# Patient Record
Sex: MALE | Race: WHITE | Employment: OTHER
[De-identification: names, ages, dates, MRNs, and addresses within clinical notes are randomized per-mention and may not be internally consistent; named-entity substitution may affect disease eponyms.]

---

## 2017-01-09 ENCOUNTER — CARE COORDINATION (OUTPATIENT)
Dept: CARE COORDINATION | Facility: CLINIC | Age: 52
End: 2017-01-09

## 2017-02-21 ENCOUNTER — CARE COORDINATION (OUTPATIENT)
Dept: CARE COORDINATION | Facility: CLINIC | Age: 52
End: 2017-02-21

## 2017-02-28 DIAGNOSIS — R94.31 ABNORMAL EKG: Primary | ICD-10-CM

## 2017-03-01 ENCOUNTER — CARE COORDINATION (OUTPATIENT)
Dept: CARE COORDINATION | Facility: CLINIC | Age: 52
End: 2017-03-01

## 2017-03-02 RX ORDER — SEVELAMER CARBONATE 800 MG/1
2 TABLET, FILM COATED ORAL
Status: ON HOLD | COMMUNITY
End: 2020-07-13

## 2017-03-02 RX ORDER — OMEPRAZOLE 20 MG/1
20 CAPSULE, DELAYED RELEASE ORAL DAILY
COMMUNITY
End: 2017-06-22 | Stop reason: ALTCHOICE

## 2017-03-02 RX ORDER — NIFEDIPINE 90 MG/1
90 TABLET, EXTENDED RELEASE ORAL DAILY
COMMUNITY
End: 2017-06-22 | Stop reason: ALTCHOICE

## 2017-03-02 RX ORDER — DOCUSATE SODIUM 100 MG/1
100 CAPSULE, LIQUID FILLED ORAL DAILY
COMMUNITY
End: 2017-06-22 | Stop reason: ALTCHOICE

## 2017-03-02 RX ORDER — MINOXIDIL 2.5 MG/1
5 TABLET ORAL DAILY
COMMUNITY
End: 2017-10-30 | Stop reason: DRUGHIGH

## 2017-03-13 RX ORDER — METOPROLOL TARTRATE 100 MG/1
TABLET ORAL
Qty: 60 TABLET | Refills: 5 | Status: SHIPPED | OUTPATIENT
Start: 2017-03-13 | End: 2017-12-05 | Stop reason: SDUPTHER

## 2017-03-16 ENCOUNTER — TELEPHONE (OUTPATIENT)
Dept: FAMILY MEDICINE CLINIC | Age: 52
End: 2017-03-16

## 2017-03-16 ENCOUNTER — CARE COORDINATION (OUTPATIENT)
Dept: CARE COORDINATION | Age: 52
End: 2017-03-16

## 2017-04-25 ENCOUNTER — TELEPHONE (OUTPATIENT)
Dept: OTHER | Age: 52
End: 2017-04-25

## 2017-04-26 ENCOUNTER — CARE COORDINATION (OUTPATIENT)
Dept: CARE COORDINATION | Age: 52
End: 2017-04-26

## 2017-04-27 ENCOUNTER — CARE COORDINATION (OUTPATIENT)
Dept: CARE COORDINATION | Age: 52
End: 2017-04-27

## 2017-05-12 ENCOUNTER — CARE COORDINATION (OUTPATIENT)
Dept: CARE COORDINATION | Age: 52
End: 2017-05-12

## 2017-06-07 ENCOUNTER — ANESTHESIA (OUTPATIENT)
Dept: OPERATING ROOM | Age: 52
End: 2017-06-07
Payer: MEDICARE

## 2017-06-07 ENCOUNTER — HOSPITAL ENCOUNTER (OUTPATIENT)
Age: 52
Setting detail: OUTPATIENT SURGERY
Discharge: HOME | End: 2017-06-07
Attending: SURGERY | Admitting: SURGERY
Payer: MEDICARE

## 2017-06-07 ENCOUNTER — ANESTHESIA EVENT (OUTPATIENT)
Dept: OPERATING ROOM | Age: 52
End: 2017-06-07
Payer: MEDICARE

## 2017-06-07 VITALS — DIASTOLIC BLOOD PRESSURE: 82 MMHG | TEMPERATURE: 96.8 F | SYSTOLIC BLOOD PRESSURE: 138 MMHG | OXYGEN SATURATION: 100 %

## 2017-06-07 VITALS
HEART RATE: 72 BPM | SYSTOLIC BLOOD PRESSURE: 173 MMHG | OXYGEN SATURATION: 97 % | BODY MASS INDEX: 26.48 KG/M2 | WEIGHT: 185 LBS | HEIGHT: 70 IN | TEMPERATURE: 97.5 F | DIASTOLIC BLOOD PRESSURE: 96 MMHG | RESPIRATION RATE: 16 BRPM

## 2017-06-07 LAB
ABSOLUTE EOS #: 0.1 K/UL (ref 0–0.4)
ABSOLUTE LYMPH #: 0.5 K/UL (ref 1–4.8)
ABSOLUTE MONO #: 0.5 K/UL (ref 0.1–1.3)
ANION GAP SERPL CALCULATED.3IONS-SCNC: 23 MMOL/L (ref 9–17)
BASOPHILS # BLD: 1 %
BASOPHILS ABSOLUTE: 0 K/UL (ref 0–0.2)
BUN BLDV-MCNC: 84 MG/DL (ref 6–20)
BUN/CREAT BLD: ABNORMAL (ref 9–20)
CALCIUM SERPL-MCNC: 9.3 MG/DL (ref 8.6–10.4)
CHLORIDE BLD-SCNC: 95 MMOL/L (ref 98–107)
CO2: 18 MMOL/L (ref 20–31)
CREAT SERPL-MCNC: 10.72 MG/DL (ref 0.7–1.2)
DIFFERENTIAL TYPE: ABNORMAL
EOSINOPHILS RELATIVE PERCENT: 1 %
GFR AFRICAN AMERICAN: 6 ML/MIN
GFR NON-AFRICAN AMERICAN: 5 ML/MIN
GFR SERPL CREATININE-BSD FRML MDRD: ABNORMAL ML/MIN/{1.73_M2}
GFR SERPL CREATININE-BSD FRML MDRD: ABNORMAL ML/MIN/{1.73_M2}
GLUCOSE BLD-MCNC: 191 MG/DL (ref 75–110)
GLUCOSE BLD-MCNC: 232 MG/DL (ref 75–110)
GLUCOSE BLD-MCNC: 242 MG/DL (ref 70–99)
HCT VFR BLD CALC: 27.9 % (ref 41–53)
HEMOGLOBIN: 9.2 G/DL (ref 13.5–17.5)
LYMPHOCYTES # BLD: 8 %
MCH RBC QN AUTO: 29.5 PG (ref 26–34)
MCHC RBC AUTO-ENTMCNC: 32.9 G/DL (ref 31–37)
MCV RBC AUTO: 89.5 FL (ref 80–100)
MONOCYTES # BLD: 7 %
PDW BLD-RTO: 14.4 % (ref 11.5–14.9)
PLATELET # BLD: 119 K/UL (ref 150–450)
PLATELET ESTIMATE: ABNORMAL
PMV BLD AUTO: 6.9 FL (ref 6–12)
POTASSIUM SERPL-SCNC: 4.9 MMOL/L (ref 3.7–5.3)
RBC # BLD: 3.12 M/UL (ref 4.5–5.9)
RBC # BLD: ABNORMAL 10*6/UL
SEG NEUTROPHILS: 83 %
SEGMENTED NEUTROPHILS ABSOLUTE COUNT: 5.4 K/UL (ref 1.3–9.1)
SODIUM BLD-SCNC: 136 MMOL/L (ref 135–144)
WBC # BLD: 6.5 K/UL (ref 3.5–11)
WBC # BLD: ABNORMAL 10*3/UL

## 2017-06-07 PROCEDURE — 2500000003 HC RX 250 WO HCPCS: Performed by: NURSE ANESTHETIST, CERTIFIED REGISTERED

## 2017-06-07 PROCEDURE — 7100000001 HC PACU RECOVERY - ADDTL 15 MIN: Performed by: SURGERY

## 2017-06-07 PROCEDURE — 36415 COLL VENOUS BLD VENIPUNCTURE: CPT

## 2017-06-07 PROCEDURE — 3700000001 HC ADD 15 MINUTES (ANESTHESIA): Performed by: SURGERY

## 2017-06-07 PROCEDURE — 82947 ASSAY GLUCOSE BLOOD QUANT: CPT

## 2017-06-07 PROCEDURE — 6360000002 HC RX W HCPCS: Performed by: NURSE ANESTHETIST, CERTIFIED REGISTERED

## 2017-06-07 PROCEDURE — 2580000003 HC RX 258: Performed by: SURGERY

## 2017-06-07 PROCEDURE — 6370000000 HC RX 637 (ALT 250 FOR IP): Performed by: ANESTHESIOLOGY

## 2017-06-07 PROCEDURE — 2500000003 HC RX 250 WO HCPCS: Performed by: SURGERY

## 2017-06-07 PROCEDURE — 7100000000 HC PACU RECOVERY - FIRST 15 MIN: Performed by: SURGERY

## 2017-06-07 PROCEDURE — 7100000031 HC ASPR PHASE II RECOVERY - ADDTL 15 MIN: Performed by: SURGERY

## 2017-06-07 PROCEDURE — 3700000000 HC ANESTHESIA ATTENDED CARE: Performed by: SURGERY

## 2017-06-07 PROCEDURE — 3600000002 HC SURGERY LEVEL 2 BASE: Performed by: SURGERY

## 2017-06-07 PROCEDURE — 85025 COMPLETE CBC W/AUTO DIFF WBC: CPT

## 2017-06-07 PROCEDURE — 80048 BASIC METABOLIC PNL TOTAL CA: CPT

## 2017-06-07 PROCEDURE — 3600000012 HC SURGERY LEVEL 2 ADDTL 15MIN: Performed by: SURGERY

## 2017-06-07 PROCEDURE — 7100000030 HC ASPR PHASE II RECOVERY - FIRST 15 MIN: Performed by: SURGERY

## 2017-06-07 RX ORDER — LABETALOL HYDROCHLORIDE 5 MG/ML
5 INJECTION, SOLUTION INTRAVENOUS EVERY 10 MIN PRN
Status: DISCONTINUED | OUTPATIENT
Start: 2017-06-07 | End: 2017-06-07 | Stop reason: HOSPADM

## 2017-06-07 RX ORDER — FENTANYL CITRATE 50 UG/ML
INJECTION, SOLUTION INTRAMUSCULAR; INTRAVENOUS PRN
Status: DISCONTINUED | OUTPATIENT
Start: 2017-06-07 | End: 2017-06-07 | Stop reason: SDUPTHER

## 2017-06-07 RX ORDER — ONDANSETRON 2 MG/ML
INJECTION INTRAMUSCULAR; INTRAVENOUS PRN
Status: DISCONTINUED | OUTPATIENT
Start: 2017-06-07 | End: 2017-06-07 | Stop reason: SDUPTHER

## 2017-06-07 RX ORDER — DEXTROSE MONOHYDRATE 25 G/50ML
12.5 INJECTION, SOLUTION INTRAVENOUS PRN
Status: DISCONTINUED | OUTPATIENT
Start: 2017-06-07 | End: 2017-06-07 | Stop reason: HOSPADM

## 2017-06-07 RX ORDER — LIDOCAINE HYDROCHLORIDE 10 MG/ML
INJECTION, SOLUTION EPIDURAL; INFILTRATION; INTRACAUDAL; PERINEURAL PRN
Status: DISCONTINUED | OUTPATIENT
Start: 2017-06-07 | End: 2017-06-07 | Stop reason: SDUPTHER

## 2017-06-07 RX ORDER — DIPHENHYDRAMINE HYDROCHLORIDE 50 MG/ML
12.5 INJECTION INTRAMUSCULAR; INTRAVENOUS
Status: DISCONTINUED | OUTPATIENT
Start: 2017-06-07 | End: 2017-06-07 | Stop reason: HOSPADM

## 2017-06-07 RX ORDER — MORPHINE SULFATE 2 MG/ML
2 INJECTION, SOLUTION INTRAMUSCULAR; INTRAVENOUS EVERY 5 MIN PRN
Status: DISCONTINUED | OUTPATIENT
Start: 2017-06-07 | End: 2017-06-07 | Stop reason: HOSPADM

## 2017-06-07 RX ORDER — DEXTROSE MONOHYDRATE 50 MG/ML
100 INJECTION, SOLUTION INTRAVENOUS PRN
Status: DISCONTINUED | OUTPATIENT
Start: 2017-06-07 | End: 2017-06-07 | Stop reason: HOSPADM

## 2017-06-07 RX ORDER — SODIUM CHLORIDE 9 MG/ML
INJECTION, SOLUTION INTRAVENOUS CONTINUOUS
Status: DISCONTINUED | OUTPATIENT
Start: 2017-06-07 | End: 2017-06-07 | Stop reason: HOSPADM

## 2017-06-07 RX ORDER — PROPOFOL 10 MG/ML
INJECTION, EMULSION INTRAVENOUS PRN
Status: DISCONTINUED | OUTPATIENT
Start: 2017-06-07 | End: 2017-06-07 | Stop reason: SDUPTHER

## 2017-06-07 RX ORDER — MIDAZOLAM HYDROCHLORIDE 1 MG/ML
INJECTION INTRAMUSCULAR; INTRAVENOUS PRN
Status: DISCONTINUED | OUTPATIENT
Start: 2017-06-07 | End: 2017-06-07 | Stop reason: SDUPTHER

## 2017-06-07 RX ORDER — ONDANSETRON 2 MG/ML
4 INJECTION INTRAMUSCULAR; INTRAVENOUS
Status: DISCONTINUED | OUTPATIENT
Start: 2017-06-07 | End: 2017-06-07 | Stop reason: HOSPADM

## 2017-06-07 RX ORDER — CEFAZOLIN SODIUM 1 G/3ML
INJECTION, POWDER, FOR SOLUTION INTRAMUSCULAR; INTRAVENOUS PRN
Status: DISCONTINUED | OUTPATIENT
Start: 2017-06-07 | End: 2017-06-07 | Stop reason: SDUPTHER

## 2017-06-07 RX ORDER — NICOTINE POLACRILEX 4 MG
15 LOZENGE BUCCAL PRN
Status: DISCONTINUED | OUTPATIENT
Start: 2017-06-07 | End: 2017-06-07 | Stop reason: HOSPADM

## 2017-06-07 RX ORDER — BUPIVACAINE HYDROCHLORIDE 5 MG/ML
INJECTION, SOLUTION EPIDURAL; INTRACAUDAL PRN
Status: DISCONTINUED | OUTPATIENT
Start: 2017-06-07 | End: 2017-06-07 | Stop reason: HOSPADM

## 2017-06-07 RX ORDER — DEXAMETHASONE SODIUM PHOSPHATE 4 MG/ML
INJECTION, SOLUTION INTRA-ARTICULAR; INTRALESIONAL; INTRAMUSCULAR; INTRAVENOUS; SOFT TISSUE PRN
Status: DISCONTINUED | OUTPATIENT
Start: 2017-06-07 | End: 2017-06-07 | Stop reason: SDUPTHER

## 2017-06-07 RX ADMIN — LIDOCAINE HYDROCHLORIDE 40 MG: 10 INJECTION, SOLUTION EPIDURAL; INFILTRATION; INTRACAUDAL; PERINEURAL at 13:01

## 2017-06-07 RX ADMIN — SODIUM CHLORIDE: 9 INJECTION, SOLUTION INTRAVENOUS at 12:56

## 2017-06-07 RX ADMIN — ONDANSETRON 4 MG: 2 INJECTION INTRAMUSCULAR; INTRAVENOUS at 13:05

## 2017-06-07 RX ADMIN — FENTANYL CITRATE 50 MCG: 50 INJECTION, SOLUTION INTRAMUSCULAR; INTRAVENOUS at 13:08

## 2017-06-07 RX ADMIN — MIDAZOLAM 2 MG: 1 INJECTION INTRAMUSCULAR; INTRAVENOUS at 12:55

## 2017-06-07 RX ADMIN — FENTANYL CITRATE 50 MCG: 50 INJECTION, SOLUTION INTRAMUSCULAR; INTRAVENOUS at 13:01

## 2017-06-07 RX ADMIN — CEFAZOLIN 2000 MG: 330 INJECTION, POWDER, FOR SOLUTION INTRAMUSCULAR; INTRAVENOUS at 12:56

## 2017-06-07 RX ADMIN — DEXAMETHASONE SODIUM PHOSPHATE 4 MG: 4 INJECTION, SOLUTION INTRAMUSCULAR; INTRAVENOUS at 13:05

## 2017-06-07 RX ADMIN — INSULIN HUMAN 5 UNITS: 100 INJECTION, SOLUTION PARENTERAL at 12:25

## 2017-06-07 RX ADMIN — PROPOFOL 30 MG: 10 INJECTION, EMULSION INTRAVENOUS at 13:01

## 2017-06-07 RX ADMIN — PROPOFOL 30 MG: 10 INJECTION, EMULSION INTRAVENOUS at 13:12

## 2017-06-07 ASSESSMENT — PAIN SCALES - GENERAL
PAINLEVEL_OUTOF10: 0

## 2017-06-07 ASSESSMENT — PAIN - FUNCTIONAL ASSESSMENT: PAIN_FUNCTIONAL_ASSESSMENT: 0-10

## 2017-06-12 ENCOUNTER — CARE COORDINATION (OUTPATIENT)
Dept: CARE COORDINATION | Age: 52
End: 2017-06-12

## 2017-06-19 ENCOUNTER — CARE COORDINATION (OUTPATIENT)
Dept: CARE COORDINATION | Age: 52
End: 2017-06-19

## 2017-06-20 ENCOUNTER — CARE COORDINATION (OUTPATIENT)
Dept: CARE COORDINATION | Age: 52
End: 2017-06-20

## 2017-06-22 ENCOUNTER — CARE COORDINATOR VISIT (OUTPATIENT)
Dept: CARE COORDINATION | Age: 52
End: 2017-06-22

## 2017-06-22 ENCOUNTER — OFFICE VISIT (OUTPATIENT)
Dept: FAMILY MEDICINE CLINIC | Age: 52
End: 2017-06-22
Payer: MEDICARE

## 2017-06-22 VITALS
BODY MASS INDEX: 28.12 KG/M2 | RESPIRATION RATE: 18 BRPM | OXYGEN SATURATION: 96 % | DIASTOLIC BLOOD PRESSURE: 82 MMHG | TEMPERATURE: 97.9 F | HEIGHT: 70 IN | HEART RATE: 65 BPM | SYSTOLIC BLOOD PRESSURE: 132 MMHG | WEIGHT: 196.4 LBS

## 2017-06-22 DIAGNOSIS — I13.2 MALIGNANT HYPERTENSION WITH HEART FAILURE AND END-STAGE RENAL DIS (HCC): ICD-10-CM

## 2017-06-22 DIAGNOSIS — E11.22 TYPE 2 DIABETES MELLITUS WITH CHRONIC KIDNEY DISEASE ON CHRONIC DIALYSIS, WITHOUT LONG-TERM CURRENT USE OF INSULIN (HCC): ICD-10-CM

## 2017-06-22 DIAGNOSIS — N18.6 MALIGNANT HYPERTENSION WITH HEART FAILURE AND END-STAGE RENAL DIS (HCC): ICD-10-CM

## 2017-06-22 DIAGNOSIS — R42 DIZZINESS: ICD-10-CM

## 2017-06-22 DIAGNOSIS — Z99.2 TYPE 2 DIABETES MELLITUS WITH CHRONIC KIDNEY DISEASE ON CHRONIC DIALYSIS, WITHOUT LONG-TERM CURRENT USE OF INSULIN (HCC): ICD-10-CM

## 2017-06-22 DIAGNOSIS — K86.0 ALCOHOL-INDUCED CHRONIC PANCREATITIS (HCC): ICD-10-CM

## 2017-06-22 DIAGNOSIS — Z99.2 ESRD ON DIALYSIS (HCC): ICD-10-CM
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
## 2017-06-22 DIAGNOSIS — N18.6 TYPE 2 DIABETES MELLITUS WITH CHRONIC KIDNEY DISEASE ON CHRONIC DIALYSIS, WITHOUT LONG-TERM CURRENT USE OF INSULIN (HCC): ICD-10-CM

## 2017-06-22 DIAGNOSIS — K40.90 LEFT INGUINAL HERNIA: Primary | ICD-10-CM

## 2017-06-22 DIAGNOSIS — N18.6 ESRD ON DIALYSIS (HCC): ICD-10-CM

## 2017-06-22 PROCEDURE — G8427 DOCREV CUR MEDS BY ELIG CLIN: HCPCS | Performed by: NURSE PRACTITIONER

## 2017-06-22 PROCEDURE — 99215 OFFICE O/P EST HI 40 MIN: CPT | Performed by: NURSE PRACTITIONER

## 2017-06-22 PROCEDURE — 3046F HEMOGLOBIN A1C LEVEL >9.0%: CPT | Performed by: NURSE PRACTITIONER

## 2017-06-22 PROCEDURE — 1036F TOBACCO NON-USER: CPT | Performed by: NURSE PRACTITIONER

## 2017-06-22 PROCEDURE — G8419 CALC BMI OUT NRM PARAM NOF/U: HCPCS | Performed by: NURSE PRACTITIONER

## 2017-06-23 RX ORDER — HYDROCODONE BITARTRATE AND ACETAMINOPHEN 5; 325 MG/1; MG/1
1 TABLET ORAL EVERY 8 HOURS PRN
Qty: 9 TABLET | Refills: 0 | Status: SHIPPED | OUTPATIENT
Start: 2017-06-23 | End: 2017-06-30

## 2017-07-06 ASSESSMENT — ENCOUNTER SYMPTOMS
VISUAL CHANGE: 0
CHEST TIGHTNESS: 0
ABDOMINAL DISTENTION: 0
COUGH: 0
ABDOMINAL PAIN: 1
EYE ITCHING: 0
BOWEL INCONTINENCE: 0
EYE PAIN: 0
CONSTIPATION: 0
COLOR CHANGE: 0
DIARRHEA: 0
SORE THROAT: 0
WHEEZING: 0
SINUS PRESSURE: 0
BLOOD IN STOOL: 0
NAUSEA: 1
SHORTNESS OF BREATH: 0

## 2017-07-06 ASSESSMENT — CROHNS DISEASE ACTIVITY INDEX (CDAI): CDAI SCORE: 0

## 2017-07-11 ENCOUNTER — CARE COORDINATION (OUTPATIENT)
Dept: CARE COORDINATION | Age: 52
End: 2017-07-11

## 2017-07-12 ENCOUNTER — CARE COORDINATION (OUTPATIENT)
Dept: CARE COORDINATION | Age: 52
End: 2017-07-12

## 2017-07-25 ENCOUNTER — CARE COORDINATION (OUTPATIENT)
Dept: CARE COORDINATION | Age: 52
End: 2017-07-25

## 2017-07-28 ENCOUNTER — CARE COORDINATION (OUTPATIENT)
Dept: CARE COORDINATION | Age: 52
End: 2017-07-28

## 2017-07-31 ENCOUNTER — CARE COORDINATION (OUTPATIENT)
Dept: CARE COORDINATION | Age: 52
End: 2017-07-31

## 2017-07-31 DIAGNOSIS — R93.0 ABNORMAL CT SCAN OF HEAD: Primary | ICD-10-CM

## 2017-08-07 ENCOUNTER — CARE COORDINATOR VISIT (OUTPATIENT)
Dept: CARE COORDINATION | Age: 52
End: 2017-08-07

## 2017-08-07 ENCOUNTER — OFFICE VISIT (OUTPATIENT)
Dept: FAMILY MEDICINE CLINIC | Age: 52
End: 2017-08-07
Payer: MEDICARE

## 2017-08-07 VITALS
HEART RATE: 56 BPM | OXYGEN SATURATION: 99 % | BODY MASS INDEX: 27.69 KG/M2 | WEIGHT: 193.4 LBS | DIASTOLIC BLOOD PRESSURE: 68 MMHG | SYSTOLIC BLOOD PRESSURE: 142 MMHG | HEIGHT: 70 IN

## 2017-08-07 DIAGNOSIS — I10 ESSENTIAL HYPERTENSION: Primary | ICD-10-CM

## 2017-08-07 DIAGNOSIS — Z12.11 ENCOUNTER FOR FIT (FECAL IMMUNOCHEMICAL TEST) SCREENING: ICD-10-CM

## 2017-08-07 DIAGNOSIS — E11.22 TYPE 2 DIABETES MELLITUS WITH CHRONIC KIDNEY DISEASE ON CHRONIC DIALYSIS, WITHOUT LONG-TERM CURRENT USE OF INSULIN (HCC): ICD-10-CM

## 2017-08-07 DIAGNOSIS — N18.6 ESRD (END STAGE RENAL DISEASE) ON DIALYSIS (HCC): ICD-10-CM

## 2017-08-07 DIAGNOSIS — Z99.2 TYPE 2 DIABETES MELLITUS WITH CHRONIC KIDNEY DISEASE ON CHRONIC DIALYSIS, WITHOUT LONG-TERM CURRENT USE OF INSULIN (HCC): ICD-10-CM

## 2017-08-07 DIAGNOSIS — Z99.2 ESRD (END STAGE RENAL DISEASE) ON DIALYSIS (HCC): ICD-10-CM

## 2017-08-07 DIAGNOSIS — N18.6 TYPE 2 DIABETES MELLITUS WITH CHRONIC KIDNEY DISEASE ON CHRONIC DIALYSIS, WITHOUT LONG-TERM CURRENT USE OF INSULIN (HCC): ICD-10-CM

## 2017-08-07 LAB — HBA1C MFR BLD: 7.5 %

## 2017-08-07 PROCEDURE — 83036 HEMOGLOBIN GLYCOSYLATED A1C: CPT | Performed by: NURSE PRACTITIONER

## 2017-08-07 PROCEDURE — 1036F TOBACCO NON-USER: CPT | Performed by: NURSE PRACTITIONER

## 2017-08-07 PROCEDURE — G8427 DOCREV CUR MEDS BY ELIG CLIN: HCPCS | Performed by: NURSE PRACTITIONER

## 2017-08-07 PROCEDURE — 3046F HEMOGLOBIN A1C LEVEL >9.0%: CPT | Performed by: NURSE PRACTITIONER

## 2017-08-07 PROCEDURE — 3017F COLORECTAL CA SCREEN DOC REV: CPT | Performed by: NURSE PRACTITIONER

## 2017-08-07 PROCEDURE — 99214 OFFICE O/P EST MOD 30 MIN: CPT | Performed by: NURSE PRACTITIONER

## 2017-08-07 PROCEDURE — G8419 CALC BMI OUT NRM PARAM NOF/U: HCPCS | Performed by: NURSE PRACTITIONER

## 2017-08-07 RX ORDER — NIFEDIPINE 30 MG/1
TABLET, EXTENDED RELEASE ORAL
COMMUNITY
Start: 2017-08-02 | End: 2017-09-21 | Stop reason: ALTCHOICE

## 2017-08-07 RX ORDER — BLOOD-GLUCOSE METER
1 KIT MISCELLANEOUS DAILY
Qty: 1 KIT | Refills: 0 | Status: SHIPPED | OUTPATIENT
Start: 2017-08-07

## 2017-08-07 ASSESSMENT — ENCOUNTER SYMPTOMS
NAUSEA: 0
SORE THROAT: 0
BLOOD IN STOOL: 0
DIARRHEA: 0
CONSTIPATION: 0
COUGH: 0
WHEEZING: 0
CHEST TIGHTNESS: 0
COLOR CHANGE: 0
SINUS PRESSURE: 0
ABDOMINAL PAIN: 0
SHORTNESS OF BREATH: 0
ABDOMINAL DISTENTION: 0
EYE ITCHING: 0
EYE PAIN: 0

## 2017-08-07 ASSESSMENT — PATIENT HEALTH QUESTIONNAIRE - PHQ9
SUM OF ALL RESPONSES TO PHQ QUESTIONS 1-9: 0
1. LITTLE INTEREST OR PLEASURE IN DOING THINGS: 0
SUM OF ALL RESPONSES TO PHQ9 QUESTIONS 1 & 2: 0
2. FEELING DOWN, DEPRESSED OR HOPELESS: 0

## 2017-08-16 ENCOUNTER — HOSPITAL ENCOUNTER (OUTPATIENT)
Age: 52
Setting detail: SPECIMEN
Discharge: HOME | End: 2017-08-16
Payer: MEDICARE

## 2017-08-16 ENCOUNTER — TELEPHONE (OUTPATIENT)
Dept: FAMILY MEDICINE CLINIC | Age: 52
End: 2017-08-16

## 2017-08-16 DIAGNOSIS — N18.6 TYPE 2 DIABETES MELLITUS WITH CHRONIC KIDNEY DISEASE ON CHRONIC DIALYSIS, WITH LONG-TERM CURRENT USE OF INSULIN (HCC): Primary | ICD-10-CM

## 2017-08-16 DIAGNOSIS — E11.22 TYPE 2 DIABETES MELLITUS WITH CHRONIC KIDNEY DISEASE ON CHRONIC DIALYSIS, WITH LONG-TERM CURRENT USE OF INSULIN (HCC): Primary | ICD-10-CM

## 2017-08-16 DIAGNOSIS — Z99.2 TYPE 2 DIABETES MELLITUS WITH CHRONIC KIDNEY DISEASE ON CHRONIC DIALYSIS, WITHOUT LONG-TERM CURRENT USE OF INSULIN (HCC): Primary | ICD-10-CM

## 2017-08-16 DIAGNOSIS — Z99.2 TYPE 2 DIABETES MELLITUS WITH CHRONIC KIDNEY DISEASE ON CHRONIC DIALYSIS, WITH LONG-TERM CURRENT USE OF INSULIN (HCC): ICD-10-CM

## 2017-08-16 DIAGNOSIS — Z79.4 TYPE 2 DIABETES MELLITUS WITH CHRONIC KIDNEY DISEASE ON CHRONIC DIALYSIS, WITH LONG-TERM CURRENT USE OF INSULIN (HCC): Primary | ICD-10-CM

## 2017-08-16 DIAGNOSIS — Z99.2 TYPE 2 DIABETES MELLITUS WITH CHRONIC KIDNEY DISEASE ON CHRONIC DIALYSIS, WITH LONG-TERM CURRENT USE OF INSULIN (HCC): Primary | ICD-10-CM

## 2017-08-16 DIAGNOSIS — E11.22 TYPE 2 DIABETES MELLITUS WITH CHRONIC KIDNEY DISEASE ON CHRONIC DIALYSIS, WITH LONG-TERM CURRENT USE OF INSULIN (HCC): ICD-10-CM

## 2017-08-16 DIAGNOSIS — N18.6 TYPE 2 DIABETES MELLITUS WITH CHRONIC KIDNEY DISEASE ON CHRONIC DIALYSIS, WITH LONG-TERM CURRENT USE OF INSULIN (HCC): ICD-10-CM

## 2017-08-16 DIAGNOSIS — Z79.4 TYPE 2 DIABETES MELLITUS WITH CHRONIC KIDNEY DISEASE ON CHRONIC DIALYSIS, WITH LONG-TERM CURRENT USE OF INSULIN (HCC): ICD-10-CM

## 2017-08-16 DIAGNOSIS — N18.6 TYPE 2 DIABETES MELLITUS WITH CHRONIC KIDNEY DISEASE ON CHRONIC DIALYSIS, WITHOUT LONG-TERM CURRENT USE OF INSULIN (HCC): Primary | ICD-10-CM

## 2017-08-16 DIAGNOSIS — E11.22 TYPE 2 DIABETES MELLITUS WITH CHRONIC KIDNEY DISEASE ON CHRONIC DIALYSIS, WITHOUT LONG-TERM CURRENT USE OF INSULIN (HCC): Primary | ICD-10-CM

## 2017-08-16 LAB
CREATININE URINE: 53 MG/DL (ref 39–259)
MICROALBUMIN/CREAT 24H UR: 1394 MG/L
MICROALBUMIN/CREAT UR-RTO: 2630 MCG/MG CREAT

## 2017-08-21 ENCOUNTER — CARE COORDINATION (OUTPATIENT)
Dept: CARE COORDINATION | Age: 52
End: 2017-08-21

## 2017-08-22 ENCOUNTER — TELEPHONE (OUTPATIENT)
Dept: FAMILY MEDICINE CLINIC | Age: 52
End: 2017-08-22

## 2017-08-24 ENCOUNTER — CARE COORDINATION (OUTPATIENT)
Dept: CARE COORDINATION | Age: 52
End: 2017-08-24

## 2017-08-29 ENCOUNTER — CARE COORDINATION (OUTPATIENT)
Dept: CARE COORDINATION | Age: 52
End: 2017-08-29

## 2017-09-12 ENCOUNTER — CARE COORDINATION (OUTPATIENT)
Dept: FAMILY MEDICINE CLINIC | Age: 52
End: 2017-09-12

## 2017-09-20 ENCOUNTER — CARE COORDINATION (OUTPATIENT)
Dept: CARE COORDINATION | Age: 52
End: 2017-09-20

## 2017-09-20 ENCOUNTER — TELEPHONE (OUTPATIENT)
Dept: FAMILY MEDICINE CLINIC | Age: 52
End: 2017-09-20

## 2017-09-20 NOTE — TELEPHONE ENCOUNTER
Smith Newman was discharged from [facility] 09/16/17 and has a f/u appt with Brisa Mcallister on 09/21/17, please request records for this appt.   Thanks

## 2017-09-21 ENCOUNTER — OFFICE VISIT (OUTPATIENT)
Dept: FAMILY MEDICINE CLINIC | Age: 52
End: 2017-09-21
Payer: MEDICARE

## 2017-09-21 ENCOUNTER — CARE COORDINATOR VISIT (OUTPATIENT)
Dept: CARE COORDINATION | Age: 52
End: 2017-09-21

## 2017-09-21 VITALS
BODY MASS INDEX: 26.05 KG/M2 | TEMPERATURE: 98.4 F | WEIGHT: 182 LBS | SYSTOLIC BLOOD PRESSURE: 134 MMHG | HEART RATE: 63 BPM | RESPIRATION RATE: 20 BRPM | OXYGEN SATURATION: 97 % | DIASTOLIC BLOOD PRESSURE: 82 MMHG | HEIGHT: 70 IN

## 2017-09-21 DIAGNOSIS — A41.01 SEPSIS DUE TO METHICILLIN SUSCEPTIBLE STAPHYLOCOCCUS AUREUS (HCC): ICD-10-CM

## 2017-09-21 DIAGNOSIS — T82.7XXS: ICD-10-CM

## 2017-09-21 DIAGNOSIS — N18.6 TYPE 2 DIABETES MELLITUS WITH CHRONIC KIDNEY DISEASE ON CHRONIC DIALYSIS, WITH LONG-TERM CURRENT USE OF INSULIN (HCC): ICD-10-CM

## 2017-09-21 DIAGNOSIS — Z99.2 TYPE 2 DIABETES MELLITUS WITH CHRONIC KIDNEY DISEASE ON CHRONIC DIALYSIS, WITH LONG-TERM CURRENT USE OF INSULIN (HCC): ICD-10-CM

## 2017-09-21 DIAGNOSIS — Z79.4 TYPE 2 DIABETES MELLITUS WITH CHRONIC KIDNEY DISEASE ON CHRONIC DIALYSIS, WITH LONG-TERM CURRENT USE OF INSULIN (HCC): ICD-10-CM

## 2017-09-21 DIAGNOSIS — E11.22 TYPE 2 DIABETES MELLITUS WITH CHRONIC KIDNEY DISEASE ON CHRONIC DIALYSIS, WITH LONG-TERM CURRENT USE OF INSULIN (HCC): ICD-10-CM

## 2017-09-21 DIAGNOSIS — R10.84 GENERALIZED ABDOMINAL PAIN: Primary | ICD-10-CM

## 2017-09-21 PROCEDURE — 3046F HEMOGLOBIN A1C LEVEL >9.0%: CPT | Performed by: NURSE PRACTITIONER

## 2017-09-21 PROCEDURE — 1036F TOBACCO NON-USER: CPT | Performed by: NURSE PRACTITIONER

## 2017-09-21 PROCEDURE — 3017F COLORECTAL CA SCREEN DOC REV: CPT | Performed by: NURSE PRACTITIONER

## 2017-09-21 PROCEDURE — G8417 CALC BMI ABV UP PARAM F/U: HCPCS | Performed by: NURSE PRACTITIONER

## 2017-09-21 PROCEDURE — G8427 DOCREV CUR MEDS BY ELIG CLIN: HCPCS | Performed by: NURSE PRACTITIONER

## 2017-09-21 PROCEDURE — 99214 OFFICE O/P EST MOD 30 MIN: CPT | Performed by: NURSE PRACTITIONER

## 2017-09-21 RX ORDER — DOXYCYCLINE 100 MG/1
100 TABLET ORAL 2 TIMES DAILY
COMMUNITY
Start: 2017-09-18 | End: 2017-09-27

## 2017-09-21 RX ORDER — LINEZOLID 600 MG/1
600 TABLET, FILM COATED ORAL 2 TIMES DAILY
COMMUNITY
Start: 2017-09-18 | End: 2017-09-27

## 2017-09-21 ASSESSMENT — PATIENT HEALTH QUESTIONNAIRE - PHQ9
2. FEELING DOWN, DEPRESSED OR HOPELESS: 1
5. POOR APPETITE OR OVEREATING: 0
1. LITTLE INTEREST OR PLEASURE IN DOING THINGS: 0
SUM OF ALL RESPONSES TO PHQ QUESTIONS 1-9: 2
10. IF YOU CHECKED OFF ANY PROBLEMS, HOW DIFFICULT HAVE THESE PROBLEMS MADE IT FOR YOU TO DO YOUR WORK, TAKE CARE OF THINGS AT HOME, OR GET ALONG WITH OTHER PEOPLE: 0
SUM OF ALL RESPONSES TO PHQ9 QUESTIONS 1 & 2: 1
6. FEELING BAD ABOUT YOURSELF - OR THAT YOU ARE A FAILURE OR HAVE LET YOURSELF OR YOUR FAMILY DOWN: 0
3. TROUBLE FALLING OR STAYING ASLEEP: 0
4. FEELING TIRED OR HAVING LITTLE ENERGY: 1
7. TROUBLE CONCENTRATING ON THINGS, SUCH AS READING THE NEWSPAPER OR WATCHING TELEVISION: 0
8. MOVING OR SPEAKING SO SLOWLY THAT OTHER PEOPLE COULD HAVE NOTICED. OR THE OPPOSITE, BEING SO FIGETY OR RESTLESS THAT YOU HAVE BEEN MOVING AROUND A LOT MORE THAN USUAL: 0
9. THOUGHTS THAT YOU WOULD BE BETTER OFF DEAD, OR OF HURTING YOURSELF: 0

## 2017-09-21 NOTE — MR AVS SNAPSHOT
After Visit Summary             Teagan Thomas   2017 10:00 AM   Office Visit    Description:  Male : 1965   Provider: Maria Esther Michael CNP   Department:  [facility] and Future Appointments         Below is a list of your follow-up and future appointments. This may not be a complete list as you may have made appointments directly with providers that we are not aware of or your providers may have made some for you. Please call your providers to confirm appointments. It is important to keep your appointments. Please bring your current insurance card, photo ID, co-pay, and all medication bottles to your appointment. If self-pay, payment is expected at the time of service. Your To-Do List     Future Appointments Provider Department Dept Phone    2017 10:00 AM Maria Esther Michael, [facility] [phone]    Please arrive 15 minutes prior to appointment time, bring insurance card and photo ID. Follow-Up    Return in about 3 months (around 2017) for renal failure, fistula in abd. .         Information from Your Visit        Department     Name Address Phone Fax    [address] [phone]      Vital Signs     Blood Pressure Pulse Temperature Respirations Height Weight    134/82 (Site: Right Arm, Position: Sitting, Cuff Size: Medium Adult) 63 98.4 °F (36.9 °C) (Temporal) 20 5' 10\" (1.778 m) 182 lb (82.6 kg)    Oxygen Saturation Body Mass Index Smoking Status             97% 26.11 kg/m2 Never Smoker         Additional Information about your Body Mass Index (BMI)           Your BMI as listed above is considered overweight (25.0-29.9). BMI is an estimate of body fat, calculated from your height and weight.   The higher your BMI, the greater your risk of heart disease, high blood pressure, Eye Exam By An Eye Doctor 3/22/2018 (Originally 6/24/1975)    Cholesterol Screening 3/22/2018 (Originally [birth year]/24/1977)    Pneumococcal Vaccines (two) for adults aged 19-64  years who are immunocompromised or whose spleen is missing or not working  (1 of 3 - PCV13) 3/22/2018 (Originally 6/24/1986)    HIV screening is recommended for all people regardless of risk factors  aged 15-65 years at least once (lifetime) who have never been HIV tested. 4/25/2018 (Originally 6/24/1982)    Hemoglobin A1C (Test For Long-Term Glucose Control) 8/7/2018            MyChart Signup           Our records indicate that you have declined MyChart signup.

## 2017-10-01 ASSESSMENT — ENCOUNTER SYMPTOMS
BLOOD IN STOOL: 0
EYE PAIN: 0
SHORTNESS OF BREATH: 0
CONSTIPATION: 0
EYE ITCHING: 0
ABDOMINAL DISTENTION: 0
SINUS PRESSURE: 0
VISUAL CHANGE: 0
COLOR CHANGE: 0
NAUSEA: 1
CHEST TIGHTNESS: 0
ABDOMINAL PAIN: 1

## 2017-10-01 NOTE — PROGRESS NOTES
[facility], [address] [address]  Dept: [phone]  Dept Fax: [phone]    Brannon Martinez is a 50 y.o. male who presents today for his medical conditions/complaints as noted below. Brannon Martinez is c/o of Follow-Up from Hospital (Kirk Garcia  steven 09/16/2017 blood infection ); Abdominal Pain; and Fever    Benton Escamilla received counseling on the following healthy behaviors: nutrition and medication adherence  Reviewed prior labs and health maintenance. Continue current medications, diet and exercise. Discussed use, benefit, and side effects of prescribed medications. Barriers to medication compliance addressed. Patient given educational materials - see patient instructions. All patient questions answered. Patient voiced understanding. HPI:     Abdominal Pain   The current episode started 1 to 4 weeks ago. The onset quality is gradual. The problem occurs constantly. The problem has been gradually improving. The pain is located in the RUQ. The pain is at a severity of 6/10. The pain is moderate. The quality of the pain is a sensation of fullness and dull. The abdominal pain does not radiate. Associated symptoms include a fever and nausea. Pertinent negatives include no arthralgias, constipation, frequency or myalgias. Nothing aggravates the pain. The pain is relieved by nothing. Treatments tried: Went to hospital. The treatment provided moderate relief. His past medical history is significant for abdominal surgery and pancreatitis. Diabetes   He presents for his follow-up diabetic visit. He has type 2 diabetes mellitus. His disease course has been improving. There are no hypoglycemic associated symptoms. Pertinent negatives for hypoglycemia include no nervousness/anxiousness or speech difficulty. Associated symptoms include fatigue.  Pertinent negatives for diabetes include no foot ulcerations, no polydipsia, no polyphagia, no polyuria, no visual change and no weakness. There are no hypoglycemic complications. Symptoms are stable. Diabetic complications include nephropathy. Risk factors for coronary artery disease include diabetes mellitus, male sex, sedentary lifestyle and hypertension. Current diabetic treatment includes insulin injections. He is compliant with treatment some of the time. His weight is stable. He is following a generally healthy diet. Meal planning includes avoidance of concentrated sweets. He has not had a previous visit with a dietitian. He rarely participates in exercise. His home blood glucose trend is decreasing steadily. An ACE inhibitor/angiotensin II receptor blocker is contraindicated. He does not see a podiatrist.Eye exam is not current. Sepsis in AV access right jugular  This is a new problem that started approximately 2 weeks ago. Went to hospital due to abdominal pain, nausea, fever and dizziness. Was found to have MRSA in AV access. Was treated with antibiotics and right jugular was removed. A new left AV jugular access was inserted without difficulty. Is currently taking antibiotics. 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 Developed a draining sinus wound that drains green drainage all the time. Needs to be seen at [facility] to have this repaired but has had numerous complications delaying the surgery. Had abdominal pain with nausea, fever and dizziness so went to hospital where cultures were done and he was found to have MRSA. Believe that the infection is from this draining sinus in abdomen. Is currently taking antibiotics and doing dressing changes as well as applying mupirocin 3 times a day. Dr. Laure Haque to get with [facility] to get surgery set up. Hemoglobin A1C (%)   Date Value   08/07/2017 7.5   01/16/2015 11.2 (H)             ( goal A1C is < 7)   Microalb/Crt.  Ratio (mcg/mg creat)   Date Value   08/16/2017 2630 (H)     No results  mupirocin (BACTROBAN) 2 % ointment Apply 1 each topically 2 times daily Indications: to abd fistula Apply topically 3 times daily. No current facility-administered medications for this visit. No Known Allergies    Health Maintenance   Topic Date Due    Colon cancer screen colonoscopy  06/24/2017    Flu vaccine (1) 11/22/2017 (Originally 9/1/2017)    Diabetic foot exam  03/15/2018 (Originally [birth year]/24/1977)    DTaP/Tdap/Td vaccine (1 - Tdap) 03/21/2018 (Originally 6/24/1986)    Diabetic retinal exam  03/22/2018 (Originally 6/24/1977)    Lipid screen  03/22/2018 (Originally 6/24/1977)    Pneumococcal highest risk (1 of 3 - PCV13) 03/22/2018 (Originally 6/24/1986)    HIV screen  04/25/2018 (Originally 6/24/1982)    Diabetic hemoglobin A1C test  08/07/2018       Subjective:      Review of Systems   Constitutional: Positive for fatigue and fever. Negative for activity change and appetite change. HENT: Negative for hearing loss, sinus pressure and tinnitus. Eyes: Negative for pain, itching and visual disturbance. Respiratory: Negative for chest tightness and shortness of breath. Cardiovascular: Negative for palpitations and leg swelling. Has AV fistula in left upper arm that is used for dialysis. Not always used as some techs cannot access it. Gastrointestinal: Positive for abdominal pain and nausea. Negative for abdominal distention, blood in stool and constipation. Endocrine: Negative for cold intolerance, heat intolerance, polydipsia, polyphagia and polyuria. Genitourinary: Negative for flank pain, frequency and urgency. Musculoskeletal: Negative for arthralgias, gait problem and myalgias. Skin: Positive for wound (abdominal fistula. ). Negative for color change. Allergic/Immunologic: Negative for environmental allergies and food allergies. Neurological: Negative for speech difficulty, weakness and light-headedness. Hematological: Does not bruise/bleed easily.

## 2017-10-09 ENCOUNTER — CARE COORDINATION (OUTPATIENT)
Dept: CARE COORDINATION | Age: 52
End: 2017-10-09

## 2017-10-09 NOTE — CARE COORDINATION
Ambulatory Care Coordination Note  10/9/2017  CM Risk Score: 11  Tracy Mortality Risk Score:      ACC: Marcelino Walker, RN    Summary Note: Patient had hospital admission at [facility] last week for abdominal pain, also diagnosed with accelerated HTN. He was prescribed Cardizem 120 mg twice daily at discharge but he didn't fill prescription because he stated he won't take any new medications without getting ok from family doctor first. He was discharged 10/5/17 but had not called to schedule appt yet. I scheduled a follow up appt for tomorrow to discuss the medications with PCP. He stated his blood pressure is always high at [facility] because they switch him from oral clonidine to the patch which does not control his blood pressure. Abdominal pain is a little better right now. He has not heard any more from Dr. Andre Marcos office regarding a [facility] appt with surgeon. I called and spoke with Ami Thacker at that office, there are no notes about Dr. Gigi Luong speaking with a surgeon at [facility] regarding Aric Fernando. She will send a referral order today to Dr. Koby Fernandes there. Diabetes Assessment    Medic Alert ID:  No  Meal Planning:  None   How often do you test your blood sugar?:  Daily   Do you have barriers with adherence to non-pharmacologic self-management interventions?  (Nutrition/Exercise/Self-Monitoring):  No   Have you ever had to go to the ED for symptoms of low blood sugar?:  No       No patient-reported symptoms       and   Congestive Heart Failure Assessment    Are you currently restricting fluids?:  Other (Comment: 1500ml/day)  Do you understand a low sodium diet?:  Yes  Do you understand how to read food labels?:  Yes  How many restaurant meals do you eat per week?:  0  Do you salt your food before tasting it?:  No     No patient-reported symptoms      Symptoms:   None:  Yes      Symptom course:  stable  Weight trend:  stable               Care Coordination Interventions    Program Enrollment:  Complex Care  Referral from

## 2017-10-20 ENCOUNTER — CARE COORDINATION (OUTPATIENT)
Dept: CARE COORDINATION | Age: 52
End: 2017-10-20

## 2017-10-30 ENCOUNTER — CARE COORDINATION (OUTPATIENT)
Dept: CARE COORDINATION | Age: 52
End: 2017-10-30

## 2017-10-30 RX ORDER — MINOXIDIL 2.5 MG/1
5 TABLET ORAL
COMMUNITY
Start: 2017-10-20 | End: 2018-01-04

## 2017-10-30 NOTE — CARE COORDINATION
Spoke with Elaine Gannon. He had another hospital admission at [facility] 10/14-10/19 for abdominal pain, infection. He was on antibiotics. He now has an appt with a pancreatic surgeon on 11/13/17 and was told by Dr. Kelley Tijerina that Dr. Kelley Tijerina will also be at appt. He needs to have abdominal fistula repaired and infected mesh removed and then will need his right inguinal hernia repaired. Blood pressure medications were again changed, medications reviewed. Blood sugars are stable, his dialysis AVF is functioning right now without problems. Will follow. Case discussed with PCP, will not make appt right now.

## 2017-11-15 ENCOUNTER — CARE COORDINATION (OUTPATIENT)
Dept: CARE COORDINATION | Age: 52
End: 2017-11-15
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
## 2017-12-08 ENCOUNTER — CARE COORDINATION (OUTPATIENT)
Dept: CARE COORDINATION | Age: 52
End: 2017-12-08

## 2017-12-08 RX ORDER — FOLIC ACID/VIT B COMPLEX AND C 0.8 MG
TABLET ORAL
Status: ON HOLD | COMMUNITY
Start: 2017-11-30 | End: 2020-07-13

## 2017-12-08 RX ORDER — NIFEDIPINE 30 MG/1
TABLET, EXTENDED RELEASE ORAL
Refills: 1 | COMMUNITY
Start: 2017-11-06 | End: 2018-07-31 | Stop reason: ALTCHOICE

## 2017-12-08 RX ORDER — HYDROCODONE BITARTRATE AND ACETAMINOPHEN 5; 325 MG/1; MG/1
TABLET ORAL
Refills: 0 | COMMUNITY
Start: 2017-11-06 | End: 2017-12-08 | Stop reason: ALTCHOICE

## 2017-12-08 NOTE — CARE COORDINATION
Ambulatory Care Coordination Note  12/8/2017  CM Risk Score: 14  Tracy Mortality Risk Score:      ACC: Zayra Escalante RN    Summary Note: He had a fistulogram last week for problems with his left arm fistula, had a balloon angioplasty done and fistula has been functioning good since then. No other issues with dialysis, his BP has been stable, he hasn't missed any treatments. Feeling good overall, blood sugars good, did not give me any numbers but his a1c last week at [facility] was 6.2. No leg swelling or shortness of breath. His appt at [facility] for his abdominal fistula is 12/18/17 also has a PCP appt that week I reminded him of. Diabetes Assessment    Medic Alert ID:  No  Meal Planning:  None   How often do you test your blood sugar?:  Daily (Comment: twice daily before breakfast and supper)   Do you have barriers with adherence to non-pharmacologic self-management interventions?  (Nutrition/Exercise/Self-Monitoring):  No   Have you ever had to go to the ED for symptoms of low blood sugar?:  No       No patient-reported symptoms   Do you have hyperglycemia symptoms?:  No   Do you have hypoglycemia symptoms?:  No   Blood Sugar Trends:  No Change      ,   Congestive Heart Failure Assessment    Are you currently restricting fluids?:  Other (Comment: 1500ml/day)  Do you understand a low sodium diet?:  Yes  Do you understand how to read food labels?:  Yes  How many restaurant meals do you eat per week?:  0  Do you salt your food before tasting it?:  No     No patient-reported symptoms      Symptoms:   None:  Yes      Symptom course:  stable  Weight trend:  stable      and   General Assessment    Do you have any symptoms that are causing concern?:  No               Care Coordination Interventions    Program Enrollment:  Complex Care  Referral from Primary Care Provider:  Yes  Suggested Interventions and Community Resources  No Identified Needs  Pharmacist:  Declined         Goals Addressed     None          Prior to Admission medications    Medication Sig Start Date End Date Taking? Authorizing Provider   minoxidil (LONITEN) 2.5 MG tablet Take 5 mg by mouth 10/20/17 12/8/17 Yes Historical Provider, MD YODER Complex-C-Folic Acid (NEPHRO-CLAUDIA) 0.8 MG TABS  11/30/17   Historical Provider, MD   NIFEdipine (PROCARDIA XL) 30 MG extended release tablet TAKE 1 TABLET (30 MG TOTAL) BY MOUTH DAILY FOR30 DAYS. 11/6/17   Historical Provider, MD   metoprolol (LOPRESSOR) 100 MG tablet TAKE 1 TABLET BY MOUTH 2 TIMES DAILY 12/5/17   Mary Lou Cochran CNP   glucose monitoring kit (FREESTYLE) monitoring kit 1 kit by Does not apply route daily 8/7/17   Mary Lou Cochran CNP   Misc. Devices MISC Check blood sugar one time a day 8/7/17   Mary Lou Cochran CNP   insulin detemir (LEVEMIR FLEXTOUCH) 100 UNIT/ML injection pen Inject 16 Units into the skin nightly 6/23/17   Mary Lou Cochran CNP   Insulin Pen Needle 32G X 6 MM MISC 1 Package by Does not apply route daily 6/23/17   Mary Lou Cochran CNP   sevelamer (RENVELA) 800 MG tablet Take 2 tablets by mouth 3 times daily (with meals) Indications: and 1 tablet with snacks    Historical Provider, MD   mupirocin (BACTROBAN) 2 % ointment Apply 1 each topically 2 times daily Indications: to abd fistula Apply topically 3 times daily.     Historical Provider, MD       Future Appointments  Date Time Provider Britney Quigley   12/21/2017 10:00 AM FRED Delgado [facility]

## 2018-01-03 ENCOUNTER — TELEPHONE (OUTPATIENT)
Dept: FAMILY MEDICINE CLINIC | Age: 53
End: 2018-01-03

## 2018-01-04 ENCOUNTER — CARE COORDINATION (OUTPATIENT)
Dept: CARE COORDINATION | Age: 53
End: 2018-01-04

## 2018-01-04 NOTE — CARE COORDINATION
Needs  Pharmacist:  Declined         Goals Addressed             Most Recent     Care Coordination Self Management   On track (1/4/2018)             CC Self Management Goal  Patient Goal (What steps will patient take to achieve goal?): Monitor blood sugars, be adherent with diet and medications, dialysis treatments  Patient is able to discuss self-management of condition(s): DM, CRF  Pt demonstrates adherence to medications  Pt demonstrates understanding of self-monitoring  Patient is able to identify Red Flags:  Alert to potential adverse drug reactions(s) or side effects and actions to take should they arise  Discuss target symptoms and actions to take should they arise  Identify problems that require immediate PCP or specialist visit  Patient demonstrates understanding of access to PCP/Specialist:  Understands about scheduling routine Follow Up appointments   Understands about sick day appointment options for worsening of symptoms/progression (Same Day, E Visits)       Conditions and Symptoms   Improving (1/4/2018)             I will schedule office visits, as directed by my provider. I will keep my appointment or reschedule if I have to cancel. I will notify my provider of any barriers to my plan of care. I will follow my Zone Management tool to seek urgent or emergent care. I will notify my provider of any symptoms that indicate a worsening of my condition. Barriers: overwhelmed by complexity of regimen  Plan for overcoming my barriers: education, schedule appts, call nurse at PCP or dialysis offices  Confidence: 7/10  Anticipated Goal Completion Date: 3 months              Prior to Admission medications    Medication Sig Start Date End Date Taking? Authorizing Provider   B Complex-C-Folic Acid (NEPHRO-CLAUDIA) 0.8 MG TABS  11/30/17  Yes Historical Provider, MD   NIFEdipine (PROCARDIA XL) 30 MG extended release tablet TAKE 1 TABLET (30 MG TOTAL) BY MOUTH DAILY FOR30 DAYS.  11/6/17  Yes Historical Provider,

## 2018-02-05 ENCOUNTER — CARE COORDINATION (OUTPATIENT)
Dept: CARE COORDINATION | Age: 53
End: 2018-02-05

## 2018-02-05 NOTE — CARE COORDINATION
He had a hospital admission at [facility] 1/23/18-1/26/18 for pancreatitis, N/V. I called and left a voicemail message on his phone asking for a return call to me at [phone] for hospital follow up/care coordination call. No future appointments.

## 2018-02-16 ENCOUNTER — CARE COORDINATION (OUTPATIENT)
Dept: CARE COORDINATION | Age: 53
End: 2018-02-16

## 2018-02-27 ENCOUNTER — TELEPHONE (OUTPATIENT)
Dept: FAMILY MEDICINE CLINIC | Age: 53
End: 2018-02-27

## 2018-03-02 ENCOUNTER — CARE COORDINATION (OUTPATIENT)
Dept: CARE COORDINATION | Age: 53
End: 2018-03-02

## 2018-03-02 NOTE — CARE COORDINATION
Called and left a voicemail message asking Karel Forrest to call me back at [phone] for care coordination call. Reminded him to call office to schedule PCP appt. He was at [facility] 2/19-2/21 for fluid overload, had ultrafiltration treatment. He continues dialysis at [facility]. No future appointments.

## 2018-03-19 ENCOUNTER — CARE COORDINATION (OUTPATIENT)
Dept: CARE COORDINATION | Age: 53
End: 2018-03-19

## 2018-05-09 ENCOUNTER — TELEPHONE (OUTPATIENT)
Dept: FAMILY MEDICINE CLINIC | Age: 53
End: 2018-05-09

## 2018-05-14 ENCOUNTER — CARE COORDINATION (OUTPATIENT)
Dept: CARE COORDINATION | Age: 53
End: 2018-05-14

## 2018-06-08 ENCOUNTER — CARE COORDINATION (OUTPATIENT)
Dept: CARE COORDINATION | Age: 53
End: 2018-06-08

## 2018-06-13 ENCOUNTER — CARE COORDINATION (OUTPATIENT)
Dept: CARE COORDINATION | Age: 53
End: 2018-06-13

## 2018-06-15 ENCOUNTER — TELEPHONE (OUTPATIENT)
Dept: FAMILY MEDICINE CLINIC | Age: 53
End: 2018-06-15

## 2018-06-18 ENCOUNTER — OFFICE VISIT (OUTPATIENT)
Dept: FAMILY MEDICINE CLINIC | Age: 53
End: 2018-06-18
Payer: MEDICARE

## 2018-06-18 VITALS
RESPIRATION RATE: 18 BRPM | HEART RATE: 79 BPM | TEMPERATURE: 98.2 F | OXYGEN SATURATION: 96 % | BODY MASS INDEX: 27.89 KG/M2 | WEIGHT: 194.8 LBS | SYSTOLIC BLOOD PRESSURE: 192 MMHG | HEIGHT: 70 IN | DIASTOLIC BLOOD PRESSURE: 78 MMHG

## 2018-06-18 DIAGNOSIS — E11.22 TYPE 2 DIABETES MELLITUS WITH CHRONIC KIDNEY DISEASE ON CHRONIC DIALYSIS, WITH LONG-TERM CURRENT USE OF INSULIN (HCC): Primary | ICD-10-CM

## 2018-06-18 DIAGNOSIS — N18.6: ICD-10-CM

## 2018-06-18 DIAGNOSIS — Z79.4 TYPE 2 DIABETES MELLITUS WITH CHRONIC KIDNEY DISEASE ON CHRONIC DIALYSIS, WITH LONG-TERM CURRENT USE OF INSULIN (HCC): Primary | ICD-10-CM

## 2018-06-18 DIAGNOSIS — Z99.2 ESRD (END STAGE RENAL DISEASE) ON DIALYSIS (HCC): ICD-10-CM

## 2018-06-18 DIAGNOSIS — Z99.2 TYPE 2 DIABETES MELLITUS WITH CHRONIC KIDNEY DISEASE ON CHRONIC DIALYSIS, WITH LONG-TERM CURRENT USE OF INSULIN (HCC): Primary | ICD-10-CM

## 2018-06-18 DIAGNOSIS — I13.2: ICD-10-CM

## 2018-06-18 DIAGNOSIS — K86.0 ALCOHOL-INDUCED CHRONIC PANCREATITIS (HCC): ICD-10-CM

## 2018-06-18 DIAGNOSIS — R12 HEARTBURN: ICD-10-CM

## 2018-06-18 DIAGNOSIS — N18.6 ESRD (END STAGE RENAL DISEASE) ON DIALYSIS (HCC): ICD-10-CM

## 2018-06-18 DIAGNOSIS — N18.6 TYPE 2 DIABETES MELLITUS WITH CHRONIC KIDNEY DISEASE ON CHRONIC DIALYSIS, WITH LONG-TERM CURRENT USE OF INSULIN (HCC): Primary | ICD-10-CM

## 2018-06-18 LAB — HBA1C MFR BLD: 5.9 %

## 2018-06-18 PROCEDURE — 2022F DILAT RTA XM EVC RTNOPTHY: CPT | Performed by: NURSE PRACTITIONER

## 2018-06-18 PROCEDURE — 3044F HG A1C LEVEL LT 7.0%: CPT | Performed by: NURSE PRACTITIONER

## 2018-06-18 PROCEDURE — 83036 HEMOGLOBIN GLYCOSYLATED A1C: CPT | Performed by: NURSE PRACTITIONER

## 2018-06-18 PROCEDURE — G8427 DOCREV CUR MEDS BY ELIG CLIN: HCPCS | Performed by: NURSE PRACTITIONER

## 2018-06-18 PROCEDURE — 1036F TOBACCO NON-USER: CPT | Performed by: NURSE PRACTITIONER

## 2018-06-18 PROCEDURE — G8417 CALC BMI ABV UP PARAM F/U: HCPCS | Performed by: NURSE PRACTITIONER

## 2018-06-18 PROCEDURE — 3017F COLORECTAL CA SCREEN DOC REV: CPT | Performed by: NURSE PRACTITIONER

## 2018-06-18 PROCEDURE — 99215 OFFICE O/P EST HI 40 MIN: CPT | Performed by: NURSE PRACTITIONER

## 2018-06-18 RX ORDER — FAMOTIDINE 20 MG/1
20 TABLET, FILM COATED ORAL 2 TIMES DAILY
Qty: 60 TABLET | Refills: 3
Start: 2018-06-18 | End: 2019-04-22 | Stop reason: ALTCHOICE

## 2018-06-18 RX ORDER — PANCRELIPASE 30000; 6000; 19000 [USP'U]/1; [USP'U]/1; [USP'U]/1
CAPSULE, DELAYED RELEASE PELLETS ORAL
Refills: 0 | COMMUNITY
Start: 2018-06-05 | End: 2022-02-15 | Stop reason: SDUPTHER

## 2018-06-18 ASSESSMENT — PATIENT HEALTH QUESTIONNAIRE - PHQ9
SUM OF ALL RESPONSES TO PHQ9 QUESTIONS 1 & 2: 0
1. LITTLE INTEREST OR PLEASURE IN DOING THINGS: 0
SUM OF ALL RESPONSES TO PHQ QUESTIONS 1-9: 0
2. FEELING DOWN, DEPRESSED OR HOPELESS: 0

## 2018-06-26 ASSESSMENT — ENCOUNTER SYMPTOMS
COLOR CHANGE: 0
HEARTBURN: 1
SORE THROAT: 0
ABDOMINAL DISTENTION: 0
EYE PAIN: 0
CONSTIPATION: 0
EYE ITCHING: 0
BLOOD IN STOOL: 0
WHEEZING: 0
SINUS PRESSURE: 0
SHORTNESS OF BREATH: 0
DIARRHEA: 0
CHEST TIGHTNESS: 0
COUGH: 0
NAUSEA: 0

## 2018-06-27 ENCOUNTER — TELEPHONE (OUTPATIENT)
Dept: FAMILY MEDICINE CLINIC | Age: 53
End: 2018-06-27

## 2018-06-27 DIAGNOSIS — N52.9 ERECTILE DYSFUNCTION, UNSPECIFIED ERECTILE DYSFUNCTION TYPE: Primary | ICD-10-CM

## 2018-06-27 RX ORDER — TADALAFIL 5 MG/1
5 TABLET ORAL PRN
Qty: 30 TABLET | Refills: 3 | Status: SHIPPED | OUTPATIENT
Start: 2018-06-27 | End: 2018-07-31

## 2018-06-28 ASSESSMENT — ENCOUNTER SYMPTOMS: VISUAL CHANGE: 0

## 2018-07-31 RX ORDER — SILDENAFIL 100 MG/1
100 TABLET, FILM COATED ORAL PRN
Qty: 30 TABLET | Refills: 3 | Status: SHIPPED | OUTPATIENT
Start: 2018-07-31 | End: 2019-04-22 | Stop reason: ALTCHOICE

## 2018-10-19 ENCOUNTER — TELEPHONE (OUTPATIENT)
Dept: FAMILY MEDICINE CLINIC | Age: 53
End: 2018-10-19

## 2018-10-22 ENCOUNTER — OFFICE VISIT (OUTPATIENT)
Dept: FAMILY MEDICINE CLINIC | Age: 53
End: 2018-10-22
Payer: MEDICARE

## 2018-10-22 VITALS
OXYGEN SATURATION: 94 % | TEMPERATURE: 98.5 F | WEIGHT: 205.8 LBS | SYSTOLIC BLOOD PRESSURE: 134 MMHG | HEART RATE: 64 BPM | DIASTOLIC BLOOD PRESSURE: 82 MMHG | BODY MASS INDEX: 29.46 KG/M2 | RESPIRATION RATE: 20 BRPM | HEIGHT: 70 IN

## 2018-10-22 DIAGNOSIS — E11.42 DIABETIC POLYNEUROPATHY ASSOCIATED WITH TYPE 2 DIABETES MELLITUS (HCC): ICD-10-CM

## 2018-10-22 DIAGNOSIS — Z99.2 ESRD (END STAGE RENAL DISEASE) ON DIALYSIS (HCC): ICD-10-CM

## 2018-10-22 DIAGNOSIS — N18.6 ESRD (END STAGE RENAL DISEASE) ON DIALYSIS (HCC): ICD-10-CM

## 2018-10-22 DIAGNOSIS — I10 ESSENTIAL HYPERTENSION: ICD-10-CM

## 2018-10-22 DIAGNOSIS — L03.115 CELLULITIS OF FOOT, RIGHT: Primary | ICD-10-CM

## 2018-10-22 PROCEDURE — 1036F TOBACCO NON-USER: CPT | Performed by: NURSE PRACTITIONER

## 2018-10-22 PROCEDURE — 1111F DSCHRG MED/CURRENT MED MERGE: CPT | Performed by: NURSE PRACTITIONER

## 2018-10-22 PROCEDURE — G8427 DOCREV CUR MEDS BY ELIG CLIN: HCPCS | Performed by: NURSE PRACTITIONER

## 2018-10-22 PROCEDURE — G8417 CALC BMI ABV UP PARAM F/U: HCPCS | Performed by: NURSE PRACTITIONER

## 2018-10-22 PROCEDURE — 3017F COLORECTAL CA SCREEN DOC REV: CPT | Performed by: NURSE PRACTITIONER

## 2018-10-22 PROCEDURE — 2022F DILAT RTA XM EVC RTNOPTHY: CPT | Performed by: NURSE PRACTITIONER

## 2018-10-22 PROCEDURE — 99214 OFFICE O/P EST MOD 30 MIN: CPT | Performed by: NURSE PRACTITIONER

## 2018-10-22 PROCEDURE — 3044F HG A1C LEVEL LT 7.0%: CPT | Performed by: NURSE PRACTITIONER

## 2018-10-22 PROCEDURE — G8484 FLU IMMUNIZE NO ADMIN: HCPCS | Performed by: NURSE PRACTITIONER

## 2018-10-22 RX ORDER — CEPHALEXIN 500 MG/1
500 CAPSULE ORAL 4 TIMES DAILY
Qty: 40 CAPSULE | Refills: 0 | Status: SHIPPED | OUTPATIENT
Start: 2018-10-22 | End: 2018-11-01

## 2018-10-22 RX ORDER — PREGABALIN 75 MG/1
75 CAPSULE ORAL 2 TIMES DAILY
Qty: 60 CAPSULE | Refills: 3 | Status: SHIPPED | OUTPATIENT
Start: 2018-10-22 | End: 2019-04-22 | Stop reason: DRUGHIGH

## 2018-10-22 ASSESSMENT — PATIENT HEALTH QUESTIONNAIRE - PHQ9
SUM OF ALL RESPONSES TO PHQ9 QUESTIONS 1 & 2: 0
SUM OF ALL RESPONSES TO PHQ QUESTIONS 1-9: 0
1. LITTLE INTEREST OR PLEASURE IN DOING THINGS: 0
SUM OF ALL RESPONSES TO PHQ QUESTIONS 1-9: 0
2. FEELING DOWN, DEPRESSED OR HOPELESS: 0

## 2018-10-22 NOTE — PROGRESS NOTES
tablet 3    CREON 6000 units delayed release capsule TAKE ONE CAPSULE BY MOUTH 3 TIMES A DAY WITH MEALS  0    famotidine (PEPCID) 20 MG tablet Take 1 tablet by mouth 2 times daily 60 tablet 3    B Complex-C-Folic Acid (NEPHRO-CLAUDIA) 0.8 MG TABS       metoprolol (LOPRESSOR) 100 MG tablet TAKE 1 TABLET BY MOUTH 2 TIMES DAILY 60 tablet 3    glucose monitoring kit (FREESTYLE) monitoring kit 1 kit by Does not apply route daily 1 kit 0    Misc. Devices MISC Check blood sugar one time a day 50 each 5    insulin detemir (LEVEMIR FLEXTOUCH) 100 UNIT/ML injection pen Inject 16 Units into the skin nightly 5 Pen 5    Insulin Pen Needle 32G X 6 MM MISC 1 Package by Does not apply route daily 100 each 3    sevelamer (RENVELA) 800 MG tablet Take 2 tablets by mouth 3 times daily (with meals) Indications: and 1 tablet with snacks      mupirocin (BACTROBAN) 2 % ointment Apply 1 each topically 2 times daily Indications: to abd fistula Apply topically 3 times daily. Medications patient taking as of now reconciled against medications ordered at time of hospital discharge: Yes    Chief Complaint   Patient presents with    Swelling     right leg foot swelling pain comes and goes     Fever    Follow-Up from Hospital     for high fever [facility] [place] x 4 days        Fever    This is a new problem. The current episode started 1 to 4 weeks ago. The problem occurs 2 to 4 times per day. The problem has been resolved. The maximum temperature noted was 102 to 102.9 F. The temperature was taken using an oral thermometer. Pertinent negatives include no abdominal pain, chest pain, congestion, coughing, diarrhea, ear pain, headaches, nausea, rash, sore throat, urinary pain or wheezing. He has tried acetaminophen for the symptoms. The treatment provided mild relief. Diabetes   He presents for his follow-up diabetic visit. He has type 2 diabetes mellitus. No MedicAlert identification noted. His disease course has been stable. foot  This is a new problem that started a couple of days before admission to hospital on 10/15. States foot was very swollen, red and painful. Has no idea how it started. Does walk around house barefooted and cut end of right great toe. Said doctors at hospital thought that may be the cause of it. Is continuing on antibiotics. Chronic kidney disease  This is a chronic problem that he has had for over a year. Goes to dialysis 3 times a week. Is going to [facility] to have testing done for getting on transplant list. Is very hopeful that this will happen soon. Inpatient course: Discharge summary reviewed- see chart. Interval history/Current status:     Vitals:    10/22/18 1309   BP: 134/82   Site: Right Upper Arm   Position: Sitting   Cuff Size: Large Adult   Pulse: 64   Resp: 20   Temp: 98.5 °F (36.9 °C)   TempSrc: Temporal   SpO2: 94%   Weight: 205 lb 12.8 oz (93.4 kg)   Height: 5' 10\" (1.778 m)     Body mass index is 29.53 kg/m². Wt Readings from Last 3 Encounters:   10/22/18 205 lb 12.8 oz (93.4 kg)   06/18/18 194 lb 12.8 oz (88.4 kg)   09/21/17 182 lb (82.6 kg)     BP Readings from Last 3 Encounters:   10/22/18 134/82   06/18/18 (!) 192/78   09/21/17 134/82       Physical Exam   Constitutional: He is oriented to person, place, and time. He appears well-developed and well-nourished. HENT:   Head: Normocephalic. Right Ear: External ear normal.   Left Ear: External ear normal.   Nose: Nose normal.   Mouth/Throat: Oropharynx is clear and moist.   Eyes: Conjunctivae and EOM are normal.   Neck: Normal range of motion. Neck supple. No thyromegaly present. Cardiovascular: Normal rate, regular rhythm and normal heart sounds. Exam reveals no gallop and no friction rub. No murmur heard. Pulmonary/Chest: Effort normal and breath sounds normal. No respiratory distress. He has no wheezes. He has no rales. He exhibits no tenderness. Abdominal: Soft. Bowel sounds are normal. He exhibits no distension.

## 2018-10-28 ASSESSMENT — ENCOUNTER SYMPTOMS
CHEST TIGHTNESS: 0
EYE ITCHING: 0
SINUS PRESSURE: 0
BLOOD IN STOOL: 0
NAUSEA: 0
SORE THROAT: 0
COLOR CHANGE: 0
SHORTNESS OF BREATH: 0
WHEEZING: 0
CONSTIPATION: 0
ABDOMINAL DISTENTION: 0
EYE PAIN: 0
COUGH: 0
ABDOMINAL PAIN: 0
DIARRHEA: 0

## 2018-11-01 ENCOUNTER — TELEPHONE (OUTPATIENT)
Dept: FAMILY MEDICINE CLINIC | Age: 53
End: 2018-11-01

## 2018-11-19 ENCOUNTER — OFFICE VISIT (OUTPATIENT)
Dept: FAMILY MEDICINE CLINIC | Age: 53
End: 2018-11-19
Payer: MEDICARE

## 2018-11-19 DIAGNOSIS — N18.6 TYPE 2 DIABETES MELLITUS WITH CHRONIC KIDNEY DISEASE ON CHRONIC DIALYSIS, WITH LONG-TERM CURRENT USE OF INSULIN (HCC): Primary | ICD-10-CM

## 2018-11-19 DIAGNOSIS — Z79.4 TYPE 2 DIABETES MELLITUS WITH CHRONIC KIDNEY DISEASE ON CHRONIC DIALYSIS, WITH LONG-TERM CURRENT USE OF INSULIN (HCC): Primary | ICD-10-CM

## 2018-11-19 DIAGNOSIS — I10 ESSENTIAL HYPERTENSION: ICD-10-CM

## 2018-11-19 DIAGNOSIS — Z99.2 TYPE 2 DIABETES MELLITUS WITH CHRONIC KIDNEY DISEASE ON CHRONIC DIALYSIS, WITH LONG-TERM CURRENT USE OF INSULIN (HCC): Primary | ICD-10-CM

## 2018-11-19 DIAGNOSIS — E11.22 TYPE 2 DIABETES MELLITUS WITH CHRONIC KIDNEY DISEASE ON CHRONIC DIALYSIS, WITH LONG-TERM CURRENT USE OF INSULIN (HCC): Primary | ICD-10-CM

## 2018-11-19 PROCEDURE — G8427 DOCREV CUR MEDS BY ELIG CLIN: HCPCS | Performed by: NURSE PRACTITIONER

## 2018-11-19 PROCEDURE — 2022F DILAT RTA XM EVC RTNOPTHY: CPT | Performed by: NURSE PRACTITIONER

## 2018-11-19 PROCEDURE — G8484 FLU IMMUNIZE NO ADMIN: HCPCS | Performed by: NURSE PRACTITIONER

## 2018-11-19 PROCEDURE — G8417 CALC BMI ABV UP PARAM F/U: HCPCS | Performed by: NURSE PRACTITIONER

## 2018-11-19 PROCEDURE — 3044F HG A1C LEVEL LT 7.0%: CPT | Performed by: NURSE PRACTITIONER

## 2018-11-19 PROCEDURE — 3017F COLORECTAL CA SCREEN DOC REV: CPT | Performed by: NURSE PRACTITIONER

## 2018-11-19 PROCEDURE — 1036F TOBACCO NON-USER: CPT | Performed by: NURSE PRACTITIONER

## 2018-11-19 PROCEDURE — 99213 OFFICE O/P EST LOW 20 MIN: CPT | Performed by: NURSE PRACTITIONER

## 2018-11-19 RX ORDER — METOPROLOL TARTRATE 100 MG/1
TABLET ORAL
Qty: 60 TABLET | Refills: 5 | Status: SHIPPED | OUTPATIENT
Start: 2018-11-19 | End: 2019-09-16 | Stop reason: SDUPTHER

## 2018-11-19 RX ORDER — NIFEDIPINE 30 MG/1
TABLET, EXTENDED RELEASE ORAL
Qty: 30 TABLET | Refills: 5 | Status: SHIPPED | OUTPATIENT
Start: 2018-11-19 | End: 2019-09-16

## 2018-11-19 ASSESSMENT — PATIENT HEALTH QUESTIONNAIRE - PHQ9
SUM OF ALL RESPONSES TO PHQ QUESTIONS 1-9: 0
1. LITTLE INTEREST OR PLEASURE IN DOING THINGS: 0
SUM OF ALL RESPONSES TO PHQ9 QUESTIONS 1 & 2: 0
SUM OF ALL RESPONSES TO PHQ QUESTIONS 1-9: 0
2. FEELING DOWN, DEPRESSED OR HOPELESS: 0

## 2018-11-25 VITALS
WEIGHT: 210.2 LBS | BODY MASS INDEX: 30.09 KG/M2 | DIASTOLIC BLOOD PRESSURE: 82 MMHG | RESPIRATION RATE: 18 BRPM | HEIGHT: 70 IN | HEART RATE: 58 BPM | SYSTOLIC BLOOD PRESSURE: 136 MMHG | OXYGEN SATURATION: 96 %

## 2018-11-25 ASSESSMENT — ENCOUNTER SYMPTOMS
ABDOMINAL DISTENTION: 0
ABDOMINAL PAIN: 0
CHEST TIGHTNESS: 0
WHEEZING: 0
COLOR CHANGE: 0
EYE ITCHING: 0
COUGH: 0
DIARRHEA: 0
NAUSEA: 0
CONSTIPATION: 0
SHORTNESS OF BREATH: 0
BLOOD IN STOOL: 0
SINUS PRESSURE: 0
EYE PAIN: 0
SORE THROAT: 0

## 2019-02-26 ENCOUNTER — APPOINTMENT (OUTPATIENT)
Dept: CT IMAGING | Age: 54
DRG: 077 | End: 2019-02-26
Payer: MEDICARE

## 2019-02-26 ENCOUNTER — HOSPITAL ENCOUNTER (INPATIENT)
Age: 54
LOS: 7 days | Discharge: NURSING, REHAB OR LONG-TERM CARE | DRG: 077 | End: 2019-03-05
Attending: EMERGENCY MEDICINE
Payer: MEDICARE

## 2019-02-26 ENCOUNTER — APPOINTMENT (OUTPATIENT)
Dept: GENERAL RADIOLOGY | Age: 54
DRG: 077 | End: 2019-02-26
Payer: MEDICARE

## 2019-02-26 DIAGNOSIS — I67.4 HYPERTENSIVE ENCEPHALOPATHY: Primary | ICD-10-CM

## 2019-02-26 DIAGNOSIS — M79.605 PAIN IN BOTH LOWER EXTREMITIES: ICD-10-CM

## 2019-02-26 DIAGNOSIS — M79.604 PAIN IN BOTH LOWER EXTREMITIES: ICD-10-CM

## 2019-02-26 LAB
-: NORMAL
ABO/RH: NORMAL
ACETAMINOPHEN LEVEL: <5 UG/ML (ref 10–30)
ACT TEG: 113 SEC (ref 86–118)
ALBUMIN SERPL-MCNC: 3.6 G/DL (ref 3.5–5.2)
ALBUMIN/GLOBULIN RATIO: 1.6 (ref 1–2.5)
ALLEN TEST: ABNORMAL
ALP BLD-CCNC: 77 U/L (ref 40–129)
ALT SERPL-CCNC: 20 U/L (ref 5–41)
AMMONIA: 40 UMOL/L (ref 16–60)
AMORPHOUS: NORMAL
AMPHETAMINE SCREEN URINE: NEGATIVE
ANGLE, RAPID TEG: 74.9 DEG (ref 64–80)
ANION GAP SERPL CALCULATED.3IONS-SCNC: 25 MMOL/L (ref 9–17)
ANION GAP: 9 MMOL/L (ref 7–16)
ANTIBODY SCREEN: NEGATIVE
ARM BAND NUMBER: NORMAL
AST SERPL-CCNC: 15 U/L
BACTERIA: NORMAL
BARBITURATE SCREEN URINE: NEGATIVE
BENZODIAZEPINE SCREEN, URINE: NEGATIVE
BILIRUB SERPL-MCNC: 0.55 MG/DL (ref 0.3–1.2)
BILIRUBIN DIRECT: 0.23 MG/DL
BILIRUBIN URINE: NEGATIVE
BILIRUBIN, INDIRECT: 0.32 MG/DL (ref 0–1)
BLOOD BANK SPECIMEN: ABNORMAL
BUN BLDV-MCNC: 61 MG/DL (ref 6–20)
BUPRENORPHINE URINE: NORMAL
CANNABINOID SCREEN URINE: NEGATIVE
CARBOXYHEMOGLOBIN: 1.7 % (ref 0–5)
CASTS UA: NORMAL /LPF (ref 0–2)
CHLORIDE BLD-SCNC: 94 MMOL/L (ref 98–107)
CO2: 16 MMOL/L (ref 20–31)
COCAINE METABOLITE, URINE: NEGATIVE
COLOR: YELLOW
COMMENT UA: ABNORMAL
CREAT SERPL-MCNC: 12.59 MG/DL (ref 0.7–1.2)
CRYSTALS, UA: NORMAL /HPF
EPITHELIAL CELLS UA: NORMAL /HPF (ref 0–5)
EPL-TEG: 1.7 % (ref 0–15)
ETHANOL PERCENT: <0.01 %
ETHANOL: <10 MG/DL
EXPIRATION DATE: NORMAL
FIO2: 30
FIO2: ABNORMAL
FIO2: ABNORMAL
GFR AFRICAN AMERICAN: 5 ML/MIN
GFR NON-AFRICAN AMERICAN: 4 ML/MIN
GFR NON-AFRICAN AMERICAN: 5 ML/MIN
GFR SERPL CREATININE-BSD FRML MDRD: 5 ML/MIN
GFR SERPL CREATININE-BSD FRML MDRD: ABNORMAL ML/MIN/{1.73_M2}
GLOBULIN: ABNORMAL G/DL (ref 1.5–3.8)
GLUCOSE BLD-MCNC: 171 MG/DL (ref 75–110)
GLUCOSE BLD-MCNC: 235 MG/DL (ref 74–100)
GLUCOSE BLD-MCNC: 257 MG/DL (ref 70–99)
GLUCOSE BLD-MCNC: 286 MG/DL (ref 74–100)
GLUCOSE URINE: ABNORMAL
HCG QUALITATIVE: NEGATIVE
HCO3 VENOUS: 17.8 MMOL/L (ref 24–30)
HCT VFR BLD CALC: 31.7 % (ref 40.7–50.3)
HEMOGLOBIN: 10.4 G/DL (ref 13–17)
HEPARIN THERAPY: NORMAL
INR BLD: 1.4
KETONES, URINE: NEGATIVE
KINETICS RAPID TEG: 1.8 MIN (ref 1–2)
LEUKOCYTE ESTERASE, URINE: NEGATIVE
LY30 (LYSIS) TEG: 1.7 % (ref 0–8)
MA(MAX CLOT) RAPID TEG: 57.1 MM (ref 52–71)
MAGNESIUM: 2.6 MG/DL (ref 1.6–2.6)
MCH RBC QN AUTO: 31.7 PG (ref 25.2–33.5)
MCHC RBC AUTO-ENTMCNC: 32.8 G/DL (ref 28.4–34.8)
MCV RBC AUTO: 96.6 FL (ref 82.6–102.9)
MDMA URINE: NORMAL
METHADONE SCREEN, URINE: NEGATIVE
METHAMPHETAMINE, URINE: NORMAL
METHEMOGLOBIN: ABNORMAL % (ref 0–1.5)
MODE: ABNORMAL
MUCUS: NORMAL
NEGATIVE BASE EXCESS, ART: 4 (ref 0–2)
NEGATIVE BASE EXCESS, ART: ABNORMAL (ref 0–2)
NEGATIVE BASE EXCESS, VEN: 10 MMOL/L (ref 0–2)
NITRITE, URINE: NEGATIVE
NOTIFICATION TIME: ABNORMAL
NOTIFICATION: ABNORMAL
NRBC AUTOMATED: 0 PER 100 WBC
O2 DEVICE/FLOW/%: ABNORMAL
O2 SAT, VEN: 86.1 % (ref 60–85)
OPIATES, URINE: NEGATIVE
OTHER OBSERVATIONS UA: NORMAL
OXYCODONE SCREEN URINE: NEGATIVE
OXYHEMOGLOBIN: ABNORMAL % (ref 95–98)
PARTIAL THROMBOPLASTIN TIME: 23.4 SEC (ref 20.5–30.5)
PATIENT TEMP: 35.3
PATIENT TEMP: 37
PATIENT TEMP: ABNORMAL
PCO2, VEN, TEMP ADJ: ABNORMAL MMHG (ref 39–55)
PCO2, VEN: 49.4 (ref 39–55)
PDW BLD-RTO: 14.2 % (ref 11.8–14.4)
PEEP/CPAP: ABNORMAL
PH UA: 8 (ref 5–8)
PH VENOUS: 7.18 (ref 7.32–7.42)
PH, VEN, TEMP ADJ: ABNORMAL (ref 7.32–7.42)
PHENCYCLIDINE, URINE: NEGATIVE
PHOSPHORUS: 6.5 MG/DL (ref 2.5–4.5)
PLATELET # BLD: 122 K/UL (ref 138–453)
PMV BLD AUTO: 11.5 FL (ref 8.1–13.5)
PO2, VEN, TEMP ADJ: ABNORMAL MMHG (ref 30–50)
PO2, VEN: 69.6 (ref 30–50)
POC CHLORIDE: 106 MMOL/L (ref 98–107)
POC CREATININE: 11.9 MG/DL (ref 0.51–1.19)
POC HCO3: 21.2 MMOL/L (ref 21–28)
POC HCO3: 26.5 MMOL/L (ref 21–28)
POC HEMATOCRIT: 28 % (ref 41–53)
POC HEMOGLOBIN: 9.6 G/DL (ref 13.5–17.5)
POC IONIZED CALCIUM: 0.98 MMOL/L (ref 1.15–1.33)
POC LACTIC ACID: 2.79 MMOL/L (ref 0.56–1.39)
POC O2 SATURATION: 96 % (ref 94–98)
POC O2 SATURATION: 96 % (ref 94–98)
POC PCO2 TEMP: 40 MM HG
POC PCO2 TEMP: ABNORMAL MM HG
POC PCO2: 37.3 MM HG (ref 35–48)
POC PCO2: 42.8 MM HG (ref 35–48)
POC PH TEMP: 7.42
POC PH TEMP: ABNORMAL
POC PH: 7.36 (ref 7.35–7.45)
POC PH: 7.4 (ref 7.35–7.45)
POC PO2 TEMP: 72 MM HG
POC PO2 TEMP: ABNORMAL MM HG
POC PO2: 80.7 MM HG (ref 83–108)
POC PO2: 81 MM HG (ref 83–108)
POC POTASSIUM: 4.6 MMOL/L (ref 3.5–4.5)
POC SODIUM: 136 MMOL/L (ref 138–146)
POSITIVE BASE EXCESS, ART: 2 (ref 0–3)
POSITIVE BASE EXCESS, ART: ABNORMAL (ref 0–3)
POSITIVE BASE EXCESS, VEN: ABNORMAL MMOL/L (ref 0–2)
POTASSIUM SERPL-SCNC: 6.2 MMOL/L (ref 3.7–5.3)
PROPOXYPHENE, URINE: NORMAL
PROTEIN UA: ABNORMAL
PROTHROMBIN TIME: 14.4 SEC (ref 9–12)
PSV: ABNORMAL
PT. POSITION: ABNORMAL
RBC # BLD: 3.28 M/UL (ref 4.21–5.77)
RBC UA: NORMAL /HPF (ref 0–2)
REACTION TIME RAPID TEG: 0.7 MIN (ref 0–1)
RENAL EPITHELIAL, UA: NORMAL /HPF
RESPIRATORY RATE: ABNORMAL
SALICYLATE LEVEL: <1 MG/DL (ref 3–10)
SAMPLE SITE: ABNORMAL
SET RATE: ABNORMAL
SODIUM BLD-SCNC: 135 MMOL/L (ref 135–144)
SPECIFIC GRAVITY UA: 1.02 (ref 1–1.03)
TCO2 (CALC), ART: 22 MMOL/L (ref 22–29)
TCO2 (CALC), ART: 28 MMOL/L (ref 22–29)
TEG COMMENT: NORMAL
TEST INFORMATION: NORMAL
TEXT FOR RESPIRATORY: ABNORMAL
TOTAL HB: ABNORMAL G/DL (ref 12–16)
TOTAL PROTEIN: 5.9 G/DL (ref 6.4–8.3)
TOTAL RATE: ABNORMAL
TOXIC TRICYCLIC SC,BLOOD: NEGATIVE
TRICHOMONAS: NORMAL
TRICYCLIC ANTIDEPRESSANTS, UR: NORMAL
TURBIDITY: CLEAR
URINE HGB: ABNORMAL
UROBILINOGEN, URINE: NORMAL
VT: ABNORMAL
WBC # BLD: 11.1 K/UL (ref 3.5–11.3)
WBC UA: NORMAL /HPF (ref 0–5)
YEAST: NORMAL

## 2019-02-26 PROCEDURE — 85390 FIBRINOLYSINS SCREEN I&R: CPT

## 2019-02-26 PROCEDURE — 5A1945Z RESPIRATORY VENTILATION, 24-96 CONSECUTIVE HOURS: ICD-10-PCS | Performed by: INTERNAL MEDICINE

## 2019-02-26 PROCEDURE — 82550 ASSAY OF CK (CPK): CPT

## 2019-02-26 PROCEDURE — 82803 BLOOD GASES ANY COMBINATION: CPT

## 2019-02-26 PROCEDURE — 85027 COMPLETE CBC AUTOMATED: CPT

## 2019-02-26 PROCEDURE — 86900 BLOOD TYPING SEROLOGIC ABO: CPT

## 2019-02-26 PROCEDURE — 82435 ASSAY OF BLOOD CHLORIDE: CPT

## 2019-02-26 PROCEDURE — 84443 ASSAY THYROID STIM HORMONE: CPT

## 2019-02-26 PROCEDURE — 83735 ASSAY OF MAGNESIUM: CPT

## 2019-02-26 PROCEDURE — 82565 ASSAY OF CREATININE: CPT

## 2019-02-26 PROCEDURE — 82805 BLOOD GASES W/O2 SATURATION: CPT

## 2019-02-26 PROCEDURE — 85610 PROTHROMBIN TIME: CPT

## 2019-02-26 PROCEDURE — 5A1D70Z PERFORMANCE OF URINARY FILTRATION, INTERMITTENT, LESS THAN 6 HOURS PER DAY: ICD-10-PCS | Performed by: INTERNAL MEDICINE

## 2019-02-26 PROCEDURE — 84132 ASSAY OF SERUM POTASSIUM: CPT

## 2019-02-26 PROCEDURE — 99285 EMERGENCY DEPT VISIT HI MDM: CPT

## 2019-02-26 PROCEDURE — 80051 ELECTROLYTE PANEL: CPT

## 2019-02-26 PROCEDURE — 94770 HC ETCO2 MONITOR DAILY: CPT

## 2019-02-26 PROCEDURE — 83605 ASSAY OF LACTIC ACID: CPT

## 2019-02-26 PROCEDURE — 85014 HEMATOCRIT: CPT

## 2019-02-26 PROCEDURE — 0BH18EZ INSERTION OF ENDOTRACHEAL AIRWAY INTO TRACHEA, VIA NATURAL OR ARTIFICIAL OPENING ENDOSCOPIC: ICD-10-PCS | Performed by: EMERGENCY MEDICINE

## 2019-02-26 PROCEDURE — 72125 CT NECK SPINE W/O DYE: CPT

## 2019-02-26 PROCEDURE — 82140 ASSAY OF AMMONIA: CPT

## 2019-02-26 PROCEDURE — 90937 HEMODIALYSIS REPEATED EVAL: CPT

## 2019-02-26 PROCEDURE — 84295 ASSAY OF SERUM SODIUM: CPT

## 2019-02-26 PROCEDURE — 80076 HEPATIC FUNCTION PANEL: CPT

## 2019-02-26 PROCEDURE — 93005 ELECTROCARDIOGRAM TRACING: CPT

## 2019-02-26 PROCEDURE — G0480 DRUG TEST DEF 1-7 CLASSES: HCPCS

## 2019-02-26 PROCEDURE — 94002 VENT MGMT INPAT INIT DAY: CPT

## 2019-02-26 PROCEDURE — 86850 RBC ANTIBODY SCREEN: CPT

## 2019-02-26 PROCEDURE — 85210 CLOT FACTOR II PROTHROM SPEC: CPT

## 2019-02-26 PROCEDURE — 80307 DRUG TEST PRSMV CHEM ANLYZR: CPT

## 2019-02-26 PROCEDURE — 70450 CT HEAD/BRAIN W/O DYE: CPT

## 2019-02-26 PROCEDURE — 94762 N-INVAS EAR/PLS OXIMTRY CONT: CPT

## 2019-02-26 PROCEDURE — 6370000000 HC RX 637 (ALT 250 FOR IP): Performed by: STUDENT IN AN ORGANIZED HEALTH CARE EDUCATION/TRAINING PROGRAM

## 2019-02-26 PROCEDURE — 6360000002 HC RX W HCPCS: Performed by: STUDENT IN AN ORGANIZED HEALTH CARE EDUCATION/TRAINING PROGRAM

## 2019-02-26 PROCEDURE — 84100 ASSAY OF PHOSPHORUS: CPT

## 2019-02-26 PROCEDURE — 82947 ASSAY GLUCOSE BLOOD QUANT: CPT

## 2019-02-26 PROCEDURE — 84520 ASSAY OF UREA NITROGEN: CPT

## 2019-02-26 PROCEDURE — 6360000002 HC RX W HCPCS: Performed by: EMERGENCY MEDICINE

## 2019-02-26 PROCEDURE — 31500 INSERT EMERGENCY AIRWAY: CPT

## 2019-02-26 PROCEDURE — 84146 ASSAY OF PROLACTIN: CPT

## 2019-02-26 PROCEDURE — 6360000002 HC RX W HCPCS

## 2019-02-26 PROCEDURE — 90935 HEMODIALYSIS ONE EVALUATION: CPT

## 2019-02-26 PROCEDURE — 71045 X-RAY EXAM CHEST 1 VIEW: CPT

## 2019-02-26 PROCEDURE — 81001 URINALYSIS AUTO W/SCOPE: CPT

## 2019-02-26 PROCEDURE — 72128 CT CHEST SPINE W/O DYE: CPT

## 2019-02-26 PROCEDURE — 84484 ASSAY OF TROPONIN QUANT: CPT

## 2019-02-26 PROCEDURE — 74177 CT ABD & PELVIS W/CONTRAST: CPT

## 2019-02-26 PROCEDURE — 2000000000 HC ICU R&B

## 2019-02-26 PROCEDURE — 6810039001 HC L1 TRAUMA PRIORITY

## 2019-02-26 PROCEDURE — 72131 CT LUMBAR SPINE W/O DYE: CPT

## 2019-02-26 PROCEDURE — 2700000000 HC OXYGEN THERAPY PER DAY

## 2019-02-26 PROCEDURE — 85730 THROMBOPLASTIN TIME PARTIAL: CPT

## 2019-02-26 PROCEDURE — 84703 CHORIONIC GONADOTROPIN ASSAY: CPT

## 2019-02-26 PROCEDURE — 2500000003 HC RX 250 WO HCPCS

## 2019-02-26 PROCEDURE — 6360000004 HC RX CONTRAST MEDICATION: Performed by: EMERGENCY MEDICINE

## 2019-02-26 PROCEDURE — 82330 ASSAY OF CALCIUM: CPT

## 2019-02-26 PROCEDURE — 87641 MR-STAPH DNA AMP PROBE: CPT

## 2019-02-26 PROCEDURE — 86901 BLOOD TYPING SEROLOGIC RH(D): CPT

## 2019-02-26 RX ORDER — FOLIC ACID 5 MG/ML
1 INJECTION, SOLUTION INTRAMUSCULAR; INTRAVENOUS; SUBCUTANEOUS DAILY
Status: DISCONTINUED | OUTPATIENT
Start: 2019-02-27 | End: 2019-02-26

## 2019-02-26 RX ORDER — MULTIVITAMIN WITH FOLIC ACID 400 MCG
1 TABLET ORAL DAILY
Status: DISCONTINUED | OUTPATIENT
Start: 2019-02-27 | End: 2019-03-05 | Stop reason: HOSPADM

## 2019-02-26 RX ORDER — ROCURONIUM BROMIDE 10 MG/ML
90 INJECTION, SOLUTION INTRAVENOUS ONCE
Status: DISCONTINUED | OUTPATIENT
Start: 2019-02-26 | End: 2019-02-26

## 2019-02-26 RX ORDER — NICOTINE POLACRILEX 4 MG
15 LOZENGE BUCCAL PRN
Status: DISCONTINUED | OUTPATIENT
Start: 2019-02-26 | End: 2019-03-05 | Stop reason: HOSPADM

## 2019-02-26 RX ORDER — ONDANSETRON 2 MG/ML
4 INJECTION INTRAMUSCULAR; INTRAVENOUS EVERY 6 HOURS PRN
Status: DISCONTINUED | OUTPATIENT
Start: 2019-02-26 | End: 2019-03-05 | Stop reason: HOSPADM

## 2019-02-26 RX ORDER — INSULIN GLARGINE 100 [IU]/ML
10 INJECTION, SOLUTION SUBCUTANEOUS NIGHTLY
Status: DISCONTINUED | OUTPATIENT
Start: 2019-02-26 | End: 2019-02-28

## 2019-02-26 RX ORDER — HEPARIN SODIUM 5000 [USP'U]/ML
5000 INJECTION, SOLUTION INTRAVENOUS; SUBCUTANEOUS EVERY 8 HOURS SCHEDULED
Status: DISCONTINUED | OUTPATIENT
Start: 2019-02-26 | End: 2019-03-05 | Stop reason: HOSPADM

## 2019-02-26 RX ORDER — PROPOFOL 10 MG/ML
INJECTION, EMULSION INTRAVENOUS
Status: COMPLETED
Start: 2019-02-26 | End: 2019-02-26

## 2019-02-26 RX ORDER — PROPOFOL 10 MG/ML
20 INJECTION, EMULSION INTRAVENOUS
Status: DISCONTINUED | OUTPATIENT
Start: 2019-02-26 | End: 2019-02-26

## 2019-02-26 RX ORDER — SODIUM CHLORIDE 0.9 % (FLUSH) 0.9 %
10 SYRINGE (ML) INJECTION PRN
Status: DISCONTINUED | OUTPATIENT
Start: 2019-02-26 | End: 2019-03-05 | Stop reason: HOSPADM

## 2019-02-26 RX ORDER — PREGABALIN 75 MG/1
75 CAPSULE ORAL 2 TIMES DAILY
COMMUNITY
End: 2019-04-22

## 2019-02-26 RX ORDER — MINOXIDIL 2.5 MG/1
5 TABLET ORAL 2 TIMES DAILY
Status: ON HOLD | COMMUNITY
End: 2019-03-04 | Stop reason: HOSPADM

## 2019-02-26 RX ORDER — PREGABALIN 75 MG/1
75 CAPSULE ORAL 2 TIMES DAILY
Status: DISCONTINUED | OUTPATIENT
Start: 2019-02-26 | End: 2019-03-05 | Stop reason: HOSPADM

## 2019-02-26 RX ORDER — METOPROLOL TARTRATE 100 MG/1
100 TABLET ORAL 2 TIMES DAILY
Status: ON HOLD | COMMUNITY
End: 2019-03-04 | Stop reason: HOSPADM

## 2019-02-26 RX ORDER — SODIUM CHLORIDE 0.9 % (FLUSH) 0.9 %
10 SYRINGE (ML) INJECTION EVERY 12 HOURS SCHEDULED
Status: DISCONTINUED | OUTPATIENT
Start: 2019-02-26 | End: 2019-03-05 | Stop reason: HOSPADM

## 2019-02-26 RX ORDER — METOPROLOL TARTRATE 50 MG/1
100 TABLET, FILM COATED ORAL 2 TIMES DAILY
Status: DISCONTINUED | OUTPATIENT
Start: 2019-02-26 | End: 2019-02-28

## 2019-02-26 RX ORDER — MAGNESIUM SULFATE 1 G/100ML
1 INJECTION INTRAVENOUS
Status: DISPENSED | OUTPATIENT
Start: 2019-02-26 | End: 2019-02-26

## 2019-02-26 RX ORDER — DEXTROSE MONOHYDRATE 25 G/50ML
12.5 INJECTION, SOLUTION INTRAVENOUS PRN
Status: DISCONTINUED | OUTPATIENT
Start: 2019-02-26 | End: 2019-03-05 | Stop reason: HOSPADM

## 2019-02-26 RX ORDER — DEXTROSE MONOHYDRATE 50 MG/ML
100 INJECTION, SOLUTION INTRAVENOUS PRN
Status: DISCONTINUED | OUTPATIENT
Start: 2019-02-26 | End: 2019-03-05 | Stop reason: HOSPADM

## 2019-02-26 RX ORDER — NIFEDIPINE 30 MG/1
30 TABLET, EXTENDED RELEASE ORAL DAILY
Status: DISCONTINUED | OUTPATIENT
Start: 2019-02-27 | End: 2019-03-02

## 2019-02-26 RX ORDER — NIFEDIPINE 30 MG/1
30 TABLET, FILM COATED, EXTENDED RELEASE ORAL DAILY
Status: ON HOLD | COMMUNITY
End: 2019-03-04 | Stop reason: HOSPADM

## 2019-02-26 RX ORDER — THIAMINE HYDROCHLORIDE 100 MG/ML
100 INJECTION, SOLUTION INTRAMUSCULAR; INTRAVENOUS DAILY
Status: DISCONTINUED | OUTPATIENT
Start: 2019-02-27 | End: 2019-02-26

## 2019-02-26 RX ORDER — ONDANSETRON 2 MG/ML
4 INJECTION INTRAMUSCULAR; INTRAVENOUS EVERY 6 HOURS PRN
Status: DISCONTINUED | OUTPATIENT
Start: 2019-02-26 | End: 2019-03-01

## 2019-02-26 RX ORDER — CHLORHEXIDINE GLUCONATE 0.12 MG/ML
15 RINSE ORAL 2 TIMES DAILY
Status: DISCONTINUED | OUTPATIENT
Start: 2019-02-26 | End: 2019-02-28

## 2019-02-26 RX ORDER — ETOMIDATE 2 MG/ML
20 INJECTION INTRAVENOUS ONCE
Status: DISCONTINUED | OUTPATIENT
Start: 2019-02-26 | End: 2019-02-26

## 2019-02-26 RX ORDER — PROPOFOL 10 MG/ML
20 INJECTION, EMULSION INTRAVENOUS
Status: DISCONTINUED | OUTPATIENT
Start: 2019-02-26 | End: 2019-02-28

## 2019-02-26 RX ADMIN — INSULIN GLARGINE 10 UNITS: 100 INJECTION, SOLUTION SUBCUTANEOUS at 23:30

## 2019-02-26 RX ADMIN — PROPOFOL 50 MCG/KG/MIN: 10 INJECTION, EMULSION INTRAVENOUS at 21:15

## 2019-02-26 RX ADMIN — HEPARIN SODIUM 5000 UNITS: 5000 INJECTION INTRAVENOUS; SUBCUTANEOUS at 23:29

## 2019-02-26 RX ADMIN — Medication 7.5 MG/HR: at 21:15

## 2019-02-26 RX ADMIN — IOVERSOL 130 ML: 741 INJECTION INTRA-ARTERIAL; INTRAVENOUS at 16:51

## 2019-02-26 RX ADMIN — INSULIN LISPRO 1 UNITS: 100 INJECTION, SOLUTION INTRAVENOUS; SUBCUTANEOUS at 23:30

## 2019-02-26 RX ADMIN — NICARDIPINE HYDROCHLORIDE 5 MG: 0.1 INJECTION, SOLUTION INTRAVENOUS at 21:03

## 2019-02-26 ASSESSMENT — PULMONARY FUNCTION TESTS
PIF_VALUE: 25
PIF_VALUE: 24
PIF_VALUE: 24
PIF_VALUE: 16

## 2019-02-27 ENCOUNTER — APPOINTMENT (OUTPATIENT)
Dept: DIALYSIS | Age: 54
DRG: 077 | End: 2019-02-27
Payer: MEDICARE

## 2019-02-27 LAB
ALBUMIN SERPL-MCNC: 4.1 G/DL (ref 3.5–5.2)
ALBUMIN/GLOBULIN RATIO: 1.5 (ref 1–2.5)
ALP BLD-CCNC: 90 U/L (ref 40–129)
ALT SERPL-CCNC: 24 U/L (ref 5–41)
AMPHETAMINE SCREEN URINE: NEGATIVE
ANION GAP SERPL CALCULATED.3IONS-SCNC: 16 MMOL/L (ref 9–17)
AST SERPL-CCNC: 18 U/L
BARBITURATE SCREEN URINE: NEGATIVE
BENZODIAZEPINE SCREEN, URINE: POSITIVE
BILIRUB SERPL-MCNC: 0.84 MG/DL (ref 0.3–1.2)
BUN BLDV-MCNC: 30 MG/DL (ref 6–20)
BUN/CREAT BLD: ABNORMAL (ref 9–20)
BUPRENORPHINE URINE: ABNORMAL
CALCIUM SERPL-MCNC: 8.1 MG/DL (ref 8.6–10.4)
CANNABINOID SCREEN URINE: NEGATIVE
CHLORIDE BLD-SCNC: 97 MMOL/L (ref 98–107)
CO2: 26 MMOL/L (ref 20–31)
COCAINE METABOLITE, URINE: NEGATIVE
CREAT SERPL-MCNC: 7.5 MG/DL (ref 0.7–1.2)
GFR AFRICAN AMERICAN: 9 ML/MIN
GFR NON-AFRICAN AMERICAN: 8 ML/MIN
GFR SERPL CREATININE-BSD FRML MDRD: ABNORMAL ML/MIN/{1.73_M2}
GFR SERPL CREATININE-BSD FRML MDRD: ABNORMAL ML/MIN/{1.73_M2}
GLUCOSE BLD-MCNC: 101 MG/DL (ref 75–110)
GLUCOSE BLD-MCNC: 116 MG/DL (ref 75–110)
GLUCOSE BLD-MCNC: 134 MG/DL (ref 70–99)
GLUCOSE BLD-MCNC: 87 MG/DL (ref 75–110)
GLUCOSE BLD-MCNC: 96 MG/DL (ref 75–110)
MDMA URINE: ABNORMAL
METHADONE SCREEN, URINE: NEGATIVE
METHAMPHETAMINE, URINE: ABNORMAL
MRSA, DNA, NASAL: ABNORMAL
OPIATES, URINE: NEGATIVE
OXYCODONE SCREEN URINE: NEGATIVE
PHENCYCLIDINE, URINE: NEGATIVE
POTASSIUM SERPL-SCNC: 4.2 MMOL/L (ref 3.7–5.3)
PROLACTIN: 33.46 UG/L (ref 4.04–15.2)
PROPOXYPHENE, URINE: ABNORMAL
SODIUM BLD-SCNC: 139 MMOL/L (ref 135–144)
SPECIMEN DESCRIPTION: ABNORMAL
TEST INFORMATION: ABNORMAL
TOTAL CK: 236 U/L (ref 39–308)
TOTAL PROTEIN: 6.8 G/DL (ref 6.4–8.3)
TRICYCLIC ANTIDEPRESSANTS, UR: ABNORMAL
TROPONIN INTERP: ABNORMAL
TROPONIN INTERP: ABNORMAL
TROPONIN T: ABNORMAL NG/ML
TROPONIN T: ABNORMAL NG/ML
TROPONIN, HIGH SENSITIVITY: 371 NG/L (ref 0–22)
TROPONIN, HIGH SENSITIVITY: 390 NG/L (ref 0–22)
TSH SERPL DL<=0.05 MIU/L-ACNC: 2.82 MIU/L (ref 0.3–5)

## 2019-02-27 PROCEDURE — 94770 HC ETCO2 MONITOR DAILY: CPT

## 2019-02-27 PROCEDURE — 36415 COLL VENOUS BLD VENIPUNCTURE: CPT

## 2019-02-27 PROCEDURE — 2580000003 HC RX 258: Performed by: STUDENT IN AN ORGANIZED HEALTH CARE EDUCATION/TRAINING PROGRAM

## 2019-02-27 PROCEDURE — 80053 COMPREHEN METABOLIC PANEL: CPT

## 2019-02-27 PROCEDURE — 99291 CRITICAL CARE FIRST HOUR: CPT | Performed by: INTERNAL MEDICINE

## 2019-02-27 PROCEDURE — 94762 N-INVAS EAR/PLS OXIMTRY CONT: CPT

## 2019-02-27 PROCEDURE — 94003 VENT MGMT INPAT SUBQ DAY: CPT

## 2019-02-27 PROCEDURE — 90935 HEMODIALYSIS ONE EVALUATION: CPT

## 2019-02-27 PROCEDURE — 6360000002 HC RX W HCPCS: Performed by: EMERGENCY MEDICINE

## 2019-02-27 PROCEDURE — 82803 BLOOD GASES ANY COMBINATION: CPT

## 2019-02-27 PROCEDURE — 6370000000 HC RX 637 (ALT 250 FOR IP): Performed by: STUDENT IN AN ORGANIZED HEALTH CARE EDUCATION/TRAINING PROGRAM

## 2019-02-27 PROCEDURE — 2700000000 HC OXYGEN THERAPY PER DAY

## 2019-02-27 PROCEDURE — 84484 ASSAY OF TROPONIN QUANT: CPT

## 2019-02-27 PROCEDURE — 2580000003 HC RX 258: Performed by: INTERNAL MEDICINE

## 2019-02-27 PROCEDURE — 90937 HEMODIALYSIS REPEATED EVAL: CPT

## 2019-02-27 PROCEDURE — 2500000003 HC RX 250 WO HCPCS: Performed by: STUDENT IN AN ORGANIZED HEALTH CARE EDUCATION/TRAINING PROGRAM

## 2019-02-27 PROCEDURE — 2000000000 HC ICU R&B

## 2019-02-27 PROCEDURE — 6360000002 HC RX W HCPCS: Performed by: STUDENT IN AN ORGANIZED HEALTH CARE EDUCATION/TRAINING PROGRAM

## 2019-02-27 PROCEDURE — 82947 ASSAY GLUCOSE BLOOD QUANT: CPT

## 2019-02-27 RX ORDER — FAMOTIDINE 20 MG/1
20 TABLET, FILM COATED ORAL DAILY
Status: DISCONTINUED | OUTPATIENT
Start: 2019-02-28 | End: 2019-02-28

## 2019-02-27 RX ORDER — FENTANYL CITRATE 50 UG/ML
50 INJECTION, SOLUTION INTRAMUSCULAR; INTRAVENOUS
Status: DISCONTINUED | OUTPATIENT
Start: 2019-02-27 | End: 2019-03-01

## 2019-02-27 RX ORDER — MAGNESIUM SULFATE 1 G/100ML
1 INJECTION INTRAVENOUS
Status: DISPENSED | OUTPATIENT
Start: 2019-02-27 | End: 2019-02-27

## 2019-02-27 RX ADMIN — HEPARIN SODIUM 5000 UNITS: 5000 INJECTION INTRAVENOUS; SUBCUTANEOUS at 14:50

## 2019-02-27 RX ADMIN — FENTANYL CITRATE 50 MCG: 50 INJECTION, SOLUTION INTRAMUSCULAR; INTRAVENOUS at 21:55

## 2019-02-27 RX ADMIN — FAMOTIDINE 20 MG: 10 INJECTION, SOLUTION INTRAVENOUS at 01:12

## 2019-02-27 RX ADMIN — NICARDIPINE HYDROCHLORIDE 5 MG/HR: 0.1 INJECTION, SOLUTION INTRAVENOUS at 01:25

## 2019-02-27 RX ADMIN — NICARDIPINE HYDROCHLORIDE 4 MG/HR: 0.1 INJECTION, SOLUTION INTRAVENOUS at 21:00

## 2019-02-27 RX ADMIN — FENTANYL CITRATE 50 MCG: 50 INJECTION, SOLUTION INTRAMUSCULAR; INTRAVENOUS at 05:53

## 2019-02-27 RX ADMIN — CHLORHEXIDINE GLUCONATE 0.12% ORAL RINSE 15 ML: 1.2 LIQUID ORAL at 21:03

## 2019-02-27 RX ADMIN — FAMOTIDINE 20 MG: 10 INJECTION, SOLUTION INTRAVENOUS at 08:20

## 2019-02-27 RX ADMIN — Medication 10 ML: at 08:21

## 2019-02-27 RX ADMIN — Medication 10 ML: at 20:10

## 2019-02-27 RX ADMIN — Medication 10 ML: at 08:23

## 2019-02-27 RX ADMIN — PREGABALIN 75 MG: 75 CAPSULE ORAL at 08:20

## 2019-02-27 RX ADMIN — PREGABALIN 75 MG: 75 CAPSULE ORAL at 01:13

## 2019-02-27 RX ADMIN — INSULIN GLARGINE 10 UNITS: 100 INJECTION, SOLUTION SUBCUTANEOUS at 21:01

## 2019-02-27 RX ADMIN — HEPARIN SODIUM 5000 UNITS: 5000 INJECTION INTRAVENOUS; SUBCUTANEOUS at 21:00

## 2019-02-27 RX ADMIN — PREGABALIN 75 MG: 75 CAPSULE ORAL at 20:10

## 2019-02-27 RX ADMIN — FENTANYL CITRATE 50 MCG: 50 INJECTION, SOLUTION INTRAMUSCULAR; INTRAVENOUS at 17:46

## 2019-02-27 RX ADMIN — FENTANYL CITRATE 50 MCG: 50 INJECTION, SOLUTION INTRAMUSCULAR; INTRAVENOUS at 18:48

## 2019-02-27 RX ADMIN — THERA TABS 1 TABLET: TAB at 08:21

## 2019-02-27 RX ADMIN — NICARDIPINE HYDROCHLORIDE 5 MG/HR: 0.1 INJECTION, SOLUTION INTRAVENOUS at 02:32

## 2019-02-27 RX ADMIN — PROPOFOL 30 MCG/KG/MIN: 10 INJECTION, EMULSION INTRAVENOUS at 03:20

## 2019-02-27 RX ADMIN — FENTANYL CITRATE 50 MCG: 50 INJECTION, SOLUTION INTRAMUSCULAR; INTRAVENOUS at 08:45

## 2019-02-27 RX ADMIN — FENTANYL CITRATE 50 MCG: 50 INJECTION, SOLUTION INTRAMUSCULAR; INTRAVENOUS at 14:54

## 2019-02-27 RX ADMIN — FENTANYL CITRATE 50 MCG: 50 INJECTION, SOLUTION INTRAMUSCULAR; INTRAVENOUS at 10:57

## 2019-02-27 RX ADMIN — HEPARIN SODIUM 5000 UNITS: 5000 INJECTION INTRAVENOUS; SUBCUTANEOUS at 05:46

## 2019-02-27 RX ADMIN — THIAMINE HYDROCHLORIDE: 100 INJECTION, SOLUTION INTRAMUSCULAR; INTRAVENOUS at 09:38

## 2019-02-27 RX ADMIN — MAGNESIUM SULFATE HEPTAHYDRATE 1 G: 1 INJECTION, SOLUTION INTRAVENOUS at 08:03

## 2019-02-27 RX ADMIN — NICARDIPINE HYDROCHLORIDE 3 MG/HR: 0.1 INJECTION, SOLUTION INTRAVENOUS at 08:22

## 2019-02-27 ASSESSMENT — PULMONARY FUNCTION TESTS
PIF_VALUE: 18
PIF_VALUE: 17
PIF_VALUE: 18
PIF_VALUE: 13
PIF_VALUE: 13
PIF_VALUE: 18
PIF_VALUE: 17
PIF_VALUE: 18
PIF_VALUE: 13
PIF_VALUE: 13
PIF_VALUE: 18
PIF_VALUE: 13
PIF_VALUE: 18
PIF_VALUE: 17
PIF_VALUE: 14
PIF_VALUE: 18
PIF_VALUE: 13
PIF_VALUE: 18
PIF_VALUE: 17
PIF_VALUE: 18
PIF_VALUE: 18
PIF_VALUE: 19
PIF_VALUE: 18
PIF_VALUE: 13
PIF_VALUE: 13
PIF_VALUE: 17
PIF_VALUE: 17
PIF_VALUE: 13
PIF_VALUE: 13
PIF_VALUE: 17
PIF_VALUE: 18
PIF_VALUE: 18
PIF_VALUE: 13
PIF_VALUE: 18

## 2019-02-28 LAB
ABSOLUTE EOS #: 0.1 K/UL (ref 0–0.4)
ABSOLUTE IMMATURE GRANULOCYTE: 0 K/UL (ref 0–0.3)
ABSOLUTE LYMPH #: 0.4 K/UL (ref 1–4.8)
ABSOLUTE MONO #: 1.21 K/UL (ref 0.1–0.8)
ALLEN TEST: ABNORMAL
ANION GAP SERPL CALCULATED.3IONS-SCNC: 11 MMOL/L (ref 9–17)
BASOPHILS # BLD: 0 % (ref 0–2)
BASOPHILS ABSOLUTE: 0 K/UL (ref 0–0.2)
BUN BLDV-MCNC: 13 MG/DL (ref 6–20)
BUN/CREAT BLD: ABNORMAL (ref 9–20)
CALCIUM SERPL-MCNC: 8.9 MG/DL (ref 8.6–10.4)
CHLORIDE BLD-SCNC: 99 MMOL/L (ref 98–107)
CO2: 26 MMOL/L (ref 20–31)
CREAT SERPL-MCNC: 5.26 MG/DL (ref 0.7–1.2)
DIFFERENTIAL TYPE: ABNORMAL
EKG ATRIAL RATE: 71 BPM
EKG P AXIS: 63 DEGREES
EKG P-R INTERVAL: 254 MS
EKG Q-T INTERVAL: 510 MS
EKG QRS DURATION: 114 MS
EKG QTC CALCULATION (BAZETT): 554 MS
EKG R AXIS: -19 DEGREES
EKG T AXIS: 39 DEGREES
EKG VENTRICULAR RATE: 71 BPM
EOSINOPHILS RELATIVE PERCENT: 1 % (ref 1–4)
FIO2: 30
GFR AFRICAN AMERICAN: 14 ML/MIN
GFR NON-AFRICAN AMERICAN: 12 ML/MIN
GFR SERPL CREATININE-BSD FRML MDRD: ABNORMAL ML/MIN/{1.73_M2}
GFR SERPL CREATININE-BSD FRML MDRD: ABNORMAL ML/MIN/{1.73_M2}
GLUCOSE BLD-MCNC: 127 MG/DL (ref 75–110)
GLUCOSE BLD-MCNC: 235 MG/DL (ref 75–110)
GLUCOSE BLD-MCNC: 69 MG/DL (ref 75–110)
GLUCOSE BLD-MCNC: 70 MG/DL (ref 75–110)
GLUCOSE BLD-MCNC: 73 MG/DL (ref 70–99)
GLUCOSE BLD-MCNC: 92 MG/DL (ref 75–110)
GLUCOSE BLD-MCNC: 93 MG/DL (ref 75–110)
HCT VFR BLD CALC: 31.6 % (ref 40.7–50.3)
HEMOGLOBIN: 10.4 G/DL (ref 13–17)
IMMATURE GRANULOCYTES: 0 %
LYMPHOCYTES # BLD: 4 % (ref 24–44)
MCH RBC QN AUTO: 30.7 PG (ref 25.2–33.5)
MCHC RBC AUTO-ENTMCNC: 32.9 G/DL (ref 28.4–34.8)
MCV RBC AUTO: 93.2 FL (ref 82.6–102.9)
MODE: ABNORMAL
MONOCYTES # BLD: 12 % (ref 1–7)
MORPHOLOGY: NORMAL
NEGATIVE BASE EXCESS, ART: ABNORMAL (ref 0–2)
NRBC AUTOMATED: 0 PER 100 WBC
O2 DEVICE/FLOW/%: ABNORMAL
PATIENT TEMP: ABNORMAL
PDW BLD-RTO: 13.9 % (ref 11.8–14.4)
PLATELET # BLD: ABNORMAL K/UL (ref 138–453)
PLATELET ESTIMATE: ABNORMAL
PLATELET, FLUORESCENCE: 110 K/UL (ref 138–453)
PLATELET, IMMATURE FRACTION: 3.2 % (ref 1.1–10.3)
PMV BLD AUTO: ABNORMAL FL (ref 8.1–13.5)
POC HCO3: 29.1 MMOL/L (ref 21–28)
POC O2 SATURATION: 99 % (ref 94–98)
POC PCO2 TEMP: ABNORMAL MM HG
POC PCO2: 42.9 MM HG (ref 35–48)
POC PH TEMP: ABNORMAL
POC PH: 7.44 (ref 7.35–7.45)
POC PO2 TEMP: ABNORMAL MM HG
POC PO2: 116.9 MM HG (ref 83–108)
POSITIVE BASE EXCESS, ART: 4 (ref 0–3)
POTASSIUM SERPL-SCNC: 4 MMOL/L (ref 3.7–5.3)
RBC # BLD: 3.39 M/UL (ref 4.21–5.77)
RBC # BLD: ABNORMAL 10*6/UL
SAMPLE SITE: ABNORMAL
SEG NEUTROPHILS: 83 % (ref 36–66)
SEGMENTED NEUTROPHILS ABSOLUTE COUNT: 8.39 K/UL (ref 1.8–7.7)
SODIUM BLD-SCNC: 136 MMOL/L (ref 135–144)
TCO2 (CALC), ART: 30 MMOL/L (ref 22–29)
WBC # BLD: 10.1 K/UL (ref 3.5–11.3)
WBC # BLD: ABNORMAL 10*3/UL

## 2019-02-28 PROCEDURE — 2580000003 HC RX 258: Performed by: STUDENT IN AN ORGANIZED HEALTH CARE EDUCATION/TRAINING PROGRAM

## 2019-02-28 PROCEDURE — 6360000002 HC RX W HCPCS: Performed by: STUDENT IN AN ORGANIZED HEALTH CARE EDUCATION/TRAINING PROGRAM

## 2019-02-28 PROCEDURE — 85055 RETICULATED PLATELET ASSAY: CPT

## 2019-02-28 PROCEDURE — 82947 ASSAY GLUCOSE BLOOD QUANT: CPT

## 2019-02-28 PROCEDURE — 85025 COMPLETE CBC W/AUTO DIFF WBC: CPT

## 2019-02-28 PROCEDURE — 2700000000 HC OXYGEN THERAPY PER DAY

## 2019-02-28 PROCEDURE — 80048 BASIC METABOLIC PNL TOTAL CA: CPT

## 2019-02-28 PROCEDURE — 90935 HEMODIALYSIS ONE EVALUATION: CPT

## 2019-02-28 PROCEDURE — 2060000000 HC ICU INTERMEDIATE R&B

## 2019-02-28 PROCEDURE — 2580000003 HC RX 258: Performed by: INTERNAL MEDICINE

## 2019-02-28 PROCEDURE — 6360000002 HC RX W HCPCS: Performed by: INTERNAL MEDICINE

## 2019-02-28 PROCEDURE — 6370000000 HC RX 637 (ALT 250 FOR IP): Performed by: STUDENT IN AN ORGANIZED HEALTH CARE EDUCATION/TRAINING PROGRAM

## 2019-02-28 PROCEDURE — 90937 HEMODIALYSIS REPEATED EVAL: CPT

## 2019-02-28 PROCEDURE — 94762 N-INVAS EAR/PLS OXIMTRY CONT: CPT

## 2019-02-28 PROCEDURE — 2500000003 HC RX 250 WO HCPCS: Performed by: STUDENT IN AN ORGANIZED HEALTH CARE EDUCATION/TRAINING PROGRAM

## 2019-02-28 PROCEDURE — 76937 US GUIDE VASCULAR ACCESS: CPT

## 2019-02-28 PROCEDURE — 94660 CPAP INITIATION&MGMT: CPT

## 2019-02-28 PROCEDURE — 99233 SBSQ HOSP IP/OBS HIGH 50: CPT | Performed by: INTERNAL MEDICINE

## 2019-02-28 PROCEDURE — 82803 BLOOD GASES ANY COMBINATION: CPT

## 2019-02-28 RX ORDER — 0.9 % SODIUM CHLORIDE 0.9 %
150 INTRAVENOUS SOLUTION INTRAVENOUS PRN
Status: DISCONTINUED | OUTPATIENT
Start: 2019-02-28 | End: 2019-03-05 | Stop reason: HOSPADM

## 2019-02-28 RX ORDER — HYDRALAZINE HYDROCHLORIDE 20 MG/ML
10 INJECTION INTRAMUSCULAR; INTRAVENOUS EVERY 6 HOURS PRN
Status: DISCONTINUED | OUTPATIENT
Start: 2019-02-28 | End: 2019-03-05 | Stop reason: HOSPADM

## 2019-02-28 RX ORDER — METOPROLOL TARTRATE 50 MG/1
50 TABLET, FILM COATED ORAL 2 TIMES DAILY
Status: DISCONTINUED | OUTPATIENT
Start: 2019-02-28 | End: 2019-03-02

## 2019-02-28 RX ORDER — METOPROLOL TARTRATE 50 MG/1
50 TABLET, FILM COATED ORAL ONCE
Status: COMPLETED | OUTPATIENT
Start: 2019-02-28 | End: 2019-02-28

## 2019-02-28 RX ORDER — MINOXIDIL 2.5 MG/1
5 TABLET ORAL 2 TIMES DAILY
Status: DISCONTINUED | OUTPATIENT
Start: 2019-02-28 | End: 2019-03-02

## 2019-02-28 RX ORDER — 0.9 % SODIUM CHLORIDE 0.9 %
250 INTRAVENOUS SOLUTION INTRAVENOUS PRN
Status: DISCONTINUED | OUTPATIENT
Start: 2019-02-28 | End: 2019-03-05 | Stop reason: HOSPADM

## 2019-02-28 RX ORDER — TRAMADOL HYDROCHLORIDE 50 MG/1
50 TABLET ORAL EVERY 6 HOURS PRN
Status: COMPLETED | OUTPATIENT
Start: 2019-02-28 | End: 2019-03-01

## 2019-02-28 RX ORDER — INSULIN GLARGINE 100 [IU]/ML
5 INJECTION, SOLUTION SUBCUTANEOUS NIGHTLY
Status: DISCONTINUED | OUTPATIENT
Start: 2019-02-28 | End: 2019-03-04

## 2019-02-28 RX ADMIN — PANCRELIPASE 1 CAPSULE: 30000; 6000; 19000 CAPSULE, DELAYED RELEASE PELLETS ORAL at 12:42

## 2019-02-28 RX ADMIN — Medication 10 ML: at 23:06

## 2019-02-28 RX ADMIN — PANCRELIPASE 1 CAPSULE: 30000; 6000; 19000 CAPSULE, DELAYED RELEASE PELLETS ORAL at 18:43

## 2019-02-28 RX ADMIN — HEPARIN SODIUM 5000 UNITS: 5000 INJECTION INTRAVENOUS; SUBCUTANEOUS at 14:31

## 2019-02-28 RX ADMIN — TRAMADOL HYDROCHLORIDE 50 MG: 50 TABLET, FILM COATED ORAL at 21:47

## 2019-02-28 RX ADMIN — MINOXIDIL 5 MG: 2.5 TABLET ORAL at 23:05

## 2019-02-28 RX ADMIN — Medication 10 ML: at 23:07

## 2019-02-28 RX ADMIN — PANCRELIPASE 1 CAPSULE: 30000; 6000; 19000 CAPSULE, DELAYED RELEASE PELLETS ORAL at 08:39

## 2019-02-28 RX ADMIN — Medication 10 ML: at 08:40

## 2019-02-28 RX ADMIN — PREGABALIN 75 MG: 75 CAPSULE ORAL at 08:39

## 2019-02-28 RX ADMIN — NIFEDIPINE 30 MG: 30 TABLET, FILM COATED, EXTENDED RELEASE ORAL at 08:39

## 2019-02-28 RX ADMIN — DOXERCALCIFEROL 2.5 MCG: 2 INJECTION, SOLUTION INTRAVENOUS at 15:37

## 2019-02-28 RX ADMIN — FAMOTIDINE 20 MG: 20 TABLET, FILM COATED ORAL at 08:39

## 2019-02-28 RX ADMIN — HEPARIN SODIUM 5000 UNITS: 5000 INJECTION INTRAVENOUS; SUBCUTANEOUS at 06:06

## 2019-02-28 RX ADMIN — MINOXIDIL 5 MG: 2.5 TABLET ORAL at 12:42

## 2019-02-28 RX ADMIN — HYDRALAZINE HYDROCHLORIDE 10 MG: 20 INJECTION INTRAMUSCULAR; INTRAVENOUS at 15:40

## 2019-02-28 RX ADMIN — NICARDIPINE HYDROCHLORIDE 8 MG/HR: 0.1 INJECTION, SOLUTION INTRAVENOUS at 00:34

## 2019-02-28 RX ADMIN — THERA TABS 1 TABLET: TAB at 08:39

## 2019-02-28 RX ADMIN — Medication 10 ML: at 08:41

## 2019-02-28 RX ADMIN — METOPROLOL TARTRATE 50 MG: 50 TABLET, FILM COATED ORAL at 22:07

## 2019-02-28 RX ADMIN — INSULIN LISPRO 1 UNITS: 100 INJECTION, SOLUTION INTRAVENOUS; SUBCUTANEOUS at 21:50

## 2019-02-28 RX ADMIN — METOPROLOL TARTRATE 50 MG: 50 TABLET, FILM COATED ORAL at 11:26

## 2019-02-28 RX ADMIN — INSULIN GLARGINE 5 UNITS: 100 INJECTION, SOLUTION SUBCUTANEOUS at 21:51

## 2019-02-28 RX ADMIN — PREGABALIN 75 MG: 75 CAPSULE ORAL at 22:07

## 2019-02-28 RX ADMIN — METOPROLOL TARTRATE 25 MG: 50 TABLET, FILM COATED ORAL at 08:40

## 2019-02-28 ASSESSMENT — PAIN SCALES - GENERAL
PAINLEVEL_OUTOF10: 0
PAINLEVEL_OUTOF10: 6

## 2019-02-28 ASSESSMENT — PULMONARY FUNCTION TESTS: PIF_VALUE: 12

## 2019-03-01 LAB
ABSOLUTE EOS #: 0.1 K/UL (ref 0–0.44)
ABSOLUTE IMMATURE GRANULOCYTE: 0.05 K/UL (ref 0–0.3)
ABSOLUTE LYMPH #: 0.94 K/UL (ref 1.1–3.7)
ABSOLUTE MONO #: 1.43 K/UL (ref 0.1–1.2)
ANION GAP SERPL CALCULATED.3IONS-SCNC: 18 MMOL/L (ref 9–17)
BASOPHILS # BLD: 1 % (ref 0–2)
BASOPHILS ABSOLUTE: 0.12 K/UL (ref 0–0.2)
BUN BLDV-MCNC: 16 MG/DL (ref 6–20)
BUN/CREAT BLD: ABNORMAL (ref 9–20)
CALCIUM SERPL-MCNC: 9 MG/DL (ref 8.6–10.4)
CHLORIDE BLD-SCNC: 95 MMOL/L (ref 98–107)
CO2: 18 MMOL/L (ref 20–31)
CREAT SERPL-MCNC: 5.29 MG/DL (ref 0.7–1.2)
DIFFERENTIAL TYPE: ABNORMAL
EOSINOPHILS RELATIVE PERCENT: 1 % (ref 1–4)
GFR AFRICAN AMERICAN: 14 ML/MIN
GFR NON-AFRICAN AMERICAN: 12 ML/MIN
GFR SERPL CREATININE-BSD FRML MDRD: ABNORMAL ML/MIN/{1.73_M2}
GFR SERPL CREATININE-BSD FRML MDRD: ABNORMAL ML/MIN/{1.73_M2}
GLUCOSE BLD-MCNC: 131 MG/DL (ref 75–110)
GLUCOSE BLD-MCNC: 139 MG/DL (ref 70–99)
GLUCOSE BLD-MCNC: 158 MG/DL (ref 75–110)
GLUCOSE BLD-MCNC: 161 MG/DL (ref 75–110)
GLUCOSE BLD-MCNC: 191 MG/DL (ref 75–110)
HCT VFR BLD CALC: 39.5 % (ref 40.7–50.3)
HEMOGLOBIN: 12.7 G/DL (ref 13–17)
IMMATURE GRANULOCYTES: 1 %
LYMPHOCYTES # BLD: 9 % (ref 24–43)
MCH RBC QN AUTO: 31.4 PG (ref 25.2–33.5)
MCHC RBC AUTO-ENTMCNC: 32.2 G/DL (ref 28.4–34.8)
MCV RBC AUTO: 97.8 FL (ref 82.6–102.9)
MONOCYTES # BLD: 13 % (ref 3–12)
NRBC AUTOMATED: 0 PER 100 WBC
PDW BLD-RTO: 14 % (ref 11.8–14.4)
PLATELET # BLD: 143 K/UL (ref 138–453)
PLATELET ESTIMATE: ABNORMAL
PMV BLD AUTO: 10.6 FL (ref 8.1–13.5)
POTASSIUM SERPL-SCNC: 5.1 MMOL/L (ref 3.7–5.3)
RBC # BLD: 4.04 M/UL (ref 4.21–5.77)
RBC # BLD: ABNORMAL 10*6/UL
SEG NEUTROPHILS: 76 % (ref 36–65)
SEGMENTED NEUTROPHILS ABSOLUTE COUNT: 8.43 K/UL (ref 1.5–8.1)
SODIUM BLD-SCNC: 131 MMOL/L (ref 135–144)
WBC # BLD: 11.1 K/UL (ref 3.5–11.3)
WBC # BLD: ABNORMAL 10*3/UL

## 2019-03-01 PROCEDURE — 97530 THERAPEUTIC ACTIVITIES: CPT

## 2019-03-01 PROCEDURE — 2580000003 HC RX 258: Performed by: STUDENT IN AN ORGANIZED HEALTH CARE EDUCATION/TRAINING PROGRAM

## 2019-03-01 PROCEDURE — 6370000000 HC RX 637 (ALT 250 FOR IP): Performed by: STUDENT IN AN ORGANIZED HEALTH CARE EDUCATION/TRAINING PROGRAM

## 2019-03-01 PROCEDURE — 6360000002 HC RX W HCPCS: Performed by: STUDENT IN AN ORGANIZED HEALTH CARE EDUCATION/TRAINING PROGRAM

## 2019-03-01 PROCEDURE — 94760 N-INVAS EAR/PLS OXIMETRY 1: CPT

## 2019-03-01 PROCEDURE — 85025 COMPLETE CBC W/AUTO DIFF WBC: CPT

## 2019-03-01 PROCEDURE — 97535 SELF CARE MNGMENT TRAINING: CPT

## 2019-03-01 PROCEDURE — 94762 N-INVAS EAR/PLS OXIMTRY CONT: CPT

## 2019-03-01 PROCEDURE — 99233 SBSQ HOSP IP/OBS HIGH 50: CPT | Performed by: INTERNAL MEDICINE

## 2019-03-01 PROCEDURE — 2060000000 HC ICU INTERMEDIATE R&B

## 2019-03-01 PROCEDURE — 82947 ASSAY GLUCOSE BLOOD QUANT: CPT

## 2019-03-01 PROCEDURE — 97162 PT EVAL MOD COMPLEX 30 MIN: CPT

## 2019-03-01 PROCEDURE — 36415 COLL VENOUS BLD VENIPUNCTURE: CPT

## 2019-03-01 PROCEDURE — 80048 BASIC METABOLIC PNL TOTAL CA: CPT

## 2019-03-01 PROCEDURE — 97166 OT EVAL MOD COMPLEX 45 MIN: CPT

## 2019-03-01 RX ORDER — 0.9 % SODIUM CHLORIDE 0.9 %
250 INTRAVENOUS SOLUTION INTRAVENOUS ONCE
Status: COMPLETED | OUTPATIENT
Start: 2019-03-01 | End: 2019-03-01

## 2019-03-01 RX ORDER — MIDODRINE HYDROCHLORIDE 5 MG/1
10 TABLET ORAL ONCE
Status: COMPLETED | OUTPATIENT
Start: 2019-03-01 | End: 2019-03-01

## 2019-03-01 RX ORDER — THIAMINE MONONITRATE (VIT B1) 100 MG
100 TABLET ORAL DAILY
Status: DISCONTINUED | OUTPATIENT
Start: 2019-03-01 | End: 2019-03-05 | Stop reason: HOSPADM

## 2019-03-01 RX ORDER — FOLIC ACID 1 MG/1
1 TABLET ORAL DAILY
Status: DISCONTINUED | OUTPATIENT
Start: 2019-03-01 | End: 2019-03-05 | Stop reason: HOSPADM

## 2019-03-01 RX ADMIN — THERA TABS 1 TABLET: TAB at 08:25

## 2019-03-01 RX ADMIN — PANCRELIPASE 1 CAPSULE: 30000; 6000; 19000 CAPSULE, DELAYED RELEASE PELLETS ORAL at 14:37

## 2019-03-01 RX ADMIN — PANCRELIPASE 1 CAPSULE: 30000; 6000; 19000 CAPSULE, DELAYED RELEASE PELLETS ORAL at 17:22

## 2019-03-01 RX ADMIN — HEPARIN SODIUM 5000 UNITS: 5000 INJECTION INTRAVENOUS; SUBCUTANEOUS at 23:00

## 2019-03-01 RX ADMIN — INSULIN LISPRO 1 UNITS: 100 INJECTION, SOLUTION INTRAVENOUS; SUBCUTANEOUS at 21:40

## 2019-03-01 RX ADMIN — INSULIN LISPRO 1 UNITS: 100 INJECTION, SOLUTION INTRAVENOUS; SUBCUTANEOUS at 14:37

## 2019-03-01 RX ADMIN — METOPROLOL TARTRATE 50 MG: 50 TABLET, FILM COATED ORAL at 08:25

## 2019-03-01 RX ADMIN — TRAMADOL HYDROCHLORIDE 50 MG: 50 TABLET, FILM COATED ORAL at 09:43

## 2019-03-01 RX ADMIN — MINOXIDIL 5 MG: 2.5 TABLET ORAL at 08:25

## 2019-03-01 RX ADMIN — INSULIN GLARGINE 5 UNITS: 100 INJECTION, SOLUTION SUBCUTANEOUS at 21:40

## 2019-03-01 RX ADMIN — PREGABALIN 75 MG: 75 CAPSULE ORAL at 08:25

## 2019-03-01 RX ADMIN — Medication 100 MG: at 08:25

## 2019-03-01 RX ADMIN — NIFEDIPINE 30 MG: 30 TABLET, FILM COATED, EXTENDED RELEASE ORAL at 08:24

## 2019-03-01 RX ADMIN — MIDODRINE HYDROCHLORIDE 10 MG: 5 TABLET ORAL at 22:37

## 2019-03-01 RX ADMIN — FOLIC ACID 1 MG: 1 TABLET ORAL at 08:25

## 2019-03-01 RX ADMIN — SODIUM CHLORIDE 250 ML: 9 INJECTION, SOLUTION INTRAVENOUS at 20:45

## 2019-03-01 RX ADMIN — CALCIUM GLUCONATE 1 G: 98 INJECTION, SOLUTION INTRAVENOUS at 22:38

## 2019-03-01 RX ADMIN — PANCRELIPASE 1 CAPSULE: 30000; 6000; 19000 CAPSULE, DELAYED RELEASE PELLETS ORAL at 08:24

## 2019-03-01 RX ADMIN — Medication 10 ML: at 20:45

## 2019-03-01 RX ADMIN — Medication 10 ML: at 08:26

## 2019-03-01 RX ADMIN — HEPARIN SODIUM 5000 UNITS: 5000 INJECTION INTRAVENOUS; SUBCUTANEOUS at 14:36

## 2019-03-01 ASSESSMENT — PAIN SCALES - GENERAL
PAINLEVEL_OUTOF10: 8
PAINLEVEL_OUTOF10: 7
PAINLEVEL_OUTOF10: 7
PAINLEVEL_OUTOF10: 6

## 2019-03-01 ASSESSMENT — PAIN DESCRIPTION - ORIENTATION
ORIENTATION: RIGHT
ORIENTATION: RIGHT
ORIENTATION: RIGHT;LEFT

## 2019-03-01 ASSESSMENT — PAIN DESCRIPTION - LOCATION
LOCATION: SHOULDER
LOCATION: SHOULDER;LEG
LOCATION: SHOULDER

## 2019-03-02 ENCOUNTER — APPOINTMENT (OUTPATIENT)
Dept: DIALYSIS | Age: 54
DRG: 077 | End: 2019-03-02
Payer: MEDICARE

## 2019-03-02 LAB
ABSOLUTE EOS #: 0.18 K/UL (ref 0–0.44)
ABSOLUTE IMMATURE GRANULOCYTE: 0.06 K/UL (ref 0–0.3)
ABSOLUTE LYMPH #: 1.16 K/UL (ref 1.1–3.7)
ABSOLUTE MONO #: 1.46 K/UL (ref 0.1–1.2)
ANION GAP SERPL CALCULATED.3IONS-SCNC: 15 MMOL/L (ref 9–17)
BASOPHILS # BLD: 1 % (ref 0–2)
BASOPHILS ABSOLUTE: 0.14 K/UL (ref 0–0.2)
BUN BLDV-MCNC: 38 MG/DL (ref 6–20)
BUN/CREAT BLD: ABNORMAL (ref 9–20)
CALCIUM SERPL-MCNC: 9.2 MG/DL (ref 8.6–10.4)
CHLORIDE BLD-SCNC: 96 MMOL/L (ref 98–107)
CO2: 19 MMOL/L (ref 20–31)
CREAT SERPL-MCNC: 8.12 MG/DL (ref 0.7–1.2)
DIFFERENTIAL TYPE: ABNORMAL
EOSINOPHILS RELATIVE PERCENT: 1 % (ref 1–4)
GFR AFRICAN AMERICAN: 9 ML/MIN
GFR NON-AFRICAN AMERICAN: 7 ML/MIN
GFR SERPL CREATININE-BSD FRML MDRD: ABNORMAL ML/MIN/{1.73_M2}
GFR SERPL CREATININE-BSD FRML MDRD: ABNORMAL ML/MIN/{1.73_M2}
GLUCOSE BLD-MCNC: 154 MG/DL (ref 75–110)
GLUCOSE BLD-MCNC: 171 MG/DL (ref 70–99)
GLUCOSE BLD-MCNC: 174 MG/DL (ref 75–110)
GLUCOSE BLD-MCNC: 213 MG/DL (ref 75–110)
HCT VFR BLD CALC: 36 % (ref 40.7–50.3)
HEMOGLOBIN: 11.5 G/DL (ref 13–17)
IMMATURE GRANULOCYTES: 1 %
LYMPHOCYTES # BLD: 9 % (ref 24–43)
MCH RBC QN AUTO: 31 PG (ref 25.2–33.5)
MCHC RBC AUTO-ENTMCNC: 31.9 G/DL (ref 28.4–34.8)
MCV RBC AUTO: 97 FL (ref 82.6–102.9)
MONOCYTES # BLD: 11 % (ref 3–12)
NRBC AUTOMATED: 0 PER 100 WBC
PDW BLD-RTO: 13.9 % (ref 11.8–14.4)
PLATELET # BLD: 160 K/UL (ref 138–453)
PLATELET ESTIMATE: ABNORMAL
PMV BLD AUTO: 10.2 FL (ref 8.1–13.5)
POTASSIUM SERPL-SCNC: 5.3 MMOL/L (ref 3.7–5.3)
RBC # BLD: 3.71 M/UL (ref 4.21–5.77)
RBC # BLD: ABNORMAL 10*6/UL
SEG NEUTROPHILS: 77 % (ref 36–65)
SEGMENTED NEUTROPHILS ABSOLUTE COUNT: 10.23 K/UL (ref 1.5–8.1)
SODIUM BLD-SCNC: 130 MMOL/L (ref 135–144)
WBC # BLD: 13.2 K/UL (ref 3.5–11.3)
WBC # BLD: ABNORMAL 10*3/UL

## 2019-03-02 PROCEDURE — 6370000000 HC RX 637 (ALT 250 FOR IP): Performed by: STUDENT IN AN ORGANIZED HEALTH CARE EDUCATION/TRAINING PROGRAM

## 2019-03-02 PROCEDURE — 6360000002 HC RX W HCPCS: Performed by: STUDENT IN AN ORGANIZED HEALTH CARE EDUCATION/TRAINING PROGRAM

## 2019-03-02 PROCEDURE — 36415 COLL VENOUS BLD VENIPUNCTURE: CPT

## 2019-03-02 PROCEDURE — 83036 HEMOGLOBIN GLYCOSYLATED A1C: CPT

## 2019-03-02 PROCEDURE — 6370000000 HC RX 637 (ALT 250 FOR IP): Performed by: INTERNAL MEDICINE

## 2019-03-02 PROCEDURE — 90937 HEMODIALYSIS REPEATED EVAL: CPT

## 2019-03-02 PROCEDURE — 6360000002 HC RX W HCPCS: Performed by: INTERNAL MEDICINE

## 2019-03-02 PROCEDURE — 2580000003 HC RX 258: Performed by: STUDENT IN AN ORGANIZED HEALTH CARE EDUCATION/TRAINING PROGRAM

## 2019-03-02 PROCEDURE — 90935 HEMODIALYSIS ONE EVALUATION: CPT

## 2019-03-02 PROCEDURE — 99232 SBSQ HOSP IP/OBS MODERATE 35: CPT | Performed by: INTERNAL MEDICINE

## 2019-03-02 PROCEDURE — 85025 COMPLETE CBC W/AUTO DIFF WBC: CPT

## 2019-03-02 PROCEDURE — 80048 BASIC METABOLIC PNL TOTAL CA: CPT

## 2019-03-02 PROCEDURE — 82947 ASSAY GLUCOSE BLOOD QUANT: CPT

## 2019-03-02 PROCEDURE — 2060000000 HC ICU INTERMEDIATE R&B

## 2019-03-02 RX ORDER — MIDODRINE HYDROCHLORIDE 5 MG/1
5 TABLET ORAL ONCE
Status: COMPLETED | OUTPATIENT
Start: 2019-03-02 | End: 2019-03-02

## 2019-03-02 RX ORDER — MORPHINE SULFATE 4 MG/ML
1 INJECTION, SOLUTION INTRAMUSCULAR; INTRAVENOUS EVERY 4 HOURS PRN
Status: DISCONTINUED | OUTPATIENT
Start: 2019-03-02 | End: 2019-03-05

## 2019-03-02 RX ORDER — MIDODRINE HYDROCHLORIDE 5 MG/1
10 TABLET ORAL PRN
Status: DISPENSED | OUTPATIENT
Start: 2019-03-02 | End: 2019-03-02

## 2019-03-02 RX ORDER — MIDODRINE HYDROCHLORIDE 5 MG/1
5 TABLET ORAL
Status: DISCONTINUED | OUTPATIENT
Start: 2019-03-02 | End: 2019-03-05 | Stop reason: HOSPADM

## 2019-03-02 RX ADMIN — HEPARIN SODIUM 5000 UNITS: 5000 INJECTION INTRAVENOUS; SUBCUTANEOUS at 21:02

## 2019-03-02 RX ADMIN — INSULIN LISPRO 1 UNITS: 100 INJECTION, SOLUTION INTRAVENOUS; SUBCUTANEOUS at 16:48

## 2019-03-02 RX ADMIN — FOLIC ACID 1 MG: 1 TABLET ORAL at 08:22

## 2019-03-02 RX ADMIN — PREGABALIN 75 MG: 75 CAPSULE ORAL at 21:55

## 2019-03-02 RX ADMIN — MIDODRINE HYDROCHLORIDE 5 MG: 5 TABLET ORAL at 21:03

## 2019-03-02 RX ADMIN — INSULIN GLARGINE 5 UNITS: 100 INJECTION, SOLUTION SUBCUTANEOUS at 21:02

## 2019-03-02 RX ADMIN — Medication 100 MG: at 08:22

## 2019-03-02 RX ADMIN — Medication 10 ML: at 09:18

## 2019-03-02 RX ADMIN — MIDODRINE HYDROCHLORIDE 5 MG: 5 TABLET ORAL at 16:48

## 2019-03-02 RX ADMIN — MIDODRINE HYDROCHLORIDE 10 MG: 5 TABLET ORAL at 11:37

## 2019-03-02 RX ADMIN — INSULIN LISPRO 1 UNITS: 100 INJECTION, SOLUTION INTRAVENOUS; SUBCUTANEOUS at 08:22

## 2019-03-02 RX ADMIN — PREGABALIN 75 MG: 75 CAPSULE ORAL at 08:22

## 2019-03-02 RX ADMIN — PANCRELIPASE 1 CAPSULE: 30000; 6000; 19000 CAPSULE, DELAYED RELEASE PELLETS ORAL at 16:48

## 2019-03-02 RX ADMIN — PANCRELIPASE 1 CAPSULE: 30000; 6000; 19000 CAPSULE, DELAYED RELEASE PELLETS ORAL at 08:22

## 2019-03-02 RX ADMIN — MIDODRINE HYDROCHLORIDE 10 MG: 5 TABLET ORAL at 14:13

## 2019-03-02 RX ADMIN — HEPARIN SODIUM 5000 UNITS: 5000 INJECTION INTRAVENOUS; SUBCUTANEOUS at 06:52

## 2019-03-02 RX ADMIN — INSULIN LISPRO 1 UNITS: 100 INJECTION, SOLUTION INTRAVENOUS; SUBCUTANEOUS at 21:02

## 2019-03-02 RX ADMIN — Medication 10 ML: at 21:07

## 2019-03-02 RX ADMIN — MIDODRINE HYDROCHLORIDE 5 MG: 5 TABLET ORAL at 08:22

## 2019-03-02 RX ADMIN — DOXERCALCIFEROL 2.5 MCG: 2 INJECTION, SOLUTION INTRAVENOUS at 12:19

## 2019-03-02 RX ADMIN — MIDODRINE HYDROCHLORIDE 5 MG: 5 TABLET ORAL at 09:18

## 2019-03-02 RX ADMIN — THERA TABS 1 TABLET: TAB at 08:22

## 2019-03-02 RX ADMIN — MORPHINE SULFATE 1 MG: 4 INJECTION INTRAVENOUS at 16:15

## 2019-03-02 ASSESSMENT — PAIN DESCRIPTION - ONSET: ONSET: ON-GOING

## 2019-03-02 ASSESSMENT — PAIN DESCRIPTION - PAIN TYPE
TYPE: ACUTE PAIN
TYPE: ACUTE PAIN

## 2019-03-02 ASSESSMENT — PAIN DESCRIPTION - LOCATION
LOCATION: NECK
LOCATION: BACK;SHOULDER

## 2019-03-02 ASSESSMENT — PAIN SCALES - GENERAL
PAINLEVEL_OUTOF10: 8
PAINLEVEL_OUTOF10: 0
PAINLEVEL_OUTOF10: 8
PAINLEVEL_OUTOF10: 8

## 2019-03-02 ASSESSMENT — PAIN - FUNCTIONAL ASSESSMENT: PAIN_FUNCTIONAL_ASSESSMENT: PREVENTS OR INTERFERES SOME ACTIVE ACTIVITIES AND ADLS

## 2019-03-02 ASSESSMENT — PAIN DESCRIPTION - FREQUENCY: FREQUENCY: CONTINUOUS

## 2019-03-02 ASSESSMENT — PAIN DESCRIPTION - DESCRIPTORS: DESCRIPTORS: ACHING

## 2019-03-02 ASSESSMENT — PAIN DESCRIPTION - ORIENTATION: ORIENTATION: UPPER;LEFT

## 2019-03-02 ASSESSMENT — PAIN DESCRIPTION - PROGRESSION: CLINICAL_PROGRESSION: NOT CHANGED

## 2019-03-03 LAB
ANION GAP SERPL CALCULATED.3IONS-SCNC: 14 MMOL/L (ref 9–17)
BUN BLDV-MCNC: 28 MG/DL (ref 6–20)
BUN/CREAT BLD: ABNORMAL (ref 9–20)
CALCIUM SERPL-MCNC: 9.3 MG/DL (ref 8.6–10.4)
CHLORIDE BLD-SCNC: 91 MMOL/L (ref 98–107)
CO2: 26 MMOL/L (ref 20–31)
CREAT SERPL-MCNC: 6.52 MG/DL (ref 0.7–1.2)
ESTIMATED AVERAGE GLUCOSE: 140 MG/DL
GFR AFRICAN AMERICAN: 11 ML/MIN
GFR NON-AFRICAN AMERICAN: 9 ML/MIN
GFR SERPL CREATININE-BSD FRML MDRD: ABNORMAL ML/MIN/{1.73_M2}
GFR SERPL CREATININE-BSD FRML MDRD: ABNORMAL ML/MIN/{1.73_M2}
GLUCOSE BLD-MCNC: 145 MG/DL (ref 75–110)
GLUCOSE BLD-MCNC: 156 MG/DL (ref 75–110)
GLUCOSE BLD-MCNC: 212 MG/DL (ref 70–99)
GLUCOSE BLD-MCNC: 214 MG/DL (ref 75–110)
GLUCOSE BLD-MCNC: 223 MG/DL (ref 75–110)
HBA1C MFR BLD: 6.5 % (ref 4–6)
POTASSIUM SERPL-SCNC: 5.1 MMOL/L (ref 3.7–5.3)
SODIUM BLD-SCNC: 131 MMOL/L (ref 135–144)

## 2019-03-03 PROCEDURE — 97116 GAIT TRAINING THERAPY: CPT

## 2019-03-03 PROCEDURE — 6370000000 HC RX 637 (ALT 250 FOR IP): Performed by: STUDENT IN AN ORGANIZED HEALTH CARE EDUCATION/TRAINING PROGRAM

## 2019-03-03 PROCEDURE — 2580000003 HC RX 258: Performed by: STUDENT IN AN ORGANIZED HEALTH CARE EDUCATION/TRAINING PROGRAM

## 2019-03-03 PROCEDURE — 80048 BASIC METABOLIC PNL TOTAL CA: CPT

## 2019-03-03 PROCEDURE — 94762 N-INVAS EAR/PLS OXIMTRY CONT: CPT

## 2019-03-03 PROCEDURE — 82947 ASSAY GLUCOSE BLOOD QUANT: CPT

## 2019-03-03 PROCEDURE — 6360000002 HC RX W HCPCS: Performed by: STUDENT IN AN ORGANIZED HEALTH CARE EDUCATION/TRAINING PROGRAM

## 2019-03-03 PROCEDURE — 36415 COLL VENOUS BLD VENIPUNCTURE: CPT

## 2019-03-03 PROCEDURE — 99232 SBSQ HOSP IP/OBS MODERATE 35: CPT | Performed by: INTERNAL MEDICINE

## 2019-03-03 PROCEDURE — 2060000000 HC ICU INTERMEDIATE R&B

## 2019-03-03 PROCEDURE — 97110 THERAPEUTIC EXERCISES: CPT

## 2019-03-03 PROCEDURE — 2700000000 HC OXYGEN THERAPY PER DAY

## 2019-03-03 RX ORDER — DOCUSATE SODIUM 100 MG/1
100 CAPSULE, LIQUID FILLED ORAL DAILY
Status: DISCONTINUED | OUTPATIENT
Start: 2019-03-03 | End: 2019-03-05 | Stop reason: HOSPADM

## 2019-03-03 RX ADMIN — DOCUSATE SODIUM 100 MG: 100 CAPSULE, LIQUID FILLED ORAL at 09:13

## 2019-03-03 RX ADMIN — Medication 10 ML: at 20:54

## 2019-03-03 RX ADMIN — MIDODRINE HYDROCHLORIDE 5 MG: 5 TABLET ORAL at 11:43

## 2019-03-03 RX ADMIN — INSULIN GLARGINE 5 UNITS: 100 INJECTION, SOLUTION SUBCUTANEOUS at 20:54

## 2019-03-03 RX ADMIN — HEPARIN SODIUM 5000 UNITS: 5000 INJECTION INTRAVENOUS; SUBCUTANEOUS at 06:23

## 2019-03-03 RX ADMIN — INSULIN LISPRO 1 UNITS: 100 INJECTION, SOLUTION INTRAVENOUS; SUBCUTANEOUS at 20:54

## 2019-03-03 RX ADMIN — PANCRELIPASE 1 CAPSULE: 30000; 6000; 19000 CAPSULE, DELAYED RELEASE PELLETS ORAL at 11:43

## 2019-03-03 RX ADMIN — MORPHINE SULFATE 1 MG: 4 INJECTION INTRAVENOUS at 06:24

## 2019-03-03 RX ADMIN — PREGABALIN 75 MG: 75 CAPSULE ORAL at 09:13

## 2019-03-03 RX ADMIN — Medication 100 MG: at 09:13

## 2019-03-03 RX ADMIN — MORPHINE SULFATE 1 MG: 4 INJECTION INTRAVENOUS at 16:50

## 2019-03-03 RX ADMIN — HEPARIN SODIUM 5000 UNITS: 5000 INJECTION INTRAVENOUS; SUBCUTANEOUS at 21:05

## 2019-03-03 RX ADMIN — THERA TABS 1 TABLET: TAB at 09:13

## 2019-03-03 RX ADMIN — MORPHINE SULFATE 1 MG: 4 INJECTION INTRAVENOUS at 13:00

## 2019-03-03 RX ADMIN — PREGABALIN 75 MG: 75 CAPSULE ORAL at 20:53

## 2019-03-03 RX ADMIN — MIDODRINE HYDROCHLORIDE 5 MG: 5 TABLET ORAL at 18:11

## 2019-03-03 RX ADMIN — INSULIN LISPRO 1 UNITS: 100 INJECTION, SOLUTION INTRAVENOUS; SUBCUTANEOUS at 11:43

## 2019-03-03 RX ADMIN — INSULIN LISPRO 1 UNITS: 100 INJECTION, SOLUTION INTRAVENOUS; SUBCUTANEOUS at 09:13

## 2019-03-03 RX ADMIN — MORPHINE SULFATE 1 MG: 4 INJECTION INTRAVENOUS at 20:54

## 2019-03-03 RX ADMIN — FOLIC ACID 1 MG: 1 TABLET ORAL at 09:13

## 2019-03-03 RX ADMIN — Medication 10 ML: at 09:14

## 2019-03-03 RX ADMIN — PANCRELIPASE 1 CAPSULE: 30000; 6000; 19000 CAPSULE, DELAYED RELEASE PELLETS ORAL at 16:50

## 2019-03-03 RX ADMIN — INSULIN LISPRO 2 UNITS: 100 INJECTION, SOLUTION INTRAVENOUS; SUBCUTANEOUS at 16:50

## 2019-03-03 RX ADMIN — PANCRELIPASE 1 CAPSULE: 30000; 6000; 19000 CAPSULE, DELAYED RELEASE PELLETS ORAL at 09:13

## 2019-03-03 RX ADMIN — HEPARIN SODIUM 5000 UNITS: 5000 INJECTION INTRAVENOUS; SUBCUTANEOUS at 13:31

## 2019-03-03 ASSESSMENT — PAIN DESCRIPTION - PAIN TYPE
TYPE: ACUTE PAIN
TYPE: ACUTE PAIN

## 2019-03-03 ASSESSMENT — PAIN SCALES - GENERAL
PAINLEVEL_OUTOF10: 4
PAINLEVEL_OUTOF10: 7
PAINLEVEL_OUTOF10: 3
PAINLEVEL_OUTOF10: 7

## 2019-03-03 ASSESSMENT — PAIN DESCRIPTION - LOCATION
LOCATION: BACK
LOCATION: BACK;SHOULDER

## 2019-03-03 ASSESSMENT — ENCOUNTER SYMPTOMS
SORE THROAT: 1
COUGH: 0
SHORTNESS OF BREATH: 0
NAUSEA: 0
TROUBLE SWALLOWING: 0
WHEEZING: 0
ABDOMINAL PAIN: 0
VOMITING: 0

## 2019-03-03 ASSESSMENT — PAIN DESCRIPTION - ORIENTATION: ORIENTATION: LEFT

## 2019-03-04 LAB
ANION GAP SERPL CALCULATED.3IONS-SCNC: 17 MMOL/L (ref 9–17)
BUN BLDV-MCNC: 47 MG/DL (ref 6–20)
BUN/CREAT BLD: ABNORMAL (ref 9–20)
CALCIUM SERPL-MCNC: 9.5 MG/DL (ref 8.6–10.4)
CHLORIDE BLD-SCNC: 91 MMOL/L (ref 98–107)
CO2: 24 MMOL/L (ref 20–31)
CREAT SERPL-MCNC: 8.72 MG/DL (ref 0.7–1.2)
GFR AFRICAN AMERICAN: 8 ML/MIN
GFR NON-AFRICAN AMERICAN: 6 ML/MIN
GFR SERPL CREATININE-BSD FRML MDRD: ABNORMAL ML/MIN/{1.73_M2}
GFR SERPL CREATININE-BSD FRML MDRD: ABNORMAL ML/MIN/{1.73_M2}
GLUCOSE BLD-MCNC: 158 MG/DL (ref 75–110)
GLUCOSE BLD-MCNC: 170 MG/DL (ref 70–99)
GLUCOSE BLD-MCNC: 180 MG/DL (ref 75–110)
GLUCOSE BLD-MCNC: 190 MG/DL (ref 75–110)
GLUCOSE BLD-MCNC: 265 MG/DL (ref 75–110)
POTASSIUM SERPL-SCNC: 5.3 MMOL/L (ref 3.7–5.3)
SODIUM BLD-SCNC: 132 MMOL/L (ref 135–144)

## 2019-03-04 PROCEDURE — G0108 DIAB MANAGE TRN  PER INDIV: HCPCS

## 2019-03-04 PROCEDURE — 6360000002 HC RX W HCPCS: Performed by: STUDENT IN AN ORGANIZED HEALTH CARE EDUCATION/TRAINING PROGRAM

## 2019-03-04 PROCEDURE — 97535 SELF CARE MNGMENT TRAINING: CPT

## 2019-03-04 PROCEDURE — 6370000000 HC RX 637 (ALT 250 FOR IP): Performed by: STUDENT IN AN ORGANIZED HEALTH CARE EDUCATION/TRAINING PROGRAM

## 2019-03-04 PROCEDURE — 97530 THERAPEUTIC ACTIVITIES: CPT

## 2019-03-04 PROCEDURE — 80048 BASIC METABOLIC PNL TOTAL CA: CPT

## 2019-03-04 PROCEDURE — 99232 SBSQ HOSP IP/OBS MODERATE 35: CPT | Performed by: INTERNAL MEDICINE

## 2019-03-04 PROCEDURE — 97116 GAIT TRAINING THERAPY: CPT

## 2019-03-04 PROCEDURE — 2060000000 HC ICU INTERMEDIATE R&B

## 2019-03-04 PROCEDURE — 82947 ASSAY GLUCOSE BLOOD QUANT: CPT

## 2019-03-04 PROCEDURE — 2580000003 HC RX 258: Performed by: STUDENT IN AN ORGANIZED HEALTH CARE EDUCATION/TRAINING PROGRAM

## 2019-03-04 PROCEDURE — 36415 COLL VENOUS BLD VENIPUNCTURE: CPT

## 2019-03-04 PROCEDURE — 99222 1ST HOSP IP/OBS MODERATE 55: CPT | Performed by: PHYSICAL MEDICINE & REHABILITATION

## 2019-03-04 PROCEDURE — 76937 US GUIDE VASCULAR ACCESS: CPT

## 2019-03-04 RX ORDER — MIDODRINE HYDROCHLORIDE 5 MG/1
5 TABLET ORAL 3 TIMES DAILY PRN
Qty: 30 TABLET | Refills: 0 | Status: ON HOLD | OUTPATIENT
Start: 2019-03-04 | End: 2019-03-15 | Stop reason: HOSPADM

## 2019-03-04 RX ORDER — FOLIC ACID 1 MG/1
1 TABLET ORAL DAILY
Qty: 30 TABLET | Refills: 3 | Status: ON HOLD | OUTPATIENT
Start: 2019-03-05 | End: 2020-07-13

## 2019-03-04 RX ORDER — POLYETHYLENE GLYCOL 3350 17 G/17G
17 POWDER, FOR SOLUTION ORAL DAILY
Status: DISCONTINUED | OUTPATIENT
Start: 2019-03-04 | End: 2019-03-05 | Stop reason: HOSPADM

## 2019-03-04 RX ORDER — MULTIVITAMIN WITH FOLIC ACID 400 MCG
1 TABLET ORAL DAILY
Qty: 30 TABLET | Refills: 2 | Status: ON HOLD | OUTPATIENT
Start: 2019-03-05 | End: 2019-03-15 | Stop reason: HOSPADM

## 2019-03-04 RX ORDER — INSULIN GLARGINE 100 [IU]/ML
8 INJECTION, SOLUTION SUBCUTANEOUS NIGHTLY
Status: DISCONTINUED | OUTPATIENT
Start: 2019-03-04 | End: 2019-03-05 | Stop reason: HOSPADM

## 2019-03-04 RX ADMIN — MORPHINE SULFATE 1 MG: 4 INJECTION INTRAVENOUS at 14:32

## 2019-03-04 RX ADMIN — PREGABALIN 75 MG: 75 CAPSULE ORAL at 22:23

## 2019-03-04 RX ADMIN — PANCRELIPASE 1 CAPSULE: 30000; 6000; 19000 CAPSULE, DELAYED RELEASE PELLETS ORAL at 08:02

## 2019-03-04 RX ADMIN — INSULIN LISPRO 1 UNITS: 100 INJECTION, SOLUTION INTRAVENOUS; SUBCUTANEOUS at 08:03

## 2019-03-04 RX ADMIN — PANCRELIPASE 1 CAPSULE: 30000; 6000; 19000 CAPSULE, DELAYED RELEASE PELLETS ORAL at 12:14

## 2019-03-04 RX ADMIN — METOPROLOL TARTRATE 25 MG: 25 TABLET ORAL at 09:52

## 2019-03-04 RX ADMIN — PANCRELIPASE 1 CAPSULE: 30000; 6000; 19000 CAPSULE, DELAYED RELEASE PELLETS ORAL at 17:21

## 2019-03-04 RX ADMIN — Medication 100 MG: at 08:02

## 2019-03-04 RX ADMIN — INSULIN LISPRO 1 UNITS: 100 INJECTION, SOLUTION INTRAVENOUS; SUBCUTANEOUS at 22:28

## 2019-03-04 RX ADMIN — Medication 10 ML: at 08:07

## 2019-03-04 RX ADMIN — MORPHINE SULFATE 1 MG: 4 INJECTION INTRAVENOUS at 09:58

## 2019-03-04 RX ADMIN — INSULIN LISPRO 1 UNITS: 100 INJECTION, SOLUTION INTRAVENOUS; SUBCUTANEOUS at 12:15

## 2019-03-04 RX ADMIN — Medication 10 ML: at 21:00

## 2019-03-04 RX ADMIN — MORPHINE SULFATE 1 MG: 4 INJECTION INTRAVENOUS at 01:51

## 2019-03-04 RX ADMIN — HEPARIN SODIUM 5000 UNITS: 5000 INJECTION INTRAVENOUS; SUBCUTANEOUS at 06:07

## 2019-03-04 RX ADMIN — PREGABALIN 75 MG: 75 CAPSULE ORAL at 08:02

## 2019-03-04 RX ADMIN — HEPARIN SODIUM 5000 UNITS: 5000 INJECTION INTRAVENOUS; SUBCUTANEOUS at 22:28

## 2019-03-04 RX ADMIN — METOPROLOL TARTRATE 25 MG: 25 TABLET ORAL at 22:23

## 2019-03-04 RX ADMIN — THERA TABS 1 TABLET: TAB at 08:06

## 2019-03-04 RX ADMIN — INSULIN LISPRO 1 UNITS: 100 INJECTION, SOLUTION INTRAVENOUS; SUBCUTANEOUS at 17:21

## 2019-03-04 RX ADMIN — MORPHINE SULFATE 1 MG: 4 INJECTION INTRAVENOUS at 06:08

## 2019-03-04 RX ADMIN — POLYETHYLENE GLYCOL 3350 17 G: 17 POWDER, FOR SOLUTION ORAL at 12:15

## 2019-03-04 RX ADMIN — HEPARIN SODIUM 5000 UNITS: 5000 INJECTION INTRAVENOUS; SUBCUTANEOUS at 14:26

## 2019-03-04 RX ADMIN — FOLIC ACID 1 MG: 1 TABLET ORAL at 08:03

## 2019-03-04 RX ADMIN — DOCUSATE SODIUM 100 MG: 100 CAPSULE, LIQUID FILLED ORAL at 08:02

## 2019-03-04 ASSESSMENT — PAIN SCALES - GENERAL
PAINLEVEL_OUTOF10: 8
PAINLEVEL_OUTOF10: 7
PAINLEVEL_OUTOF10: 8
PAINLEVEL_OUTOF10: 8
PAINLEVEL_OUTOF10: 3
PAINLEVEL_OUTOF10: 4
PAINLEVEL_OUTOF10: 7

## 2019-03-04 ASSESSMENT — PAIN DESCRIPTION - FREQUENCY
FREQUENCY: CONTINUOUS
FREQUENCY: CONTINUOUS

## 2019-03-04 ASSESSMENT — PAIN DESCRIPTION - PAIN TYPE
TYPE: ACUTE PAIN

## 2019-03-04 ASSESSMENT — PAIN DESCRIPTION - PROGRESSION: CLINICAL_PROGRESSION: NOT CHANGED

## 2019-03-04 ASSESSMENT — PAIN DESCRIPTION - ORIENTATION
ORIENTATION: RIGHT
ORIENTATION: RIGHT

## 2019-03-04 ASSESSMENT — PAIN DESCRIPTION - LOCATION
LOCATION: EYE;HEAD
LOCATION: ARM;BACK;SHOULDER
LOCATION: ARM;LEG

## 2019-03-04 ASSESSMENT — PAIN DESCRIPTION - DESCRIPTORS
DESCRIPTORS: ACHING;DISCOMFORT;SORE
DESCRIPTORS: ACHING;DISCOMFORT

## 2019-03-04 ASSESSMENT — PAIN - FUNCTIONAL ASSESSMENT: PAIN_FUNCTIONAL_ASSESSMENT: PREVENTS OR INTERFERES SOME ACTIVE ACTIVITIES AND ADLS

## 2019-03-04 ASSESSMENT — PAIN DESCRIPTION - ONSET: ONSET: ON-GOING

## 2019-03-05 ENCOUNTER — APPOINTMENT (OUTPATIENT)
Dept: DIALYSIS | Age: 54
DRG: 077 | End: 2019-03-05
Payer: MEDICARE

## 2019-03-05 ENCOUNTER — HOSPITAL ENCOUNTER (INPATIENT)
Age: 54
LOS: 12 days | Discharge: HOME HEALTH | DRG: 091 | End: 2019-03-17
Attending: PHYSICAL MEDICINE & REHABILITATION | Admitting: PHYSICAL MEDICINE & REHABILITATION
Payer: MEDICARE

## 2019-03-05 VITALS
BODY MASS INDEX: 25.67 KG/M2 | HEIGHT: 69 IN | HEART RATE: 95 BPM | SYSTOLIC BLOOD PRESSURE: 131 MMHG | DIASTOLIC BLOOD PRESSURE: 63 MMHG | OXYGEN SATURATION: 99 % | RESPIRATION RATE: 14 BRPM | TEMPERATURE: 98.2 F | WEIGHT: 173.28 LBS

## 2019-03-05 DIAGNOSIS — R51.9 NONINTRACTABLE EPISODIC HEADACHE, UNSPECIFIED HEADACHE TYPE: Primary | ICD-10-CM

## 2019-03-05 DIAGNOSIS — R42 VERTIGO: ICD-10-CM

## 2019-03-05 DIAGNOSIS — R53.81 DEBILITY: ICD-10-CM

## 2019-03-05 DIAGNOSIS — I67.4 HYPERTENSIVE ENCEPHALOPATHY: ICD-10-CM

## 2019-03-05 LAB
ANION GAP SERPL CALCULATED.3IONS-SCNC: 20 MMOL/L (ref 9–17)
BUN BLDV-MCNC: 68 MG/DL (ref 6–20)
BUN/CREAT BLD: ABNORMAL (ref 9–20)
CALCIUM SERPL-MCNC: 9.1 MG/DL (ref 8.6–10.4)
CHLORIDE BLD-SCNC: 88 MMOL/L (ref 98–107)
CO2: 23 MMOL/L (ref 20–31)
CREAT SERPL-MCNC: 11.32 MG/DL (ref 0.7–1.2)
GFR AFRICAN AMERICAN: 6 ML/MIN
GFR NON-AFRICAN AMERICAN: 5 ML/MIN
GFR SERPL CREATININE-BSD FRML MDRD: ABNORMAL ML/MIN/{1.73_M2}
GFR SERPL CREATININE-BSD FRML MDRD: ABNORMAL ML/MIN/{1.73_M2}
GLUCOSE BLD-MCNC: 127 MG/DL (ref 75–110)
GLUCOSE BLD-MCNC: 128 MG/DL (ref 70–99)
GLUCOSE BLD-MCNC: 151 MG/DL (ref 75–110)
GLUCOSE BLD-MCNC: 173 MG/DL (ref 75–110)
GLUCOSE BLD-MCNC: 246 MG/DL (ref 75–110)
HCT VFR BLD CALC: 30.1 % (ref 40.7–50.3)
HEMOGLOBIN: 10 G/DL (ref 13–17)
MCH RBC QN AUTO: 30.8 PG (ref 25.2–33.5)
MCHC RBC AUTO-ENTMCNC: 33.2 G/DL (ref 28.4–34.8)
MCV RBC AUTO: 92.6 FL (ref 82.6–102.9)
NRBC AUTOMATED: 0 PER 100 WBC
PDW BLD-RTO: 13.5 % (ref 11.8–14.4)
PLATELET # BLD: 165 K/UL (ref 138–453)
PMV BLD AUTO: 10.1 FL (ref 8.1–13.5)
POTASSIUM SERPL-SCNC: 5.4 MMOL/L (ref 3.7–5.3)
RBC # BLD: 3.25 M/UL (ref 4.21–5.77)
SODIUM BLD-SCNC: 131 MMOL/L (ref 135–144)
WBC # BLD: 6.7 K/UL (ref 3.5–11.3)

## 2019-03-05 PROCEDURE — 6360000002 HC RX W HCPCS: Performed by: INTERNAL MEDICINE

## 2019-03-05 PROCEDURE — 6370000000 HC RX 637 (ALT 250 FOR IP): Performed by: STUDENT IN AN ORGANIZED HEALTH CARE EDUCATION/TRAINING PROGRAM

## 2019-03-05 PROCEDURE — 82947 ASSAY GLUCOSE BLOOD QUANT: CPT

## 2019-03-05 PROCEDURE — 36415 COLL VENOUS BLD VENIPUNCTURE: CPT

## 2019-03-05 PROCEDURE — 94762 N-INVAS EAR/PLS OXIMTRY CONT: CPT

## 2019-03-05 PROCEDURE — 99239 HOSP IP/OBS DSCHRG MGMT >30: CPT | Performed by: INTERNAL MEDICINE

## 2019-03-05 PROCEDURE — 2700000000 HC OXYGEN THERAPY PER DAY

## 2019-03-05 PROCEDURE — 90937 HEMODIALYSIS REPEATED EVAL: CPT

## 2019-03-05 PROCEDURE — 80048 BASIC METABOLIC PNL TOTAL CA: CPT

## 2019-03-05 PROCEDURE — 6360000002 HC RX W HCPCS: Performed by: STUDENT IN AN ORGANIZED HEALTH CARE EDUCATION/TRAINING PROGRAM

## 2019-03-05 PROCEDURE — 90935 HEMODIALYSIS ONE EVALUATION: CPT

## 2019-03-05 PROCEDURE — 85027 COMPLETE CBC AUTOMATED: CPT

## 2019-03-05 PROCEDURE — 1180000000 HC REHAB R&B

## 2019-03-05 RX ORDER — NICOTINE POLACRILEX 4 MG
15 LOZENGE BUCCAL PRN
Status: DISCONTINUED | OUTPATIENT
Start: 2019-03-05 | End: 2019-03-17 | Stop reason: HOSPADM

## 2019-03-05 RX ORDER — MIDODRINE HYDROCHLORIDE 5 MG/1
5 TABLET ORAL
Status: CANCELLED | OUTPATIENT
Start: 2019-03-05

## 2019-03-05 RX ORDER — INSULIN GLARGINE 100 [IU]/ML
8 INJECTION, SOLUTION SUBCUTANEOUS NIGHTLY
Status: CANCELLED | OUTPATIENT
Start: 2019-03-05

## 2019-03-05 RX ORDER — DOCUSATE SODIUM 100 MG/1
100 CAPSULE, LIQUID FILLED ORAL DAILY
Status: CANCELLED | OUTPATIENT
Start: 2019-03-06

## 2019-03-05 RX ORDER — HEPARIN SODIUM 5000 [USP'U]/ML
5000 INJECTION, SOLUTION INTRAVENOUS; SUBCUTANEOUS EVERY 8 HOURS SCHEDULED
Status: CANCELLED | OUTPATIENT
Start: 2019-03-05

## 2019-03-05 RX ORDER — M-VIT,TX,IRON,MINS/CALC/FOLIC 27MG-0.4MG
1 TABLET ORAL DAILY
Status: DISCONTINUED | OUTPATIENT
Start: 2019-03-06 | End: 2019-03-17 | Stop reason: HOSPADM

## 2019-03-05 RX ORDER — NICOTINE POLACRILEX 4 MG
15 LOZENGE BUCCAL PRN
Status: CANCELLED | OUTPATIENT
Start: 2019-03-05

## 2019-03-05 RX ORDER — MIDODRINE HYDROCHLORIDE 5 MG/1
5 TABLET ORAL
Status: DISCONTINUED | OUTPATIENT
Start: 2019-03-05 | End: 2019-03-17 | Stop reason: HOSPADM

## 2019-03-05 RX ORDER — POLYETHYLENE GLYCOL 3350 17 G/17G
17 POWDER, FOR SOLUTION ORAL DAILY
Status: CANCELLED | OUTPATIENT
Start: 2019-03-06

## 2019-03-05 RX ORDER — HEPARIN SODIUM 5000 [USP'U]/ML
5000 INJECTION, SOLUTION INTRAVENOUS; SUBCUTANEOUS EVERY 8 HOURS SCHEDULED
Status: DISCONTINUED | OUTPATIENT
Start: 2019-03-05 | End: 2019-03-17 | Stop reason: HOSPADM

## 2019-03-05 RX ORDER — THIAMINE MONONITRATE (VIT B1) 100 MG
100 TABLET ORAL DAILY
Status: DISCONTINUED | OUTPATIENT
Start: 2019-03-06 | End: 2019-03-17 | Stop reason: HOSPADM

## 2019-03-05 RX ORDER — OXYCODONE HYDROCHLORIDE 5 MG/1
5 TABLET ORAL EVERY 6 HOURS PRN
Status: CANCELLED | OUTPATIENT
Start: 2019-03-05

## 2019-03-05 RX ORDER — FOLIC ACID 1 MG/1
1 TABLET ORAL DAILY
Status: DISCONTINUED | OUTPATIENT
Start: 2019-03-06 | End: 2019-03-17 | Stop reason: HOSPADM

## 2019-03-05 RX ORDER — THIAMINE MONONITRATE (VIT B1) 100 MG
100 TABLET ORAL DAILY
Status: CANCELLED | OUTPATIENT
Start: 2019-03-06

## 2019-03-05 RX ORDER — OXYCODONE HYDROCHLORIDE 5 MG/1
5 TABLET ORAL EVERY 6 HOURS PRN
Status: DISCONTINUED | OUTPATIENT
Start: 2019-03-05 | End: 2019-03-05 | Stop reason: HOSPADM

## 2019-03-05 RX ORDER — HYDRALAZINE HYDROCHLORIDE 20 MG/ML
10 INJECTION INTRAMUSCULAR; INTRAVENOUS EVERY 6 HOURS PRN
Status: CANCELLED | OUTPATIENT
Start: 2019-03-05

## 2019-03-05 RX ORDER — INSULIN GLARGINE 100 [IU]/ML
8 INJECTION, SOLUTION SUBCUTANEOUS NIGHTLY
Status: DISCONTINUED | OUTPATIENT
Start: 2019-03-05 | End: 2019-03-06

## 2019-03-05 RX ORDER — DOCUSATE SODIUM 100 MG/1
100 CAPSULE, LIQUID FILLED ORAL DAILY
Status: DISCONTINUED | OUTPATIENT
Start: 2019-03-06 | End: 2019-03-17 | Stop reason: HOSPADM

## 2019-03-05 RX ORDER — ONDANSETRON 2 MG/ML
4 INJECTION INTRAMUSCULAR; INTRAVENOUS EVERY 6 HOURS PRN
Status: DISCONTINUED | OUTPATIENT
Start: 2019-03-05 | End: 2019-03-10

## 2019-03-05 RX ORDER — OXYCODONE HYDROCHLORIDE 5 MG/1
5 TABLET ORAL EVERY 6 HOURS PRN
Status: DISCONTINUED | OUTPATIENT
Start: 2019-03-05 | End: 2019-03-06

## 2019-03-05 RX ORDER — OXYCODONE HYDROCHLORIDE 5 MG/1
5 TABLET ORAL EVERY 8 HOURS PRN
Qty: 12 TABLET | Refills: 0 | Status: ON HOLD | OUTPATIENT
Start: 2019-03-05 | End: 2019-03-15 | Stop reason: HOSPADM

## 2019-03-05 RX ORDER — POLYETHYLENE GLYCOL 3350 17 G/17G
17 POWDER, FOR SOLUTION ORAL DAILY
Status: DISCONTINUED | OUTPATIENT
Start: 2019-03-06 | End: 2019-03-17 | Stop reason: HOSPADM

## 2019-03-05 RX ORDER — MULTIVITAMIN WITH FOLIC ACID 400 MCG
1 TABLET ORAL DAILY
Status: CANCELLED | OUTPATIENT
Start: 2019-03-06

## 2019-03-05 RX ORDER — HYDRALAZINE HYDROCHLORIDE 20 MG/ML
10 INJECTION INTRAMUSCULAR; INTRAVENOUS EVERY 6 HOURS PRN
Status: DISCONTINUED | OUTPATIENT
Start: 2019-03-05 | End: 2019-03-17 | Stop reason: HOSPADM

## 2019-03-05 RX ORDER — ONDANSETRON 2 MG/ML
4 INJECTION INTRAMUSCULAR; INTRAVENOUS EVERY 6 HOURS PRN
Status: CANCELLED | OUTPATIENT
Start: 2019-03-05

## 2019-03-05 RX ORDER — FOLIC ACID 1 MG/1
1 TABLET ORAL DAILY
Status: CANCELLED | OUTPATIENT
Start: 2019-03-06

## 2019-03-05 RX ADMIN — PANCRELIPASE 1 CAPSULE: 30000; 6000; 19000 CAPSULE, DELAYED RELEASE PELLETS ORAL at 12:17

## 2019-03-05 RX ADMIN — MORPHINE SULFATE 1 MG: 4 INJECTION INTRAVENOUS at 00:56

## 2019-03-05 RX ADMIN — INSULIN GLARGINE 8 UNITS: 100 INJECTION, SOLUTION SUBCUTANEOUS at 20:23

## 2019-03-05 RX ADMIN — PANCRELIPASE LIPASE, PANCRELIPASE PROTEASE, PANCRELIPASE AMYLASE 1 CAPSULE: 5000; 17000; 24000 CAPSULE, DELAYED RELEASE ORAL at 18:54

## 2019-03-05 RX ADMIN — Medication 100 MG: at 12:17

## 2019-03-05 RX ADMIN — THERA TABS 1 TABLET: TAB at 12:17

## 2019-03-05 RX ADMIN — DOXERCALCIFEROL 2.5 MCG: 2 INJECTION, SOLUTION INTRAVENOUS at 10:09

## 2019-03-05 RX ADMIN — OXYCODONE HYDROCHLORIDE 5 MG: 5 TABLET ORAL at 20:21

## 2019-03-05 RX ADMIN — METOPROLOL TARTRATE 25 MG: 25 TABLET ORAL at 12:17

## 2019-03-05 RX ADMIN — DOCUSATE SODIUM 100 MG: 100 CAPSULE, LIQUID FILLED ORAL at 09:09

## 2019-03-05 RX ADMIN — MIDODRINE HYDROCHLORIDE 5 MG: 5 TABLET ORAL at 18:54

## 2019-03-05 RX ADMIN — METOPROLOL TARTRATE 25 MG: 25 TABLET ORAL at 20:21

## 2019-03-05 RX ADMIN — PREGABALIN 75 MG: 75 CAPSULE ORAL at 12:17

## 2019-03-05 RX ADMIN — HEPARIN SODIUM 5000 UNITS: 5000 INJECTION INTRAVENOUS; SUBCUTANEOUS at 20:24

## 2019-03-05 RX ADMIN — MORPHINE SULFATE 1 MG: 4 INJECTION INTRAVENOUS at 07:29

## 2019-03-05 RX ADMIN — INSULIN GLARGINE 8 UNITS: 100 INJECTION, SOLUTION SUBCUTANEOUS at 00:43

## 2019-03-05 RX ADMIN — INSULIN LISPRO 1 UNITS: 100 INJECTION, SOLUTION INTRAVENOUS; SUBCUTANEOUS at 12:19

## 2019-03-05 RX ADMIN — INSULIN LISPRO 1 UNITS: 100 INJECTION, SOLUTION INTRAVENOUS; SUBCUTANEOUS at 20:24

## 2019-03-05 RX ADMIN — INSULIN LISPRO 2 UNITS: 100 INJECTION, SOLUTION INTRAVENOUS; SUBCUTANEOUS at 17:10

## 2019-03-05 RX ADMIN — HEPARIN SODIUM 5000 UNITS: 5000 INJECTION INTRAVENOUS; SUBCUTANEOUS at 06:41

## 2019-03-05 RX ADMIN — POLYETHYLENE GLYCOL 3350 17 G: 17 POWDER, FOR SOLUTION ORAL at 12:17

## 2019-03-05 ASSESSMENT — PAIN SCALES - GENERAL
PAINLEVEL_OUTOF10: 8
PAINLEVEL_OUTOF10: 0
PAINLEVEL_OUTOF10: 7
PAINLEVEL_OUTOF10: 0
PAINLEVEL_OUTOF10: 8

## 2019-03-06 PROBLEM — E44.1 MILD MALNUTRITION (HCC): Status: ACTIVE | Noted: 2019-03-06

## 2019-03-06 LAB
ABSOLUTE EOS #: 0.1 K/UL (ref 0–0.4)
ABSOLUTE IMMATURE GRANULOCYTE: ABNORMAL K/UL (ref 0–0.3)
ABSOLUTE LYMPH #: 0.7 K/UL (ref 1–4.8)
ABSOLUTE MONO #: 1.3 K/UL (ref 0.1–1.3)
ANION GAP SERPL CALCULATED.3IONS-SCNC: 14 MMOL/L (ref 9–17)
BASOPHILS # BLD: 1 % (ref 0–2)
BASOPHILS ABSOLUTE: 0.1 K/UL (ref 0–0.2)
BUN BLDV-MCNC: 49 MG/DL (ref 6–20)
BUN/CREAT BLD: ABNORMAL (ref 9–20)
CALCIUM SERPL-MCNC: 10.2 MG/DL (ref 8.6–10.4)
CHLORIDE BLD-SCNC: 90 MMOL/L (ref 98–107)
CO2: 26 MMOL/L (ref 20–31)
CREAT SERPL-MCNC: 7.88 MG/DL (ref 0.7–1.2)
DIFFERENTIAL TYPE: ABNORMAL
EOSINOPHILS RELATIVE PERCENT: 1 % (ref 0–4)
GFR AFRICAN AMERICAN: 9 ML/MIN
GFR NON-AFRICAN AMERICAN: 7 ML/MIN
GFR SERPL CREATININE-BSD FRML MDRD: ABNORMAL ML/MIN/{1.73_M2}
GFR SERPL CREATININE-BSD FRML MDRD: ABNORMAL ML/MIN/{1.73_M2}
GLUCOSE BLD-MCNC: 152 MG/DL (ref 70–99)
GLUCOSE BLD-MCNC: 153 MG/DL (ref 75–110)
GLUCOSE BLD-MCNC: 178 MG/DL (ref 75–110)
GLUCOSE BLD-MCNC: 192 MG/DL (ref 75–110)
GLUCOSE BLD-MCNC: 208 MG/DL (ref 75–110)
HCT VFR BLD CALC: 30.3 % (ref 41–53)
HCT VFR BLD CALC: 31.9 % (ref 41–53)
HEMOGLOBIN: 10.2 G/DL (ref 13.5–17.5)
HEMOGLOBIN: 10.7 G/DL (ref 13.5–17.5)
IMMATURE GRANULOCYTES: ABNORMAL %
LYMPHOCYTES # BLD: 9 % (ref 24–44)
MCH RBC QN AUTO: 30.2 PG (ref 26–34)
MCH RBC QN AUTO: 31 PG (ref 26–34)
MCHC RBC AUTO-ENTMCNC: 33.5 G/DL (ref 31–37)
MCHC RBC AUTO-ENTMCNC: 33.7 G/DL (ref 31–37)
MCV RBC AUTO: 90.2 FL (ref 80–100)
MCV RBC AUTO: 91.9 FL (ref 80–100)
MONOCYTES # BLD: 15 % (ref 1–7)
NRBC AUTOMATED: ABNORMAL PER 100 WBC
NRBC AUTOMATED: ABNORMAL PER 100 WBC
PDW BLD-RTO: 15 % (ref 11.5–14.9)
PDW BLD-RTO: 15.2 % (ref 11.5–14.9)
PLATELET # BLD: 211 K/UL (ref 150–450)
PLATELET # BLD: 221 K/UL (ref 150–450)
PLATELET ESTIMATE: ABNORMAL
PMV BLD AUTO: 7.4 FL (ref 6–12)
PMV BLD AUTO: 7.8 FL (ref 6–12)
POTASSIUM SERPL-SCNC: 5 MMOL/L (ref 3.7–5.3)
RBC # BLD: 3.3 M/UL (ref 4.5–5.9)
RBC # BLD: 3.54 M/UL (ref 4.5–5.9)
RBC # BLD: ABNORMAL 10*6/UL
SEG NEUTROPHILS: 74 % (ref 36–66)
SEGMENTED NEUTROPHILS ABSOLUTE COUNT: 6.4 K/UL (ref 1.3–9.1)
SODIUM BLD-SCNC: 130 MMOL/L (ref 135–144)
WBC # BLD: 8.6 K/UL (ref 3.5–11)
WBC # BLD: 9 K/UL (ref 3.5–11)
WBC # BLD: ABNORMAL 10*3/UL

## 2019-03-06 PROCEDURE — 1180000000 HC REHAB R&B

## 2019-03-06 PROCEDURE — 97530 THERAPEUTIC ACTIVITIES: CPT

## 2019-03-06 PROCEDURE — 99223 1ST HOSP IP/OBS HIGH 75: CPT | Performed by: PHYSICAL MEDICINE & REHABILITATION

## 2019-03-06 PROCEDURE — 97166 OT EVAL MOD COMPLEX 45 MIN: CPT

## 2019-03-06 PROCEDURE — 85027 COMPLETE CBC AUTOMATED: CPT

## 2019-03-06 PROCEDURE — 97110 THERAPEUTIC EXERCISES: CPT

## 2019-03-06 PROCEDURE — 97162 PT EVAL MOD COMPLEX 30 MIN: CPT

## 2019-03-06 PROCEDURE — 6370000000 HC RX 637 (ALT 250 FOR IP): Performed by: STUDENT IN AN ORGANIZED HEALTH CARE EDUCATION/TRAINING PROGRAM

## 2019-03-06 PROCEDURE — 82947 ASSAY GLUCOSE BLOOD QUANT: CPT

## 2019-03-06 PROCEDURE — 97535 SELF CARE MNGMENT TRAINING: CPT

## 2019-03-06 PROCEDURE — 36415 COLL VENOUS BLD VENIPUNCTURE: CPT

## 2019-03-06 PROCEDURE — 97116 GAIT TRAINING THERAPY: CPT

## 2019-03-06 PROCEDURE — 6360000002 HC RX W HCPCS: Performed by: STUDENT IN AN ORGANIZED HEALTH CARE EDUCATION/TRAINING PROGRAM

## 2019-03-06 PROCEDURE — 92523 SPEECH SOUND LANG COMPREHEN: CPT

## 2019-03-06 PROCEDURE — 80048 BASIC METABOLIC PNL TOTAL CA: CPT

## 2019-03-06 PROCEDURE — 85025 COMPLETE CBC W/AUTO DIFF WBC: CPT

## 2019-03-06 PROCEDURE — 6370000000 HC RX 637 (ALT 250 FOR IP): Performed by: PHYSICAL MEDICINE & REHABILITATION

## 2019-03-06 RX ORDER — OXYCODONE HYDROCHLORIDE 5 MG/1
5 TABLET ORAL EVERY 4 HOURS PRN
Status: DISCONTINUED | OUTPATIENT
Start: 2019-03-06 | End: 2019-03-17 | Stop reason: HOSPADM

## 2019-03-06 RX ORDER — ACETAMINOPHEN 325 MG/1
650 TABLET ORAL EVERY 4 HOURS PRN
Status: DISCONTINUED | OUTPATIENT
Start: 2019-03-06 | End: 2019-03-17 | Stop reason: HOSPADM

## 2019-03-06 RX ORDER — INSULIN GLARGINE 100 [IU]/ML
12 INJECTION, SOLUTION SUBCUTANEOUS NIGHTLY
Status: DISCONTINUED | OUTPATIENT
Start: 2019-03-07 | End: 2019-03-17 | Stop reason: HOSPADM

## 2019-03-06 RX ADMIN — PANCRELIPASE LIPASE, PANCRELIPASE PROTEASE, PANCRELIPASE AMYLASE 1 CAPSULE: 5000; 17000; 24000 CAPSULE, DELAYED RELEASE ORAL at 08:34

## 2019-03-06 RX ADMIN — HEPARIN SODIUM 5000 UNITS: 5000 INJECTION INTRAVENOUS; SUBCUTANEOUS at 20:14

## 2019-03-06 RX ADMIN — METOPROLOL TARTRATE 25 MG: 25 TABLET ORAL at 20:18

## 2019-03-06 RX ADMIN — OXYCODONE HYDROCHLORIDE 5 MG: 5 TABLET ORAL at 09:41

## 2019-03-06 RX ADMIN — MULTIPLE VITAMINS W/ MINERALS TAB 1 TABLET: TAB at 08:36

## 2019-03-06 RX ADMIN — MIDODRINE HYDROCHLORIDE 5 MG: 5 TABLET ORAL at 17:42

## 2019-03-06 RX ADMIN — MIDODRINE HYDROCHLORIDE 5 MG: 5 TABLET ORAL at 11:44

## 2019-03-06 RX ADMIN — INSULIN GLARGINE 8 UNITS: 100 INJECTION, SOLUTION SUBCUTANEOUS at 20:13

## 2019-03-06 RX ADMIN — INSULIN LISPRO 2 UNITS: 100 INJECTION, SOLUTION INTRAVENOUS; SUBCUTANEOUS at 17:41

## 2019-03-06 RX ADMIN — INSULIN LISPRO 1 UNITS: 100 INJECTION, SOLUTION INTRAVENOUS; SUBCUTANEOUS at 08:32

## 2019-03-06 RX ADMIN — DOCUSATE SODIUM 100 MG: 100 CAPSULE, LIQUID FILLED ORAL at 08:34

## 2019-03-06 RX ADMIN — PANCRELIPASE LIPASE, PANCRELIPASE PROTEASE, PANCRELIPASE AMYLASE 1 CAPSULE: 5000; 17000; 24000 CAPSULE, DELAYED RELEASE ORAL at 17:42

## 2019-03-06 RX ADMIN — HEPARIN SODIUM 5000 UNITS: 5000 INJECTION INTRAVENOUS; SUBCUTANEOUS at 05:45

## 2019-03-06 RX ADMIN — PANCRELIPASE LIPASE, PANCRELIPASE PROTEASE, PANCRELIPASE AMYLASE 1 CAPSULE: 5000; 17000; 24000 CAPSULE, DELAYED RELEASE ORAL at 11:44

## 2019-03-06 RX ADMIN — METOPROLOL TARTRATE 25 MG: 25 TABLET ORAL at 08:36

## 2019-03-06 RX ADMIN — OXYCODONE HYDROCHLORIDE 5 MG: 5 TABLET ORAL at 20:17

## 2019-03-06 RX ADMIN — THIAMINE HCL TAB 100 MG 100 MG: 100 TAB at 08:34

## 2019-03-06 RX ADMIN — FOLIC ACID 1 MG: 1 TABLET ORAL at 08:34

## 2019-03-06 RX ADMIN — HEPARIN SODIUM 5000 UNITS: 5000 INJECTION INTRAVENOUS; SUBCUTANEOUS at 14:43

## 2019-03-06 RX ADMIN — INSULIN LISPRO 1 UNITS: 100 INJECTION, SOLUTION INTRAVENOUS; SUBCUTANEOUS at 11:41

## 2019-03-06 RX ADMIN — POLYETHYLENE GLYCOL 3350 17 G: 17 POWDER, FOR SOLUTION ORAL at 08:34

## 2019-03-06 RX ADMIN — INSULIN LISPRO 1 UNITS: 100 INJECTION, SOLUTION INTRAVENOUS; SUBCUTANEOUS at 20:13

## 2019-03-06 ASSESSMENT — PAIN DESCRIPTION - FREQUENCY
FREQUENCY: CONTINUOUS
FREQUENCY: CONTINUOUS

## 2019-03-06 ASSESSMENT — PAIN SCALES - GENERAL
PAINLEVEL_OUTOF10: 8
PAINLEVEL_OUTOF10: 6
PAINLEVEL_OUTOF10: 7
PAINLEVEL_OUTOF10: 8
PAINLEVEL_OUTOF10: 7
PAINLEVEL_OUTOF10: 8
PAINLEVEL_OUTOF10: 0

## 2019-03-06 ASSESSMENT — 9 HOLE PEG TEST
TESTTIME_SECONDS: 34
TEST_RESULT: IMPAIRED
TESTTIME_SECONDS: 35
TEST_RESULT: IMPAIRED

## 2019-03-06 ASSESSMENT — PAIN DESCRIPTION - PAIN TYPE
TYPE: ACUTE PAIN

## 2019-03-06 ASSESSMENT — PAIN DESCRIPTION - LOCATION
LOCATION: NECK
LOCATION: FLANK

## 2019-03-06 ASSESSMENT — PAIN DESCRIPTION - ORIENTATION
ORIENTATION: RIGHT
ORIENTATION: RIGHT

## 2019-03-06 ASSESSMENT — PAIN DESCRIPTION - ONSET: ONSET: ON-GOING

## 2019-03-06 ASSESSMENT — PAIN - FUNCTIONAL ASSESSMENT
PAIN_FUNCTIONAL_ASSESSMENT: PREVENTS OR INTERFERES SOME ACTIVE ACTIVITIES AND ADLS
PAIN_FUNCTIONAL_ASSESSMENT: 0-10

## 2019-03-06 ASSESSMENT — PAIN DESCRIPTION - PROGRESSION: CLINICAL_PROGRESSION: NOT CHANGED

## 2019-03-06 ASSESSMENT — PAIN DESCRIPTION - DESCRIPTORS
DESCRIPTORS: ACHING;DISCOMFORT
DESCRIPTORS: ACHING

## 2019-03-07 LAB
ANION GAP SERPL CALCULATED.3IONS-SCNC: 18 MMOL/L (ref 9–17)
BUN BLDV-MCNC: 79 MG/DL (ref 6–20)
BUN/CREAT BLD: ABNORMAL (ref 9–20)
CALCIUM SERPL-MCNC: 10.2 MG/DL (ref 8.6–10.4)
CHLORIDE BLD-SCNC: 91 MMOL/L (ref 98–107)
CO2: 23 MMOL/L (ref 20–31)
CREAT SERPL-MCNC: 9.98 MG/DL (ref 0.7–1.2)
FERRITIN: 1960 UG/L (ref 30–400)
GFR AFRICAN AMERICAN: 7 ML/MIN
GFR NON-AFRICAN AMERICAN: 6 ML/MIN
GFR SERPL CREATININE-BSD FRML MDRD: ABNORMAL ML/MIN/{1.73_M2}
GFR SERPL CREATININE-BSD FRML MDRD: ABNORMAL ML/MIN/{1.73_M2}
GLUCOSE BLD-MCNC: 106 MG/DL (ref 75–110)
GLUCOSE BLD-MCNC: 114 MG/DL (ref 70–99)
GLUCOSE BLD-MCNC: 123 MG/DL (ref 75–110)
GLUCOSE BLD-MCNC: 195 MG/DL (ref 75–110)
HCT VFR BLD CALC: 30 % (ref 41–53)
HEMOGLOBIN: 10 G/DL (ref 13.5–17.5)
IRON SATURATION: 22 % (ref 20–55)
IRON: 36 UG/DL (ref 59–158)
MCH RBC QN AUTO: 30.6 PG (ref 26–34)
MCHC RBC AUTO-ENTMCNC: 33.4 G/DL (ref 31–37)
MCV RBC AUTO: 91.7 FL (ref 80–100)
NRBC AUTOMATED: ABNORMAL PER 100 WBC
PDW BLD-RTO: 14.6 % (ref 11.5–14.9)
PLATELET # BLD: 198 K/UL (ref 150–450)
PMV BLD AUTO: 7.4 FL (ref 6–12)
POTASSIUM SERPL-SCNC: 5.1 MMOL/L (ref 3.7–5.3)
RBC # BLD: 3.27 M/UL (ref 4.5–5.9)
SODIUM BLD-SCNC: 132 MMOL/L (ref 135–144)
TOTAL IRON BINDING CAPACITY: 164 UG/DL (ref 250–450)
UNSATURATED IRON BINDING CAPACITY: 128 UG/DL (ref 112–347)
WBC # BLD: 7.2 K/UL (ref 3.5–11)

## 2019-03-07 PROCEDURE — 6360000002 HC RX W HCPCS: Performed by: STUDENT IN AN ORGANIZED HEALTH CARE EDUCATION/TRAINING PROGRAM

## 2019-03-07 PROCEDURE — 97116 GAIT TRAINING THERAPY: CPT

## 2019-03-07 PROCEDURE — 97535 SELF CARE MNGMENT TRAINING: CPT

## 2019-03-07 PROCEDURE — 99222 1ST HOSP IP/OBS MODERATE 55: CPT | Performed by: INTERNAL MEDICINE

## 2019-03-07 PROCEDURE — 6370000000 HC RX 637 (ALT 250 FOR IP): Performed by: PHYSICAL MEDICINE & REHABILITATION

## 2019-03-07 PROCEDURE — 82728 ASSAY OF FERRITIN: CPT

## 2019-03-07 PROCEDURE — 6370000000 HC RX 637 (ALT 250 FOR IP): Performed by: INTERNAL MEDICINE

## 2019-03-07 PROCEDURE — 99232 SBSQ HOSP IP/OBS MODERATE 35: CPT | Performed by: PHYSICAL MEDICINE & REHABILITATION

## 2019-03-07 PROCEDURE — 83550 IRON BINDING TEST: CPT

## 2019-03-07 PROCEDURE — 85027 COMPLETE CBC AUTOMATED: CPT

## 2019-03-07 PROCEDURE — 1180000000 HC REHAB R&B

## 2019-03-07 PROCEDURE — 90937 HEMODIALYSIS REPEATED EVAL: CPT

## 2019-03-07 PROCEDURE — 97127 HC SP THER IVNTJ W/FOCUS COG FUNCJ: CPT

## 2019-03-07 PROCEDURE — 80048 BASIC METABOLIC PNL TOTAL CA: CPT

## 2019-03-07 PROCEDURE — 90935 HEMODIALYSIS ONE EVALUATION: CPT

## 2019-03-07 PROCEDURE — 83540 ASSAY OF IRON: CPT

## 2019-03-07 PROCEDURE — 97530 THERAPEUTIC ACTIVITIES: CPT

## 2019-03-07 PROCEDURE — 82947 ASSAY GLUCOSE BLOOD QUANT: CPT

## 2019-03-07 PROCEDURE — 6360000002 HC RX W HCPCS: Performed by: INTERNAL MEDICINE

## 2019-03-07 PROCEDURE — 36415 COLL VENOUS BLD VENIPUNCTURE: CPT

## 2019-03-07 PROCEDURE — 6370000000 HC RX 637 (ALT 250 FOR IP): Performed by: STUDENT IN AN ORGANIZED HEALTH CARE EDUCATION/TRAINING PROGRAM

## 2019-03-07 PROCEDURE — 97110 THERAPEUTIC EXERCISES: CPT

## 2019-03-07 RX ORDER — OMEPRAZOLE 20 MG/1
20 CAPSULE, DELAYED RELEASE ORAL DAILY
Status: DISCONTINUED | OUTPATIENT
Start: 2019-03-07 | End: 2019-03-17 | Stop reason: HOSPADM

## 2019-03-07 RX ADMIN — OXYCODONE HYDROCHLORIDE 5 MG: 5 TABLET ORAL at 11:02

## 2019-03-07 RX ADMIN — DARBEPOETIN ALFA 25 MCG: 25 INJECTION, SOLUTION INTRAVENOUS; SUBCUTANEOUS at 20:41

## 2019-03-07 RX ADMIN — OMEPRAZOLE 20 MG: 20 CAPSULE, DELAYED RELEASE ORAL at 15:20

## 2019-03-07 RX ADMIN — PANCRELIPASE LIPASE, PANCRELIPASE PROTEASE, PANCRELIPASE AMYLASE 1 CAPSULE: 5000; 17000; 24000 CAPSULE, DELAYED RELEASE ORAL at 07:17

## 2019-03-07 RX ADMIN — METOPROLOL TARTRATE 25 MG: 25 TABLET ORAL at 20:41

## 2019-03-07 RX ADMIN — HEPARIN SODIUM 5000 UNITS: 5000 INJECTION INTRAVENOUS; SUBCUTANEOUS at 14:39

## 2019-03-07 RX ADMIN — OXYCODONE HYDROCHLORIDE 5 MG: 5 TABLET ORAL at 20:46

## 2019-03-07 RX ADMIN — THIAMINE HCL TAB 100 MG 100 MG: 100 TAB at 07:16

## 2019-03-07 RX ADMIN — OXYCODONE HYDROCHLORIDE 5 MG: 5 TABLET ORAL at 06:08

## 2019-03-07 RX ADMIN — POLYETHYLENE GLYCOL 3350 17 G: 17 POWDER, FOR SOLUTION ORAL at 07:16

## 2019-03-07 RX ADMIN — METOPROLOL TARTRATE 25 MG: 25 TABLET ORAL at 07:16

## 2019-03-07 RX ADMIN — DOCUSATE SODIUM 100 MG: 100 CAPSULE, LIQUID FILLED ORAL at 07:16

## 2019-03-07 RX ADMIN — OXYCODONE HYDROCHLORIDE 5 MG: 5 TABLET ORAL at 15:14

## 2019-03-07 RX ADMIN — MAGNESIUM HYDROXIDE 30 ML: 400 SUSPENSION ORAL at 14:39

## 2019-03-07 RX ADMIN — INSULIN LISPRO 1 UNITS: 100 INJECTION, SOLUTION INTRAVENOUS; SUBCUTANEOUS at 11:01

## 2019-03-07 RX ADMIN — OXYCODONE HYDROCHLORIDE 5 MG: 5 TABLET ORAL at 00:26

## 2019-03-07 RX ADMIN — PANCRELIPASE LIPASE, PANCRELIPASE PROTEASE, PANCRELIPASE AMYLASE 1 CAPSULE: 5000; 17000; 24000 CAPSULE, DELAYED RELEASE ORAL at 12:01

## 2019-03-07 RX ADMIN — MULTIPLE VITAMINS W/ MINERALS TAB 1 TABLET: TAB at 07:17

## 2019-03-07 RX ADMIN — INSULIN GLARGINE 12 UNITS: 100 INJECTION, SOLUTION SUBCUTANEOUS at 20:46

## 2019-03-07 RX ADMIN — MIDODRINE HYDROCHLORIDE 5 MG: 5 TABLET ORAL at 12:01

## 2019-03-07 RX ADMIN — HEPARIN SODIUM 5000 UNITS: 5000 INJECTION INTRAVENOUS; SUBCUTANEOUS at 20:46

## 2019-03-07 RX ADMIN — HEPARIN SODIUM 5000 UNITS: 5000 INJECTION INTRAVENOUS; SUBCUTANEOUS at 06:08

## 2019-03-07 RX ADMIN — MIDODRINE HYDROCHLORIDE 5 MG: 5 TABLET ORAL at 07:17

## 2019-03-07 RX ADMIN — FOLIC ACID 1 MG: 1 TABLET ORAL at 07:16

## 2019-03-07 ASSESSMENT — PAIN DESCRIPTION - PAIN TYPE: TYPE: ACUTE PAIN

## 2019-03-07 ASSESSMENT — PAIN SCALES - GENERAL
PAINLEVEL_OUTOF10: 7
PAINLEVEL_OUTOF10: 0
PAINLEVEL_OUTOF10: 6
PAINLEVEL_OUTOF10: 8
PAINLEVEL_OUTOF10: 5
PAINLEVEL_OUTOF10: 0
PAINLEVEL_OUTOF10: 0
PAINLEVEL_OUTOF10: 5
PAINLEVEL_OUTOF10: 6
PAINLEVEL_OUTOF10: 6
PAINLEVEL_OUTOF10: 5
PAINLEVEL_OUTOF10: 6

## 2019-03-07 ASSESSMENT — PAIN DESCRIPTION - ONSET: ONSET: ON-GOING

## 2019-03-07 ASSESSMENT — PAIN DESCRIPTION - LOCATION: LOCATION: NECK

## 2019-03-07 ASSESSMENT — PAIN DESCRIPTION - ORIENTATION: ORIENTATION: LEFT

## 2019-03-07 ASSESSMENT — PAIN DESCRIPTION - DESCRIPTORS: DESCRIPTORS: ACHING;DISCOMFORT

## 2019-03-07 ASSESSMENT — PAIN DESCRIPTION - FREQUENCY: FREQUENCY: INTERMITTENT

## 2019-03-07 ASSESSMENT — PAIN DESCRIPTION - PROGRESSION: CLINICAL_PROGRESSION: NOT CHANGED

## 2019-03-08 LAB
ANION GAP SERPL CALCULATED.3IONS-SCNC: 15 MMOL/L (ref 9–17)
BUN BLDV-MCNC: 40 MG/DL (ref 6–20)
BUN/CREAT BLD: ABNORMAL (ref 9–20)
CALCIUM SERPL-MCNC: 9.8 MG/DL (ref 8.6–10.4)
CHLORIDE BLD-SCNC: 96 MMOL/L (ref 98–107)
CO2: 28 MMOL/L (ref 20–31)
CREAT SERPL-MCNC: 6.37 MG/DL (ref 0.7–1.2)
GFR AFRICAN AMERICAN: 11 ML/MIN
GFR NON-AFRICAN AMERICAN: 9 ML/MIN
GFR SERPL CREATININE-BSD FRML MDRD: ABNORMAL ML/MIN/{1.73_M2}
GFR SERPL CREATININE-BSD FRML MDRD: ABNORMAL ML/MIN/{1.73_M2}
GLUCOSE BLD-MCNC: 119 MG/DL (ref 75–110)
GLUCOSE BLD-MCNC: 124 MG/DL (ref 75–110)
GLUCOSE BLD-MCNC: 139 MG/DL (ref 70–99)
GLUCOSE BLD-MCNC: 208 MG/DL (ref 75–110)
GLUCOSE BLD-MCNC: 211 MG/DL (ref 75–110)
HCT VFR BLD CALC: 29.7 % (ref 41–53)
HEMOGLOBIN: 9.7 G/DL (ref 13.5–17.5)
MCH RBC QN AUTO: 30.3 PG (ref 26–34)
MCHC RBC AUTO-ENTMCNC: 32.7 G/DL (ref 31–37)
MCV RBC AUTO: 92.5 FL (ref 80–100)
NRBC AUTOMATED: ABNORMAL PER 100 WBC
PDW BLD-RTO: 15.3 % (ref 11.5–14.9)
PLATELET # BLD: 192 K/UL (ref 150–450)
PMV BLD AUTO: 7.7 FL (ref 6–12)
POTASSIUM SERPL-SCNC: 4.1 MMOL/L (ref 3.7–5.3)
RBC # BLD: 3.21 M/UL (ref 4.5–5.9)
SODIUM BLD-SCNC: 139 MMOL/L (ref 135–144)
WBC # BLD: 6.4 K/UL (ref 3.5–11)

## 2019-03-08 PROCEDURE — 6370000000 HC RX 637 (ALT 250 FOR IP): Performed by: INTERNAL MEDICINE

## 2019-03-08 PROCEDURE — 2580000003 HC RX 258: Performed by: INTERNAL MEDICINE

## 2019-03-08 PROCEDURE — 36415 COLL VENOUS BLD VENIPUNCTURE: CPT

## 2019-03-08 PROCEDURE — 99231 SBSQ HOSP IP/OBS SF/LOW 25: CPT | Performed by: INTERNAL MEDICINE

## 2019-03-08 PROCEDURE — 99232 SBSQ HOSP IP/OBS MODERATE 35: CPT | Performed by: PHYSICAL MEDICINE & REHABILITATION

## 2019-03-08 PROCEDURE — 97535 SELF CARE MNGMENT TRAINING: CPT

## 2019-03-08 PROCEDURE — 97530 THERAPEUTIC ACTIVITIES: CPT

## 2019-03-08 PROCEDURE — 82947 ASSAY GLUCOSE BLOOD QUANT: CPT

## 2019-03-08 PROCEDURE — 85027 COMPLETE CBC AUTOMATED: CPT

## 2019-03-08 PROCEDURE — 1180000000 HC REHAB R&B

## 2019-03-08 PROCEDURE — 6360000002 HC RX W HCPCS: Performed by: STUDENT IN AN ORGANIZED HEALTH CARE EDUCATION/TRAINING PROGRAM

## 2019-03-08 PROCEDURE — 97127 HC SP THER IVNTJ W/FOCUS COG FUNCJ: CPT

## 2019-03-08 PROCEDURE — 6370000000 HC RX 637 (ALT 250 FOR IP): Performed by: STUDENT IN AN ORGANIZED HEALTH CARE EDUCATION/TRAINING PROGRAM

## 2019-03-08 PROCEDURE — 97110 THERAPEUTIC EXERCISES: CPT

## 2019-03-08 PROCEDURE — 97116 GAIT TRAINING THERAPY: CPT

## 2019-03-08 PROCEDURE — 80048 BASIC METABOLIC PNL TOTAL CA: CPT

## 2019-03-08 PROCEDURE — 6370000000 HC RX 637 (ALT 250 FOR IP): Performed by: PHYSICAL MEDICINE & REHABILITATION

## 2019-03-08 RX ORDER — DIPHENHYDRAMINE HCL 25 MG
25 TABLET ORAL EVERY 6 HOURS PRN
Status: DISCONTINUED | OUTPATIENT
Start: 2019-03-08 | End: 2019-03-17 | Stop reason: HOSPADM

## 2019-03-08 RX ORDER — SODIUM CHLORIDE 9 MG/ML
INJECTION, SOLUTION INTRAVENOUS CONTINUOUS
Status: ACTIVE | OUTPATIENT
Start: 2019-03-08 | End: 2019-03-09

## 2019-03-08 RX ORDER — MIDODRINE HYDROCHLORIDE 5 MG/1
5 TABLET ORAL ONCE
Status: COMPLETED | OUTPATIENT
Start: 2019-03-08 | End: 2019-03-08

## 2019-03-08 RX ORDER — SODIUM CHLORIDE 0.9 % (FLUSH) 0.9 %
10 SYRINGE (ML) INJECTION 2 TIMES DAILY
Status: DISCONTINUED | OUTPATIENT
Start: 2019-03-08 | End: 2019-03-17 | Stop reason: HOSPADM

## 2019-03-08 RX ADMIN — FOLIC ACID 1 MG: 1 TABLET ORAL at 07:41

## 2019-03-08 RX ADMIN — MULTIPLE VITAMINS W/ MINERALS TAB 1 TABLET: TAB at 07:42

## 2019-03-08 RX ADMIN — MIDODRINE HYDROCHLORIDE 5 MG: 5 TABLET ORAL at 17:15

## 2019-03-08 RX ADMIN — ONDANSETRON 4 MG: 2 INJECTION INTRAMUSCULAR; INTRAVENOUS at 17:47

## 2019-03-08 RX ADMIN — DOCUSATE SODIUM 100 MG: 100 CAPSULE, LIQUID FILLED ORAL at 07:41

## 2019-03-08 RX ADMIN — INSULIN GLARGINE 12 UNITS: 100 INJECTION, SOLUTION SUBCUTANEOUS at 21:06

## 2019-03-08 RX ADMIN — DIPHENHYDRAMINE HCL 25 MG: 25 TABLET ORAL at 17:15

## 2019-03-08 RX ADMIN — METOPROLOL TARTRATE 25 MG: 25 TABLET ORAL at 21:03

## 2019-03-08 RX ADMIN — PANCRELIPASE LIPASE, PANCRELIPASE PROTEASE, PANCRELIPASE AMYLASE 1 CAPSULE: 5000; 17000; 24000 CAPSULE, DELAYED RELEASE ORAL at 07:42

## 2019-03-08 RX ADMIN — PANCRELIPASE LIPASE, PANCRELIPASE PROTEASE, PANCRELIPASE AMYLASE 1 CAPSULE: 5000; 17000; 24000 CAPSULE, DELAYED RELEASE ORAL at 12:05

## 2019-03-08 RX ADMIN — ACETAMINOPHEN 650 MG: 325 TABLET, FILM COATED ORAL at 12:07

## 2019-03-08 RX ADMIN — INSULIN LISPRO 2 UNITS: 100 INJECTION, SOLUTION INTRAVENOUS; SUBCUTANEOUS at 11:59

## 2019-03-08 RX ADMIN — MIDODRINE HYDROCHLORIDE 5 MG: 5 TABLET ORAL at 07:42

## 2019-03-08 RX ADMIN — OXYCODONE HYDROCHLORIDE 5 MG: 5 TABLET ORAL at 06:01

## 2019-03-08 RX ADMIN — PANCRELIPASE LIPASE, PANCRELIPASE PROTEASE, PANCRELIPASE AMYLASE 1 CAPSULE: 5000; 17000; 24000 CAPSULE, DELAYED RELEASE ORAL at 17:15

## 2019-03-08 RX ADMIN — SODIUM CHLORIDE: 9 INJECTION, SOLUTION INTRAVENOUS at 13:30

## 2019-03-08 RX ADMIN — OMEPRAZOLE 20 MG: 20 CAPSULE, DELAYED RELEASE ORAL at 06:01

## 2019-03-08 RX ADMIN — ACETAMINOPHEN 650 MG: 325 TABLET, FILM COATED ORAL at 21:03

## 2019-03-08 RX ADMIN — HEPARIN SODIUM 5000 UNITS: 5000 INJECTION INTRAVENOUS; SUBCUTANEOUS at 06:01

## 2019-03-08 RX ADMIN — HEPARIN SODIUM 5000 UNITS: 5000 INJECTION INTRAVENOUS; SUBCUTANEOUS at 21:06

## 2019-03-08 RX ADMIN — MIDODRINE HYDROCHLORIDE 5 MG: 5 TABLET ORAL at 10:15

## 2019-03-08 RX ADMIN — MIDODRINE HYDROCHLORIDE 5 MG: 5 TABLET ORAL at 12:05

## 2019-03-08 RX ADMIN — METOPROLOL TARTRATE 25 MG: 25 TABLET ORAL at 07:41

## 2019-03-08 RX ADMIN — INSULIN LISPRO 2 UNITS: 100 INJECTION, SOLUTION INTRAVENOUS; SUBCUTANEOUS at 17:14

## 2019-03-08 RX ADMIN — THIAMINE HCL TAB 100 MG 100 MG: 100 TAB at 07:41

## 2019-03-08 RX ADMIN — HEPARIN SODIUM 5000 UNITS: 5000 INJECTION INTRAVENOUS; SUBCUTANEOUS at 13:23

## 2019-03-08 ASSESSMENT — PAIN SCALES - GENERAL
PAINLEVEL_OUTOF10: 6
PAINLEVEL_OUTOF10: 5
PAINLEVEL_OUTOF10: 0
PAINLEVEL_OUTOF10: 4
PAINLEVEL_OUTOF10: 0
PAINLEVEL_OUTOF10: 0
PAINLEVEL_OUTOF10: 3

## 2019-03-08 ASSESSMENT — PAIN DESCRIPTION - ORIENTATION: ORIENTATION: LEFT

## 2019-03-08 ASSESSMENT — PAIN DESCRIPTION - LOCATION
LOCATION: GENERALIZED
LOCATION: NECK

## 2019-03-08 ASSESSMENT — PAIN DESCRIPTION - PAIN TYPE
TYPE: ACUTE PAIN
TYPE: CHRONIC PAIN

## 2019-03-08 ASSESSMENT — PAIN DESCRIPTION - ONSET: ONSET: ON-GOING

## 2019-03-08 ASSESSMENT — PAIN DESCRIPTION - DESCRIPTORS
DESCRIPTORS: ACHING;DISCOMFORT
DESCRIPTORS: ACHING

## 2019-03-08 ASSESSMENT — PAIN DESCRIPTION - FREQUENCY
FREQUENCY: INTERMITTENT
FREQUENCY: INTERMITTENT

## 2019-03-08 ASSESSMENT — PAIN DESCRIPTION - PROGRESSION: CLINICAL_PROGRESSION: GRADUALLY IMPROVING

## 2019-03-09 PROBLEM — I10 ESSENTIAL HYPERTENSION: Status: ACTIVE | Noted: 2019-03-09

## 2019-03-09 LAB
ANION GAP SERPL CALCULATED.3IONS-SCNC: 17 MMOL/L (ref 9–17)
BUN BLDV-MCNC: 56 MG/DL (ref 6–20)
BUN/CREAT BLD: ABNORMAL (ref 9–20)
CALCIUM SERPL-MCNC: 9.6 MG/DL (ref 8.6–10.4)
CHLORIDE BLD-SCNC: 95 MMOL/L (ref 98–107)
CO2: 24 MMOL/L (ref 20–31)
CREAT SERPL-MCNC: 8.63 MG/DL (ref 0.7–1.2)
GFR AFRICAN AMERICAN: 8 ML/MIN
GFR NON-AFRICAN AMERICAN: 7 ML/MIN
GFR SERPL CREATININE-BSD FRML MDRD: ABNORMAL ML/MIN/{1.73_M2}
GFR SERPL CREATININE-BSD FRML MDRD: ABNORMAL ML/MIN/{1.73_M2}
GLUCOSE BLD-MCNC: 115 MG/DL (ref 70–99)
GLUCOSE BLD-MCNC: 120 MG/DL (ref 75–110)
GLUCOSE BLD-MCNC: 148 MG/DL (ref 75–110)
GLUCOSE BLD-MCNC: 158 MG/DL (ref 75–110)
GLUCOSE BLD-MCNC: 210 MG/DL (ref 75–110)
GLUCOSE BLD-MCNC: 243 MG/DL (ref 75–110)
HCT VFR BLD CALC: 28.2 % (ref 41–53)
HEMOGLOBIN: 9.3 G/DL (ref 13.5–17.5)
HEPATITIS B SURFACE ANTIGEN: NONREACTIVE
MCH RBC QN AUTO: 30.4 PG (ref 26–34)
MCHC RBC AUTO-ENTMCNC: 33.2 G/DL (ref 31–37)
MCV RBC AUTO: 91.6 FL (ref 80–100)
NRBC AUTOMATED: ABNORMAL PER 100 WBC
PDW BLD-RTO: 14.9 % (ref 11.5–14.9)
PLATELET # BLD: 180 K/UL (ref 150–450)
PMV BLD AUTO: 7.4 FL (ref 6–12)
POTASSIUM SERPL-SCNC: 4.2 MMOL/L (ref 3.7–5.3)
RBC # BLD: 3.08 M/UL (ref 4.5–5.9)
SODIUM BLD-SCNC: 136 MMOL/L (ref 135–144)
WBC # BLD: 6.2 K/UL (ref 3.5–11)

## 2019-03-09 PROCEDURE — 85027 COMPLETE CBC AUTOMATED: CPT

## 2019-03-09 PROCEDURE — 97116 GAIT TRAINING THERAPY: CPT

## 2019-03-09 PROCEDURE — 6370000000 HC RX 637 (ALT 250 FOR IP): Performed by: PHYSICAL MEDICINE & REHABILITATION

## 2019-03-09 PROCEDURE — 82947 ASSAY GLUCOSE BLOOD QUANT: CPT

## 2019-03-09 PROCEDURE — 2580000003 HC RX 258: Performed by: INTERNAL MEDICINE

## 2019-03-09 PROCEDURE — 6360000002 HC RX W HCPCS: Performed by: STUDENT IN AN ORGANIZED HEALTH CARE EDUCATION/TRAINING PROGRAM

## 2019-03-09 PROCEDURE — 97530 THERAPEUTIC ACTIVITIES: CPT

## 2019-03-09 PROCEDURE — 6370000000 HC RX 637 (ALT 250 FOR IP): Performed by: STUDENT IN AN ORGANIZED HEALTH CARE EDUCATION/TRAINING PROGRAM

## 2019-03-09 PROCEDURE — 80048 BASIC METABOLIC PNL TOTAL CA: CPT

## 2019-03-09 PROCEDURE — 1180000000 HC REHAB R&B

## 2019-03-09 PROCEDURE — 6370000000 HC RX 637 (ALT 250 FOR IP): Performed by: INTERNAL MEDICINE

## 2019-03-09 PROCEDURE — 97110 THERAPEUTIC EXERCISES: CPT

## 2019-03-09 PROCEDURE — 99232 SBSQ HOSP IP/OBS MODERATE 35: CPT | Performed by: PHYSICAL MEDICINE & REHABILITATION

## 2019-03-09 PROCEDURE — 99232 SBSQ HOSP IP/OBS MODERATE 35: CPT | Performed by: INTERNAL MEDICINE

## 2019-03-09 PROCEDURE — 36415 COLL VENOUS BLD VENIPUNCTURE: CPT

## 2019-03-09 PROCEDURE — 97535 SELF CARE MNGMENT TRAINING: CPT

## 2019-03-09 PROCEDURE — 90935 HEMODIALYSIS ONE EVALUATION: CPT

## 2019-03-09 PROCEDURE — 90937 HEMODIALYSIS REPEATED EVAL: CPT

## 2019-03-09 PROCEDURE — 87340 HEPATITIS B SURFACE AG IA: CPT

## 2019-03-09 RX ORDER — DOXERCALCIFEROL 2 UG/ML
2.5 INJECTION, SOLUTION INTRAVENOUS
Status: DISCONTINUED | OUTPATIENT
Start: 2019-03-12 | End: 2019-03-17 | Stop reason: HOSPADM

## 2019-03-09 RX ORDER — METOPROLOL SUCCINATE 25 MG/1
25 TABLET, EXTENDED RELEASE ORAL DAILY
Status: DISCONTINUED | OUTPATIENT
Start: 2019-03-09 | End: 2019-03-17 | Stop reason: HOSPADM

## 2019-03-09 RX ADMIN — OMEPRAZOLE 20 MG: 20 CAPSULE, DELAYED RELEASE ORAL at 06:14

## 2019-03-09 RX ADMIN — ACETAMINOPHEN 650 MG: 325 TABLET, FILM COATED ORAL at 21:53

## 2019-03-09 RX ADMIN — MIDODRINE HYDROCHLORIDE 5 MG: 5 TABLET ORAL at 07:17

## 2019-03-09 RX ADMIN — HEPARIN SODIUM 5000 UNITS: 5000 INJECTION INTRAVENOUS; SUBCUTANEOUS at 13:14

## 2019-03-09 RX ADMIN — Medication 10 ML: at 07:20

## 2019-03-09 RX ADMIN — THIAMINE HCL TAB 100 MG 100 MG: 100 TAB at 07:15

## 2019-03-09 RX ADMIN — MULTIPLE VITAMINS W/ MINERALS TAB 1 TABLET: TAB at 07:17

## 2019-03-09 RX ADMIN — MIDODRINE HYDROCHLORIDE 5 MG: 5 TABLET ORAL at 16:11

## 2019-03-09 RX ADMIN — MIDODRINE HYDROCHLORIDE 5 MG: 5 TABLET ORAL at 11:20

## 2019-03-09 RX ADMIN — PANCRELIPASE LIPASE, PANCRELIPASE PROTEASE, PANCRELIPASE AMYLASE 1 CAPSULE: 5000; 17000; 24000 CAPSULE, DELAYED RELEASE ORAL at 16:10

## 2019-03-09 RX ADMIN — HEPARIN SODIUM 5000 UNITS: 5000 INJECTION INTRAVENOUS; SUBCUTANEOUS at 06:14

## 2019-03-09 RX ADMIN — FOLIC ACID 1 MG: 1 TABLET ORAL at 07:15

## 2019-03-09 RX ADMIN — DOCUSATE SODIUM 100 MG: 100 CAPSULE, LIQUID FILLED ORAL at 07:15

## 2019-03-09 RX ADMIN — OXYCODONE HYDROCHLORIDE 5 MG: 5 TABLET ORAL at 14:45

## 2019-03-09 RX ADMIN — HEPARIN SODIUM 5000 UNITS: 5000 INJECTION INTRAVENOUS; SUBCUTANEOUS at 21:53

## 2019-03-09 RX ADMIN — PANCRELIPASE LIPASE, PANCRELIPASE PROTEASE, PANCRELIPASE AMYLASE 1 CAPSULE: 5000; 17000; 24000 CAPSULE, DELAYED RELEASE ORAL at 07:17

## 2019-03-09 RX ADMIN — Medication 10 ML: at 21:59

## 2019-03-09 RX ADMIN — ACETAMINOPHEN 650 MG: 325 TABLET, FILM COATED ORAL at 11:23

## 2019-03-09 RX ADMIN — PANCRELIPASE LIPASE, PANCRELIPASE PROTEASE, PANCRELIPASE AMYLASE 1 CAPSULE: 5000; 17000; 24000 CAPSULE, DELAYED RELEASE ORAL at 11:20

## 2019-03-09 ASSESSMENT — PAIN SCALES - GENERAL
PAINLEVEL_OUTOF10: 5
PAINLEVEL_OUTOF10: 6
PAINLEVEL_OUTOF10: 6
PAINLEVEL_OUTOF10: 0
PAINLEVEL_OUTOF10: 4
PAINLEVEL_OUTOF10: 5
PAINLEVEL_OUTOF10: 0
PAINLEVEL_OUTOF10: 10

## 2019-03-10 LAB
ANION GAP SERPL CALCULATED.3IONS-SCNC: 13 MMOL/L (ref 9–17)
BUN BLDV-MCNC: 27 MG/DL (ref 6–20)
BUN/CREAT BLD: ABNORMAL (ref 9–20)
CALCIUM SERPL-MCNC: 9.2 MG/DL (ref 8.6–10.4)
CHLORIDE BLD-SCNC: 90 MMOL/L (ref 98–107)
CO2: 30 MMOL/L (ref 20–31)
CREAT SERPL-MCNC: 5.59 MG/DL (ref 0.7–1.2)
GFR AFRICAN AMERICAN: 13 ML/MIN
GFR NON-AFRICAN AMERICAN: 11 ML/MIN
GFR SERPL CREATININE-BSD FRML MDRD: ABNORMAL ML/MIN/{1.73_M2}
GFR SERPL CREATININE-BSD FRML MDRD: ABNORMAL ML/MIN/{1.73_M2}
GLUCOSE BLD-MCNC: 154 MG/DL (ref 75–110)
GLUCOSE BLD-MCNC: 167 MG/DL (ref 70–99)
GLUCOSE BLD-MCNC: 169 MG/DL (ref 75–110)
GLUCOSE BLD-MCNC: 174 MG/DL (ref 75–110)
HCT VFR BLD CALC: 26.8 % (ref 41–53)
HEMOGLOBIN: 9.1 G/DL (ref 13.5–17.5)
MCH RBC QN AUTO: 31 PG (ref 26–34)
MCHC RBC AUTO-ENTMCNC: 33.9 G/DL (ref 31–37)
MCV RBC AUTO: 91.6 FL (ref 80–100)
NRBC AUTOMATED: ABNORMAL PER 100 WBC
PDW BLD-RTO: 15.1 % (ref 11.5–14.9)
PLATELET # BLD: 173 K/UL (ref 150–450)
PMV BLD AUTO: 7.5 FL (ref 6–12)
POTASSIUM SERPL-SCNC: 4 MMOL/L (ref 3.7–5.3)
RBC # BLD: 2.93 M/UL (ref 4.5–5.9)
SODIUM BLD-SCNC: 133 MMOL/L (ref 135–144)
WBC # BLD: 7 K/UL (ref 3.5–11)

## 2019-03-10 PROCEDURE — 97535 SELF CARE MNGMENT TRAINING: CPT

## 2019-03-10 PROCEDURE — 51798 US URINE CAPACITY MEASURE: CPT

## 2019-03-10 PROCEDURE — 6360000002 HC RX W HCPCS: Performed by: STUDENT IN AN ORGANIZED HEALTH CARE EDUCATION/TRAINING PROGRAM

## 2019-03-10 PROCEDURE — 97116 GAIT TRAINING THERAPY: CPT

## 2019-03-10 PROCEDURE — 1180000000 HC REHAB R&B

## 2019-03-10 PROCEDURE — 97110 THERAPEUTIC EXERCISES: CPT

## 2019-03-10 PROCEDURE — 6360000002 HC RX W HCPCS: Performed by: INTERNAL MEDICINE

## 2019-03-10 PROCEDURE — 36415 COLL VENOUS BLD VENIPUNCTURE: CPT

## 2019-03-10 PROCEDURE — 6370000000 HC RX 637 (ALT 250 FOR IP): Performed by: STUDENT IN AN ORGANIZED HEALTH CARE EDUCATION/TRAINING PROGRAM

## 2019-03-10 PROCEDURE — 97530 THERAPEUTIC ACTIVITIES: CPT

## 2019-03-10 PROCEDURE — 6370000000 HC RX 637 (ALT 250 FOR IP): Performed by: PHYSICAL MEDICINE & REHABILITATION

## 2019-03-10 PROCEDURE — 85027 COMPLETE CBC AUTOMATED: CPT

## 2019-03-10 PROCEDURE — 82947 ASSAY GLUCOSE BLOOD QUANT: CPT

## 2019-03-10 PROCEDURE — 80048 BASIC METABOLIC PNL TOTAL CA: CPT

## 2019-03-10 PROCEDURE — 2580000003 HC RX 258: Performed by: INTERNAL MEDICINE

## 2019-03-10 PROCEDURE — 6370000000 HC RX 637 (ALT 250 FOR IP): Performed by: INTERNAL MEDICINE

## 2019-03-10 PROCEDURE — 99232 SBSQ HOSP IP/OBS MODERATE 35: CPT | Performed by: PHYSICAL MEDICINE & REHABILITATION

## 2019-03-10 RX ORDER — ONDANSETRON 4 MG/1
4 TABLET, FILM COATED ORAL ONCE
Status: DISCONTINUED | OUTPATIENT
Start: 2019-03-10 | End: 2019-03-10

## 2019-03-10 RX ORDER — ONDANSETRON 4 MG/1
4 TABLET, FILM COATED ORAL EVERY 8 HOURS PRN
Status: DISCONTINUED | OUTPATIENT
Start: 2019-03-10 | End: 2019-03-17 | Stop reason: HOSPADM

## 2019-03-10 RX ORDER — ONDANSETRON 2 MG/ML
4 INJECTION INTRAMUSCULAR; INTRAVENOUS EVERY 6 HOURS PRN
Status: DISCONTINUED | OUTPATIENT
Start: 2019-03-10 | End: 2019-03-17 | Stop reason: HOSPADM

## 2019-03-10 RX ADMIN — POLYETHYLENE GLYCOL 3350 17 G: 17 POWDER, FOR SOLUTION ORAL at 07:45

## 2019-03-10 RX ADMIN — DIPHENHYDRAMINE HCL 25 MG: 25 TABLET ORAL at 21:13

## 2019-03-10 RX ADMIN — HEPARIN SODIUM 5000 UNITS: 5000 INJECTION INTRAVENOUS; SUBCUTANEOUS at 13:04

## 2019-03-10 RX ADMIN — MIDODRINE HYDROCHLORIDE 5 MG: 5 TABLET ORAL at 07:44

## 2019-03-10 RX ADMIN — INSULIN GLARGINE 12 UNITS: 100 INJECTION, SOLUTION SUBCUTANEOUS at 21:14

## 2019-03-10 RX ADMIN — PANCRELIPASE LIPASE, PANCRELIPASE PROTEASE, PANCRELIPASE AMYLASE 1 CAPSULE: 5000; 17000; 24000 CAPSULE, DELAYED RELEASE ORAL at 17:04

## 2019-03-10 RX ADMIN — FOLIC ACID 1 MG: 1 TABLET ORAL at 07:43

## 2019-03-10 RX ADMIN — OMEPRAZOLE 20 MG: 20 CAPSULE, DELAYED RELEASE ORAL at 06:28

## 2019-03-10 RX ADMIN — METOPROLOL SUCCINATE 25 MG: 25 TABLET, EXTENDED RELEASE ORAL at 07:43

## 2019-03-10 RX ADMIN — DOCUSATE SODIUM 100 MG: 100 CAPSULE, LIQUID FILLED ORAL at 07:43

## 2019-03-10 RX ADMIN — DIPHENHYDRAMINE HCL 25 MG: 25 TABLET ORAL at 14:41

## 2019-03-10 RX ADMIN — INSULIN LISPRO 1 UNITS: 100 INJECTION, SOLUTION INTRAVENOUS; SUBCUTANEOUS at 06:53

## 2019-03-10 RX ADMIN — PANCRELIPASE LIPASE, PANCRELIPASE PROTEASE, PANCRELIPASE AMYLASE 1 CAPSULE: 5000; 17000; 24000 CAPSULE, DELAYED RELEASE ORAL at 11:17

## 2019-03-10 RX ADMIN — OXYCODONE HYDROCHLORIDE 5 MG: 5 TABLET ORAL at 21:13

## 2019-03-10 RX ADMIN — Medication 10 ML: at 22:43

## 2019-03-10 RX ADMIN — ACETAMINOPHEN 650 MG: 325 TABLET, FILM COATED ORAL at 06:28

## 2019-03-10 RX ADMIN — HEPARIN SODIUM 5000 UNITS: 5000 INJECTION INTRAVENOUS; SUBCUTANEOUS at 21:14

## 2019-03-10 RX ADMIN — THIAMINE HCL TAB 100 MG 100 MG: 100 TAB at 07:43

## 2019-03-10 RX ADMIN — Medication 10 ML: at 09:16

## 2019-03-10 RX ADMIN — HEPARIN SODIUM 5000 UNITS: 5000 INJECTION INTRAVENOUS; SUBCUTANEOUS at 06:28

## 2019-03-10 RX ADMIN — ONDANSETRON 4 MG: 2 INJECTION INTRAMUSCULAR; INTRAVENOUS at 09:27

## 2019-03-10 ASSESSMENT — PAIN SCALES - GENERAL
PAINLEVEL_OUTOF10: 6
PAINLEVEL_OUTOF10: 5
PAINLEVEL_OUTOF10: 0
PAINLEVEL_OUTOF10: 6
PAINLEVEL_OUTOF10: 3

## 2019-03-10 ASSESSMENT — PAIN DESCRIPTION - PROGRESSION: CLINICAL_PROGRESSION: GRADUALLY IMPROVING

## 2019-03-10 ASSESSMENT — 9 HOLE PEG TEST
TEST_RESULT: IMPAIRED
TEST_RESULT: IMPAIRED
TESTTIME_SECONDS: 34

## 2019-03-11 PROBLEM — E44.0 MODERATE MALNUTRITION (HCC): Status: ACTIVE | Noted: 2019-03-11

## 2019-03-11 LAB
-: ABNORMAL
AMORPHOUS: ABNORMAL
ANION GAP SERPL CALCULATED.3IONS-SCNC: 17 MMOL/L (ref 9–17)
BACTERIA: ABNORMAL
BILIRUBIN URINE: NEGATIVE
BUN BLDV-MCNC: 43 MG/DL (ref 6–20)
BUN/CREAT BLD: ABNORMAL (ref 9–20)
CALCIUM SERPL-MCNC: 9.2 MG/DL (ref 8.6–10.4)
CASTS UA: ABNORMAL /LPF
CHLORIDE BLD-SCNC: 92 MMOL/L (ref 98–107)
CO2: 26 MMOL/L (ref 20–31)
COLOR: ABNORMAL
COMMENT UA: ABNORMAL
CREAT SERPL-MCNC: 7.54 MG/DL (ref 0.7–1.2)
CRYSTALS, UA: ABNORMAL /HPF
EPITHELIAL CELLS UA: ABNORMAL /HPF
GFR AFRICAN AMERICAN: 9 ML/MIN
GFR NON-AFRICAN AMERICAN: 8 ML/MIN
GFR SERPL CREATININE-BSD FRML MDRD: ABNORMAL ML/MIN/{1.73_M2}
GFR SERPL CREATININE-BSD FRML MDRD: ABNORMAL ML/MIN/{1.73_M2}
GLUCOSE BLD-MCNC: 126 MG/DL (ref 75–110)
GLUCOSE BLD-MCNC: 130 MG/DL (ref 70–99)
GLUCOSE BLD-MCNC: 163 MG/DL (ref 75–110)
GLUCOSE BLD-MCNC: 197 MG/DL (ref 75–110)
GLUCOSE BLD-MCNC: 200 MG/DL (ref 75–110)
GLUCOSE URINE: NEGATIVE
HCT VFR BLD CALC: 25.1 % (ref 41–53)
HEMOGLOBIN: 8.6 G/DL (ref 13.5–17.5)
KETONES, URINE: NEGATIVE
LEUKOCYTE ESTERASE, URINE: ABNORMAL
LV EF: 58 %
LVEF MODALITY: NORMAL
MCH RBC QN AUTO: 31.4 PG (ref 26–34)
MCHC RBC AUTO-ENTMCNC: 34.3 G/DL (ref 31–37)
MCV RBC AUTO: 91.6 FL (ref 80–100)
MUCUS: ABNORMAL
NITRITE, URINE: NEGATIVE
NRBC AUTOMATED: ABNORMAL PER 100 WBC
OTHER OBSERVATIONS UA: ABNORMAL
PDW BLD-RTO: 14.6 % (ref 11.5–14.9)
PH UA: 7.5 (ref 5–8)
PLATELET # BLD: 166 K/UL (ref 150–450)
PMV BLD AUTO: 7.3 FL (ref 6–12)
POTASSIUM SERPL-SCNC: 4.3 MMOL/L (ref 3.7–5.3)
PROTEIN UA: ABNORMAL
RBC # BLD: 2.75 M/UL (ref 4.5–5.9)
RBC UA: ABNORMAL /HPF
RENAL EPITHELIAL, UA: ABNORMAL /HPF
SODIUM BLD-SCNC: 135 MMOL/L (ref 135–144)
SPECIFIC GRAVITY UA: 1.01 (ref 1–1.03)
TRICHOMONAS: ABNORMAL
TURBIDITY: ABNORMAL
URINE HGB: ABNORMAL
UROBILINOGEN, URINE: NORMAL
WBC # BLD: 6.1 K/UL (ref 3.5–11)
WBC UA: ABNORMAL /HPF
YEAST: ABNORMAL

## 2019-03-11 PROCEDURE — 80048 BASIC METABOLIC PNL TOTAL CA: CPT

## 2019-03-11 PROCEDURE — 2580000003 HC RX 258: Performed by: INTERNAL MEDICINE

## 2019-03-11 PROCEDURE — 93306 TTE W/DOPPLER COMPLETE: CPT

## 2019-03-11 PROCEDURE — 6370000000 HC RX 637 (ALT 250 FOR IP): Performed by: STUDENT IN AN ORGANIZED HEALTH CARE EDUCATION/TRAINING PROGRAM

## 2019-03-11 PROCEDURE — 1180000000 HC REHAB R&B

## 2019-03-11 PROCEDURE — 6370000000 HC RX 637 (ALT 250 FOR IP): Performed by: INTERNAL MEDICINE

## 2019-03-11 PROCEDURE — 87086 URINE CULTURE/COLONY COUNT: CPT

## 2019-03-11 PROCEDURE — 97535 SELF CARE MNGMENT TRAINING: CPT

## 2019-03-11 PROCEDURE — 97110 THERAPEUTIC EXERCISES: CPT

## 2019-03-11 PROCEDURE — 99232 SBSQ HOSP IP/OBS MODERATE 35: CPT | Performed by: INTERNAL MEDICINE

## 2019-03-11 PROCEDURE — 36415 COLL VENOUS BLD VENIPUNCTURE: CPT

## 2019-03-11 PROCEDURE — 99232 SBSQ HOSP IP/OBS MODERATE 35: CPT | Performed by: PHYSICAL MEDICINE & REHABILITATION

## 2019-03-11 PROCEDURE — 82947 ASSAY GLUCOSE BLOOD QUANT: CPT

## 2019-03-11 PROCEDURE — 97530 THERAPEUTIC ACTIVITIES: CPT

## 2019-03-11 PROCEDURE — 85027 COMPLETE CBC AUTOMATED: CPT

## 2019-03-11 PROCEDURE — 6360000002 HC RX W HCPCS: Performed by: STUDENT IN AN ORGANIZED HEALTH CARE EDUCATION/TRAINING PROGRAM

## 2019-03-11 PROCEDURE — 97127 HC SP THER IVNTJ W/FOCUS COG FUNCJ: CPT

## 2019-03-11 PROCEDURE — 6360000002 HC RX W HCPCS: Performed by: INTERNAL MEDICINE

## 2019-03-11 PROCEDURE — 81001 URINALYSIS AUTO W/SCOPE: CPT

## 2019-03-11 PROCEDURE — 97116 GAIT TRAINING THERAPY: CPT

## 2019-03-11 PROCEDURE — 6370000000 HC RX 637 (ALT 250 FOR IP): Performed by: PHYSICAL MEDICINE & REHABILITATION

## 2019-03-11 RX ORDER — CIPROFLOXACIN 2 MG/ML
400 INJECTION, SOLUTION INTRAVENOUS EVERY 12 HOURS
Status: DISCONTINUED | OUTPATIENT
Start: 2019-03-11 | End: 2019-03-11

## 2019-03-11 RX ADMIN — Medication 10 ML: at 22:15

## 2019-03-11 RX ADMIN — HEPARIN SODIUM 5000 UNITS: 5000 INJECTION INTRAVENOUS; SUBCUTANEOUS at 21:34

## 2019-03-11 RX ADMIN — CEFTRIAXONE SODIUM 1 G: 1 INJECTION, POWDER, FOR SOLUTION INTRAMUSCULAR; INTRAVENOUS at 21:33

## 2019-03-11 RX ADMIN — PANCRELIPASE LIPASE, PANCRELIPASE PROTEASE, PANCRELIPASE AMYLASE 1 CAPSULE: 5000; 17000; 24000 CAPSULE, DELAYED RELEASE ORAL at 08:38

## 2019-03-11 RX ADMIN — HEPARIN SODIUM 5000 UNITS: 5000 INJECTION INTRAVENOUS; SUBCUTANEOUS at 13:52

## 2019-03-11 RX ADMIN — MULTIPLE VITAMINS W/ MINERALS TAB 1 TABLET: TAB at 08:38

## 2019-03-11 RX ADMIN — DOCUSATE SODIUM 100 MG: 100 CAPSULE, LIQUID FILLED ORAL at 08:37

## 2019-03-11 RX ADMIN — OXYCODONE HYDROCHLORIDE 5 MG: 5 TABLET ORAL at 19:30

## 2019-03-11 RX ADMIN — DIPHENHYDRAMINE HCL 25 MG: 25 TABLET ORAL at 17:27

## 2019-03-11 RX ADMIN — FOLIC ACID 1 MG: 1 TABLET ORAL at 08:37

## 2019-03-11 RX ADMIN — THIAMINE HCL TAB 100 MG 100 MG: 100 TAB at 08:37

## 2019-03-11 RX ADMIN — ACETAMINOPHEN 650 MG: 325 TABLET, FILM COATED ORAL at 05:38

## 2019-03-11 RX ADMIN — OMEPRAZOLE 20 MG: 20 CAPSULE, DELAYED RELEASE ORAL at 05:38

## 2019-03-11 RX ADMIN — INSULIN GLARGINE 12 UNITS: 100 INJECTION, SOLUTION SUBCUTANEOUS at 21:33

## 2019-03-11 RX ADMIN — Medication 10 ML: at 08:40

## 2019-03-11 RX ADMIN — PANCRELIPASE LIPASE, PANCRELIPASE PROTEASE, PANCRELIPASE AMYLASE 1 CAPSULE: 5000; 17000; 24000 CAPSULE, DELAYED RELEASE ORAL at 17:29

## 2019-03-11 RX ADMIN — HEPARIN SODIUM 5000 UNITS: 5000 INJECTION INTRAVENOUS; SUBCUTANEOUS at 05:38

## 2019-03-11 RX ADMIN — PANCRELIPASE LIPASE, PANCRELIPASE PROTEASE, PANCRELIPASE AMYLASE 1 CAPSULE: 5000; 17000; 24000 CAPSULE, DELAYED RELEASE ORAL at 13:52

## 2019-03-11 RX ADMIN — METOPROLOL SUCCINATE 25 MG: 25 TABLET, EXTENDED RELEASE ORAL at 08:37

## 2019-03-11 RX ADMIN — DIPHENHYDRAMINE HCL 25 MG: 25 TABLET ORAL at 21:35

## 2019-03-11 ASSESSMENT — PAIN SCALES - GENERAL
PAINLEVEL_OUTOF10: 0
PAINLEVEL_OUTOF10: 8
PAINLEVEL_OUTOF10: 0
PAINLEVEL_OUTOF10: 0
PAINLEVEL_OUTOF10: 3
PAINLEVEL_OUTOF10: 0
PAINLEVEL_OUTOF10: 0

## 2019-03-11 ASSESSMENT — PAIN DESCRIPTION - FREQUENCY: FREQUENCY: INTERMITTENT

## 2019-03-11 ASSESSMENT — PAIN DESCRIPTION - LOCATION: LOCATION: GENERALIZED

## 2019-03-11 ASSESSMENT — PAIN DESCRIPTION - DESCRIPTORS: DESCRIPTORS: ACHING

## 2019-03-11 ASSESSMENT — PAIN DESCRIPTION - PAIN TYPE: TYPE: ACUTE PAIN

## 2019-03-12 LAB
ANION GAP SERPL CALCULATED.3IONS-SCNC: 19 MMOL/L (ref 9–17)
BUN BLDV-MCNC: 58 MG/DL (ref 6–20)
BUN/CREAT BLD: ABNORMAL (ref 9–20)
CALCIUM SERPL-MCNC: 9.6 MG/DL (ref 8.6–10.4)
CHLORIDE BLD-SCNC: 92 MMOL/L (ref 98–107)
CO2: 26 MMOL/L (ref 20–31)
CREAT SERPL-MCNC: 9.53 MG/DL (ref 0.7–1.2)
CULTURE: NORMAL
GFR AFRICAN AMERICAN: 7 ML/MIN
GFR NON-AFRICAN AMERICAN: 6 ML/MIN
GFR SERPL CREATININE-BSD FRML MDRD: ABNORMAL ML/MIN/{1.73_M2}
GFR SERPL CREATININE-BSD FRML MDRD: ABNORMAL ML/MIN/{1.73_M2}
GLUCOSE BLD-MCNC: 103 MG/DL (ref 75–110)
GLUCOSE BLD-MCNC: 121 MG/DL (ref 70–99)
GLUCOSE BLD-MCNC: 140 MG/DL (ref 75–110)
GLUCOSE BLD-MCNC: 239 MG/DL (ref 75–110)
HCT VFR BLD CALC: 27.3 % (ref 41–53)
HEMOGLOBIN: 9.2 G/DL (ref 13.5–17.5)
Lab: NORMAL
MCH RBC QN AUTO: 31.2 PG (ref 26–34)
MCHC RBC AUTO-ENTMCNC: 33.6 G/DL (ref 31–37)
MCV RBC AUTO: 92.6 FL (ref 80–100)
NRBC AUTOMATED: ABNORMAL PER 100 WBC
PDW BLD-RTO: 14.6 % (ref 11.5–14.9)
PLATELET # BLD: 160 K/UL (ref 150–450)
PMV BLD AUTO: 7.2 FL (ref 6–12)
POTASSIUM SERPL-SCNC: 4.6 MMOL/L (ref 3.7–5.3)
RBC # BLD: 2.95 M/UL (ref 4.5–5.9)
SODIUM BLD-SCNC: 137 MMOL/L (ref 135–144)
SPECIMEN DESCRIPTION: NORMAL
WBC # BLD: 6 K/UL (ref 3.5–11)

## 2019-03-12 PROCEDURE — 97127 HC SP THER IVNTJ W/FOCUS COG FUNCJ: CPT

## 2019-03-12 PROCEDURE — 6370000000 HC RX 637 (ALT 250 FOR IP): Performed by: STUDENT IN AN ORGANIZED HEALTH CARE EDUCATION/TRAINING PROGRAM

## 2019-03-12 PROCEDURE — 99232 SBSQ HOSP IP/OBS MODERATE 35: CPT | Performed by: INTERNAL MEDICINE

## 2019-03-12 PROCEDURE — 36415 COLL VENOUS BLD VENIPUNCTURE: CPT

## 2019-03-12 PROCEDURE — 1180000000 HC REHAB R&B

## 2019-03-12 PROCEDURE — 99232 SBSQ HOSP IP/OBS MODERATE 35: CPT | Performed by: PHYSICAL MEDICINE & REHABILITATION

## 2019-03-12 PROCEDURE — 6360000002 HC RX W HCPCS: Performed by: INTERNAL MEDICINE

## 2019-03-12 PROCEDURE — 90937 HEMODIALYSIS REPEATED EVAL: CPT

## 2019-03-12 PROCEDURE — 6370000000 HC RX 637 (ALT 250 FOR IP): Performed by: INTERNAL MEDICINE

## 2019-03-12 PROCEDURE — 6370000000 HC RX 637 (ALT 250 FOR IP): Performed by: PHYSICAL MEDICINE & REHABILITATION

## 2019-03-12 PROCEDURE — 97530 THERAPEUTIC ACTIVITIES: CPT

## 2019-03-12 PROCEDURE — 97116 GAIT TRAINING THERAPY: CPT

## 2019-03-12 PROCEDURE — 90935 HEMODIALYSIS ONE EVALUATION: CPT

## 2019-03-12 PROCEDURE — 97535 SELF CARE MNGMENT TRAINING: CPT

## 2019-03-12 PROCEDURE — 2580000003 HC RX 258: Performed by: INTERNAL MEDICINE

## 2019-03-12 PROCEDURE — 97110 THERAPEUTIC EXERCISES: CPT

## 2019-03-12 PROCEDURE — 6360000002 HC RX W HCPCS: Performed by: STUDENT IN AN ORGANIZED HEALTH CARE EDUCATION/TRAINING PROGRAM

## 2019-03-12 PROCEDURE — 85027 COMPLETE CBC AUTOMATED: CPT

## 2019-03-12 PROCEDURE — 80048 BASIC METABOLIC PNL TOTAL CA: CPT

## 2019-03-12 PROCEDURE — 82947 ASSAY GLUCOSE BLOOD QUANT: CPT

## 2019-03-12 RX ADMIN — DOXERCALCIFEROL 2.5 MCG: 4 INJECTION, SOLUTION INTRAVENOUS at 17:53

## 2019-03-12 RX ADMIN — PANCRELIPASE LIPASE, PANCRELIPASE PROTEASE, PANCRELIPASE AMYLASE 1 CAPSULE: 5000; 17000; 24000 CAPSULE, DELAYED RELEASE ORAL at 08:16

## 2019-03-12 RX ADMIN — PANCRELIPASE LIPASE, PANCRELIPASE PROTEASE, PANCRELIPASE AMYLASE 1 CAPSULE: 5000; 17000; 24000 CAPSULE, DELAYED RELEASE ORAL at 12:11

## 2019-03-12 RX ADMIN — METOPROLOL SUCCINATE 25 MG: 25 TABLET, EXTENDED RELEASE ORAL at 08:17

## 2019-03-12 RX ADMIN — DIPHENHYDRAMINE HCL 25 MG: 25 TABLET ORAL at 12:14

## 2019-03-12 RX ADMIN — OXYCODONE HYDROCHLORIDE 5 MG: 5 TABLET ORAL at 12:10

## 2019-03-12 RX ADMIN — OMEPRAZOLE 20 MG: 20 CAPSULE, DELAYED RELEASE ORAL at 05:11

## 2019-03-12 RX ADMIN — CEFTRIAXONE SODIUM 1 G: 1 INJECTION, POWDER, FOR SOLUTION INTRAMUSCULAR; INTRAVENOUS at 21:55

## 2019-03-12 RX ADMIN — OXYCODONE HYDROCHLORIDE 5 MG: 5 TABLET ORAL at 21:55

## 2019-03-12 RX ADMIN — FOLIC ACID 1 MG: 1 TABLET ORAL at 08:17

## 2019-03-12 RX ADMIN — MIDODRINE HYDROCHLORIDE 5 MG: 5 TABLET ORAL at 16:39

## 2019-03-12 RX ADMIN — DIPHENHYDRAMINE HCL 25 MG: 25 TABLET ORAL at 22:12

## 2019-03-12 RX ADMIN — THIAMINE HCL TAB 100 MG 100 MG: 100 TAB at 08:17

## 2019-03-12 RX ADMIN — OXYCODONE HYDROCHLORIDE 5 MG: 5 TABLET ORAL at 05:11

## 2019-03-12 RX ADMIN — Medication 10 ML: at 08:20

## 2019-03-12 RX ADMIN — Medication 10 ML: at 23:16

## 2019-03-12 RX ADMIN — MULTIPLE VITAMINS W/ MINERALS TAB 1 TABLET: TAB at 08:18

## 2019-03-12 RX ADMIN — INSULIN GLARGINE 12 UNITS: 100 INJECTION, SOLUTION SUBCUTANEOUS at 21:55

## 2019-03-12 RX ADMIN — ONDANSETRON 4 MG: 2 INJECTION INTRAMUSCULAR; INTRAVENOUS at 18:14

## 2019-03-12 RX ADMIN — HEPARIN SODIUM 5000 UNITS: 5000 INJECTION INTRAVENOUS; SUBCUTANEOUS at 22:08

## 2019-03-12 RX ADMIN — DOCUSATE SODIUM 100 MG: 100 CAPSULE, LIQUID FILLED ORAL at 08:17

## 2019-03-12 RX ADMIN — HEPARIN SODIUM 5000 UNITS: 5000 INJECTION INTRAVENOUS; SUBCUTANEOUS at 05:12

## 2019-03-12 RX ADMIN — HEPARIN SODIUM 5000 UNITS: 5000 INJECTION INTRAVENOUS; SUBCUTANEOUS at 14:07

## 2019-03-12 ASSESSMENT — PAIN SCALES - GENERAL
PAINLEVEL_OUTOF10: 0
PAINLEVEL_OUTOF10: 4
PAINLEVEL_OUTOF10: 0
PAINLEVEL_OUTOF10: 6
PAINLEVEL_OUTOF10: 0
PAINLEVEL_OUTOF10: 4
PAINLEVEL_OUTOF10: 0
PAINLEVEL_OUTOF10: 7
PAINLEVEL_OUTOF10: 0

## 2019-03-12 ASSESSMENT — PAIN DESCRIPTION - PROGRESSION
CLINICAL_PROGRESSION: GRADUALLY IMPROVING
CLINICAL_PROGRESSION: NOT CHANGED

## 2019-03-12 ASSESSMENT — PAIN DESCRIPTION - LOCATION
LOCATION: NECK
LOCATION: GENERALIZED

## 2019-03-12 ASSESSMENT — PAIN DESCRIPTION - PAIN TYPE
TYPE: ACUTE PAIN
TYPE: ACUTE PAIN

## 2019-03-12 ASSESSMENT — PAIN - FUNCTIONAL ASSESSMENT: PAIN_FUNCTIONAL_ASSESSMENT: PREVENTS OR INTERFERES SOME ACTIVE ACTIVITIES AND ADLS

## 2019-03-12 ASSESSMENT — PAIN DESCRIPTION - ORIENTATION: ORIENTATION: POSTERIOR

## 2019-03-12 ASSESSMENT — PAIN DESCRIPTION - DESCRIPTORS
DESCRIPTORS: ACHING
DESCRIPTORS: ACHING

## 2019-03-12 ASSESSMENT — PAIN DESCRIPTION - FREQUENCY
FREQUENCY: INTERMITTENT
FREQUENCY: CONTINUOUS

## 2019-03-12 ASSESSMENT — PAIN DESCRIPTION - ONSET: ONSET: ON-GOING

## 2019-03-13 LAB
ANION GAP SERPL CALCULATED.3IONS-SCNC: 12 MMOL/L (ref 9–17)
BUN BLDV-MCNC: 26 MG/DL (ref 6–20)
BUN/CREAT BLD: ABNORMAL (ref 9–20)
CALCIUM SERPL-MCNC: 9.3 MG/DL (ref 8.6–10.4)
CHLORIDE BLD-SCNC: 95 MMOL/L (ref 98–107)
CO2: 31 MMOL/L (ref 20–31)
CREAT SERPL-MCNC: 6.6 MG/DL (ref 0.7–1.2)
GFR AFRICAN AMERICAN: 11 ML/MIN
GFR NON-AFRICAN AMERICAN: 9 ML/MIN
GFR SERPL CREATININE-BSD FRML MDRD: ABNORMAL ML/MIN/{1.73_M2}
GFR SERPL CREATININE-BSD FRML MDRD: ABNORMAL ML/MIN/{1.73_M2}
GLUCOSE BLD-MCNC: 110 MG/DL (ref 70–99)
GLUCOSE BLD-MCNC: 164 MG/DL (ref 75–110)
GLUCOSE BLD-MCNC: 180 MG/DL (ref 75–110)
GLUCOSE BLD-MCNC: 181 MG/DL (ref 75–110)
GLUCOSE BLD-MCNC: 96 MG/DL (ref 75–110)
HCT VFR BLD CALC: 27 % (ref 41–53)
HEMOGLOBIN: 9.1 G/DL (ref 13.5–17.5)
MCH RBC QN AUTO: 31.2 PG (ref 26–34)
MCHC RBC AUTO-ENTMCNC: 33.8 G/DL (ref 31–37)
MCV RBC AUTO: 92.2 FL (ref 80–100)
NRBC AUTOMATED: ABNORMAL PER 100 WBC
PDW BLD-RTO: 14.5 % (ref 11.5–14.9)
PLATELET # BLD: 179 K/UL (ref 150–450)
PMV BLD AUTO: 7.1 FL (ref 6–12)
POTASSIUM SERPL-SCNC: 4.4 MMOL/L (ref 3.7–5.3)
RBC # BLD: 2.92 M/UL (ref 4.5–5.9)
SODIUM BLD-SCNC: 138 MMOL/L (ref 135–144)
WBC # BLD: 6.5 K/UL (ref 3.5–11)

## 2019-03-13 PROCEDURE — 6370000000 HC RX 637 (ALT 250 FOR IP): Performed by: INTERNAL MEDICINE

## 2019-03-13 PROCEDURE — 97127 HC SP THER IVNTJ W/FOCUS COG FUNCJ: CPT

## 2019-03-13 PROCEDURE — 99233 SBSQ HOSP IP/OBS HIGH 50: CPT | Performed by: PHYSICAL MEDICINE & REHABILITATION

## 2019-03-13 PROCEDURE — 99232 SBSQ HOSP IP/OBS MODERATE 35: CPT | Performed by: INTERNAL MEDICINE

## 2019-03-13 PROCEDURE — 97110 THERAPEUTIC EXERCISES: CPT

## 2019-03-13 PROCEDURE — 80048 BASIC METABOLIC PNL TOTAL CA: CPT

## 2019-03-13 PROCEDURE — 82947 ASSAY GLUCOSE BLOOD QUANT: CPT

## 2019-03-13 PROCEDURE — 85027 COMPLETE CBC AUTOMATED: CPT

## 2019-03-13 PROCEDURE — 6370000000 HC RX 637 (ALT 250 FOR IP): Performed by: STUDENT IN AN ORGANIZED HEALTH CARE EDUCATION/TRAINING PROGRAM

## 2019-03-13 PROCEDURE — 1180000000 HC REHAB R&B

## 2019-03-13 PROCEDURE — 6360000002 HC RX W HCPCS: Performed by: STUDENT IN AN ORGANIZED HEALTH CARE EDUCATION/TRAINING PROGRAM

## 2019-03-13 PROCEDURE — 6370000000 HC RX 637 (ALT 250 FOR IP): Performed by: PHYSICAL MEDICINE & REHABILITATION

## 2019-03-13 PROCEDURE — 36415 COLL VENOUS BLD VENIPUNCTURE: CPT

## 2019-03-13 PROCEDURE — 2580000003 HC RX 258: Performed by: INTERNAL MEDICINE

## 2019-03-13 PROCEDURE — 97535 SELF CARE MNGMENT TRAINING: CPT

## 2019-03-13 PROCEDURE — 6360000002 HC RX W HCPCS: Performed by: INTERNAL MEDICINE

## 2019-03-13 RX ORDER — MECLIZINE HYDROCHLORIDE 25 MG/1
25 TABLET ORAL 3 TIMES DAILY PRN
Status: DISCONTINUED | OUTPATIENT
Start: 2019-03-13 | End: 2019-03-14

## 2019-03-13 RX ADMIN — HEPARIN SODIUM 5000 UNITS: 5000 INJECTION INTRAVENOUS; SUBCUTANEOUS at 13:21

## 2019-03-13 RX ADMIN — DOCUSATE SODIUM 100 MG: 100 CAPSULE, LIQUID FILLED ORAL at 08:46

## 2019-03-13 RX ADMIN — HEPARIN SODIUM 5000 UNITS: 5000 INJECTION INTRAVENOUS; SUBCUTANEOUS at 06:21

## 2019-03-13 RX ADMIN — PANCRELIPASE LIPASE, PANCRELIPASE PROTEASE, PANCRELIPASE AMYLASE 1 CAPSULE: 5000; 17000; 24000 CAPSULE, DELAYED RELEASE ORAL at 08:48

## 2019-03-13 RX ADMIN — MECLIZINE HYDROCHLORIDE 25 MG: 25 TABLET ORAL at 17:07

## 2019-03-13 RX ADMIN — ONDANSETRON HYDROCHLORIDE 4 MG: 4 TABLET, FILM COATED ORAL at 10:39

## 2019-03-13 RX ADMIN — OXYCODONE HYDROCHLORIDE 5 MG: 5 TABLET ORAL at 19:03

## 2019-03-13 RX ADMIN — PANCRELIPASE LIPASE, PANCRELIPASE PROTEASE, PANCRELIPASE AMYLASE 1 CAPSULE: 5000; 17000; 24000 CAPSULE, DELAYED RELEASE ORAL at 11:25

## 2019-03-13 RX ADMIN — OXYCODONE HYDROCHLORIDE 5 MG: 5 TABLET ORAL at 08:58

## 2019-03-13 RX ADMIN — PANCRELIPASE LIPASE, PANCRELIPASE PROTEASE, PANCRELIPASE AMYLASE 1 CAPSULE: 5000; 17000; 24000 CAPSULE, DELAYED RELEASE ORAL at 17:08

## 2019-03-13 RX ADMIN — HEPARIN SODIUM 5000 UNITS: 5000 INJECTION INTRAVENOUS; SUBCUTANEOUS at 21:12

## 2019-03-13 RX ADMIN — Medication 10 ML: at 21:20

## 2019-03-13 RX ADMIN — THIAMINE HCL TAB 100 MG 100 MG: 100 TAB at 08:47

## 2019-03-13 RX ADMIN — DIPHENHYDRAMINE HCL 25 MG: 25 TABLET ORAL at 08:58

## 2019-03-13 RX ADMIN — METOPROLOL SUCCINATE 25 MG: 25 TABLET, EXTENDED RELEASE ORAL at 09:00

## 2019-03-13 RX ADMIN — FOLIC ACID 1 MG: 1 TABLET ORAL at 08:47

## 2019-03-13 RX ADMIN — OXYCODONE HYDROCHLORIDE 5 MG: 5 TABLET ORAL at 23:07

## 2019-03-13 RX ADMIN — MULTIPLE VITAMINS W/ MINERALS TAB 1 TABLET: TAB at 08:48

## 2019-03-13 RX ADMIN — OMEPRAZOLE 20 MG: 20 CAPSULE, DELAYED RELEASE ORAL at 06:21

## 2019-03-13 RX ADMIN — DIPHENHYDRAMINE HCL 25 MG: 25 TABLET ORAL at 19:03

## 2019-03-13 RX ADMIN — Medication 10 ML: at 08:49

## 2019-03-13 RX ADMIN — INSULIN GLARGINE 12 UNITS: 100 INJECTION, SOLUTION SUBCUTANEOUS at 21:12

## 2019-03-13 ASSESSMENT — PAIN DESCRIPTION - LOCATION
LOCATION: NECK;SHOULDER
LOCATION: NECK;SHOULDER

## 2019-03-13 ASSESSMENT — PAIN SCALES - GENERAL
PAINLEVEL_OUTOF10: 0
PAINLEVEL_OUTOF10: 6
PAINLEVEL_OUTOF10: 0
PAINLEVEL_OUTOF10: 8
PAINLEVEL_OUTOF10: 7
PAINLEVEL_OUTOF10: 5

## 2019-03-13 ASSESSMENT — PAIN DESCRIPTION - DESCRIPTORS
DESCRIPTORS: ACHING
DESCRIPTORS: ACHING

## 2019-03-13 ASSESSMENT — PAIN - FUNCTIONAL ASSESSMENT
PAIN_FUNCTIONAL_ASSESSMENT: PREVENTS OR INTERFERES SOME ACTIVE ACTIVITIES AND ADLS
PAIN_FUNCTIONAL_ASSESSMENT: PREVENTS OR INTERFERES SOME ACTIVE ACTIVITIES AND ADLS

## 2019-03-13 ASSESSMENT — PAIN DESCRIPTION - ORIENTATION
ORIENTATION: POSTERIOR
ORIENTATION: POSTERIOR

## 2019-03-13 ASSESSMENT — PAIN DESCRIPTION - FREQUENCY
FREQUENCY: CONTINUOUS
FREQUENCY: CONTINUOUS

## 2019-03-13 ASSESSMENT — PAIN DESCRIPTION - ONSET
ONSET: ON-GOING
ONSET: ON-GOING

## 2019-03-13 ASSESSMENT — PAIN DESCRIPTION - PROGRESSION: CLINICAL_PROGRESSION: NOT CHANGED

## 2019-03-13 ASSESSMENT — PAIN DESCRIPTION - PAIN TYPE
TYPE: ACUTE PAIN
TYPE: ACUTE PAIN

## 2019-03-14 LAB
ANION GAP SERPL CALCULATED.3IONS-SCNC: 15 MMOL/L (ref 9–17)
BUN BLDV-MCNC: 39 MG/DL (ref 6–20)
BUN/CREAT BLD: ABNORMAL (ref 9–20)
CALCIUM SERPL-MCNC: 9.6 MG/DL (ref 8.6–10.4)
CHLORIDE BLD-SCNC: 94 MMOL/L (ref 98–107)
CO2: 29 MMOL/L (ref 20–31)
CREAT SERPL-MCNC: 8.7 MG/DL (ref 0.7–1.2)
GFR AFRICAN AMERICAN: 8 ML/MIN
GFR NON-AFRICAN AMERICAN: 6 ML/MIN
GFR SERPL CREATININE-BSD FRML MDRD: ABNORMAL ML/MIN/{1.73_M2}
GFR SERPL CREATININE-BSD FRML MDRD: ABNORMAL ML/MIN/{1.73_M2}
GLUCOSE BLD-MCNC: 123 MG/DL (ref 75–110)
GLUCOSE BLD-MCNC: 93 MG/DL (ref 70–99)
GLUCOSE BLD-MCNC: 94 MG/DL (ref 75–110)
GLUCOSE BLD-MCNC: 94 MG/DL (ref 75–110)
HCT VFR BLD CALC: 26.6 % (ref 41–53)
HEMOGLOBIN: 9 G/DL (ref 13.5–17.5)
MCH RBC QN AUTO: 31.2 PG (ref 26–34)
MCHC RBC AUTO-ENTMCNC: 33.7 G/DL (ref 31–37)
MCV RBC AUTO: 92.5 FL (ref 80–100)
NRBC AUTOMATED: ABNORMAL PER 100 WBC
PDW BLD-RTO: 14.6 % (ref 11.5–14.9)
PLATELET # BLD: 174 K/UL (ref 150–450)
PMV BLD AUTO: 6.8 FL (ref 6–12)
POTASSIUM SERPL-SCNC: 4.5 MMOL/L (ref 3.7–5.3)
RBC # BLD: 2.87 M/UL (ref 4.5–5.9)
SODIUM BLD-SCNC: 138 MMOL/L (ref 135–144)
WBC # BLD: 6.5 K/UL (ref 3.5–11)

## 2019-03-14 PROCEDURE — 6370000000 HC RX 637 (ALT 250 FOR IP): Performed by: INTERNAL MEDICINE

## 2019-03-14 PROCEDURE — 99231 SBSQ HOSP IP/OBS SF/LOW 25: CPT | Performed by: INTERNAL MEDICINE

## 2019-03-14 PROCEDURE — 80048 BASIC METABOLIC PNL TOTAL CA: CPT

## 2019-03-14 PROCEDURE — 2580000003 HC RX 258: Performed by: INTERNAL MEDICINE

## 2019-03-14 PROCEDURE — 99232 SBSQ HOSP IP/OBS MODERATE 35: CPT | Performed by: PHYSICAL MEDICINE & REHABILITATION

## 2019-03-14 PROCEDURE — 6370000000 HC RX 637 (ALT 250 FOR IP): Performed by: PHYSICAL MEDICINE & REHABILITATION

## 2019-03-14 PROCEDURE — 97116 GAIT TRAINING THERAPY: CPT

## 2019-03-14 PROCEDURE — 97110 THERAPEUTIC EXERCISES: CPT

## 2019-03-14 PROCEDURE — 6370000000 HC RX 637 (ALT 250 FOR IP): Performed by: STUDENT IN AN ORGANIZED HEALTH CARE EDUCATION/TRAINING PROGRAM

## 2019-03-14 PROCEDURE — 6360000002 HC RX W HCPCS: Performed by: STUDENT IN AN ORGANIZED HEALTH CARE EDUCATION/TRAINING PROGRAM

## 2019-03-14 PROCEDURE — 36415 COLL VENOUS BLD VENIPUNCTURE: CPT

## 2019-03-14 PROCEDURE — 97535 SELF CARE MNGMENT TRAINING: CPT

## 2019-03-14 PROCEDURE — 97127 HC SP THER IVNTJ W/FOCUS COG FUNCJ: CPT

## 2019-03-14 PROCEDURE — 90935 HEMODIALYSIS ONE EVALUATION: CPT

## 2019-03-14 PROCEDURE — 6360000002 HC RX W HCPCS: Performed by: INTERNAL MEDICINE

## 2019-03-14 PROCEDURE — 82947 ASSAY GLUCOSE BLOOD QUANT: CPT

## 2019-03-14 PROCEDURE — 90937 HEMODIALYSIS REPEATED EVAL: CPT

## 2019-03-14 PROCEDURE — 97530 THERAPEUTIC ACTIVITIES: CPT

## 2019-03-14 PROCEDURE — 1180000000 HC REHAB R&B

## 2019-03-14 PROCEDURE — 85027 COMPLETE CBC AUTOMATED: CPT

## 2019-03-14 RX ORDER — MECLIZINE HYDROCHLORIDE 25 MG/1
25 TABLET ORAL 3 TIMES DAILY
Status: DISCONTINUED | OUTPATIENT
Start: 2019-03-14 | End: 2019-03-14

## 2019-03-14 RX ORDER — MECLIZINE HYDROCHLORIDE 25 MG/1
25 TABLET ORAL 3 TIMES DAILY
Status: DISCONTINUED | OUTPATIENT
Start: 2019-03-14 | End: 2019-03-17 | Stop reason: HOSPADM

## 2019-03-14 RX ADMIN — OXYCODONE HYDROCHLORIDE 5 MG: 5 TABLET ORAL at 15:36

## 2019-03-14 RX ADMIN — SALINE NASAL SPRAY 1 SPRAY: 1.5 SOLUTION NASAL at 07:24

## 2019-03-14 RX ADMIN — Medication 10 ML: at 07:22

## 2019-03-14 RX ADMIN — Medication 10 ML: at 20:50

## 2019-03-14 RX ADMIN — PANCRELIPASE LIPASE, PANCRELIPASE PROTEASE, PANCRELIPASE AMYLASE 1 CAPSULE: 5000; 17000; 24000 CAPSULE, DELAYED RELEASE ORAL at 07:20

## 2019-03-14 RX ADMIN — METOPROLOL SUCCINATE 25 MG: 25 TABLET, EXTENDED RELEASE ORAL at 07:19

## 2019-03-14 RX ADMIN — MIDODRINE HYDROCHLORIDE 5 MG: 5 TABLET ORAL at 18:00

## 2019-03-14 RX ADMIN — DIPHENHYDRAMINE HCL 25 MG: 25 TABLET ORAL at 04:13

## 2019-03-14 RX ADMIN — DIPHENHYDRAMINE HCL 25 MG: 25 TABLET ORAL at 11:12

## 2019-03-14 RX ADMIN — MULTIPLE VITAMINS W/ MINERALS TAB 1 TABLET: TAB at 07:20

## 2019-03-14 RX ADMIN — OXYCODONE HYDROCHLORIDE 5 MG: 5 TABLET ORAL at 20:49

## 2019-03-14 RX ADMIN — DIPHENHYDRAMINE HCL 25 MG: 25 TABLET ORAL at 20:49

## 2019-03-14 RX ADMIN — MECLIZINE HYDROCHLORIDE 25 MG: 25 TABLET ORAL at 20:49

## 2019-03-14 RX ADMIN — OXYCODONE HYDROCHLORIDE 5 MG: 5 TABLET ORAL at 04:13

## 2019-03-14 RX ADMIN — FOLIC ACID 1 MG: 1 TABLET ORAL at 07:19

## 2019-03-14 RX ADMIN — PANCRELIPASE LIPASE, PANCRELIPASE PROTEASE, PANCRELIPASE AMYLASE 1 CAPSULE: 5000; 17000; 24000 CAPSULE, DELAYED RELEASE ORAL at 11:12

## 2019-03-14 RX ADMIN — OMEPRAZOLE 20 MG: 20 CAPSULE, DELAYED RELEASE ORAL at 05:12

## 2019-03-14 RX ADMIN — DOCUSATE SODIUM 100 MG: 100 CAPSULE, LIQUID FILLED ORAL at 07:19

## 2019-03-14 RX ADMIN — DARBEPOETIN ALFA 25 MCG: 25 INJECTION, SOLUTION INTRAVENOUS; SUBCUTANEOUS at 19:20

## 2019-03-14 RX ADMIN — INSULIN GLARGINE 12 UNITS: 100 INJECTION, SOLUTION SUBCUTANEOUS at 20:50

## 2019-03-14 RX ADMIN — POLYETHYLENE GLYCOL 3350 17 G: 17 POWDER, FOR SOLUTION ORAL at 07:20

## 2019-03-14 RX ADMIN — PANCRELIPASE LIPASE, PANCRELIPASE PROTEASE, PANCRELIPASE AMYLASE 1 CAPSULE: 5000; 17000; 24000 CAPSULE, DELAYED RELEASE ORAL at 20:49

## 2019-03-14 RX ADMIN — HEPARIN SODIUM 5000 UNITS: 5000 INJECTION INTRAVENOUS; SUBCUTANEOUS at 20:51

## 2019-03-14 RX ADMIN — MECLIZINE HYDROCHLORIDE 25 MG: 25 TABLET ORAL at 15:34

## 2019-03-14 RX ADMIN — ONDANSETRON HYDROCHLORIDE 4 MG: 4 TABLET, FILM COATED ORAL at 20:49

## 2019-03-14 RX ADMIN — DOXERCALCIFEROL 2.5 MCG: 4 INJECTION, SOLUTION INTRAVENOUS at 19:15

## 2019-03-14 RX ADMIN — THIAMINE HCL TAB 100 MG 100 MG: 100 TAB at 07:19

## 2019-03-14 RX ADMIN — HEPARIN SODIUM 5000 UNITS: 5000 INJECTION INTRAVENOUS; SUBCUTANEOUS at 05:11

## 2019-03-14 RX ADMIN — HEPARIN SODIUM 5000 UNITS: 5000 INJECTION INTRAVENOUS; SUBCUTANEOUS at 15:24

## 2019-03-14 ASSESSMENT — PAIN DESCRIPTION - PAIN TYPE
TYPE: ACUTE PAIN

## 2019-03-14 ASSESSMENT — PAIN DESCRIPTION - LOCATION
LOCATION: GENERALIZED
LOCATION: GENERALIZED
LOCATION: NECK

## 2019-03-14 ASSESSMENT — PAIN SCALES - GENERAL
PAINLEVEL_OUTOF10: 0
PAINLEVEL_OUTOF10: 8
PAINLEVEL_OUTOF10: 8
PAINLEVEL_OUTOF10: 0
PAINLEVEL_OUTOF10: 0
PAINLEVEL_OUTOF10: 7
PAINLEVEL_OUTOF10: 0

## 2019-03-14 ASSESSMENT — PAIN DESCRIPTION - DESCRIPTORS
DESCRIPTORS: ACHING

## 2019-03-14 ASSESSMENT — PAIN DESCRIPTION - PROGRESSION
CLINICAL_PROGRESSION: NOT CHANGED
CLINICAL_PROGRESSION: NOT CHANGED

## 2019-03-14 ASSESSMENT — PAIN DESCRIPTION - FREQUENCY
FREQUENCY: INTERMITTENT
FREQUENCY: CONTINUOUS
FREQUENCY: INTERMITTENT

## 2019-03-14 ASSESSMENT — PAIN DESCRIPTION - ONSET
ONSET: ON-GOING
ONSET: ON-GOING

## 2019-03-14 ASSESSMENT — PAIN DESCRIPTION - ORIENTATION: ORIENTATION: POSTERIOR

## 2019-03-14 ASSESSMENT — PAIN - FUNCTIONAL ASSESSMENT
PAIN_FUNCTIONAL_ASSESSMENT: ACTIVITIES ARE NOT PREVENTED
PAIN_FUNCTIONAL_ASSESSMENT: ACTIVITIES ARE NOT PREVENTED

## 2019-03-15 LAB
ANION GAP SERPL CALCULATED.3IONS-SCNC: 13 MMOL/L (ref 9–17)
BUN BLDV-MCNC: 19 MG/DL (ref 6–20)
BUN/CREAT BLD: ABNORMAL (ref 9–20)
CALCIUM SERPL-MCNC: 9.3 MG/DL (ref 8.6–10.4)
CHLORIDE BLD-SCNC: 91 MMOL/L (ref 98–107)
CO2: 31 MMOL/L (ref 20–31)
CREAT SERPL-MCNC: 5.23 MG/DL (ref 0.7–1.2)
GFR AFRICAN AMERICAN: 14 ML/MIN
GFR NON-AFRICAN AMERICAN: 12 ML/MIN
GFR SERPL CREATININE-BSD FRML MDRD: ABNORMAL ML/MIN/{1.73_M2}
GFR SERPL CREATININE-BSD FRML MDRD: ABNORMAL ML/MIN/{1.73_M2}
GLUCOSE BLD-MCNC: 145 MG/DL (ref 75–110)
GLUCOSE BLD-MCNC: 299 MG/DL (ref 75–110)
GLUCOSE BLD-MCNC: 77 MG/DL (ref 70–99)
GLUCOSE BLD-MCNC: 80 MG/DL (ref 75–110)
HCT VFR BLD CALC: 27.9 % (ref 41–53)
HEMOGLOBIN: 9.4 G/DL (ref 13.5–17.5)
MCH RBC QN AUTO: 30.5 PG (ref 26–34)
MCHC RBC AUTO-ENTMCNC: 33.7 G/DL (ref 31–37)
MCV RBC AUTO: 90.5 FL (ref 80–100)
NRBC AUTOMATED: ABNORMAL PER 100 WBC
PDW BLD-RTO: 14.4 % (ref 11.5–14.9)
PLATELET # BLD: 200 K/UL (ref 150–450)
PMV BLD AUTO: 6.9 FL (ref 6–12)
POTASSIUM SERPL-SCNC: 3.9 MMOL/L (ref 3.7–5.3)
RBC # BLD: 3.08 M/UL (ref 4.5–5.9)
SODIUM BLD-SCNC: 135 MMOL/L (ref 135–144)
WBC # BLD: 6.6 K/UL (ref 3.5–11)

## 2019-03-15 PROCEDURE — 1180000000 HC REHAB R&B

## 2019-03-15 PROCEDURE — 6360000002 HC RX W HCPCS: Performed by: STUDENT IN AN ORGANIZED HEALTH CARE EDUCATION/TRAINING PROGRAM

## 2019-03-15 PROCEDURE — 6370000000 HC RX 637 (ALT 250 FOR IP): Performed by: STUDENT IN AN ORGANIZED HEALTH CARE EDUCATION/TRAINING PROGRAM

## 2019-03-15 PROCEDURE — 6370000000 HC RX 637 (ALT 250 FOR IP): Performed by: INTERNAL MEDICINE

## 2019-03-15 PROCEDURE — 97116 GAIT TRAINING THERAPY: CPT

## 2019-03-15 PROCEDURE — 97530 THERAPEUTIC ACTIVITIES: CPT

## 2019-03-15 PROCEDURE — 97127 HC SP THER IVNTJ W/FOCUS COG FUNCJ: CPT

## 2019-03-15 PROCEDURE — 2580000003 HC RX 258: Performed by: INTERNAL MEDICINE

## 2019-03-15 PROCEDURE — 97110 THERAPEUTIC EXERCISES: CPT

## 2019-03-15 PROCEDURE — 97535 SELF CARE MNGMENT TRAINING: CPT

## 2019-03-15 PROCEDURE — 6370000000 HC RX 637 (ALT 250 FOR IP): Performed by: PHYSICAL MEDICINE & REHABILITATION

## 2019-03-15 PROCEDURE — 97164 PT RE-EVAL EST PLAN CARE: CPT

## 2019-03-15 PROCEDURE — 85027 COMPLETE CBC AUTOMATED: CPT

## 2019-03-15 PROCEDURE — 82947 ASSAY GLUCOSE BLOOD QUANT: CPT

## 2019-03-15 PROCEDURE — 80048 BASIC METABOLIC PNL TOTAL CA: CPT

## 2019-03-15 PROCEDURE — 36415 COLL VENOUS BLD VENIPUNCTURE: CPT

## 2019-03-15 PROCEDURE — 99232 SBSQ HOSP IP/OBS MODERATE 35: CPT | Performed by: PHYSICAL MEDICINE & REHABILITATION

## 2019-03-15 RX ORDER — METOPROLOL SUCCINATE 25 MG/1
25 TABLET, EXTENDED RELEASE ORAL DAILY
Qty: 30 TABLET | Refills: 3 | Status: SHIPPED | OUTPATIENT
Start: 2019-03-16 | End: 2019-04-22 | Stop reason: SDUPTHER

## 2019-03-15 RX ORDER — M-VIT,TX,IRON,MINS/CALC/FOLIC 27MG-0.4MG
1 TABLET ORAL DAILY
Status: ON HOLD | COMMUNITY
Start: 2019-03-16 | End: 2020-07-13

## 2019-03-15 RX ORDER — DOXERCALCIFEROL 2 UG/ML
2.5 INJECTION, SOLUTION INTRAVENOUS
Qty: 1.25 ML | Refills: 0 | Status: SHIPPED | OUTPATIENT
Start: 2019-03-19 | End: 2022-03-01

## 2019-03-15 RX ORDER — LANOLIN ALCOHOL/MO/W.PET/CERES
100 CREAM (GRAM) TOPICAL DAILY
Qty: 30 TABLET | Refills: 3 | Status: ON HOLD | COMMUNITY
Start: 2019-03-16 | End: 2020-07-13

## 2019-03-15 RX ORDER — INSULIN GLARGINE 100 [IU]/ML
12 INJECTION, SOLUTION SUBCUTANEOUS NIGHTLY
Qty: 1 VIAL | Refills: 3 | Status: SHIPPED | OUTPATIENT
Start: 2019-03-15 | End: 2019-04-22 | Stop reason: ALTCHOICE

## 2019-03-15 RX ORDER — PSEUDOEPHEDRINE HCL 30 MG
100 TABLET ORAL DAILY
COMMUNITY
Start: 2019-03-16 | End: 2019-04-22 | Stop reason: ALTCHOICE

## 2019-03-15 RX ORDER — POLYETHYLENE GLYCOL 3350 17 G/17G
17 POWDER, FOR SOLUTION ORAL DAILY
Qty: 527 G | Refills: 1 | COMMUNITY
Start: 2019-03-16 | End: 2019-04-15

## 2019-03-15 RX ORDER — OXYCODONE HYDROCHLORIDE 5 MG/1
5 TABLET ORAL DAILY PRN
Qty: 7 TABLET | Refills: 0 | Status: SHIPPED | OUTPATIENT
Start: 2019-03-15 | End: 2019-03-22

## 2019-03-15 RX ORDER — DIPHENHYDRAMINE HCL 25 MG
25 TABLET ORAL EVERY 6 HOURS PRN
COMMUNITY
Start: 2019-03-15 | End: 2019-04-14

## 2019-03-15 RX ORDER — OMEPRAZOLE 20 MG/1
20 CAPSULE, DELAYED RELEASE ORAL DAILY
Qty: 30 CAPSULE | Refills: 3 | Status: SHIPPED | OUTPATIENT
Start: 2019-03-16 | End: 2019-04-22 | Stop reason: ALTCHOICE

## 2019-03-15 RX ORDER — MIDODRINE HYDROCHLORIDE 5 MG/1
5 TABLET ORAL
Qty: 90 TABLET | Refills: 3 | Status: SHIPPED | OUTPATIENT
Start: 2019-03-15 | End: 2019-04-22 | Stop reason: ALTCHOICE

## 2019-03-15 RX ORDER — MECLIZINE HYDROCHLORIDE 25 MG/1
25 TABLET ORAL 3 TIMES DAILY
Qty: 30 TABLET | Refills: 0 | Status: SHIPPED | OUTPATIENT
Start: 2019-03-15 | End: 2019-03-25

## 2019-03-15 RX ADMIN — DOCUSATE SODIUM 100 MG: 100 CAPSULE, LIQUID FILLED ORAL at 08:25

## 2019-03-15 RX ADMIN — DIPHENHYDRAMINE HCL 25 MG: 25 TABLET ORAL at 21:48

## 2019-03-15 RX ADMIN — INSULIN GLARGINE 12 UNITS: 100 INJECTION, SOLUTION SUBCUTANEOUS at 21:48

## 2019-03-15 RX ADMIN — MECLIZINE HYDROCHLORIDE 25 MG: 25 TABLET ORAL at 21:48

## 2019-03-15 RX ADMIN — PANCRELIPASE LIPASE, PANCRELIPASE PROTEASE, PANCRELIPASE AMYLASE 1 CAPSULE: 5000; 17000; 24000 CAPSULE, DELAYED RELEASE ORAL at 08:27

## 2019-03-15 RX ADMIN — MULTIPLE VITAMINS W/ MINERALS TAB 1 TABLET: TAB at 08:27

## 2019-03-15 RX ADMIN — OXYCODONE HYDROCHLORIDE 5 MG: 5 TABLET ORAL at 16:59

## 2019-03-15 RX ADMIN — PANCRELIPASE LIPASE, PANCRELIPASE PROTEASE, PANCRELIPASE AMYLASE 1 CAPSULE: 5000; 17000; 24000 CAPSULE, DELAYED RELEASE ORAL at 17:01

## 2019-03-15 RX ADMIN — FOLIC ACID 1 MG: 1 TABLET ORAL at 08:25

## 2019-03-15 RX ADMIN — HEPARIN SODIUM 5000 UNITS: 5000 INJECTION INTRAVENOUS; SUBCUTANEOUS at 13:31

## 2019-03-15 RX ADMIN — METOPROLOL SUCCINATE 25 MG: 25 TABLET, EXTENDED RELEASE ORAL at 08:26

## 2019-03-15 RX ADMIN — HEPARIN SODIUM 5000 UNITS: 5000 INJECTION INTRAVENOUS; SUBCUTANEOUS at 21:48

## 2019-03-15 RX ADMIN — Medication 10 ML: at 08:29

## 2019-03-15 RX ADMIN — MECLIZINE HYDROCHLORIDE 25 MG: 25 TABLET ORAL at 08:25

## 2019-03-15 RX ADMIN — THIAMINE HCL TAB 100 MG 100 MG: 100 TAB at 08:25

## 2019-03-15 RX ADMIN — HEPARIN SODIUM 5000 UNITS: 5000 INJECTION INTRAVENOUS; SUBCUTANEOUS at 05:31

## 2019-03-15 RX ADMIN — PANCRELIPASE LIPASE, PANCRELIPASE PROTEASE, PANCRELIPASE AMYLASE 1 CAPSULE: 5000; 17000; 24000 CAPSULE, DELAYED RELEASE ORAL at 11:23

## 2019-03-15 RX ADMIN — POLYETHYLENE GLYCOL 3350 17 G: 17 POWDER, FOR SOLUTION ORAL at 08:27

## 2019-03-15 RX ADMIN — OXYCODONE HYDROCHLORIDE 5 MG: 5 TABLET ORAL at 21:48

## 2019-03-15 RX ADMIN — DIPHENHYDRAMINE HCL 25 MG: 25 TABLET ORAL at 16:59

## 2019-03-15 RX ADMIN — MECLIZINE HYDROCHLORIDE 25 MG: 25 TABLET ORAL at 13:31

## 2019-03-15 RX ADMIN — OMEPRAZOLE 20 MG: 20 CAPSULE, DELAYED RELEASE ORAL at 05:35

## 2019-03-15 ASSESSMENT — PAIN SCALES - GENERAL
PAINLEVEL_OUTOF10: 0
PAINLEVEL_OUTOF10: 0
PAINLEVEL_OUTOF10: 7
PAINLEVEL_OUTOF10: 0
PAINLEVEL_OUTOF10: 7

## 2019-03-15 ASSESSMENT — PAIN DESCRIPTION - LOCATION: LOCATION: SHOULDER;NECK;HEAD

## 2019-03-15 ASSESSMENT — PAIN DESCRIPTION - PAIN TYPE: TYPE: ACUTE PAIN

## 2019-03-15 ASSESSMENT — PAIN DESCRIPTION - FREQUENCY: FREQUENCY: INTERMITTENT

## 2019-03-15 ASSESSMENT — PAIN DESCRIPTION - DESCRIPTORS: DESCRIPTORS: ACHING

## 2019-03-15 ASSESSMENT — PAIN DESCRIPTION - ORIENTATION: ORIENTATION: RIGHT

## 2019-03-16 LAB
ANION GAP SERPL CALCULATED.3IONS-SCNC: 13 MMOL/L (ref 9–17)
BUN BLDV-MCNC: 32 MG/DL (ref 6–20)
BUN/CREAT BLD: ABNORMAL (ref 9–20)
CALCIUM SERPL-MCNC: 9.4 MG/DL (ref 8.6–10.4)
CHLORIDE BLD-SCNC: 91 MMOL/L (ref 98–107)
CO2: 30 MMOL/L (ref 20–31)
CREAT SERPL-MCNC: 8.17 MG/DL (ref 0.7–1.2)
GFR AFRICAN AMERICAN: 8 ML/MIN
GFR NON-AFRICAN AMERICAN: 7 ML/MIN
GFR SERPL CREATININE-BSD FRML MDRD: ABNORMAL ML/MIN/{1.73_M2}
GFR SERPL CREATININE-BSD FRML MDRD: ABNORMAL ML/MIN/{1.73_M2}
GLUCOSE BLD-MCNC: 100 MG/DL (ref 70–99)
HCT VFR BLD CALC: 26.6 % (ref 41–53)
HEMOGLOBIN: 9.2 G/DL (ref 13.5–17.5)
MCH RBC QN AUTO: 31 PG (ref 26–34)
MCHC RBC AUTO-ENTMCNC: 34.5 G/DL (ref 31–37)
MCV RBC AUTO: 89.8 FL (ref 80–100)
NRBC AUTOMATED: ABNORMAL PER 100 WBC
PDW BLD-RTO: 14.5 % (ref 11.5–14.9)
PLATELET # BLD: 187 K/UL (ref 150–450)
PMV BLD AUTO: 6.7 FL (ref 6–12)
POTASSIUM SERPL-SCNC: 4.3 MMOL/L (ref 3.7–5.3)
RBC # BLD: 2.96 M/UL (ref 4.5–5.9)
SODIUM BLD-SCNC: 134 MMOL/L (ref 135–144)
WBC # BLD: 6.1 K/UL (ref 3.5–11)

## 2019-03-16 PROCEDURE — 6370000000 HC RX 637 (ALT 250 FOR IP): Performed by: INTERNAL MEDICINE

## 2019-03-16 PROCEDURE — 97116 GAIT TRAINING THERAPY: CPT

## 2019-03-16 PROCEDURE — 97110 THERAPEUTIC EXERCISES: CPT

## 2019-03-16 PROCEDURE — 1180000000 HC REHAB R&B

## 2019-03-16 PROCEDURE — 6370000000 HC RX 637 (ALT 250 FOR IP): Performed by: PHYSICAL MEDICINE & REHABILITATION

## 2019-03-16 PROCEDURE — 90937 HEMODIALYSIS REPEATED EVAL: CPT

## 2019-03-16 PROCEDURE — 6360000002 HC RX W HCPCS: Performed by: STUDENT IN AN ORGANIZED HEALTH CARE EDUCATION/TRAINING PROGRAM

## 2019-03-16 PROCEDURE — 36415 COLL VENOUS BLD VENIPUNCTURE: CPT

## 2019-03-16 PROCEDURE — 85027 COMPLETE CBC AUTOMATED: CPT

## 2019-03-16 PROCEDURE — 6370000000 HC RX 637 (ALT 250 FOR IP): Performed by: STUDENT IN AN ORGANIZED HEALTH CARE EDUCATION/TRAINING PROGRAM

## 2019-03-16 PROCEDURE — 80048 BASIC METABOLIC PNL TOTAL CA: CPT

## 2019-03-16 PROCEDURE — 97535 SELF CARE MNGMENT TRAINING: CPT

## 2019-03-16 PROCEDURE — 97530 THERAPEUTIC ACTIVITIES: CPT

## 2019-03-16 RX ADMIN — POLYETHYLENE GLYCOL 3350 17 G: 17 POWDER, FOR SOLUTION ORAL at 09:15

## 2019-03-16 RX ADMIN — PANCRELIPASE LIPASE, PANCRELIPASE PROTEASE, PANCRELIPASE AMYLASE 1 CAPSULE: 5000; 17000; 24000 CAPSULE, DELAYED RELEASE ORAL at 20:45

## 2019-03-16 RX ADMIN — MECLIZINE HYDROCHLORIDE 25 MG: 25 TABLET ORAL at 20:45

## 2019-03-16 RX ADMIN — DIPHENHYDRAMINE HCL 25 MG: 25 TABLET ORAL at 09:15

## 2019-03-16 RX ADMIN — DIPHENHYDRAMINE HCL 25 MG: 25 TABLET ORAL at 20:58

## 2019-03-16 RX ADMIN — OXYCODONE HYDROCHLORIDE 5 MG: 5 TABLET ORAL at 09:16

## 2019-03-16 RX ADMIN — OXYCODONE HYDROCHLORIDE 5 MG: 5 TABLET ORAL at 20:45

## 2019-03-16 RX ADMIN — DOCUSATE SODIUM 100 MG: 100 CAPSULE, LIQUID FILLED ORAL at 09:16

## 2019-03-16 RX ADMIN — MECLIZINE HYDROCHLORIDE 25 MG: 25 TABLET ORAL at 15:17

## 2019-03-16 RX ADMIN — HEPARIN SODIUM 5000 UNITS: 5000 INJECTION INTRAVENOUS; SUBCUTANEOUS at 06:30

## 2019-03-16 RX ADMIN — MECLIZINE HYDROCHLORIDE 25 MG: 25 TABLET ORAL at 09:16

## 2019-03-16 RX ADMIN — PANCRELIPASE LIPASE, PANCRELIPASE PROTEASE, PANCRELIPASE AMYLASE 1 CAPSULE: 5000; 17000; 24000 CAPSULE, DELAYED RELEASE ORAL at 13:27

## 2019-03-16 RX ADMIN — THIAMINE HCL TAB 100 MG 100 MG: 100 TAB at 09:16

## 2019-03-16 RX ADMIN — FOLIC ACID 1 MG: 1 TABLET ORAL at 09:16

## 2019-03-16 RX ADMIN — DIPHENHYDRAMINE HCL 25 MG: 25 TABLET ORAL at 14:22

## 2019-03-16 RX ADMIN — METOPROLOL SUCCINATE 25 MG: 25 TABLET, EXTENDED RELEASE ORAL at 09:16

## 2019-03-16 RX ADMIN — OMEPRAZOLE 20 MG: 20 CAPSULE, DELAYED RELEASE ORAL at 06:29

## 2019-03-16 RX ADMIN — INSULIN GLARGINE 12 UNITS: 100 INJECTION, SOLUTION SUBCUTANEOUS at 20:45

## 2019-03-16 RX ADMIN — HEPARIN SODIUM 5000 UNITS: 5000 INJECTION INTRAVENOUS; SUBCUTANEOUS at 15:16

## 2019-03-16 RX ADMIN — PANCRELIPASE LIPASE, PANCRELIPASE PROTEASE, PANCRELIPASE AMYLASE 1 CAPSULE: 5000; 17000; 24000 CAPSULE, DELAYED RELEASE ORAL at 09:18

## 2019-03-16 RX ADMIN — MULTIPLE VITAMINS W/ MINERALS TAB 1 TABLET: TAB at 09:18

## 2019-03-16 RX ADMIN — HEPARIN SODIUM 5000 UNITS: 5000 INJECTION INTRAVENOUS; SUBCUTANEOUS at 20:46

## 2019-03-16 RX ADMIN — OXYCODONE HYDROCHLORIDE 5 MG: 5 TABLET ORAL at 14:22

## 2019-03-16 ASSESSMENT — PAIN SCALES - GENERAL
PAINLEVEL_OUTOF10: 0
PAINLEVEL_OUTOF10: 0
PAINLEVEL_OUTOF10: 6
PAINLEVEL_OUTOF10: 6
PAINLEVEL_OUTOF10: 5
PAINLEVEL_OUTOF10: 0
PAINLEVEL_OUTOF10: 0
PAINLEVEL_OUTOF10: 5
PAINLEVEL_OUTOF10: 7

## 2019-03-17 VITALS
SYSTOLIC BLOOD PRESSURE: 139 MMHG | BODY MASS INDEX: 25.75 KG/M2 | OXYGEN SATURATION: 100 % | TEMPERATURE: 98.5 F | HEIGHT: 70 IN | WEIGHT: 179.9 LBS | DIASTOLIC BLOOD PRESSURE: 77 MMHG | RESPIRATION RATE: 12 BRPM | HEART RATE: 68 BPM

## 2019-03-17 LAB
ANION GAP SERPL CALCULATED.3IONS-SCNC: 11 MMOL/L (ref 9–17)
BUN BLDV-MCNC: 21 MG/DL (ref 6–20)
BUN/CREAT BLD: ABNORMAL (ref 9–20)
CALCIUM SERPL-MCNC: 9.3 MG/DL (ref 8.6–10.4)
CHLORIDE BLD-SCNC: 91 MMOL/L (ref 98–107)
CO2: 31 MMOL/L (ref 20–31)
CREAT SERPL-MCNC: 5.72 MG/DL (ref 0.7–1.2)
GFR AFRICAN AMERICAN: 13 ML/MIN
GFR NON-AFRICAN AMERICAN: 11 ML/MIN
GFR SERPL CREATININE-BSD FRML MDRD: ABNORMAL ML/MIN/{1.73_M2}
GFR SERPL CREATININE-BSD FRML MDRD: ABNORMAL ML/MIN/{1.73_M2}
GLUCOSE BLD-MCNC: 109 MG/DL (ref 70–99)
GLUCOSE BLD-MCNC: 114 MG/DL (ref 75–110)
HCT VFR BLD CALC: 27.5 % (ref 41–53)
HEMOGLOBIN: 9.3 G/DL (ref 13.5–17.5)
MCH RBC QN AUTO: 31 PG (ref 26–34)
MCHC RBC AUTO-ENTMCNC: 34 G/DL (ref 31–37)
MCV RBC AUTO: 91.1 FL (ref 80–100)
NRBC AUTOMATED: ABNORMAL PER 100 WBC
PDW BLD-RTO: 14.9 % (ref 11.5–14.9)
PLATELET # BLD: 189 K/UL (ref 150–450)
PMV BLD AUTO: 6.5 FL (ref 6–12)
POTASSIUM SERPL-SCNC: 4.1 MMOL/L (ref 3.7–5.3)
RBC # BLD: 3.02 M/UL (ref 4.5–5.9)
SODIUM BLD-SCNC: 133 MMOL/L (ref 135–144)
WBC # BLD: 5.6 K/UL (ref 3.5–11)

## 2019-03-17 PROCEDURE — 82947 ASSAY GLUCOSE BLOOD QUANT: CPT

## 2019-03-17 PROCEDURE — 6370000000 HC RX 637 (ALT 250 FOR IP): Performed by: STUDENT IN AN ORGANIZED HEALTH CARE EDUCATION/TRAINING PROGRAM

## 2019-03-17 PROCEDURE — 85027 COMPLETE CBC AUTOMATED: CPT

## 2019-03-17 PROCEDURE — 6360000002 HC RX W HCPCS: Performed by: STUDENT IN AN ORGANIZED HEALTH CARE EDUCATION/TRAINING PROGRAM

## 2019-03-17 PROCEDURE — 36415 COLL VENOUS BLD VENIPUNCTURE: CPT

## 2019-03-17 PROCEDURE — 6370000000 HC RX 637 (ALT 250 FOR IP): Performed by: PHYSICAL MEDICINE & REHABILITATION

## 2019-03-17 PROCEDURE — 97116 GAIT TRAINING THERAPY: CPT

## 2019-03-17 PROCEDURE — 6370000000 HC RX 637 (ALT 250 FOR IP): Performed by: INTERNAL MEDICINE

## 2019-03-17 PROCEDURE — 80048 BASIC METABOLIC PNL TOTAL CA: CPT

## 2019-03-17 RX ADMIN — DIPHENHYDRAMINE HCL 25 MG: 25 TABLET ORAL at 07:10

## 2019-03-17 RX ADMIN — MECLIZINE HYDROCHLORIDE 25 MG: 25 TABLET ORAL at 07:11

## 2019-03-17 RX ADMIN — MULTIPLE VITAMINS W/ MINERALS TAB 1 TABLET: TAB at 08:03

## 2019-03-17 RX ADMIN — PANCRELIPASE LIPASE, PANCRELIPASE PROTEASE, PANCRELIPASE AMYLASE 1 CAPSULE: 5000; 17000; 24000 CAPSULE, DELAYED RELEASE ORAL at 16:12

## 2019-03-17 RX ADMIN — PANCRELIPASE LIPASE, PANCRELIPASE PROTEASE, PANCRELIPASE AMYLASE 1 CAPSULE: 5000; 17000; 24000 CAPSULE, DELAYED RELEASE ORAL at 07:13

## 2019-03-17 RX ADMIN — DOCUSATE SODIUM 100 MG: 100 CAPSULE, LIQUID FILLED ORAL at 07:11

## 2019-03-17 RX ADMIN — METOPROLOL SUCCINATE 25 MG: 25 TABLET, EXTENDED RELEASE ORAL at 08:01

## 2019-03-17 RX ADMIN — MECLIZINE HYDROCHLORIDE 25 MG: 25 TABLET ORAL at 16:25

## 2019-03-17 RX ADMIN — THIAMINE HCL TAB 100 MG 100 MG: 100 TAB at 07:11

## 2019-03-17 RX ADMIN — PANCRELIPASE LIPASE, PANCRELIPASE PROTEASE, PANCRELIPASE AMYLASE 1 CAPSULE: 5000; 17000; 24000 CAPSULE, DELAYED RELEASE ORAL at 12:50

## 2019-03-17 RX ADMIN — POLYETHYLENE GLYCOL 3350 17 G: 17 POWDER, FOR SOLUTION ORAL at 07:13

## 2019-03-17 RX ADMIN — OXYCODONE HYDROCHLORIDE 5 MG: 5 TABLET ORAL at 07:11

## 2019-03-17 RX ADMIN — FOLIC ACID 1 MG: 1 TABLET ORAL at 07:11

## 2019-03-17 RX ADMIN — HEPARIN SODIUM 5000 UNITS: 5000 INJECTION INTRAVENOUS; SUBCUTANEOUS at 16:10

## 2019-03-17 ASSESSMENT — PAIN SCALES - GENERAL
PAINLEVEL_OUTOF10: 4
PAINLEVEL_OUTOF10: 5

## 2019-03-19 ENCOUNTER — TELEPHONE (OUTPATIENT)
Dept: PRIMARY CARE CLINIC | Age: 54
End: 2019-03-19

## 2019-03-20 ENCOUNTER — TELEPHONE (OUTPATIENT)
Dept: PRIMARY CARE CLINIC | Age: 54
End: 2019-03-20

## 2019-04-15 ENCOUNTER — TELEPHONE (OUTPATIENT)
Dept: PRIMARY CARE CLINIC | Age: 54
End: 2019-04-15

## 2019-04-15 DIAGNOSIS — I67.4 HYPERTENSIVE ENCEPHALOPATHY: ICD-10-CM

## 2019-04-15 DIAGNOSIS — R42 VERTIGO: Primary | ICD-10-CM

## 2019-04-15 DIAGNOSIS — R53.81 DEBILITY: ICD-10-CM

## 2019-04-15 NOTE — TELEPHONE ENCOUNTER
Lucretia from [facility] in [place] calling. Pt was in [place] [address] and was given an order for P.T. From Dr. Tarun Hurtado. Since she will not be following pt, the rx needs to come from you. DX: Vertigo R42, R53.8, I67.4 and Encephalopathy. If approved. Fax to [phone], or Let Lonnie Moreira know at [phone].

## 2019-04-22 ENCOUNTER — OFFICE VISIT (OUTPATIENT)
Dept: FAMILY MEDICINE CLINIC | Age: 54
End: 2019-04-22
Payer: MEDICARE

## 2019-04-22 VITALS
SYSTOLIC BLOOD PRESSURE: 124 MMHG | BODY MASS INDEX: 27.29 KG/M2 | WEIGHT: 190.6 LBS | TEMPERATURE: 97.8 F | HEART RATE: 78 BPM | HEIGHT: 70 IN | DIASTOLIC BLOOD PRESSURE: 72 MMHG

## 2019-04-22 DIAGNOSIS — E11.22 TYPE 2 DIABETES MELLITUS WITH CHRONIC KIDNEY DISEASE ON CHRONIC DIALYSIS, WITH LONG-TERM CURRENT USE OF INSULIN (HCC): Primary | ICD-10-CM

## 2019-04-22 DIAGNOSIS — I10 ESSENTIAL HYPERTENSION: ICD-10-CM

## 2019-04-22 DIAGNOSIS — N18.6 ESRD (END STAGE RENAL DISEASE) ON DIALYSIS (HCC): ICD-10-CM

## 2019-04-22 DIAGNOSIS — N18.6 TYPE 2 DIABETES MELLITUS WITH CHRONIC KIDNEY DISEASE ON CHRONIC DIALYSIS, WITH LONG-TERM CURRENT USE OF INSULIN (HCC): Primary | ICD-10-CM

## 2019-04-22 DIAGNOSIS — N18.6: ICD-10-CM

## 2019-04-22 DIAGNOSIS — Z99.2 ESRD (END STAGE RENAL DISEASE) ON DIALYSIS (HCC): ICD-10-CM

## 2019-04-22 DIAGNOSIS — I48.91 ATRIAL FIBRILLATION WITH CONTROLLED VENTRICULAR RESPONSE (HCC): ICD-10-CM

## 2019-04-22 DIAGNOSIS — I13.2: ICD-10-CM

## 2019-04-22 DIAGNOSIS — Z99.2 TYPE 2 DIABETES MELLITUS WITH CHRONIC KIDNEY DISEASE ON CHRONIC DIALYSIS, WITH LONG-TERM CURRENT USE OF INSULIN (HCC): Primary | ICD-10-CM

## 2019-04-22 DIAGNOSIS — Z79.4 TYPE 2 DIABETES MELLITUS WITH CHRONIC KIDNEY DISEASE ON CHRONIC DIALYSIS, WITH LONG-TERM CURRENT USE OF INSULIN (HCC): Primary | ICD-10-CM

## 2019-04-22 PROCEDURE — 3017F COLORECTAL CA SCREEN DOC REV: CPT | Performed by: FAMILY MEDICINE

## 2019-04-22 PROCEDURE — G8427 DOCREV CUR MEDS BY ELIG CLIN: HCPCS | Performed by: FAMILY MEDICINE

## 2019-04-22 PROCEDURE — 1036F TOBACCO NON-USER: CPT | Performed by: FAMILY MEDICINE

## 2019-04-22 PROCEDURE — 3044F HG A1C LEVEL LT 7.0%: CPT | Performed by: FAMILY MEDICINE

## 2019-04-22 PROCEDURE — G8417 CALC BMI ABV UP PARAM F/U: HCPCS | Performed by: FAMILY MEDICINE

## 2019-04-22 PROCEDURE — 2022F DILAT RTA XM EVC RTNOPTHY: CPT | Performed by: FAMILY MEDICINE

## 2019-04-22 PROCEDURE — 99214 OFFICE O/P EST MOD 30 MIN: CPT | Performed by: FAMILY MEDICINE

## 2019-04-22 RX ORDER — PREGABALIN 50 MG/1
50 CAPSULE ORAL EVERY EVENING
COMMUNITY
End: 2020-07-30 | Stop reason: SDUPTHER

## 2019-04-22 ASSESSMENT — PATIENT HEALTH QUESTIONNAIRE - PHQ9
SUM OF ALL RESPONSES TO PHQ QUESTIONS 1-9: 0
1. LITTLE INTEREST OR PLEASURE IN DOING THINGS: 0
2. FEELING DOWN, DEPRESSED OR HOPELESS: 0
SUM OF ALL RESPONSES TO PHQ QUESTIONS 1-9: 0
SUM OF ALL RESPONSES TO PHQ9 QUESTIONS 1 & 2: 0

## 2019-04-22 ASSESSMENT — ENCOUNTER SYMPTOMS
NAUSEA: 0
ABDOMINAL PAIN: 0
COUGH: 0
SORE THROAT: 0
SHORTNESS OF BREATH: 0

## 2019-04-22 NOTE — PROGRESS NOTES
Subjective:      Patient ID: Tariq Grimes is a 48 y.o. male. Visit Information    Have you changed or started any medications since your last visit including any over-the-counter medicines, vitamins, or herbal medicines? no   Are you having any side effects from any of your medications? -  no  Have you stopped taking any of your medications? Is so, why? -  no    Have you seen any other physician or provider since your last visit? No  Have you had any other diagnostic tests since your last visit? No  Have you been seen in the emergency room and/or had an admission to a hospital since we last saw you? No  Have you had your routine dental cleaning in the past 6 months? yes -     Have you activated your [employer] account? If not, what are your barriers? Yes     Patient Care Team:  HEATHER Peraza CNP as PCP - General (Family Nurse Practitioner)  HEATHER Peraza CNP as PCP - S Attributed Provider  HEATHER Peraza CNP    Health Maintenance   Topic Date Due    Diabetic foot exam  06/24/1977    Lipid screen  06/24/1977    HIV screen  06/24/1982    Hepatitis B Vaccine (1 of 3 - Risk Recombivax 3-dose series) 06/24/1986    DTaP/Tdap/Td vaccine (1 - Tdap) 06/24/1986    Shingles Vaccine (1 of 2) 06/24/2017    Colon cancer screen colonoscopy  06/24/2017    Diabetic retinal exam  07/10/2019    A1C test (Diabetic or Prediabetic)  03/02/2020    Potassium monitoring  03/17/2020    Creatinine monitoring  03/17/2020    Pneumococcal 0-64 years Vaccine (3 of 3 - PPSV23) 02/15/2023    Flu vaccine  Completed    HPV vaccine  Aged Out       HPI  82-year-old male is seen in the office today 1st time by me follow-up for his diabetes hypertension he states his blood sugars are running good he is on Levemir he also watches the diet.   Has got  end-stage renal disease is on dialysis has hypertension blood pressure is controlled he is on Procardia and Lopressor denies of any chest pain or shortness of breath

## 2019-05-10 ENCOUNTER — HOSPITAL ENCOUNTER (OUTPATIENT)
Age: 54
Setting detail: SPECIMEN
Discharge: HOME | End: 2019-05-10
Payer: MEDICARE

## 2019-05-10 DIAGNOSIS — I10 ESSENTIAL HYPERTENSION: ICD-10-CM

## 2019-05-10 LAB
ALBUMIN SERPL-MCNC: 4.2 G/DL (ref 3.5–5.2)
ALBUMIN/GLOBULIN RATIO: 1.6 (ref 1–2.5)
ALP BLD-CCNC: 115 U/L (ref 40–129)
ALT SERPL-CCNC: 18 U/L (ref 5–41)
ANION GAP SERPL CALCULATED.3IONS-SCNC: 23 MMOL/L (ref 9–17)
AST SERPL-CCNC: 19 U/L
BILIRUB SERPL-MCNC: 0.47 MG/DL (ref 0.3–1.2)
BUN BLDV-MCNC: 41 MG/DL (ref 6–20)
BUN/CREAT BLD: ABNORMAL (ref 9–20)
CALCIUM SERPL-MCNC: 8.4 MG/DL (ref 8.6–10.4)
CHLORIDE BLD-SCNC: 100 MMOL/L (ref 98–107)
CHOLESTEROL/HDL RATIO: 3.1
CHOLESTEROL: 149 MG/DL
CO2: 23 MMOL/L (ref 20–31)
CREAT SERPL-MCNC: 6.44 MG/DL (ref 0.7–1.2)
GFR AFRICAN AMERICAN: 11 ML/MIN
GFR NON-AFRICAN AMERICAN: 9 ML/MIN
GFR SERPL CREATININE-BSD FRML MDRD: ABNORMAL ML/MIN/{1.73_M2}
GFR SERPL CREATININE-BSD FRML MDRD: ABNORMAL ML/MIN/{1.73_M2}
GLUCOSE BLD-MCNC: 215 MG/DL (ref 70–99)
HDLC SERPL-MCNC: 48 MG/DL
LDL CHOLESTEROL: 81 MG/DL (ref 0–130)
POTASSIUM SERPL-SCNC: 4.7 MMOL/L (ref 3.7–5.3)
SODIUM BLD-SCNC: 146 MMOL/L (ref 135–144)
TOTAL PROTEIN: 6.9 G/DL (ref 6.4–8.3)
TRIGL SERPL-MCNC: 99 MG/DL
VLDLC SERPL CALC-MCNC: NORMAL MG/DL (ref 1–30)

## 2019-11-13 ENCOUNTER — TELEPHONE (OUTPATIENT)
Dept: FAMILY MEDICINE CLINIC | Age: 54
End: 2019-11-13

## 2020-03-23 ENCOUNTER — TELEPHONE (OUTPATIENT)
Dept: FAMILY MEDICINE CLINIC | Age: 55
End: 2020-03-23

## 2020-06-18 ENCOUNTER — TELEPHONE (OUTPATIENT)
Dept: GASTROENTEROLOGY | Age: 55
End: 2020-06-18

## 2020-06-29 RX ORDER — POLYETHYLENE GLYCOL 3350, SODIUM SULFATE ANHYDROUS, SODIUM BICARBONATE, SODIUM CHLORIDE, POTASSIUM CHLORIDE 236; 22.74; 6.74; 5.86; 2.97 G/4L; G/4L; G/4L; G/4L; G/4L
4 POWDER, FOR SOLUTION ORAL ONCE
Qty: 4000 ML | Refills: 0 | Status: SHIPPED | OUTPATIENT
Start: 2020-06-29 | End: 2020-06-29

## 2020-07-09 ENCOUNTER — HOSPITAL ENCOUNTER (OUTPATIENT)
Dept: PREADMISSION TESTING | Age: 55
Setting detail: SPECIMEN
Discharge: HOME | End: 2020-07-13
Payer: MEDICARE

## 2020-07-09 PROCEDURE — U0004 COV-19 TEST NON-CDC HGH THRU: HCPCS

## 2020-07-10 LAB
SARS-COV-2, PCR: NOT DETECTED
SARS-COV-2, RAPID: NORMAL
SARS-COV-2: NORMAL
SOURCE: NORMAL

## 2020-07-10 NOTE — TELEPHONE ENCOUNTER
Heath Velazco called, states he verbally understands the time change of his procedure and confirms that he will be present.

## 2020-07-11 ENCOUNTER — TELEPHONE (OUTPATIENT)
Dept: PRIMARY CARE CLINIC | Age: 55
End: 2020-07-11

## 2020-07-13 ENCOUNTER — HOSPITAL ENCOUNTER (OUTPATIENT)
Age: 55
Setting detail: OUTPATIENT SURGERY
Discharge: HOME | End: 2020-07-13
Attending: INTERNAL MEDICINE | Admitting: INTERNAL MEDICINE
Payer: MEDICARE

## 2020-07-13 ENCOUNTER — ANESTHESIA EVENT (OUTPATIENT)
Dept: ENDOSCOPY | Age: 55
End: 2020-07-13
Payer: MEDICARE

## 2020-07-13 ENCOUNTER — ANESTHESIA (OUTPATIENT)
Dept: ENDOSCOPY | Age: 55
End: 2020-07-13
Payer: MEDICARE

## 2020-07-13 VITALS — DIASTOLIC BLOOD PRESSURE: 52 MMHG | OXYGEN SATURATION: 100 % | SYSTOLIC BLOOD PRESSURE: 101 MMHG

## 2020-07-13 VITALS
TEMPERATURE: 97.8 F | HEIGHT: 70 IN | OXYGEN SATURATION: 100 % | SYSTOLIC BLOOD PRESSURE: 119 MMHG | WEIGHT: 180 LBS | HEART RATE: 52 BPM | DIASTOLIC BLOOD PRESSURE: 55 MMHG | BODY MASS INDEX: 25.77 KG/M2 | RESPIRATION RATE: 15 BRPM

## 2020-07-13 LAB
GLUCOSE BLD-MCNC: 176 MG/DL (ref 75–110)
POTASSIUM SERPL-SCNC: 5.3 MMOL/L (ref 3.7–5.3)

## 2020-07-13 PROCEDURE — 3700000000 HC ANESTHESIA ATTENDED CARE: Performed by: INTERNAL MEDICINE

## 2020-07-13 PROCEDURE — 6360000002 HC RX W HCPCS: Performed by: NURSE ANESTHETIST, CERTIFIED REGISTERED

## 2020-07-13 PROCEDURE — 2709999900 HC NON-CHARGEABLE SUPPLY: Performed by: INTERNAL MEDICINE

## 2020-07-13 PROCEDURE — 45385 COLONOSCOPY W/LESION REMOVAL: CPT | Performed by: INTERNAL MEDICINE

## 2020-07-13 PROCEDURE — 3700000001 HC ADD 15 MINUTES (ANESTHESIA): Performed by: INTERNAL MEDICINE

## 2020-07-13 PROCEDURE — 45380 COLONOSCOPY AND BIOPSY: CPT | Performed by: INTERNAL MEDICINE

## 2020-07-13 PROCEDURE — 2580000003 HC RX 258: Performed by: ANESTHESIOLOGY

## 2020-07-13 PROCEDURE — 36415 COLL VENOUS BLD VENIPUNCTURE: CPT

## 2020-07-13 PROCEDURE — 88305 TISSUE EXAM BY PATHOLOGIST: CPT

## 2020-07-13 PROCEDURE — 84132 ASSAY OF SERUM POTASSIUM: CPT

## 2020-07-13 PROCEDURE — 7100000001 HC PACU RECOVERY - ADDTL 15 MIN: Performed by: INTERNAL MEDICINE

## 2020-07-13 PROCEDURE — 3609010600 HC COLONOSCOPY POLYPECTOMY SNARE/COLD BIOPSY: Performed by: INTERNAL MEDICINE

## 2020-07-13 PROCEDURE — 7100000000 HC PACU RECOVERY - FIRST 15 MIN: Performed by: INTERNAL MEDICINE

## 2020-07-13 PROCEDURE — 82947 ASSAY GLUCOSE BLOOD QUANT: CPT

## 2020-07-13 PROCEDURE — 2500000003 HC RX 250 WO HCPCS: Performed by: NURSE ANESTHETIST, CERTIFIED REGISTERED

## 2020-07-13 RX ORDER — OXYCODONE HYDROCHLORIDE AND ACETAMINOPHEN 5; 325 MG/1; MG/1
1 TABLET ORAL PRN
Status: DISCONTINUED | OUTPATIENT
Start: 2020-07-13 | End: 2020-07-13 | Stop reason: HOSPADM

## 2020-07-13 RX ORDER — PROPOFOL 10 MG/ML
INJECTION, EMULSION INTRAVENOUS CONTINUOUS PRN
Status: DISCONTINUED | OUTPATIENT
Start: 2020-07-13 | End: 2020-07-13 | Stop reason: SDUPTHER

## 2020-07-13 RX ORDER — OMEPRAZOLE 10 MG/1
10 CAPSULE, DELAYED RELEASE ORAL DAILY
COMMUNITY
End: 2020-07-30 | Stop reason: SDUPTHER

## 2020-07-13 RX ORDER — LABETALOL 20 MG/4 ML (5 MG/ML) INTRAVENOUS SYRINGE
5 EVERY 10 MIN PRN
Status: DISCONTINUED | OUTPATIENT
Start: 2020-07-13 | End: 2020-07-13 | Stop reason: HOSPADM

## 2020-07-13 RX ORDER — SODIUM CHLORIDE 9 MG/ML
100 INJECTION, SOLUTION INTRAVENOUS CONTINUOUS
Status: DISCONTINUED | OUTPATIENT
Start: 2020-07-13 | End: 2020-07-13 | Stop reason: HOSPADM

## 2020-07-13 RX ORDER — PROPOFOL 10 MG/ML
INJECTION, EMULSION INTRAVENOUS PRN
Status: DISCONTINUED | OUTPATIENT
Start: 2020-07-13 | End: 2020-07-13 | Stop reason: SDUPTHER

## 2020-07-13 RX ORDER — DIPHENHYDRAMINE HYDROCHLORIDE 50 MG/ML
12.5 INJECTION INTRAMUSCULAR; INTRAVENOUS
Status: DISCONTINUED | OUTPATIENT
Start: 2020-07-13 | End: 2020-07-13 | Stop reason: HOSPADM

## 2020-07-13 RX ORDER — LIDOCAINE HYDROCHLORIDE 10 MG/ML
INJECTION, SOLUTION EPIDURAL; INFILTRATION; INTRACAUDAL; PERINEURAL PRN
Status: DISCONTINUED | OUTPATIENT
Start: 2020-07-13 | End: 2020-07-13 | Stop reason: SDUPTHER

## 2020-07-13 RX ORDER — ONDANSETRON 2 MG/ML
4 INJECTION INTRAMUSCULAR; INTRAVENOUS
Status: DISCONTINUED | OUTPATIENT
Start: 2020-07-13 | End: 2020-07-13 | Stop reason: HOSPADM

## 2020-07-13 RX ORDER — OXYCODONE HYDROCHLORIDE AND ACETAMINOPHEN 5; 325 MG/1; MG/1
2 TABLET ORAL PRN
Status: DISCONTINUED | OUTPATIENT
Start: 2020-07-13 | End: 2020-07-13 | Stop reason: HOSPADM

## 2020-07-13 RX ADMIN — PROPOFOL 125 MCG/KG/MIN: 10 INJECTION, EMULSION INTRAVENOUS at 11:24

## 2020-07-13 RX ADMIN — LIDOCAINE HYDROCHLORIDE 40 MG: 10 INJECTION, SOLUTION EPIDURAL; INFILTRATION; INTRACAUDAL; PERINEURAL at 11:21

## 2020-07-13 RX ADMIN — SODIUM CHLORIDE 100 ML/HR: 9 INJECTION, SOLUTION INTRAVENOUS at 11:09

## 2020-07-13 RX ADMIN — PROPOFOL 100 MG: 10 INJECTION, EMULSION INTRAVENOUS at 11:21

## 2020-07-13 ASSESSMENT — PULMONARY FUNCTION TESTS
PIF_VALUE: 1
PIF_VALUE: 6
PIF_VALUE: 1
PIF_VALUE: 6
PIF_VALUE: 1

## 2020-07-13 ASSESSMENT — PAIN - FUNCTIONAL ASSESSMENT: PAIN_FUNCTIONAL_ASSESSMENT: 0-10

## 2020-07-13 ASSESSMENT — PAIN SCALES - GENERAL: PAINLEVEL_OUTOF10: 0

## 2020-07-13 NOTE — OP NOTE
COLONOSCOPY    DATE OF PROCEDURE: 7/13/2020    SURGEON: Jazmin Craft MD    ASSISTANT: None    PREOPERATIVE DIAGNOSIS: Screening colonoscopy    POSTOPERATIVE DIAGNOSIS: Diverticulosis, poor preparation, polyp left colon, polyp right colon    OPERATION: Total colonoscopy, snare polypectomy right colon, excision of polyp with biopsy forceps from the left colon    ANESTHESIA: MAC    ESTIMATED BLOOD LOSS: None    COMPLICATIONS: None     SPECIMENS:  Was Obtained: Polyp right colon, polyp from left colon    HISTORY: The patient is a 50y.o. year old male with history of above preop diagnosis. I recommended colonoscopy with possible biopsy or polypectomy and I explained the risk, benefits, expected outcome, and alternatives to the procedure. Risks included but are not limited to bleeding, infection, respiratory distress, hypotension, and perforation of the colon and possibility of missing a lesion. The patient understands and is in agreement. PROCEDURE:  The patient's SPO2 remained above 90% throughout the procedure. Digital rectal exam was normal.  The colonoscope was inserted through the anus into the rectum and advanced under direct vision to the cecum without difficulty. Terminal ileum was examined for approximately 2 inches. The prep was poor. Findings:  Terminal ileum: normal    Cecum/Ascending colon: abnormal: Also examined in the retroflexed view. In the right colon there is a polyp which is about 4 to 5 mm seen, removed with cold snare. Patient has poor preparation. Transverse colon: normal, less than adequate preparation.     Descending/Sigmoid colon: abnormal: A flat polyp 3 to 5 mm at about 55 cm from the anus, completely removed with cold biopsy forceps    Has a diverticulosis, food preparation    Rectum/Anus: examined in normal and retroflexed positions and was normal    Withdrawal Time was (minutes): 15      During this exam patient had food preparation and was expelling a lot of

## 2020-07-13 NOTE — ANESTHESIA POSTPROCEDURE EVALUATION
Department of Anesthesiology  Postprocedure Note    Patient: Jasmin Anna  MRN: [id number]  YOB: 1965  Date of evaluation: 7/13/2020  Time:  1:28 PM     Procedure Summary     Date:  07/13/20 Room / Location:  [address] [place] [address] ENDO 01 / [facility] ENDO    Anesthesia Start:  1113 Anesthesia Stop:  1239    Procedure:  COLONOSCOPY POLYPECTOMY COLD BIOPSY, COLD SNARE (N/A ) Diagnosis:  (SCREENING  (PAT PER ANES ON ADMIT))    Surgeon:  Alexa Baker MD Responsible Provider:  Joice Lombard, MD    Anesthesia Type:  MAC ASA Status:  4          Anesthesia Type: MAC    Delfin Phase I: Delfin Score: 10    Delfin Phase II:      Last vitals: Reviewed and per EMR flowsheets.        Anesthesia Post Evaluation    Comments: POST- ANESTHESIA EVALUATION       Pt Name: Jasmin Anna  MRN: [id number]  [place]: 1965  Date of evaluation: 7/13/2020  Time:  1:28 PM      BP (!) 119/55   Pulse 52   Temp 97.8 °F (36.6 °C)   Resp 15   Ht 5' 10\" (1.778 m)   Wt 180 lb (81.6 kg)   SpO2 100%   BMI 25.83 kg/m²      Consciousness Level  Awake  Cardiopulmonary Status  Stable  Pain Adequately Treated YES  Nausea / Vomiting  NO  Adequate Hydration  YES  Anesthesia Related Complications NONE      Electronically signed by Joice Lombard, MD on 7/13/2020 at 1:28 PM

## 2020-07-13 NOTE — ANESTHESIA PRE PROCEDURE
Department of Anesthesiology  Preprocedure Note       Name:  Antonette Birmingham   Age:  50 y.o.  :  1965                                          MRN:  [id number]         Date:  2020      Surgeon: Jo Kern):  Kyra Chinchilla MD    Procedure: Procedure(s):  COLORECTAL CANCER SCREENING, NOT HIGH RISK    Medications prior to admission:   Prior to Admission medications    Medication Sig Start Date End Date Taking? Authorizing Provider   omeprazole (PRILOSEC) 10 MG delayed release capsule Take 10 mg by mouth daily   Yes Historical Provider, MD   NIFEdipine (PROCARDIA XL) 90 MG extended release tablet TAKE 1 TABLET BY MOUTH EVERY DAY 20  Yes Leticia Patterson MD   metoprolol (LOPRESSOR) 100 MG tablet Take 1 tablet by mouth daily TAKE 1 TABLET BY MOUTH 2 TIMES DAILY 19  Yes Leticia Patterson MD   pregabalin (LYRICA) 50 MG capsule Take 50 mg by mouth every evening. Yes Historical Provider, MD   doxercalciferol (HECTOROL) 4 MCG/2ML injection Infuse 1.25 mLs intravenously Once in dialysis for 1 dose Given at the end of dialysis Bria Smith, Sat 3/19/19 4/22/19  Noemy Weber MD   CREON 6000 units delayed release capsule TAKE ONE CAPSULE BY MOUTH 3 TIMES A DAY WITH MEALS 18   Historical Provider, MD   glucose monitoring kit (FREESTYLE) monitoring kit 1 kit by Does not apply route daily 17   [place]   Misc. Devices MISC Check blood sugar one time a day 17   [place]   insulin detemir (LEVEMIR FLEXTOUCH) 100 UNIT/ML injection pen Inject 16 Units into the skin nightly 17   [place]   Insulin Pen Needle 32G X 6 MM MISC 1 Package by Does not apply route daily 17   [place]       Current medications:    Current Facility-Administered Medications   Medication Dose Route Frequency Provider Last Rate Last Dose    0.9 % sodium chloride infusion  100 mL/hr Intravenous Continuous Chiquis Connolly MD           Allergies:     Allergies   Allergen Reactions    Clonidine Nausea Only     Requested by dr Spencer Stafford to add to allergy list, pt should not be on catapress. Problem List:    Patient Active Problem List   Diagnosis Code    Pyogenic granuloma L98.0    Hypertensive heart and renal disease with CHF and ESRD ([address] I13.2, N18.6    ESRD (end stage renal disease) on dialysis ([address] N18.6, Z99.2    Hypertensive encephalopathy I67.4    Acute respiratory failure (HCC) J96.00    Type 2 diabetes mellitus with chronic kidney disease on chronic dialysis, with long-term current use of insulin (HCC) E11.22, N18.6, Z99.2, Z79.4    Atrial fibrillation with controlled ventricular response ([address] I48.91    Essential hypertension I10       Past Medical History:        Diagnosis Date    Chronic kidney disease     left renal infarct-on dialysis Tues. , Thurs, Sat.  Chronic pancreatitis ([address]     Diabetes mellitus ([address]     Diverticulosis     Hyperlipidemia     Hypertension     Mild malnutrition (HCC)     MRSA (methicillin resistant staph aureus) culture positive 02/26/2019    nares    Pancreatitis        Past Surgical History:        Procedure Laterality Date    AV FISTULA CREATION Left     CATHETER REMOVAL N/A 6/7/2017    CATHETER REMOVAL TUNNEL H-D  performed by Stephanie Matthew MD at [id number]      with bile duct repair    HERNIA REPAIR      OTHER SURGICAL HISTORY      bile duct surgery    OTHER SURGICAL HISTORY  06/07/2017    removal tunneled dialysis catheter    SEPTOPLASTY      TONSILLECTOMY         Social History:    Social History     Tobacco Use    Smoking status: Never Smoker    Smokeless tobacco: Never Used   Substance Use Topics    Alcohol use:  Yes     Alcohol/week: 0.0 standard drinks     Comment: rarely                                Counseling given: Not Answered      Vital Signs (Current):   Vitals:    07/13/20 1009   BP: (!) 148/74   Pulse: 55   Resp: 16   Temp: 97.1 °F (36.2 °C)   TempSrc: Infrared   SpO2: 100%   Weight: 180 lb (81.6 kg)   Height: 5' 10\" (1.778 m)                                              BP Readings from Last 3 Encounters:   07/13/20 (!) 148/74   04/22/19 124/72   03/17/19 139/77       NPO Status: Time of last liquid consumption: 2300                        Time of last solid consumption: 2100                        Date of last liquid consumption: 07/12/20                        Date of last solid food consumption: 07/11/20    BMI:   Wt Readings from Last 3 Encounters:   07/13/20 180 lb (81.6 kg)   04/22/19 190 lb 9.6 oz (86.5 kg)   03/16/19 179 lb 14.3 oz (81.6 kg)     Body mass index is 25.83 kg/m². CBC:   Lab Results   Component Value Date    WBC 5.6 03/17/2019    RBC 3.02 03/17/2019    RBC 4.13 11/12/2011    HGB 9.3 03/17/2019    HCT 27.5 03/17/2019    MCV 91.1 03/17/2019    RDW 14.9 03/17/2019     03/17/2019     11/12/2011       CMP:   Lab Results   Component Value Date     05/10/2019    K 4.7 05/10/2019     05/10/2019    CO2 23 05/10/2019    BUN 41 05/10/2019    CREATININE 6.44 05/10/2019    GFRAA 11 05/10/2019    LABGLOM 9 05/10/2019    GLUCOSE 215 05/10/2019    GLUCOSE 182 11/12/2011    PROT 6.9 05/10/2019    CALCIUM 8.4 05/10/2019    BILITOT 0.47 05/10/2019    ALKPHOS 115 05/10/2019    AST 19 05/10/2019    ALT 18 05/10/2019       POC Tests: No results for input(s): POCGLU, POCNA, POCK, POCCL, POCBUN, POCHEMO, POCHCT in the last 72 hours.     Coags:   Lab Results   Component Value Date    PROTIME 14.4 02/26/2019    PROTIME 12.5 10/10/2011    INR 1.4 02/26/2019    APTT 23.4 02/26/2019       HCG (If Applicable): No results found for: PREGTESTUR, PREGSERUM, HCG, HCGQUANT     ABGs: No results found for: PHART, PO2ART, CYE6OIW, HLO6RWB, BEART, Q1UDXHHO     Type & Screen (If Applicable):  No results found for: LABABO, LABRH    Drug/Infectious Status (If Applicable):  No results found for: HIV, HEPCAB    COVID-19 Screening (If Applicable):   Lab Results   Component Value Date    COVID19 Not Detected 07/08/2020         Anesthesia Evaluation  Patient summary reviewed and Nursing notes reviewed no history of anesthetic complications:   Airway: Mallampati: II  TM distance: >3 FB   Neck ROM: full  Mouth opening: > = 3 FB Dental: normal exam         Pulmonary:Negative Pulmonary ROS and normal exam  breath sounds clear to auscultation                             Cardiovascular:    (+) hypertension:, dysrhythmias: atrial fibrillation, CHF:, hyperlipidemia        Rhythm: regular  Rate: normal  Echocardiogram reviewed         Beta Blocker:  Dose within 24 Hrs      ROS comment: Left ventricle is normal in size Global left ventricular systolic function is normal Estimated ejection fraction is 55-60 % . Neuro/Psych:   (+) neuromuscular disease:,              ROS comment: H/o Vertigo GI/Hepatic/Renal:   (+) renal disease (HD - T, Th, Sat; last HD 7/11/20): ESRD and dialysis,          ROS comment: Diverticulosis   Chronic Pancreatitis. Endo/Other:    (+) Diabetes (FBS - 176)Type II DM, , .                 Abdominal:           Vascular:                                  Anesthesia Plan      MAC     ASA 4       Induction: intravenous. MIPS: Prophylactic antiemetics administered. Anesthetic plan and risks discussed with patient. Plan discussed with CRNA. Poor IV access!       Joice Lombard, MD   7/13/2020

## 2020-07-13 NOTE — H&P
intravenously Once in dialysis for 1 dose Given at the end of dialysis Tues, Thurs, Sat 1.25 mL 0    Multiple Vitamins-Minerals (THERAPEUTIC MULTIVITAMIN-MINERALS) tablet Take 1 tablet by mouth daily      vitamin B-1 100 MG tablet Take 1 tablet by mouth daily 30 tablet 3    folic acid (FOLVITE) 1 MG tablet Take 1 tablet by mouth daily 30 tablet 3    CREON 6000 units delayed release capsule TAKE ONE CAPSULE BY MOUTH 3 TIMES A DAY WITH MEALS  0    B Complex-C-Folic Acid (NEPHRO-CLAUDIA) 0.8 MG TABS       glucose monitoring kit (FREESTYLE) monitoring kit 1 kit by Does not apply route daily 1 kit 0    Misc. Devices MISC Check blood sugar one time a day 50 each 5    insulin detemir (LEVEMIR FLEXTOUCH) 100 UNIT/ML injection pen Inject 16 Units into the skin nightly 5 Pen 5    Insulin Pen Needle 32G X 6 MM MISC 1 Package by Does not apply route daily 100 each 3    sevelamer (RENVELA) 800 MG tablet Take 2 tablets by mouth 3 times daily (with meals) Indications: and 1 tablet with snacks         Negative except for what is mentioned in the HPI. GENERAL PHYSICAL EXAM     Vitals: BP (!) 148/74   Pulse 55   Temp 97.1 °F (36.2 °C) (Infrared)   Resp 16   Ht 5' 10\" (1.778 m)   Wt 180 lb (81.6 kg)   SpO2 100%   BMI 25.83 kg/m²  Body mass index is 25.83 kg/m². GENERAL APPEARANCE:   Robbi Gain is 50 y.o.,  male, moderately obese, nourished, conscious, alert. Does not appear to be distress or pain at this time. SKIN:  Warm, dry, no cyanosis or jaundice. HEAD:  Normocephalic, atraumatic, no swelling or tenderness. EYES:  Pupils equal, reactive to light. EARS:  No discharge, no marked hearing loss. NOSE:  No rhinorrhea, epistaxis or septal deformity. THROAT:  Not congested. No ulceration bleeding or discharge.                   NECK:  No stiffness, trachea central.  No palpable masses or L.N.

## 2020-07-14 PROBLEM — Z86.0100 PERSONAL HISTORY OF COLONIC POLYPS: Status: ACTIVE | Noted: 2020-07-14

## 2020-07-14 PROBLEM — Z86.010 PERSONAL HISTORY OF COLONIC POLYPS: Status: ACTIVE | Noted: 2020-07-14

## 2020-07-14 PROBLEM — D69.6 THROMBOCYTOPENIA (HCC): Status: ACTIVE | Noted: 2019-11-14

## 2020-07-14 PROBLEM — K40.90 LEFT INGUINAL HERNIA: Status: ACTIVE | Noted: 2017-07-29

## 2020-07-14 PROBLEM — A49.02 MRSA (METHICILLIN RESISTANT STAPHYLOCOCCUS AUREUS): Status: ACTIVE | Noted: 2020-07-14

## 2020-07-14 PROBLEM — G47.33 OBSTRUCTIVE SLEEP APNEA SYNDROME: Status: ACTIVE | Noted: 2019-11-11

## 2020-07-14 LAB — SURGICAL PATHOLOGY REPORT: NORMAL

## 2020-07-14 RX ORDER — SEVELAMER CARBONATE 800 MG/1
800 TABLET, FILM COATED ORAL
COMMUNITY
End: 2020-07-30

## 2020-07-14 RX ORDER — FUROSEMIDE 20 MG/1
20 TABLET ORAL 2 TIMES DAILY
COMMUNITY
End: 2020-07-30

## 2020-07-17 PROBLEM — E78.2 MIXED HYPERLIPIDEMIA: Status: ACTIVE | Noted: 2020-07-17

## 2020-07-17 PROBLEM — E55.9 VITAMIN D DEFICIENCY: Status: ACTIVE | Noted: 2020-07-17

## 2020-07-17 PROBLEM — R53.82 CHRONIC FATIGUE: Status: ACTIVE | Noted: 2020-07-17

## 2020-07-22 ENCOUNTER — TELEPHONE (OUTPATIENT)
Dept: FAMILY MEDICINE CLINIC | Age: 55
End: 2020-07-22

## 2020-07-22 NOTE — TELEPHONE ENCOUNTER
[facility] CALLED ASKING IFTHE PROVIDER COULD SIGN OFF ON HIS ORDERS HOWEVER HE MISSED HIS APPT AT THAT TIME AND HAS NOT ESTABLISHED WITH ANY OTHER PROVIDERS, I DID REACH OUT TO HIM BY PHONE TO GET HIM SCHEDULED AGAIN AND HAD TO LEAVE A MESSAGE

## 2020-07-30 ENCOUNTER — OFFICE VISIT (OUTPATIENT)
Dept: FAMILY MEDICINE CLINIC | Age: 55
End: 2020-07-30
Payer: MEDICARE

## 2020-07-30 VITALS
SYSTOLIC BLOOD PRESSURE: 138 MMHG | DIASTOLIC BLOOD PRESSURE: 72 MMHG | HEART RATE: 73 BPM | BODY MASS INDEX: 27.49 KG/M2 | TEMPERATURE: 99 F | HEIGHT: 70 IN | WEIGHT: 192 LBS | RESPIRATION RATE: 16 BRPM

## 2020-07-30 LAB — HBA1C MFR BLD: 7.1 %

## 2020-07-30 PROCEDURE — 3046F HEMOGLOBIN A1C LEVEL >9.0%: CPT | Performed by: FAMILY MEDICINE

## 2020-07-30 PROCEDURE — G8419 CALC BMI OUT NRM PARAM NOF/U: HCPCS | Performed by: FAMILY MEDICINE

## 2020-07-30 PROCEDURE — 83036 HEMOGLOBIN GLYCOSYLATED A1C: CPT | Performed by: FAMILY MEDICINE

## 2020-07-30 PROCEDURE — 1036F TOBACCO NON-USER: CPT | Performed by: FAMILY MEDICINE

## 2020-07-30 PROCEDURE — G8427 DOCREV CUR MEDS BY ELIG CLIN: HCPCS | Performed by: FAMILY MEDICINE

## 2020-07-30 PROCEDURE — 99214 OFFICE O/P EST MOD 30 MIN: CPT | Performed by: FAMILY MEDICINE

## 2020-07-30 PROCEDURE — 3017F COLORECTAL CA SCREEN DOC REV: CPT | Performed by: FAMILY MEDICINE

## 2020-07-30 PROCEDURE — 2022F DILAT RTA XM EVC RTNOPTHY: CPT | Performed by: FAMILY MEDICINE

## 2020-07-30 RX ORDER — OMEPRAZOLE 10 MG/1
10 CAPSULE, DELAYED RELEASE ORAL DAILY
Qty: 90 CAPSULE | Refills: 2 | Status: SHIPPED | OUTPATIENT
Start: 2020-07-30 | End: 2020-10-30 | Stop reason: SDUPTHER

## 2020-07-30 RX ORDER — PREGABALIN 50 MG/1
50 CAPSULE ORAL EVERY EVENING
Qty: 90 CAPSULE | Refills: 3 | Status: SHIPPED | OUTPATIENT
Start: 2020-07-30 | End: 2021-01-13 | Stop reason: SDUPTHER

## 2020-07-30 RX ORDER — ZOSTER VACCINE RECOMBINANT, ADJUVANTED 50 MCG/0.5
0.5 KIT INTRAMUSCULAR SEE ADMIN INSTRUCTIONS
Qty: 0.5 ML | Refills: 0 | Status: SHIPPED | OUTPATIENT
Start: 2020-07-30 | End: 2021-01-26

## 2020-07-30 ASSESSMENT — ENCOUNTER SYMPTOMS
ABDOMINAL PAIN: 0
RESPIRATORY NEGATIVE: 1
COUGH: 0
SHORTNESS OF BREATH: 0

## 2020-07-30 NOTE — LETTER
MEDICATION AGREEMENT     [facility]  [facility]      For certain conditions, multiple classes of medications may be used to help better manage your symptoms, and to improve your ability to function at home, work and in social settings. However, these medications do have risks, which will be discussed with you, including addiction and dependency. The following prescribed medications need frequent monitoring and will require you to partner and assist in your healthcare. Medication  Dose, instructions and quantity as indicated on current prescription bottle Diagnosis/Reason(s) for Taking Category                                  Benefits and goals of Controlled Substance Medications: There are two potential goals for your treatment: (1) decreased pain and suffering (2) improved daily life functions. There are many possible treatments for your chronic condition(s), and, in addition to controlled substance medications, we will try alternatives such as physical therapy, yoga, massage, home daily exercise, meditation, relaxation techniques, injections, chiropractic manipulations, surgery, cognitive therapy, hypnosis and many medications that are not habit-forming. Use of controlled substance medications may be helpful, but they are unlikely to resolve all of your symptoms or restore all function. Risks of Controlled Substance Medications:    Opioid pain medications: These medications can lead to problems such as addiction/dependence, sedation, lightheadedness/dizziness, memory issues, falls, constipation, nausea, or vomiting. They may also impair the ability to drive or operate machinery. Additionally, these medications may lower testosterone levels, leading to loss of bone strength, stamina and sex drive. They may cause problems with breathing, sleep apnea and reduced coughing, which are especially dangerous for patients with lung disease.   Overdose or dangerous interactions with alcohol and other medications may occur, leading to death. Hyperalgesia may develop, in which patients receiving opioids for the treatment of pain may actually become more sensitive to certain painful stimuli, and in some cases, experience pain from ordinarily non-painful stimuli. Women between the ages of 14-53 who could become pregnant should carefully weigh the risks and benefits of opioids with their physicians, as these medications increase the risk of pregnancy complications, including miscarriage,  delivery and stillbirth. It is also possible for babies to be born addicted to opioids. Opioid dependence withdrawal symptoms may include; feelings of uneasiness, increased pain, irritability, belly pain, diarrhea, sweats and goose-flesh. Benzodiazepines and non-benzodiazepine sleep medications: These medications can lead to problems such as addiction/dependence, sedation, fatigue, lightheadedness, dizziness, incoordination, falls, depression, hallucinations, and impaired judgment, memory and concentration. The ability to drive and operate machinery may also be affected. Abnormal sleep-related behaviors have been reported, including sleep walking, driving, making telephone calls, eating, or having sex while not fully awake. These medications can suppress breathing and worsen sleep apnea, particularly when combined with alcohol or other sedating medications, potentially leading to death. Dependence withdrawal symptoms may include tremors, anxiety, hallucinations and seizures. Stimulants:  Common adverse effects include addiction/dependence, increased blood pressure and heart rate, decreased appetite, nausea, involuntary weight loss, insomnia, irritability, and headaches. These risks may increase when these medications are combined with other stimulants, such as caffeine pills or energy drinks, certain weight loss supplements and oral decongestants.   Dependence withdrawal symptoms may include depressed mood, loss of interest, suicidal thoughts, anxiety, fatigue, appetite changes and agitation. Testosterone replacement therapy:  Potential side effects include increased risk of stroke and heart attack, blood clots, increased blood pressure, increased cholesterol, enlarged prostate, sleep apnea, irritability/aggression and other mood disorders, and decreased fertility. Other:     1. I understand that I have the following responsibilities:  · I will take medications at the dose and frequency prescribed. · I will not increase or change how I take my medications without the approval of the health care provider who signs this Medication Agreement. · I will arrange for refills at the prescribed interval ONLY during regular office hours. I will not ask for refills earlier than agreed, after-hours, on holidays or on weekends. · I will obtain all refills for these medications at  ·  ____________________________________  pharmacy (phone number  ·  ________________________), with full consent for my provider and pharmacist to exchange information in writing or verbally. · I will not request any pain medications or controlled substances from other providers and will inform this provider of all other medications I am taking. · I will inform my other health care providers that I am taking these medications and of the existence of this [address]. In the event of an emergency, I will provide the same information to the emergency department providers. · I will protect my prescriptions and medications. I understand that lost or misplaced prescriptions will not be replaced. · I will keep medications only for my own use and will not share them with others. I will keep all medications away from children. · I agree to participate in any medical, psychological or psychiatric assessments recommended by my provider.   · I will actively participate in any program designed to improve function, Other:____________________________________________________________________    AGREEMENT:    I have read the above and have had all of my questions answered. For chronic disease management, I know that my symptoms can be managed with many types of treatments. A chronic medication trial may be part of my treatment, but I must be an active participant in my care. Medication therapy is only one part of my symptom management plan. In some cases, there may be limited scientific evidence to support the chronic use of certain medications to improve symptoms and daily function. Furthermore, in certain circumstances, there may be scientific information that suggests that use of chronic controlled substances may actually worsen my symptoms and increase my risk of unintentional death directly related to this medication therapy. I know that if my provider feels my risk from controlled medications is greater than my benefit, I will have my controlled substance medication(s) compassionately lowered or removed altogether. I agree to a controlled substance medication trial.      I further agree to allow this office to contact my HIPAA contact on file if there are concerns about my safety and use of controlled medications. I have agreed to use the following medications above as instructed by my physician and as stated in this [address].      Patient Signature:  ______________________  [facility]:3/15/3663 or _____________    Provider Signature:______________________  [facility]:8/93/8460 or _____________

## 2020-07-30 NOTE — PROGRESS NOTES
[facility] PHYSICIANS  [facility] PHYSICIANS  CHRIS Estrada [facility] 2.  [address] [address]  Dept: [phone]     2020   Chief Complaint   Patient presents with    New Patient     LOLI Spann (:  1965) is a 50 y.o. male was an established patient Dr. Scarlett Pereyra. Patient has multiple chronic conditions. Patient is a known history of atrial fibrillation, hypertension, and hyperlipidemia. Patient's blood pressure in the office was stable. Patient is currently on metoprolol, and Procardia. He denies any adverse reaction to any of his therapy. He denies any chest pain, shortness of breath, palpitations. He is setablished with Dr. Alesia Castellano who he sees at least 2 times a year. BP Readings from Last 3 Encounters:   20 138/72   20 (!) 119/55   20 (!) 101/52     Patient has a known history of end-stage renal disease. Patient is currently seeing Dr. Pratik German he has dialysis on , and Saturday. Patient is trying to get to the transplant team including clinic. Lab Results   Component Value Date    CREATININE 6.44 ([place]) 05/10/2019     Patient has a  stable Diabetes Mellitus. Patient's recent   Lab Results   Component Value Date    LABA1C 6.5 (H) 2019   . Current therapy includes levemir. Patient is responding well with this therapy. Patient reports home glucose monitoring as Stable readings. Patient denieshypoglycemia episodes such as{symptoms. Patient denies blurred vision. The patient has seen an ophthalmologist with in one last year. The patient is not on ACE inhibitors. The patient has not a foot examination. The patient has not seen an podiatrist with in last year. Patient also reported some indigestion, bloating, and heartburn. Patient is currently onPrilosec. Reports success with that therapy. Patient also has some abdominal hernia.   He had several abdominal surgeries in the past.  He is working with the [facility] surgeon who is going to lead the renal transplant center he can get the hernia reduced. Patient had 2 mesh placed on his abdomen in the past.    Patient had surgery for his ROMEO. Patient reports good success with that. He is not currently on any CPAP. Patient has a known vitamin D deficiency is currently on supplementation. He is due for a level check which will be done today. Patient reports some pain and burning sensation in both of his feet especially at night. He has had this for a while. He is currently on Lyrica. Patient reports fair success with this therapy. Patient has thrombocytopenia. Is been stable in the past several months. Does complain of some chronic fatigue. Patient says this is really nothing new. He denies worsening of these symptoms.   Lab Results   Component Value Date     03/17/2019     Karlee Cobos ([birth year]. [birth year] - Present)  [phone] (Work)  [phone] (Fax)  [address] (Referring)  [phone] (Work)  [phone] (Fax)  [address]  [address] [place] [address], [address]  Nephrology  [facility]  [address] [facility] [address]  [facility] [place], [facility], [address] [facility],[address]  [address] (Physician)  [phone] (Work)  [phone] (Fax)  [address] [facility], [address] [place] [address], [address] [place] [address]  Cardiology  [address], [address] [place] [address] [address]       No data recorded   Counseling given: Not Answered    Patient Active Problem List   Diagnosis    Pyogenic granuloma    Hypertensive heart and renal disease with CHF and ESRD ([address]    ESRD (end stage renal disease) on dialysis ([address]    Hypertensive encephalopathy    Acute respiratory failure ([address]    Type 2 diabetes mellitus with chronic kidney disease on chronic dialysis, with long-term current use of insulin ([address]    Atrial fibrillation with controlled ventricular response ([address]    Essential hypertension    Polyp of ascending colon  Personal history of colonic polyps    Alcohol-induced chronic pancreatitis ([address]    Duodenal mass    Incisional hernia    Left inguinal hernia    MRSA (methicillin resistant Staphylococcus aureus)    Noncompliance with medications    Obstructive sleep apnea syndrome    Thrombocytopenia (HCC)    Mixed hyperlipidemia    Vitamin D deficiency    Chronic fatigue      Past Medical History:   Diagnosis Date    Chronic kidney disease     left renal infarct-on dialysis Tues. , Thurs, Sat.  Chronic pancreatitis ([address]     Diabetes mellitus [facility]     Diverticulosis     Hyperlipidemia     Hypertension     Mild malnutrition (HCC)     MRSA (methicillin resistant staph aureus) culture positive 02/26/2019    nares    Pancreatitis     Personal history of colonic polyps 7/14/2020      Past Surgical History:   Procedure Laterality Date    AV FISTULA CREATION Left     CATHETER REMOVAL N/A 6/7/2017    CATHETER REMOVAL TUNNEL H-D  performed by Valeria Philippe MD at [address] [place] [address]      with bile duct repair    COLONOSCOPY N/A 7/13/2020    COLONOSCOPY POLYPECTOMY COLD BIOPSY, COLD SNARE performed by Conchetta Fothergill, MD at [address] OTHER SURGICAL HISTORY      bile duct surgery    OTHER SURGICAL HISTORY  06/07/2017    removal tunneled dialysis catheter    SEPTOPLASTY      TONSILLECTOMY     History reviewed. No pertinent family history. Current Outpatient Medications   Medication Sig Dispense Refill    zoster recombinant adjuvanted vaccine (SHINGRIX) 50 MCG/0.5ML SUSR injection Inject 0.5 mLs into the muscle See Admin Instructions 1 dose now and repeat in 2-6 months 0.5 mL 0    pregabalin (LYRICA) 50 MG capsule Take 1 capsule by mouth every evening for 90 days.  90 capsule 3    omeprazole (PRILOSEC) 10 MG delayed release capsule Take 1 capsule by mouth daily 90 capsule 2    insulin detemir (LEVEMIR) 100 UNIT/ML injection pen Inject 5 Units into the skin nightly  NIFEdipine (PROCARDIA XL) 90 MG extended release tablet TAKE 1 TABLET BY MOUTH EVERY DAY 30 tablet 5    metoprolol (LOPRESSOR) 100 MG tablet Take 1 tablet by mouth daily TAKE 1 TABLET BY MOUTH 2 TIMES DAILY 30 tablet 6    CREON 6000 units delayed release capsule TAKE ONE CAPSULE BY MOUTH 3 TIMES A DAY WITH MEALS  0    glucose monitoring kit (FREESTYLE) monitoring kit 1 kit by Does not apply route daily 1 kit 0    Misc. Devices MISC Check blood sugar one time a day 50 each 5    Insulin Pen Needle 32G X 6 MM MISC 1 Package by Does not apply route daily 100 each 3    doxercalciferol (HECTOROL) 4 MCG/2ML injection Infuse 1.25 mLs intravenously Once in dialysis for 1 dose Given at the end of dialysis Tues, Thurs, Sat 1.25 mL 0     No current facility-administered medications for this visit. Review of Systems   Constitutional: Negative. Negative for activity change, chills and fever. HENT: Negative. Respiratory: Negative. Negative for cough and shortness of breath. Cardiovascular: Positive for leg swelling (chronic). Negative for chest pain and palpitations. Gastrointestinal: Negative for abdominal pain. Genitourinary: Negative for dysuria. Musculoskeletal: Negative for arthralgias and myalgias. Skin: Negative for rash. AV fistula left upper arm   Neurological: Negative for weakness, light-headedness and headaches. Hematological: Bruises/bleeds easily. Psychiatric/Behavioral: Positive for sleep disturbance. The patient is nervous/anxious. Prior to Visit Medications    Medication Sig Taking? Authorizing Provider   zoster recombinant adjuvanted vaccine [facility]) 50 MCG/0.5ML SUSR injection Inject 0.5 mLs into the muscle See Admin Instructions 1 dose now and repeat in 2-6 months Yes HEATHER Elizondo - CNP   pregabalin (LYRICA) 50 MG capsule Take 1 capsule by mouth every evening for 90 days.  Yes HEATHER Elizondo CNP   omeprazole (PRILOSEC) 10 MG delayed release capsule Take 1 capsule by mouth daily Yes Kusum Shaikh, APRN - CNP   insulin detemir (LEVEMIR) 100 UNIT/ML injection pen Inject 5 Units into the skin nightly Yes Historical Provider, MD   NIFEdipine (PROCARDIA XL) 90 MG extended release tablet TAKE 1 TABLET BY MOUTH EVERY DAY Yes Óscar Allen MD   metoprolol (LOPRESSOR) 100 MG tablet Take 1 tablet by mouth daily TAKE 1 TABLET BY MOUTH 2 TIMES DAILY Yes Óscar Allen MD   CREON 6000 units delayed release capsule TAKE ONE CAPSULE BY MOUTH 3 TIMES A DAY WITH MEALS Yes Historical Provider, MD   glucose monitoring kit (FREESTYLE) monitoring kit 1 kit by Does not apply route daily Yes [facility]. Devices MISC Check blood sugar one time a day Yes Johnny Mascorro   Insulin Pen Needle 32G X 6 MM MISC 1 Package by Does not apply route daily Yes Johnny Mascorro   doxercalciferol (HECTOROL) 4 MCG/2ML injection Infuse 1.25 mLs intravenously Once in dialysis for 1 dose Given at the end of dialysis Lena Dash Sat Arnell Carmel, MD      Social History     Tobacco Use    Smoking status: Never Smoker    Smokeless tobacco: Never Used   Substance Use Topics    Alcohol use: Yes     Alcohol/week: 0.0 standard drinks     Comment: rarely      Vitals:    07/30/20 1100   BP: 138/72   Pulse: 73   Resp: 16   Temp: 99 °F (37.2 °C)   TempSrc: Infrared   Weight: 192 lb (87.1 kg)   Height: 5' 10\" (1.778 m)     Estimated body mass index is 27.55 kg/m² as calculated from the following:    Height as of this encounter: 5' 10\" (1.778 m). Weight as of this encounter: 192 lb (87.1 kg). Physical Exam  Vitals signs and nursing note reviewed. Constitutional:       Appearance: He is well-developed. HENT:      Head: Normocephalic.       Right Ear: Tympanic membrane and external ear normal.      Left Ear: Tympanic membrane and external ear normal.      Nose: Nose normal.      Mouth/Throat:      Mouth: Mucous membranes are moist.   Eyes:      Pupils: Pupils are equal, round, and reactive to light. Neck:      Musculoskeletal: Normal range of motion and neck supple. Thyroid: No thyromegaly. Vascular: No JVD. Cardiovascular:      Rate and Rhythm: Normal rate and regular rhythm. Pulses: Normal pulses. Heart sounds: Normal heart sounds. No murmur. Arteriovenous access: left arteriovenous access is present. Comments: AV fistula with dressing, good bruit. No erythema. Pulmonary:      Effort: Pulmonary effort is normal. No respiratory distress. Breath sounds: Normal breath sounds. Abdominal:      General: Abdomen is scaphoid and protuberant. A surgical scar is present. Bowel sounds are normal. There is no distension. Palpations: Abdomen is soft. Tenderness: There is no abdominal tenderness. Hernia: A hernia is present. Hernia is present in the ventral area. Comments: Multiple surgical scar. Hernia, soft, non tender. Musculoskeletal: Normal range of motion. Lymphadenopathy:      Cervical: No cervical adenopathy. Skin:     General: Skin is warm and dry. Capillary Refill: Capillary refill takes less than 2 seconds. Neurological:      Mental Status: He is alert and oriented to person, place, and time. Psychiatric:         Mood and Affect: Mood is anxious. Speech: Speech is rapid and pressured. Behavior: Behavior normal.       Most recent labs reviewed with patient, and all questions answered.   Lab Results   Component Value Date    WBC 5.6 03/17/2019    HGB 9.3 (L) 03/17/2019    HCT 27.5 (L) 03/17/2019    MCV 91.1 03/17/2019     03/17/2019     Lab Results   Component Value Date     05/10/2019    K 5.3 07/13/2020     05/10/2019    CO2 23 05/10/2019    BUN 41 05/10/2019    CREATININE 6.44 05/10/2019    GLUCOSE 215 05/10/2019    GLUCOSE 182 11/12/2011    CALCIUM 8.4 05/10/2019      Lab Results   Component Value Date    ALT 18 05/10/2019    AST 19 05/10/2019    ALKPHOS 115 05/10/2019    BILITOT 0.47 05/10/2019     Lab Results   Component Value Date    TSH 2.82 02/26/2019     Lab Results   Component Value Date    CHOL 149 05/10/2019     Lab Results   Component Value Date    TRIG 99 05/10/2019     Lab Results   Component Value Date    HDL 48 05/10/2019     Lab Results   Component Value Date    LDLCHOLESTEROL 81 05/10/2019     Lab Results   Component Value Date    CHOLHDLRATIO 3.1 05/10/2019     Lab Results   Component Value Date    LABA1C 6.5 (H) 03/02/2019      ASSESSMENT/PLAN:  1. Hypertensive heart and renal disease with CHF and ESRD ([facility]  Stable  Continue current therapy. DISCUSSED AND ADVISED TO:  Cut down on your salt intake. Cut down on caffeinated drinks, sports drinks. Instructed to check BP at home regularly. Report for any chest pains, shortness of breath, headaches, and lightheadedness. Call the office if your blood pressure continue to be higher than 140/90 or 90/50.    - CBC Auto Differential; Future    2. Type 2 diabetes mellitus with chronic kidney disease on chronic dialysis, with long-term current use of insulin (HCC)  Stable  Continue therapy. We will recheck Hemoglobin A1c on 3 mos  DISCUSSED and ADVISED TO:  Continue to check Glucose levels at home. Report increased and low levels as discussed. Decrease carbohydrates, sugary drinks, desserts in your diet. Exercise regularly, as tolerated. Try to lose weight. - Microalbumin, Ur; Future  - POCT glycosylated hemoglobin (Hb A1C); Future    3. ESRD (end stage renal disease) on dialysis ([facility]  Stable  Continue nephro follow up  Dialysis  Renal transplant   - Comprehensive Metabolic Panel, Fasting; Future  - Urinalysis Reflex to Culture; Future    4. Mixed hyperlipidemia  Stable  Due for lipid test  - Lipid, Fasting; Future    5. Obstructive sleep apnea syndrome  Stable  No CPAP    6. Gastroesophageal reflux disease without esophagitis  Stable  Continue omeprazole.    DISCUSSED AND ADVISED TO:  Avoid food triggers. Stop eating large meals close to bedtime  Don't eat meals too close to bedtime  Avoid ASA, NSAID's, caffeine, peppermints, alcohol and tobacco.  Report for worsening symptoms. - omeprazole (PRILOSEC) 10 MG delayed release capsule; Take 1 capsule by mouth daily  Dispense: 90 capsule; Refill: 2    7. History of atrial fibrillation  Historical]  No recent incidents    8. Recurrent abdominal hernia without obstruction or gangrene, unspecified hernia type  Failure to [facility] surgeon consult    9. Vitamin D deficiency  Stable  Due for level check  - Vitamin D 25 Hydroxy; Future    10. Polyneuropathy  Failure to Improve  Stable with lyrica  Continue therapy  - pregabalin (LYRICA) 50 MG capsule; Take 1 capsule by mouth every evening for 90 days. Dispense: 90 capsule; Refill: 3    11. Thrombocytopenia (HCC)  Stable  - CBC Auto Differential; Future    12. Chronic fatigue  Stable  TSH recheck  - TSH without Reflex; Future    13. Need for varicella vaccine  Recommended by CDC. No current infection. Denies previous adverse reaction to vaccination. - zoster recombinant adjuvanted vaccine [facility]) 50 MCG/0.5ML SUSR injection; Inject 0.5 mLs into the muscle See Admin Instructions 1 dose now and repeat in 2-6 months  Dispense: 0.5 mL; Refill: 0    14. Screening for viral disease  recommended    - HIV Screen; Future  - Hepatitis C Antibody; Future    15. Screening for prostate cancer  recommended    - Psa screening; Future     Controlled Substance Monitoring:  Acute and Chronic Pain Monitoring:   No flowsheet data found.   Orders Placed This Encounter   Procedures    CBC Auto Differential     Standing Status:   Future     Standing Expiration Date:   7/29/2021    Comprehensive Metabolic Panel, Fasting     Standing Status:   Future     Standing Expiration Date:   7/29/2021    Lipid, Fasting     Standing Status:   Future     Standing Expiration Date:   7/29/2021    Vitamin D 25 Hydroxy Standing Status:   Future     Standing Expiration Date:   7/29/2021    Urinalysis Reflex to Culture     Standing Status:   Future     Standing Expiration Date:   7/29/2021     Order Specific Question:   SPECIFY(EX-CATH,MIDSTREAM,CYSTO,ETC)? Answer:   midstream    TSH without Reflex     Standing Status:   Future     Standing Expiration Date:   7/29/2021    Psa screening     Standing Status:   Future     Standing Expiration Date:   7/29/2021    HIV Screen     Standing Status:   Future     Standing Expiration Date:   7/29/2021    Hepatitis C Antibody     Standing Status:   Future     Standing Expiration Date:   7/29/2021    Microalbumin, Ur     Standing Status:   Future     Standing Expiration Date:   7/29/2021    POCT glycosylated hemoglobin (Hb A1C)     Standing Status:   Future     Standing Expiration Date:   7/29/2021     Orders Placed This Encounter   Medications    zoster recombinant adjuvanted vaccine [facility]) 50 MCG/0.5ML SUSR injection     Sig: Inject 0.5 mLs into the muscle See Admin Instructions 1 dose now and repeat in 2-6 months     Dispense:  0.5 mL     Refill:  0    pregabalin (LYRICA) 50 MG capsule     Sig: Take 1 capsule by mouth every evening for 90 days.      Dispense:  90 capsule     Refill:  3    omeprazole (PRILOSEC) 10 MG delayed release capsule     Sig: Take 1 capsule by mouth daily     Dispense:  90 capsule     Refill:  2      Medications Discontinued During This Encounter   Medication Reason    sevelamer (RENVELA) 800 MG tablet LIST CLEANUP    furosemide (LASIX) 20 MG tablet LIST CLEANUP    pregabalin (LYRICA) 50 MG capsule REORDER    omeprazole (PRILOSEC) 10 MG delayed release capsule REORDER      Health Maintenance Due   Topic Date Due    HIV screen  06/24/1982    Hepatitis B vaccine (1 of 3 - Risk 3-dose series) 06/24/1986    Shingles Vaccine (1 of 2) 06/24/2017    Annual Wellness Visit (AWV)  05/29/2019    Diabetic retinal exam  07/10/2019    A1C test (Diabetic or Prediabetic)  03/02/2020    Diabetic foot exam  04/22/2020    Lipid screen  05/10/2020    Creatinine monitoring  05/10/2020      Return in about 3 months (around 10/30/2020) for Chronic conditions. Shaylee Beckett received counseling on the following healthy behaviors: nutrition, exercise and medication adherence  Reviewed prior labs and health maintenance  Continue current medications, diet and exercise. Discussed use, benefit, and side effects of prescribed medications. Barriers to medication compliance addressed. Patient given educational materials - see patient instructions  Was a self-tracking handout given in paper form or via [employer]? Yes    Requested Prescriptions     Signed Prescriptions Disp Refills    zoster recombinant adjuvanted vaccine (SHINGRIX) 50 MCG/0.5ML SUSR injection 0.5 mL 0     Sig: Inject 0.5 mLs into the muscle See Admin Instructions 1 dose now and repeat in 2-6 months    pregabalin (LYRICA) 50 MG capsule 90 capsule 3     Sig: Take 1 capsule by mouth every evening for 90 days.  omeprazole (PRILOSEC) 10 MG delayed release capsule 90 capsule 2     Sig: Take 1 capsule by mouth daily       All patient questions answered. Patient voiced understanding. Quality Measures    Body mass index is 27.55 kg/m². Elevated. Weight control planned discussed Healthy diet and regular exercise. BP: 138/72 Blood pressure is normal. Treatment plan consists of No treatment change needed.     Lab Results   Component Value Date    LDLCHOLESTEROL 81 05/10/2019    (goal LDL reduction with dx if diabetes is 50% LDL reduction)      PHQ Scores 4/22/2019 11/19/2018 10/22/2018 6/18/2018 9/21/2017 8/7/2017 2/8/2016   PHQ2 Score 0 0 0 0 1 0 0   PHQ9 Score 0 0 0 0 2 0 1     Interpretation of Total Score Depression Severity: 1-4 = Minimal depression, 5-9 = Mild depression, 10-14 = Moderate depression, 15-19 = Moderately severe depression, 20-27 = Severe depression   This note was completed by using the assistance of prem speech-recognition program. However, inadvertent computerized transcription errors may be present. Although every effort was made to ensure accuracy, no guarantees can be provided that every mistake has been identified and corrected by editing   An electronic signature was used to authenticate this note.   --HEATHER Portillo - CNP on 7/30/2020 at 12:41 PM

## 2020-07-30 NOTE — PROGRESS NOTES
Visit Information    Have you changed or started any medications since your last visit including any over-the-counter medicines, vitamins, or herbal medicines? no   Have you stopped taking any of your medications? Is so, why? -  no  Are you having any side effects from any of your medications? - no    Have you seen any other physician or provider since your last visit?  no   Have you had any other diagnostic tests since your last visit?  no   Have you been seen in the emergency room and/or had an admission in a hospital since we last saw you?  no   Have you had your routine dental cleaning in the past 6 months?  no     Do you have an active MyChart account? If no, what is the barrier?   Yes    Patient Care Team:  Ruben Jean MD as PCP - General (Family Medicine)  HEATHER Jesus - FRED Connors RN as Ambulatory Care Manager    Medical History Review  Past Medical, Family, and Social History reviewed and does contribute to the patient presenting condition    Health Maintenance   Topic Date Due    HIV screen  06/24/1982    Hepatitis B vaccine (1 of 3 - Risk 3-dose series) 06/24/1986    Shingles Vaccine (1 of 2) 06/24/2017    Annual Wellness Visit (AWV)  05/29/2019    Diabetic retinal exam  07/10/2019    A1C test (Diabetic or Prediabetic)  03/02/2020    Diabetic foot exam  04/22/2020    Lipid screen  05/10/2020    Creatinine monitoring  05/10/2020    Flu vaccine (1) 09/01/2020    Potassium monitoring  07/13/2021    Pneumococcal 0-64 years Vaccine (3 of 3 - PPSV23) 02/15/2023    DTaP/Tdap/Td vaccine (2 - Td) 07/20/2028    Colon cancer screen colonoscopy  07/13/2030    Hepatitis A vaccine  Aged Out    Hib vaccine  Aged Out    Meningococcal (ACWY) vaccine  Aged Out

## 2020-07-30 NOTE — PATIENT INSTRUCTIONS
Patient Education        High Blood Pressure: Care Instructions  Overview     It's normal for blood pressure to go up and down throughout the day. But if it stays up, you have high blood pressure. Another name for high blood pressure is hypertension. Despite what a lot of people think, high blood pressure usually doesn't cause headaches or make you feel dizzy or lightheaded. It usually has no symptoms. But it does increase your risk of stroke, heart attack, and other problems. You and your doctor will talk about your risks of these problems based on your blood pressure. Your doctor will give you a goal for your blood pressure. Your goal will be based on your health and your age. Lifestyle changes, such as eating healthy and being active, are always important to help lower blood pressure. You might also take medicine to reach your blood pressure goal.  Follow-up care is a key part of your treatment and safety. Be sure to make and go to all appointments, and call your doctor if you are having problems. It's also a good idea to know your test results and keep a list of the medicines you take. How can you care for yourself at home? Medical treatment  · If you stop taking your medicine, your blood pressure will go back up. You may take one or more types of medicine to lower your blood pressure. Be safe with medicines. Take your medicine exactly as prescribed. Call your doctor if you think you are having a problem with your medicine. · Talk to your doctor before you start taking aspirin every day. Aspirin can help certain people lower their risk of a heart attack or stroke. But taking aspirin isn't right for everyone, because it can cause serious bleeding. · See your doctor regularly. You may need to see the doctor more often at first or until your blood pressure comes down.   · If you are taking blood pressure medicine, talk to your doctor before you take decongestants or anti-inflammatory medicine, such as ibuprofen. Some of these medicines can raise blood pressure. · Learn how to check your blood pressure at home. Lifestyle changes  · Stay at a healthy weight. This is especially important if you put on weight around the waist. Losing even 10 pounds can help you lower your blood pressure. · If your doctor recommends it, get more exercise. Walking is a good choice. Bit by bit, increase the amount you walk every day. Try for at least 30 minutes on most days of the week. You also may want to swim, bike, or do other activities. · Avoid or limit alcohol. Talk to your doctor about whether you can drink any alcohol. · Try to limit how much sodium you eat to less than 2,300 milligrams (mg) a day. Your doctor may ask you to try to eat less than 1,500 mg a day. · Eat plenty of fruits (such as bananas and oranges), vegetables, legumes, whole grains, and low-fat dairy products. · Lower the amount of saturated fat in your diet. Saturated fat is found in animal products such as milk, cheese, and meat. Limiting these foods may help you lose weight and also lower your risk for heart disease. · Do not smoke. Smoking increases your risk for heart attack and stroke. If you need help quitting, talk to your doctor about stop-smoking programs and medicines. These can increase your chances of quitting for good. When should you call for help? Call  911 anytime you think you may need emergency care. This may mean having symptoms that suggest that your blood pressure is causing a serious heart or blood vessel problem. Your blood pressure may be over 180/120. For example, call 911 if:  · You have symptoms of a heart attack. These may include:  ? Chest pain or pressure, or a strange feeling in the chest.  ? Sweating. ? Shortness of breath. ? Nausea or vomiting. ? Pain, pressure, or a strange feeling in the back, neck, jaw, or upper belly or in one or both shoulders or arms. ? Lightheadedness or sudden weakness.   ? A fast or irregular heartbeat. · You have symptoms of a stroke. These may include:  ? Sudden numbness, tingling, weakness, or loss of movement in your face, arm, or leg, especially on only one side of your body. ? Sudden vision changes. ? Sudden trouble speaking. ? Sudden confusion or trouble understanding simple statements. ? Sudden problems with walking or balance. ? A sudden, severe headache that is different from past headaches. · You have severe back or belly pain. Do not wait until your blood pressure comes down on its own. Get help right away. Call your doctor now or seek immediate care if:  · Your blood pressure is much higher than normal (such as 180/120 or higher), but you don't have symptoms. · You think high blood pressure is causing symptoms, such as:  ? Severe headache.  ? Blurry vision. Watch closely for changes in your health, and be sure to contact your doctor if:  · Your blood pressure measures higher than your doctor recommends at least 2 times. That means the top number is higher or the bottom number is higher, or both. · You think you may be having side effects from your blood pressure medicine. Where can you learn more? Go to [email or web address] [email or web address]. org and sign in to your Page[employer] account. Enter Q614 in the Podio box to learn more about \"High Blood Pressure: Care Instructions. \"     If you do not have an account, please click on the \"Sign Up Now\" link. Current as of: December 16, 2019               Content Version: 12.5  © 8994-0639 Healthwise, Incorporated. Care instructions adapted under license by [facility] [email or web address] [facility] ([facility]). If you have questions about a medical condition or this instruction, always ask your healthcare professional. [place] [address] any warranty or liability for your use of this information.          Patient Education        Diabetic Neuropathy: Care Instructions  Your Care Instructions     When you have diabetes, your blood sugar level may get too high. Over time, high blood sugar levels can damage nerves. This is called diabetic neuropathy. Nerve damage can cause pain, burning, tingling, and numbness and may leave you feeling weak. The feet are often affected. When you have nerve damage in your feet, you cannot feel your feet and toes as well as normal and may not notice cuts or sores. Even a small injury can lead to a serious infection. It is very important that you follow your doctor's advice on foot care. Sometimes diabetes damages nerves that help the body function. If this happens, your blood pressure, sweating, digestion, and urination might be affected. Your doctor may give you a target blood sugar level that is higher or lower than you are used to. Try to keep your blood sugar very close to this target level to prevent more damage. Follow-up care is a key part of your treatment and safety. Be sure to make and go to all appointments, and call your doctor if you are having problems. It's also a good idea to know your test results and keep a list of the medicines you take. How can you care for yourself at home? · Take your medicines exactly as prescribed. Call your doctor if you think you are having a problem with your medicine. It is very important that you take your insulin or diabetes pills as your doctor tells you. · Try to keep blood sugar at your target level. ? Eat a variety of healthy foods, with carbohydrate spread out in your meals. A dietitian can help you plan meals. ? Try to get at least 30 minutes of exercise on most days. ? Check your blood sugar as many times each day as your doctor recommends. · Take and record your blood pressure at home if your doctor tells you to. Learn the importance of the two measures of blood pressure (such as 130 over 80, or 130/80). To take your blood pressure at home:  ? Ask your doctor to check your blood pressure monitor to be sure it is accurate and the cuff fits you.  Also ask

## 2020-09-03 ENCOUNTER — TELEPHONE (OUTPATIENT)
Dept: FAMILY MEDICINE CLINIC | Age: 55
End: 2020-09-03

## 2020-10-02 ENCOUNTER — CARE COORDINATION (OUTPATIENT)
Dept: CARE COORDINATION | Age: 55
End: 2020-10-02

## 2020-10-02 NOTE — CARE COORDINATION
Chart reviewed. Call placed twice to patient to introduce self and discussed CC program and benefits of participation. Call connected.   Briefly, ACM could hear voices in the background, then call disconnected, Introductory letter sent via [employer]

## 2020-10-02 NOTE — LETTER
10/2/2020    Héctor Freeze      Dear Mr. Dilia Garcia,    My name is Merry Hylton. I am a registered nurse who partners with [facility] to improve patients' health and I would like to offer my service to you. As a member of your health care team, I work with Billy Urbina and other providers involved in your care, offer education for your specific health conditions and connect you with additional resources as needed. The additional support I provide is no additional cost to you. My primary focus is to support you in following your treatment plan, help you achieve specific goals, and improve your health. We are committed to walk with you on this journey and look forward to working with you. Please call me to further discuss your healthcare needs. I am available by phone and look forward to speaking with you. You can reach me at [phone].     In good health,     Merry Hylton, RN  Ambulatory Care Manager  [facility]

## 2020-10-12 ENCOUNTER — TELEPHONE (OUTPATIENT)
Dept: GASTROENTEROLOGY | Age: 55
End: 2020-10-12

## 2020-10-12 NOTE — TELEPHONE ENCOUNTER
Patient left another message for a call back. Stated that he has been having stomach issues. Scheduled for 10/14/20 2 pm at the [address] [place] [address] office. Writer thanked and call ended.

## 2020-10-12 NOTE — TELEPHONE ENCOUNTER
Returning pt call to schedule appt. No answer and  LVM to call us back and schedule at earliest convenience.

## 2020-10-14 ENCOUNTER — OFFICE VISIT (OUTPATIENT)
Dept: GASTROENTEROLOGY | Age: 55
End: 2020-10-14
Payer: MEDICARE

## 2020-10-14 ENCOUNTER — TELEPHONE (OUTPATIENT)
Dept: GASTROENTEROLOGY | Age: 55
End: 2020-10-14

## 2020-10-14 VITALS
HEART RATE: 70 BPM | BODY MASS INDEX: 28.37 KG/M2 | OXYGEN SATURATION: 97 % | TEMPERATURE: 97.2 F | HEIGHT: 70 IN | DIASTOLIC BLOOD PRESSURE: 98 MMHG | WEIGHT: 198.2 LBS | SYSTOLIC BLOOD PRESSURE: 222 MMHG

## 2020-10-14 PROCEDURE — 99215 OFFICE O/P EST HI 40 MIN: CPT | Performed by: INTERNAL MEDICINE

## 2020-10-14 PROCEDURE — 3017F COLORECTAL CA SCREEN DOC REV: CPT | Performed by: INTERNAL MEDICINE

## 2020-10-14 PROCEDURE — G8427 DOCREV CUR MEDS BY ELIG CLIN: HCPCS | Performed by: INTERNAL MEDICINE

## 2020-10-14 PROCEDURE — G8484 FLU IMMUNIZE NO ADMIN: HCPCS | Performed by: INTERNAL MEDICINE

## 2020-10-14 PROCEDURE — G8419 CALC BMI OUT NRM PARAM NOF/U: HCPCS | Performed by: INTERNAL MEDICINE

## 2020-10-14 PROCEDURE — 1036F TOBACCO NON-USER: CPT | Performed by: INTERNAL MEDICINE

## 2020-10-14 ASSESSMENT — ENCOUNTER SYMPTOMS
ANAL BLEEDING: 0
BACK PAIN: 1
COUGH: 0
DIARRHEA: 1
ABDOMINAL PAIN: 1
TROUBLE SWALLOWING: 0
BLOOD IN STOOL: 0
SORE THROAT: 0
WHEEZING: 0
RECTAL PAIN: 0
NAUSEA: 0
ABDOMINAL DISTENTION: 1
SINUS PRESSURE: 0
RESPIRATORY NEGATIVE: 1
VOMITING: 1
VOICE CHANGE: 0
CONSTIPATION: 0
SINUS PAIN: 0
SHORTNESS OF BREATH: 0
CHOKING: 0

## 2020-10-14 NOTE — TELEPHONE ENCOUNTER
Spoke with Mary Ann Urban and informed him of Covid-19 testing scheduled Monday [email or web address] at [facility]. Priyanka Grew voiced understanding.

## 2020-10-14 NOTE — PROGRESS NOTES
GI OFFICE FOLLOW UP    INTERVAL HISTORY:   No referring provider defined for this encounter. Chief Complaint   Patient presents with    GI Problem     Patient is here today due to stomach issues. Patient stated that the last month he has had diarrhea and the last 2 to 3 weeks he vomits everything he eats. The vomiting happens about an hour after eat eats. No diagnosis found. HISTORY OF PRESENT ILLNESS: Aries Haddad is a 50 y.o. male with a past history remarkable for diarrhea, nausea vomiting, referred for evaluation of   Chief Complaint   Patient presents with    GI Problem     Patient is here today due to stomach issues. Patient stated that the last month he has had diarrhea and the last 2 to 3 weeks he vomits everything he eats. The vomiting happens about an hour after eat eats. .  Seen with a history of diarrhea, nausea vomiting. Patient states that in the last 3 to 4 weeks he is having intermittent nausea vomiting. Soon after eating food he gets nausea and he brings it out. No hematemesis. Emesis contains sometimes previously ingested food and most of the time bile and gastric juices. He denies dysphagia. Has a mild GERD symptoms. Fair appetite. He states that he has some weight change not able to quantify this weight change. Denies abdominal pain. He does have diarrhea. Typically has 4-5 bowel movements a day. Also had nocturnal bowel movement. No hematochezia. No history of lactose intolerance. No food allergies. Patient stated that he never had similar symptoms of diarrhea in the past.  No history of recent travel. Denies fever chills. No other family members health similar symptoms. He has significant past medical history including alcoholism. 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   At that time he stated that he has bile duct injury as well. He also had ventral hernia and had mesh placement. In July 2020 he had a colonoscopy done, and was found to have diverticulosis and a polyp in the left colon polyp right colon. He had less than adequate preparation. Histology revealed tubular adenoma. Patient has diabetes and has renal failure. He is on hemodialysis. Past Medical,Family, and Social History reviewed and does contribute to the patient presenting condition. Patient's PMH/PSH,SH,PSYCH Hx, MEDs, ALLERGIES, and ROS were all reviewed and updated in the appropriate sections. PAST MEDICAL HISTORY:  Past Medical History:   Diagnosis Date    Chronic kidney disease     left renal infarct-on dialysis Tues. , Thurs, Sat.  [facility] [address] [facility]     Diabetes mellitus ([facility] [facility]     Diverticulosis     Hyperlipidemia     Hypertension     Mild malnutrition (HCC)     MRSA (methicillin resistant staph aureus) culture positive 02/26/2019    nares    Pancreatitis     Personal history of colonic polyps 7/14/2020       Past Surgical History:   Procedure Laterality Date    AV FISTULA CREATION Left     CATHETER REMOVAL N/A 6/7/2017    CATHETER REMOVAL TUNNEL H-D  performed by Rita Dallas MD at [address]      with bile duct repair    COLONOSCOPY N/A 7/13/2020    COLONOSCOPY POLYPECTOMY COLD BIOPSY, COLD SNARE performed by Daniel Richmond MD at [address] OTHER SURGICAL HISTORY      bile duct surgery    OTHER SURGICAL HISTORY  06/07/2017    removal tunneled dialysis catheter    SEPTOPLASTY      TONSILLECTOMY         CURRENT MEDICATIONS:    Current Outpatient Medications:     zoster recombinant adjuvanted vaccine (SHINGRIX) 50 MCG/0.5ML SUSR injection, Inject 0.5 mLs into the muscle See Admin Instructions 1 dose now and repeat in 2-6 months, Disp: 0.5 mL, Rfl: 0    pregabalin (LYRICA) 50 MG capsule, Take 1 capsule by mouth every evening for 90 days. , Disp: 90 capsule, Rfl: 3    omeprazole (PRILOSEC) 10 MG delayed release capsule, Take 1 capsule by mouth daily, Disp: 90 capsule, Rfl: 2    insulin detemir (LEVEMIR) 100 UNIT/ML injection pen, Inject 5 Units into the skin nightly, Disp: , Rfl:     NIFEdipine (PROCARDIA XL) 90 MG extended release tablet, TAKE 1 TABLET BY MOUTH EVERY DAY, Disp: 30 tablet, Rfl: 5    metoprolol (LOPRESSOR) 100 MG tablet, Take 1 tablet by mouth daily TAKE 1 TABLET BY MOUTH 2 TIMES DAILY, Disp: 30 tablet, Rfl: 6    CREON 6000 units delayed release capsule, TAKE ONE CAPSULE BY MOUTH 3 TIMES A DAY WITH MEALS, Disp: , Rfl: 0    glucose monitoring kit (FREESTYLE) monitoring kit, 1 kit by Does not apply route daily, Disp: 1 kit, Rfl: 0    Misc. Devices MISC, Check blood sugar one time a day, Disp: 50 each, Rfl: 5    Insulin Pen Needle 32G X 6 MM MISC, 1 Package by Does not apply route daily, Disp: 100 each, Rfl: 3    doxercalciferol (HECTOROL) 4 MCG/2ML injection, Infuse 1.25 mLs intravenously Once in dialysis for 1 dose Given at the end of dialysis Tues, Thurs, Sat, Disp: 1.25 mL, Rfl: 0    ALLERGIES:   Allergies   Allergen Reactions    Clonidine Nausea Only     Requested by dr Ronnie Tracy to add to allergy list, pt should not be on catapress. FAMILY HISTORY: History reviewed. No pertinent family history. SOCIAL HISTORY:   Social History     Socioeconomic History    Marital status: Unknown     Spouse name: Not on file    Number of children: Not on file    Years of education: Not on file    Highest education level: Not on file   Occupational History    Not on file   Social Needs    Financial resource strain: Not on file    Food insecurity     Worry: Not on file     Inability: Not on file    Transportation needs     Medical: Not on file     Non-medical: Not on file   Tobacco Use    Smoking status: Never Smoker    Smokeless tobacco: Never Used   Substance and Sexual Activity    Alcohol use:  Yes     Alcohol/week: 0.0 standard drinks     Comment: rarely    Drug use: No    Sexual activity: Not on file   Lifestyle    Physical activity     Days per week: Not on file     Minutes per session: Not on file    Stress: Not on file   Relationships    Social connections     Talks on phone: Not on file     Gets together: Not on file     Attends Restoration service: Not on file     Active member of club or organization: Not on file     Attends meetings of clubs or organizations: Not on file     Relationship status: Not on file    Intimate partner violence     Fear of current or ex partner: Not on file     Emotionally abused: Not on file     Physically abused: Not on file     Forced sexual activity: Not on file   Other Topics Concern    Not on file   Social History Narrative    ** Merged History Encounter **              REVIEW OF SYSTEMS:         Review of Systems   Constitutional: Positive for appetite change (Decreased due to not being able to keep it down) and fatigue. Negative for unexpected weight change. HENT: Negative. Negative for postnasal drip, sinus pressure, sinus pain, sore throat, trouble swallowing and voice change. Eyes: Negative for visual disturbance (readers). Respiratory: Negative. Negative for cough, choking, shortness of breath and wheezing. Cardiovascular: Positive for leg swelling. Negative for chest pain and palpitations. Gastrointestinal: Positive for abdominal distention, abdominal pain, diarrhea and vomiting. Negative for anal bleeding, blood in stool, constipation, nausea and rectal pain. Genitourinary: Negative for difficulty urinating. Musculoskeletal: Positive for back pain. Negative for arthralgias and gait problem. Allergic/Immunologic: Negative for environmental allergies and food allergies. Neurological: Positive for dizziness. Negative for weakness, light-headedness, numbness and headaches. Hematological: Bruises/bleeds easily.    Psychiatric/Behavioral: Negative for sleep disturbance. The patient is not nervous/anxious. PHYSICAL EXAMINATION: Vital signs reviewed per the nursing documentation. Temp 97.2 °F (36.2 °C)   Wt 198 lb 3.2 oz (89.9 kg)   BMI 28.44 kg/m²   Body mass index is 28.44 kg/m². Physical Exam  Vitals signs and nursing note reviewed. Constitutional:       General: He is not in acute distress. Appearance: He is well-developed. HENT:      Head: Normocephalic and atraumatic. Mouth/Throat:      Pharynx: No oropharyngeal exudate. Eyes:      General: No scleral icterus. Conjunctiva/sclera: Conjunctivae normal.      Pupils: Pupils are equal, round, and reactive to light. Neck:      Musculoskeletal: Normal range of motion and neck supple. Thyroid: No thyromegaly. Trachea: No tracheal deviation. Cardiovascular:      Rate and Rhythm: Normal rate and regular rhythm. Heart sounds: Normal heart sounds. No murmur. Pulmonary:      Effort: Pulmonary effort is normal. No respiratory distress. Breath sounds: Normal breath sounds. No wheezing or rales. Abdominal:      General: Bowel sounds are normal. There is no distension. Palpations: Abdomen is soft. There is no hepatomegaly or mass. Tenderness: There is no abdominal tenderness. There is no guarding. Hernia: No hernia is present. Comments: No peripheral signs of ch. Liver disease    He has extensive scarring. Has ventral hernia. Genitourinary:     Rectum: Normal.   Lymphadenopathy:      Cervical: No cervical adenopathy. Skin:     General: Skin is warm and dry. Findings: No erythema or rash. Neurological:      Mental Status: He is alert and oriented to person, place, and time. Cranial Nerves: No cranial nerve deficit. Psychiatric:         Thought Content:  Thought content normal.           LABORATORY DATA: Reviewed  Lab Results   Component Value Date    WBC 5.6 03/17/2019    HGB 7.8 (A) 09/05/2020    HCT 27.5 (L) 03/17/2019    MCV 91.1 03/17/2019     03/17/2019     (H) 05/10/2019    K 5.3 07/13/2020     05/10/2019    CO2 23 05/10/2019    BUN 41 (H) 05/10/2019    CREATININE 6.44 (HH) 05/10/2019    LABPROT 7.8 01/17/2013    LABALBU 4.2 05/10/2019    BILITOT 0.47 05/10/2019    ALKPHOS 115 05/10/2019    AST 19 05/10/2019    ALT 18 05/10/2019    INR 1.4 02/26/2019         Lab Results   Component Value Date    RBC 3.02 (L) 03/17/2019    HGB 7.8 (A) 09/05/2020    MCV 91.1 03/17/2019    MCH 31.0 03/17/2019    MCHC 34.0 03/17/2019    RDW 14.9 03/17/2019    MPV 6.5 03/17/2019    BASOPCT 1 03/06/2019    LYMPHSABS 0.70 (L) 03/06/2019    MONOSABS 1.30 03/06/2019    NEUTROABS 6.40 03/06/2019    EOSABS 0.10 03/06/2019    BASOSABS 0.10 03/06/2019         DIAGNOSTIC TESTING:     No results found. Assessment  No diagnosis found. Plan  This patient had markedly elevated blood pressure. However he is asymptomatic. Advised him to see PCP today to further evaluate and manage this uncontrolled hypertension. He does have postprandial nausea vomiting. Not clear whether he has diabetic gastroparesis. Other etiologies including outlet obstruction, ulcer disease etc. need to be evaluated. He has diarrhea. Colonoscopy in July did not reveal colitis. Need to alternate pancreatic insufficiency, and other etiologies. Discussed regarding differential diagnosis including malabsorption syndromes, rule out C. difficile diarrhea. Patient is advised to see in the next 2 to 3 weeks. EGD arranged. If the symptoms get worse to contact me sooner. Discussed with him regarding EGD procedure risks and benefits. Patient understood and verbalized the consent. Thank you for allowing me to participate in the care of Mr. Katty Rojo. For any further questions please do not hesitate to contact me. I have reviewed and agree with the ROS entered by the MA/LPN.          Ajay Briones MD,FACP, [facility]  Board Certified in Gastroenterology and

## 2020-10-15 ENCOUNTER — CARE COORDINATION (OUTPATIENT)
Dept: CARE COORDINATION | Age: 55
End: 2020-10-15

## 2020-10-19 ENCOUNTER — HOSPITAL ENCOUNTER (OUTPATIENT)
Dept: PREADMISSION TESTING | Age: 55
Setting detail: SPECIMEN
Discharge: HOME | End: 2020-10-23
Payer: MEDICARE

## 2020-10-19 LAB
SARS-COV-2, RAPID: NORMAL
SARS-COV-2: NORMAL
SARS-COV-2: NOT DETECTED
SOURCE: NORMAL

## 2020-10-19 PROCEDURE — U0003 INFECTIOUS AGENT DETECTION BY NUCLEIC ACID (DNA OR RNA); SEVERE ACUTE RESPIRATORY SYNDROME CORONAVIRUS 2 (SARS-COV-2) (CORONAVIRUS DISEASE [COVID-19]), AMPLIFIED PROBE TECHNIQUE, MAKING USE OF HIGH THROUGHPUT TECHNOLOGIES AS DESCRIBED BY CMS-2020-01-R: HCPCS

## 2020-10-20 ENCOUNTER — TELEPHONE (OUTPATIENT)
Dept: PRIMARY CARE CLINIC | Age: 55
End: 2020-10-20

## 2020-10-23 ENCOUNTER — ANESTHESIA (OUTPATIENT)
Dept: ENDOSCOPY | Age: 55
End: 2020-10-23
Payer: MEDICARE

## 2020-10-23 ENCOUNTER — TELEPHONE (OUTPATIENT)
Dept: FAMILY MEDICINE CLINIC | Age: 55
End: 2020-10-23

## 2020-10-23 ENCOUNTER — ANESTHESIA EVENT (OUTPATIENT)
Dept: ENDOSCOPY | Age: 55
End: 2020-10-23
Payer: MEDICARE

## 2020-10-23 ENCOUNTER — HOSPITAL ENCOUNTER (OUTPATIENT)
Age: 55
Setting detail: OUTPATIENT SURGERY
Discharge: HOME | End: 2020-10-23
Attending: INTERNAL MEDICINE | Admitting: INTERNAL MEDICINE
Payer: MEDICARE

## 2020-10-23 VITALS
OXYGEN SATURATION: 99 % | RESPIRATION RATE: 10 BRPM | DIASTOLIC BLOOD PRESSURE: 69 MMHG | SYSTOLIC BLOOD PRESSURE: 148 MMHG

## 2020-10-23 VITALS
HEART RATE: 60 BPM | TEMPERATURE: 98 F | WEIGHT: 198.19 LBS | RESPIRATION RATE: 16 BRPM | BODY MASS INDEX: 28.37 KG/M2 | SYSTOLIC BLOOD PRESSURE: 164 MMHG | HEIGHT: 70 IN | DIASTOLIC BLOOD PRESSURE: 76 MMHG | OXYGEN SATURATION: 95 %

## 2020-10-23 LAB
GLUCOSE BLD-MCNC: 116 MG/DL (ref 75–110)
GLUCOSE BLD-MCNC: 120 MG/DL (ref 75–110)

## 2020-10-23 PROCEDURE — 3700000001 HC ADD 15 MINUTES (ANESTHESIA): Performed by: INTERNAL MEDICINE

## 2020-10-23 PROCEDURE — 3609012400 HC EGD TRANSORAL BIOPSY SINGLE/MULTIPLE: Performed by: INTERNAL MEDICINE

## 2020-10-23 PROCEDURE — 2709999900 HC NON-CHARGEABLE SUPPLY: Performed by: INTERNAL MEDICINE

## 2020-10-23 PROCEDURE — 7100000001 HC PACU RECOVERY - ADDTL 15 MIN: Performed by: INTERNAL MEDICINE

## 2020-10-23 PROCEDURE — 2500000003 HC RX 250 WO HCPCS: Performed by: NURSE ANESTHETIST, CERTIFIED REGISTERED

## 2020-10-23 PROCEDURE — 6360000002 HC RX W HCPCS: Performed by: NURSE ANESTHETIST, CERTIFIED REGISTERED

## 2020-10-23 PROCEDURE — 7100000000 HC PACU RECOVERY - FIRST 15 MIN: Performed by: INTERNAL MEDICINE

## 2020-10-23 PROCEDURE — 43239 EGD BIOPSY SINGLE/MULTIPLE: CPT | Performed by: INTERNAL MEDICINE

## 2020-10-23 PROCEDURE — 2580000003 HC RX 258: Performed by: ANESTHESIOLOGY

## 2020-10-23 PROCEDURE — 82947 ASSAY GLUCOSE BLOOD QUANT: CPT

## 2020-10-23 PROCEDURE — 88305 TISSUE EXAM BY PATHOLOGIST: CPT

## 2020-10-23 PROCEDURE — 3700000000 HC ANESTHESIA ATTENDED CARE: Performed by: INTERNAL MEDICINE

## 2020-10-23 PROCEDURE — 43450 DILATE ESOPHAGUS 1/MULT PASS: CPT | Performed by: INTERNAL MEDICINE

## 2020-10-23 RX ORDER — ONDANSETRON 2 MG/ML
4 INJECTION INTRAMUSCULAR; INTRAVENOUS
Status: DISCONTINUED | OUTPATIENT
Start: 2020-10-23 | End: 2020-10-23 | Stop reason: HOSPADM

## 2020-10-23 RX ORDER — SODIUM CHLORIDE 9 MG/ML
INJECTION, SOLUTION INTRAVENOUS CONTINUOUS
Status: DISCONTINUED | OUTPATIENT
Start: 2020-10-23 | End: 2020-10-23 | Stop reason: HOSPADM

## 2020-10-23 RX ORDER — OXYCODONE HYDROCHLORIDE AND ACETAMINOPHEN 5; 325 MG/1; MG/1
1 TABLET ORAL PRN
Status: DISCONTINUED | OUTPATIENT
Start: 2020-10-23 | End: 2020-10-23 | Stop reason: HOSPADM

## 2020-10-23 RX ORDER — LIDOCAINE HYDROCHLORIDE 10 MG/ML
1 INJECTION, SOLUTION EPIDURAL; INFILTRATION; INTRACAUDAL; PERINEURAL
Status: DISCONTINUED | OUTPATIENT
Start: 2020-10-23 | End: 2020-10-23 | Stop reason: HOSPADM

## 2020-10-23 RX ORDER — DIPHENHYDRAMINE HYDROCHLORIDE 50 MG/ML
12.5 INJECTION INTRAMUSCULAR; INTRAVENOUS
Status: DISCONTINUED | OUTPATIENT
Start: 2020-10-23 | End: 2020-10-23 | Stop reason: HOSPADM

## 2020-10-23 RX ORDER — LIDOCAINE HYDROCHLORIDE 10 MG/ML
INJECTION, SOLUTION EPIDURAL; INFILTRATION; INTRACAUDAL; PERINEURAL PRN
Status: DISCONTINUED | OUTPATIENT
Start: 2020-10-23 | End: 2020-10-23 | Stop reason: SDUPTHER

## 2020-10-23 RX ORDER — SODIUM CHLORIDE 0.9 % (FLUSH) 0.9 %
10 SYRINGE (ML) INJECTION EVERY 12 HOURS SCHEDULED
Status: DISCONTINUED | OUTPATIENT
Start: 2020-10-23 | End: 2020-10-23 | Stop reason: HOSPADM

## 2020-10-23 RX ORDER — LABETALOL HYDROCHLORIDE 5 MG/ML
5 INJECTION, SOLUTION INTRAVENOUS EVERY 10 MIN PRN
Status: DISCONTINUED | OUTPATIENT
Start: 2020-10-23 | End: 2020-10-23 | Stop reason: HOSPADM

## 2020-10-23 RX ORDER — SODIUM CHLORIDE 0.9 % (FLUSH) 0.9 %
10 SYRINGE (ML) INJECTION PRN
Status: DISCONTINUED | OUTPATIENT
Start: 2020-10-23 | End: 2020-10-23 | Stop reason: HOSPADM

## 2020-10-23 RX ORDER — OXYCODONE HYDROCHLORIDE AND ACETAMINOPHEN 5; 325 MG/1; MG/1
2 TABLET ORAL PRN
Status: DISCONTINUED | OUTPATIENT
Start: 2020-10-23 | End: 2020-10-23 | Stop reason: HOSPADM

## 2020-10-23 RX ORDER — PROPOFOL 10 MG/ML
INJECTION, EMULSION INTRAVENOUS PRN
Status: DISCONTINUED | OUTPATIENT
Start: 2020-10-23 | End: 2020-10-23 | Stop reason: SDUPTHER

## 2020-10-23 RX ADMIN — SODIUM CHLORIDE: 9 INJECTION, SOLUTION INTRAVENOUS at 09:50

## 2020-10-23 RX ADMIN — LIDOCAINE HYDROCHLORIDE 50 MG: 10 INJECTION, SOLUTION EPIDURAL; INFILTRATION; INTRACAUDAL; PERINEURAL at 10:50

## 2020-10-23 RX ADMIN — PROPOFOL 150 MG: 10 INJECTION, EMULSION INTRAVENOUS at 10:50

## 2020-10-23 ASSESSMENT — PULMONARY FUNCTION TESTS
PIF_VALUE: 1

## 2020-10-23 ASSESSMENT — PAIN - FUNCTIONAL ASSESSMENT: PAIN_FUNCTIONAL_ASSESSMENT: 0-10

## 2020-10-23 ASSESSMENT — PAIN SCALES - GENERAL
PAINLEVEL_OUTOF10: 0

## 2020-10-23 NOTE — OP NOTE
ESOPHAGOGASTRODUODENOSCOPY   ( EGD )  DATE OF PROCEDURE: 10/23/2020     SURGEON: Renny Brooks MD    ASSISTANT: None    PREOPERATIVE DIAGNOSIS: Patient has persistent nausea vomiting in the postprandial state. Also there is a question whether this patient has esophageal abnormalities. Procedure performed to evaluate upper GI lesions    POSTOPERATIVE DIAGNOSIS: No esophageal pathology seen. Has a gastritis. OPERATION: Upper GI endoscopy with Biopsy, Mcdonald dilation of esophagus. ANESTHESIA: MAC    ESTIMATED BLOOD LOSS: None    COMPLICATIONS: None. SPECIMENS:  Was Obtained: Random biopsies from the body and antrum to evaluate H. pylori. Biopsies from the esophagus to rule out microscopic esophagitis    HISTORY: The patient is a 50y.o. year old male with history of above preop diagnosis. I recommended esophagogastroduodenoscopy with possible biopsy and I explained the risk, benefits, expected outcome, and alternatives to the procedure. Risks included but are not limited to bleeding, infection, respiratory distress, hypotension, and perforation of the esophagus, stomach, or duodenum. Patient understands and is in agreement. PROCEDURE: The patient was given IV conscious sedation. The patient's SPO2 remained above 90% throughout the procedure. Cetacaine spray given. Patient placed in left lateral position. Olympus  videogastroscope was inserted orally under vision into the esophagus without difficulty and advanced into the stomach then through the pylorus up to the second part of duodenum. Findings:    Retropharyngeal area was grossly normal appearing    Esophagus: normal.  No strictures seen. No signs of peptic esophagitis or Sherman's mucosa seen. No hiatal hernia. Squamocolumnar junction is at about 39 cm and lower esophageal sphincter opens normally      Stomach:    Fundus and Cardia Examined in Retroflexed View: normal    Body: Has signs of gastritis.     Antrum: Has antral inflammation. Multiple biopsies taken to evaluate gastritis and H. pylori. Duodenum:     Descending: normal    Bulb: normal      As there is question whether this patient has swallowing issues of solid food, I did empirically dilate the esophagus using 54 [place]onian Mcdonald dilator. The dilation was carried out in the usual fashion and following the dilatation I did check the esophagus with the gastroscope. No mucosal stretch marks seen. Following the dilatation I did take multiple biopsies from the esophagus evaluate microscopic esophagitis        While withdrawing the scope the above findings were verified and the scope was removed. The patient has tolerated the procedure without unusual events. Recommendations/Plan:   1. F/U Biopsies  2. F/U In Office as instructed  3.  Discussed with the family                   Electronically signed by Sulaiman Hilario MD  on 10/23/2020 at 10:58 AM

## 2020-10-23 NOTE — H&P
History     Socioeconomic History    Marital status: Unknown     Spouse name: Not on file    Number of children: Not on file    Years of education: Not on file    Highest education level: Not on file   Occupational History    Not on file   Social Needs    Financial resource strain: Not on file    Food insecurity     Worry: Not on file     Inability: Not on file    Transportation needs     Medical: Not on file     Non-medical: Not on file   Tobacco Use    Smoking status: Never Smoker    Smokeless tobacco: Never Used   Substance and Sexual Activity    Alcohol use: Yes     Alcohol/week: 0.0 standard drinks     Comment: rarely    Drug use: No    Sexual activity: Not on file   Lifestyle    Physical activity     Days per week: Not on file     Minutes per session: Not on file    Stress: Not on file   Relationships    Social connections     Talks on phone: Not on file     Gets together: Not on file     Attends [employer] service: Not on file     Active member of club or organization: Not on file     Attends meetings of clubs or organizations: Not on file     Relationship status: Not on file    Intimate partner violence     Fear of current or ex partner: Not on file     Emotionally abused: Not on file     Physically abused: Not on file     Forced sexual activity: Not on file   Other Topics Concern    Not on file   Social History Narrative    ** Merged History Encounter **              REVIEW OF SYSTEMS      Allergies   Allergen Reactions    Clonidine Nausea Only     Requested by dr Yonathan Garcia to add to allergy list, pt should not be on catapress. No current facility-administered medications on file prior to encounter.       Current Outpatient Medications on File Prior to Encounter   Medication Sig Dispense Refill    zoster recombinant adjuvanted vaccine [facility]) 50 MCG/0.5ML SUSR injection Inject 0.5 mLs into the muscle See Admin Instructions 1 dose now and repeat in 2-6 months 0.5 mL 0    pregabalin (LYRICA) 50 MG capsule Take 1 capsule by mouth every evening for 90 days. 90 capsule 3    omeprazole (PRILOSEC) 10 MG delayed release capsule Take 1 capsule by mouth daily 90 capsule 2    insulin detemir (LEVEMIR) 100 UNIT/ML injection pen Inject 5 Units into the skin nightly      NIFEdipine (PROCARDIA XL) 90 MG extended release tablet TAKE 1 TABLET BY MOUTH EVERY DAY 30 tablet 5    metoprolol (LOPRESSOR) 100 MG tablet Take 1 tablet by mouth daily TAKE 1 TABLET BY MOUTH 2 TIMES DAILY 30 tablet 6    doxercalciferol (HECTOROL) 4 MCG/2ML injection Infuse 1.25 mLs intravenously Once in dialysis for 1 dose Given at the end of dialysis Tues, Thurs, Sat 1.25 mL 0    CREON 6000 units delayed release capsule TAKE ONE CAPSULE BY MOUTH 3 TIMES A DAY WITH MEALS  0    glucose monitoring kit (FREESTYLE) monitoring kit 1 kit by Does not apply route daily 1 kit 0    Misc. Devices MISC Check blood sugar one time a day 50 each 5    Insulin Pen Needle 32G X 6 MM MISC 1 Package by Does not apply route daily 100 each 3       Negative except for what is mentioned in the HPI. GENERAL PHYSICAL EXAM     Vitals: Review vitals per RN flowsheet. GENERAL APPEARANCE:   Elvia Armstrong is 50 y.o.,  male, nourished, conscious, alert. Does not appear to be in distress or pain at this time. SKIN:  Warm, dry, no cyanosis or jaundice. HEAD:  Normocephalic, atraumatic. EYES:  Pupils equal, reactive to light. EARS:  No discharge, no marked hearing loss. NOSE:  No rhinorrhea, epistaxis or septal deformity. THROAT:  Not congested. No ulceration bleeding or discharge. NECK:  No stiffness, trachea central.  No palpable masses or L.N.                 CHEST:  Symmetrical and equal on expansion. HEART:  RRR S1 > S2. No audible murmurs or gallops.                  LUNGS:  Equal on expansion, normal breath sounds. No wheezing, rhonchi or rales. ABDOMEN:  Soft on palpation. No localized tenderness. No guarding or rigidity. LYMPHATICS:  No palpable cervical lymphadenopathy. LOCOMOTOR, BACK AND SPINE:  No tenderness or deformities. EXTREMITIES:  BLE 2+ pitting edema. No calf tenderness. No discoloration or ulcerations. NEUROLOGIC:  The patient is conscious, alert, oriented. No facial drooping. Speech clear. No apparent focal sensory or motor deficits.              PROVISIONAL DIAGNOSES / SURGERY:      NAUSEA/VOMITING    EGD ESOPHAGOGASTRODUODENOSCOPY    Patient Active Problem List    Diagnosis Date Noted    Mixed hyperlipidemia 07/17/2020    Vitamin D deficiency 07/17/2020    Chronic fatigue 07/17/2020    Personal history of colonic polyps 07/14/2020    MRSA (methicillin resistant Staphylococcus aureus) 07/14/2020    Polyp of ascending colon     Thrombocytopenia ([address] 11/14/2019    Obstructive sleep apnea syndrome 11/11/2019    Essential hypertension 03/09/2019    Atrial fibrillation with controlled ventricular response (HCC)     Acute respiratory failure (HCC)     Type 2 diabetes mellitus with chronic kidney disease on chronic dialysis, with long-term current use of insulin ([address]     Hypertensive encephalopathy 02/26/2019    ESRD (end stage renal disease) on dialysis ([address] 08/07/2017    Left inguinal hernia 07/29/2017    Hypertensive heart and renal disease with CHF and ESRD ([address] 11/11/2016    Duodenal mass 12/11/2015    Alcohol-induced chronic pancreatitis ([address] 08/05/2015    Noncompliance with medications 08/05/2015    Incisional hernia 08/04/2015    Pyogenic granuloma 01/17/2015           HEATHER Ruggiero CNP on 10/23/2020 at 9:10 AM

## 2020-10-23 NOTE — ANESTHESIA POSTPROCEDURE EVALUATION
Department of Anesthesiology  Postprocedure Note    Patient: Gregory Kiser  MRN: [id number]  YOB: 1965  Date of evaluation: 10/23/2020  Time:  11:35 AM     Procedure Summary     Date:  10/23/20 Room / Location:  [facility] ENDO 04 / [facility] ENDO    Anesthesia Start:  8294 Anesthesia Stop:  1104    Procedure:  EGD BIOPSY AND DILATION (N/A Esophagus) Diagnosis:       (NAUSEA/VOMITING)      (*PAT ON ADMIT)    Surgeon:  Elle Manzo MD Responsible Provider:  Marcial Mejia MD    Anesthesia Type:  MAC ASA Status:  4          Anesthesia Type: MAC    Delfin Phase I: Delfin Score: 8    Delfin Phase II:      Last vitals: Reviewed and per EMR flowsheets.        Anesthesia Post Evaluation    Comments: POST- ANESTHESIA EVALUATION       Pt Name: Gregory Kiser  MRN: [id number]  YOB: 1965  Date of evaluation: 10/23/2020  Time:  11:35 AM      BP (!) 140/60   Pulse 58   Temp 98 °F (36.7 °C) (Infrared)   Resp 9   Ht 5' 10\" (1.778 m)   Wt 198 lb 3 oz (89.9 kg)   SpO2 100%   BMI 28.44 kg/m²      Consciousness Level  Awake  Cardiopulmonary Status  Stable  Pain Adequately Treated YES  Nausea / Vomiting  NO  Adequate Hydration  YES  Anesthesia Related Complications NONE      Electronically signed by Marcial Mejia MD on 10/23/2020 at 11:35 AM

## 2020-10-23 NOTE — ANESTHESIA PRE PROCEDURE
Department of Anesthesiology  Preprocedure Note       Name:  Elvia Armstrong   Age:  50 y.o.  :  1965                                          MRN:  [id number]         Date:  10/23/2020      Surgeon: Rosemarie Perry):  Sulaiman Hilario MD    Procedure: Procedure(s):  COLORECTAL CANCER SCREENING, NOT HIGH RISK    Medications prior to admission:   Prior to Admission medications    Medication Sig Start Date End Date Taking? Authorizing Provider   zoster recombinant adjuvanted vaccine [facility]) 50 MCG/0.5ML SUSR injection Inject 0.5 mLs into the muscle See Admin Instructions 1 dose now and repeat in 2-6 months 20  HEATHER Elizondo CNP   pregabalin (LYRICA) 50 MG capsule Take 1 capsule by mouth every evening for 90 days. 7/30/20 10/28/20  HEATHER Elizondo CNP   omeprazole (PRILOSEC) 10 MG delayed release capsule Take 1 capsule by mouth daily 20   HEATHER Elizondo CNP   insulin detemir (LEVEMIR) 100 UNIT/ML injection pen Inject 5 Units into the skin nightly 17   Historical Provider, MD   NIFEdipine (PROCARDIA XL) 90 MG extended release tablet TAKE 1 TABLET BY MOUTH EVERY DAY 20   Azucena Shelton MD   metoprolol (LOPRESSOR) 100 MG tablet Take 1 tablet by mouth daily TAKE 1 TABLET BY MOUTH 2 TIMES DAILY 19   Azucena Shelton MD   doxercalciferol (HECTOROL) 4 MCG/2ML injection Infuse 1.25 mLs intravenously Once in dialysis for 1 dose Given at the end of dialysis David Free, Sat 3/19/19 4/22/19  Thom Gaspar MD   CREON 6000 units delayed release capsule TAKE ONE CAPSULE BY MOUTH 3 TIMES A DAY WITH MEALS 18   Historical Provider, MD   glucose monitoring kit (FREESTYLE) monitoring kit 1 kit by Does not apply route daily 17   Destinyyoseph Murrell   Misc.  Devices MISC Check blood sugar one time a day 17   Destiny Amen   Insulin Pen Needle 32G X 6 MM MISC 1 Package by Does not apply route daily 17   Destiny Amen       Current medications:    No current facility-administered medications for this visit. No current outpatient medications on file. Facility-Administered Medications Ordered in Other Visits   Medication Dose Route Frequency Provider Last Rate Last Dose    sodium chloride flush 0.9 % injection 10 mL  10 mL Intravenous 2 times per day German Laurent MD        sodium chloride flush 0.9 % injection 10 mL  10 mL Intravenous PRN Angelina Loyd MD        lidocaine PF 1 % injection 1 mL  1 mL Intradermal Once PRN Angelina Loyd MD        0.9 % sodium chloride infusion   Intravenous Continuous German Laurent MD           Allergies: Allergies   Allergen Reactions    Clonidine Nausea Only     Requested by dr Daniel Romeo to add to allergy list, pt should not be on catapress. Problem List:    Patient Active Problem List   Diagnosis Code    Pyogenic granuloma L98.0    Hypertensive heart and renal disease with CHF and ESRD ([address] I13.2, N18.6    ESRD (end stage renal disease) on dialysis ([address] N18.6, Z99.2    Hypertensive encephalopathy I67.4    Acute respiratory failure ([address] J96.00    Type 2 diabetes mellitus with chronic kidney disease on chronic dialysis, with long-term current use of insulin (HCC) E11.22, N18.6, Z99.2, Z79.4    Atrial fibrillation with controlled ventricular response ([address] I48.91    Essential hypertension I10    Polyp of ascending colon K63.5    Personal history of colonic polyps Z86.010    Alcohol-induced chronic pancreatitis ([address] K86.0    Duodenal mass K31.89    Incisional hernia K43.2    Left inguinal hernia K40.90    MRSA (methicillin resistant Staphylococcus aureus) A49.02    Noncompliance with medications Z91.14    Obstructive sleep apnea syndrome G47.33    Thrombocytopenia (HCC) D69.6    Mixed hyperlipidemia E78.2    Vitamin D deficiency E55.9    Chronic fatigue R53.82       Past Medical History:        Diagnosis Date    Chronic kidney disease     left renal infarct-on dialysis Tues. , Thurs, Sat.  Chronic pancreatitis ([facility]     Diabetes mellitus ([facility]     Diverticulosis     Hyperlipidemia     Hypertension     Mild malnutrition (HCC)     MRSA (methicillin resistant staph aureus) culture positive 02/26/2019    nares    Pancreatitis     Personal history of colonic polyps 7/14/2020       Past Surgical History:        Procedure Laterality Date    AV FISTULA CREATION Left     CATHETER REMOVAL N/A 6/7/2017    CATHETER REMOVAL TUNNEL H-D  performed by Dirk Delong MD at [facility]      with bile duct repair    COLONOSCOPY N/A 7/13/2020    COLONOSCOPY POLYPECTOMY COLD BIOPSY, COLD SNARE performed by Rock Medina MD at [address] OTHER SURGICAL HISTORY      bile duct surgery    OTHER SURGICAL HISTORY  06/07/2017    removal tunneled dialysis catheter    SEPTOPLASTY      TONSILLECTOMY         Social History:    Social History     Tobacco Use    Smoking status: Never Smoker    Smokeless tobacco: Never Used   Substance Use Topics    Alcohol use: Yes     Alcohol/week: 0.0 standard drinks     Comment: rarely                                Counseling given: Not Answered      Vital Signs (Current): There were no vitals filed for this visit.                                            BP Readings from Last 3 Encounters:   10/14/20 (!) 222/98   07/30/20 138/72   07/13/20 (!) 119/55       NPO Status:                                                                                 BMI:   Wt Readings from Last 3 Encounters:   10/14/20 198 lb 3.2 oz (89.9 kg)   07/30/20 192 lb (87.1 kg)   07/13/20 180 lb (81.6 kg)     There is no height or weight on file to calculate BMI.    CBC:   Lab Results   Component Value Date    WBC 5.6 03/17/2019    RBC 3.02 03/17/2019    RBC 4.13 11/12/2011    HGB 7.8 09/05/2020    HCT 27.5 03/17/2019    MCV 91.1 03/17/2019    RDW 14.9 03/17/2019     03/17/2019     11/12/2011       CMP:   Lab Results Component Value Date     05/10/2019    K 5.3 07/13/2020     05/10/2019    CO2 23 05/10/2019    BUN 41 05/10/2019    CREATININE 6.44 05/10/2019    GFRAA 11 05/10/2019    LABGLOM 9 05/10/2019    GLUCOSE 215 05/10/2019    GLUCOSE 182 11/12/2011    PROT 6.9 05/10/2019    CALCIUM 8.4 05/10/2019    BILITOT 0.47 05/10/2019    ALKPHOS 115 05/10/2019    AST 19 05/10/2019    ALT 18 05/10/2019       POC Tests: No results for input(s): POCGLU, POCNA, POCK, POCCL, POCBUN, POCHEMO, POCHCT in the last 72 hours. Coags:   Lab Results   Component Value Date    PROTIME 14.4 02/26/2019    PROTIME 12.5 10/10/2011    INR 1.4 02/26/2019    APTT 23.4 02/26/2019       HCG (If Applicable): No results found for: PREGTESTUR, PREGSERUM, HCG, HCGQUANT     ABGs: No results found for: PHART, PO2ART, RGC0SOU, NZK7LZK, BEART, T3XWZHVM     Type & Screen (If Applicable):  No results found for: LABABO, LABRH    Drug/Infectious Status (If Applicable):  No results found for: HIV, HEPCAB    COVID-19 Screening (If Applicable):   Lab Results   Component Value Date    COVID19 Not Detected 10/19/2020    COVID19 Not Detected 07/08/2020         Anesthesia Evaluation  Patient summary reviewed and Nursing notes reviewed no history of anesthetic complications:   Airway: Mallampati: II  TM distance: >3 FB   Neck ROM: full  Mouth opening: > = 3 FB Dental: normal exam         Pulmonary:normal exam  breath sounds clear to auscultation  (+) sleep apnea:                             Cardiovascular:    (+) hypertension:, dysrhythmias: atrial fibrillation, CHF:, hyperlipidemia      ECG reviewed  Rhythm: regular  Rate: normal  Echocardiogram reviewed         Beta Blocker:  Dose within 24 Hrs      ROS comment: Left ventricle is normal in size Global left ventricular systolic function is normal Estimated ejection fraction is 55-60 % .      Neuro/Psych:   (+) neuromuscular disease:,              ROS comment: H/o Vertigo GI/Hepatic/Renal:   (+) renal disease (HD - T, Th, Sat; last HD 10/22/20): ESRD and dialysis,          ROS comment: Diverticulosis   Chronic Pancreatitis. Endo/Other:    (+) Diabetes (FBS - 176)Type II DM, , .                 Abdominal:           Vascular: negative vascular ROS. Anesthesia Plan      MAC     ASA 4       Induction: intravenous. MIPS: Prophylactic antiemetics administered. Anesthetic plan and risks discussed with patient. Plan discussed with CRNA. Poor IV access!       Tati Miller MD   10/23/2020

## 2020-10-26 LAB — SURGICAL PATHOLOGY REPORT: NORMAL

## 2020-10-27 ENCOUNTER — CARE COORDINATION (OUTPATIENT)
Dept: CARE COORDINATION | Age: 55
End: 2020-10-27

## 2020-10-27 SDOH — ECONOMIC STABILITY: FOOD INSECURITY: WITHIN THE PAST 12 MONTHS, YOU WORRIED THAT YOUR FOOD WOULD RUN OUT BEFORE YOU GOT MONEY TO BUY MORE.: NEVER TRUE

## 2020-10-27 SDOH — SOCIAL STABILITY: SOCIAL NETWORK: HOW OFTEN DO YOU GET TOGETHER WITH FRIENDS OR RELATIVES?: MORE THAN THREE TIMES A WEEK

## 2020-10-27 SDOH — SOCIAL STABILITY: SOCIAL NETWORK: ARE YOU MARRIED, WIDOWED, DIVORCED, SEPARATED, NEVER MARRIED, OR LIVING WITH A PARTNER?: NEVER MARRIED

## 2020-10-27 SDOH — HEALTH STABILITY: MENTAL HEALTH: HOW OFTEN DO YOU HAVE A DRINK CONTAINING ALCOHOL?: MONTHLY OR LESS

## 2020-10-27 SDOH — HEALTH STABILITY: PHYSICAL HEALTH: ON AVERAGE, HOW MANY DAYS PER WEEK DO YOU ENGAGE IN MODERATE TO STRENUOUS EXERCISE (LIKE A BRISK WALK)?: 7 DAYS

## 2020-10-27 SDOH — ECONOMIC STABILITY: TRANSPORTATION INSECURITY
IN THE PAST 12 MONTHS, HAS LACK OF TRANSPORTATION KEPT YOU FROM MEETINGS, WORK, OR FROM GETTING THINGS NEEDED FOR DAILY LIVING?: NO

## 2020-10-27 SDOH — ECONOMIC STABILITY: FOOD INSECURITY: WITHIN THE PAST 12 MONTHS, THE FOOD YOU BOUGHT JUST DIDN'T LAST AND YOU DIDN'T HAVE MONEY TO GET MORE.: NEVER TRUE

## 2020-10-27 SDOH — ECONOMIC STABILITY: TRANSPORTATION INSECURITY
IN THE PAST 12 MONTHS, HAS THE LACK OF TRANSPORTATION KEPT YOU FROM MEDICAL APPOINTMENTS OR FROM GETTING MEDICATIONS?: NO

## 2020-10-27 SDOH — HEALTH STABILITY: PHYSICAL HEALTH: ON AVERAGE, HOW MANY MINUTES DO YOU ENGAGE IN EXERCISE AT THIS LEVEL?: 40 MIN

## 2020-10-27 SDOH — SOCIAL STABILITY: SOCIAL NETWORK
DO YOU BELONG TO ANY CLUBS OR ORGANIZATIONS SUCH AS CHURCH GROUPS UNIONS, FRATERNAL OR ATHLETIC GROUPS, OR SCHOOL GROUPS?: YES

## 2020-10-27 SDOH — SOCIAL STABILITY: SOCIAL NETWORK
IN A TYPICAL WEEK, HOW MANY TIMES DO YOU TALK ON THE PHONE WITH FAMILY, FRIENDS, OR NEIGHBORS?: MORE THAN THREE TIMES A WEEK

## 2020-10-27 SDOH — ECONOMIC STABILITY: INCOME INSECURITY: HOW HARD IS IT FOR YOU TO PAY FOR THE VERY BASICS LIKE FOOD, HOUSING, MEDICAL CARE, AND HEATING?: NOT HARD AT ALL

## 2020-10-27 SDOH — HEALTH STABILITY: MENTAL HEALTH: HOW MANY STANDARD DRINKS CONTAINING ALCOHOL DO YOU HAVE ON A TYPICAL DAY?: 1 OR 2

## 2020-10-27 SDOH — SOCIAL STABILITY: SOCIAL NETWORK: HOW OFTEN DO YOU ATTENT MEETINGS OF THE CLUB OR ORGANIZATION YOU BELONG TO?: 1 TO 4 TIMES PER YEAR

## 2020-10-27 SDOH — HEALTH STABILITY: MENTAL HEALTH
STRESS IS WHEN SOMEONE FEELS TENSE, NERVOUS, ANXIOUS, OR CAN'T SLEEP AT NIGHT BECAUSE THEIR MIND IS TROUBLED. HOW STRESSED ARE YOU?: NOT AT ALL

## 2020-10-27 SDOH — SOCIAL STABILITY: SOCIAL NETWORK: HOW OFTEN DO YOU ATTEND CHURCH OR RELIGIOUS SERVICES?: NEVER

## 2020-10-27 NOTE — CARE COORDINATION
Ambulatory Care Coordination Note  10/27/2020  CM Risk Score: 6  Charlson 10 Year Mortality Risk Score: 100%     ACC: Angeli Treviño RN    Date Care Coordination Episode Started:  15 October 2020    Reason for Call Today:     Database completion  Follow up assessment    Reason patient is in Care Coordination:     1. CHF        - Intermittent edema BLE,  fatigue  2. Diabetes         - Patient reports last A1C was 6.0%  3. ESRD  4. RAEV 93%    Topics Discussed Today:      1) Database  2) Assessments    Interventions completed today:    1. Update PCP    Assessments completed today:     · Fall risk  · Medication reconciliation  · SDOH  · CHF  iDIabetes    Care Coordinator plan of care:     1) Zone Management        CHF - GREEN Zone        DM - GREEN Zone  2) Follow up on Thursday, 05 November  3) Review ACP during next encounter    Congestive Heart Failure Assessment    Are you currently restricting fluids?:  Other (Comment: 1500ml/day)  Do you understand a low sodium diet?:  Yes  Do you understand how to read food labels?:  Yes  How many restaurant meals do you eat per week?:  0  Do you salt your food before tasting it?:  No         Symptoms:   CHF associated fatigue: Pos, CHF associated leg swelling: Pos      Symptom course:  stable  Patient-reported weight (lb):  191  Weight trend:  fluctuating minimally       Diabetes Assessment    Medic Alert ID:  No  Meal Planning:  None   How often do you test your blood sugar?:  No Testing (Comment: Three times per week at dialysis)   Do you have barriers with adherence to non-pharmacologic self-management interventions?  (Nutrition/Exercise/Self-Monitoring):  No   Have you ever had to go to the ED for symptoms of low blood sugar?:  No       Do you have hypoglycemia symptoms?:  Yes   Frequency of Episodes:  1 Per:  Week   Last Blood Sugar Value:  120        Wt Readings from Last 3 Encounters:   10/23/20 198 lb 3 oz (89.9 kg)   10/14/20 198 lb 3.2 oz (89.9 kg)   07/30/20 192 lb (87.1 kg)     BP Readings from Last 3 Encounters:   10/23/20 (!) 164/76   10/23/20 (!) 148/69   10/14/20 (!) 222/98     Ambulatory Care Coordination Assessment    Care Coordination Protocol  Program Enrollment:  Complex Care  Referral from Primary Care Provider:  No  Week 1 - Initial Assessment     Do you have all of your prescriptions and are they filled?:  Yes (Comment: hasn't picked up all meds from pharmacy)  Barriers to medication adherence:  None  Are you able to afford your medications?:  Yes  How often do you have trouble taking your medications the way you have been told to take them?:  I always take them as prescribed. Do you have Home O2 Therapy?:  No      Ability to seek help/take action for Emergent Urgent situations i.e. fire, crime, inclement weather or health crisis. :  Independent  Ability to ambulate to restroom:  Independent  Ability handle personal hygeine needs (bathing/dressing/grooming): Independent  Ability to manage Medications: Independent  Ability to prepare Food Preparation:  Independent  Ability to maintain home (clean home, laundry): Independent  Ability to drive and/or has transportation:  Independent  Ability to do shopping:  Independent  Ability to manage finances: Independent  Is patient able to live independently?:  Yes     Current Housing:  Private Residence        Per the Fall Risk Screening, did the patient have 2 or more falls or 1 fall with injury in the past year?:  Yes  How often do you think you are about to fall and you do NOT fall?  For example, you grab something to stabilize yourself or hold onto a wall/furniture?:  Frequently  Use of a Mobility Aid:  No  Difficulty walking/impaired gait:  No  Issues with feet or shoes like numbness, edema, shoes not fitting:  No  Changes in vision, poor vision or poor lighting in environment:  No  Dizziness:  Yes  Other Fall Risk:  No     Frequent urination at night?:  No  Do you use rails/bars?:  No  Do you have a non-slip tub mat?:  No     Are you experiencing loss of meaning?:  No  Are you experiencing loss of hope and peace?:  No     Suggested Interventions and Community Resources   Other Services:  Completed   Zone Management Tools:  Completed                  Prior to Admission medications    Medication Sig Start Date End Date Taking? Authorizing Provider   pregabalin (LYRICA) 50 MG capsule Take 1 capsule by mouth every evening for 90 days. 7/30/20 10/28/20 Yes HEATHER Elizondo CNP   omeprazole (PRILOSEC) 10 MG delayed release capsule Take 1 capsule by mouth daily  Patient taking differently: Take 10 mg by mouth daily 2 capsules twice daily 7/30/20  Yes HEATHER Elizondo CNP   insulin detemir (LEVEMIR) 100 UNIT/ML injection pen Inject 5 Units into the skin nightly 6/23/17  Yes Historical Provider, MD   NIFEdipine (PROCARDIA XL) 90 MG extended release tablet TAKE 1 TABLET BY MOUTH EVERY DAY 6/5/20  Yes Chiqui Pérez MD   metoprolol (LOPRESSOR) 100 MG tablet Take 1 tablet by mouth daily TAKE 1 TABLET BY MOUTH 2 TIMES DAILY 9/17/19  Yes Chiqui Pérez MD   CREON 6000 units delayed release capsule TAKE ONE CAPSULE BY MOUTH 3 TIMES A DAY WITH MEALS 6/5/18  Yes Historical Provider, MD   glucose monitoring kit (FREESTYLE) monitoring kit 1 kit by Does not apply route daily 8/7/17  Yes Soila Hendrickson   zoster recombinant adjuvanted vaccine [facility]) 50 MCG/0.5ML SUSR injection Inject 0.5 mLs into the muscle See Admin Instructions 1 dose now and repeat in 2-6 months 7/30/20 1/26/21  HEATHER Elizondo CNP   doxercalciferol (HECTOROL) 4 MCG/2ML injection Infuse 1.25 mLs intravenously Once in dialysis for 1 dose Given at the end of dialysis Ricardo Abad, Sat 3/19/19 10/23/20  Christos Mata MD   Misc.  Devices MISC Check blood sugar one time a day 8/7/17   Jamila Inches   Insulin Pen Needle 32G X 6 MM MISC 1 Package by Does not apply route daily 6/23/17   Jamila Inches       Future Appointments   Date Time Provider Britney Quigley   11/2/2020  4:00 PM [facility]   11/6/2020  1:30 PM [facility]

## 2020-10-28 ENCOUNTER — TELEPHONE (OUTPATIENT)
Dept: GASTROENTEROLOGY | Age: 55
End: 2020-10-28

## 2020-10-28 NOTE — TELEPHONE ENCOUNTER
Patients Haseeb Craig called as he is miserable with burning in his stomach. EGD was done. He is on Omeprazole 20 mg BID and not helping and Pepto Bismol is not helping.    Antony Alberto is there anything we can give his for some relief before his appointment on 11/02/2020

## 2020-10-29 ENCOUNTER — CARE COORDINATION (OUTPATIENT)
Dept: CARE COORDINATION | Age: 55
End: 2020-10-29

## 2020-10-30 RX ORDER — OMEPRAZOLE 20 MG/1
20 CAPSULE, DELAYED RELEASE ORAL 2 TIMES DAILY
Qty: 60 CAPSULE | Refills: 2 | Status: SHIPPED | OUTPATIENT
Start: 2020-10-30 | End: 2020-12-02

## 2020-10-30 NOTE — TELEPHONE ENCOUNTER
Per Etta Lunsford Omeprazole 20 mg BID and to avoid triggers like Caffeine, smoking, Alcohol, lying down after eating. When I spoke with Leidy Stager patient vonnie admitted at [facility] last night a the patient was so very sick and in pain. Writer will let the Etta Lunsford know he is at [facility] right now.

## 2020-11-05 PROBLEM — K56.7 ILEUS (HCC): Status: ACTIVE | Noted: 2020-10-30

## 2020-11-05 PROBLEM — K21.9 GASTROESOPHAGEAL REFLUX DISEASE WITHOUT ESOPHAGITIS: Status: ACTIVE | Noted: 2019-11-11

## 2020-11-05 PROBLEM — Z99.2 STAGE 5 CHRONIC KIDNEY DISEASE ON CHRONIC DIALYSIS (HCC): Status: ACTIVE | Noted: 2020-08-23

## 2020-11-05 PROBLEM — I50.32 CHRONIC DIASTOLIC CONGESTIVE HEART FAILURE (HCC): Status: ACTIVE | Noted: 2020-08-25

## 2020-11-05 PROBLEM — N18.6 STAGE 5 CHRONIC KIDNEY DISEASE ON CHRONIC DIALYSIS (HCC): Status: ACTIVE | Noted: 2020-08-23

## 2020-11-05 PROBLEM — D63.8 ANEMIA OF CHRONIC DISEASE: Status: ACTIVE | Noted: 2020-08-23

## 2020-11-11 ENCOUNTER — TELEPHONE (OUTPATIENT)
Dept: FAMILY MEDICINE CLINIC | Age: 55
End: 2020-11-11

## 2020-11-11 NOTE — TELEPHONE ENCOUNTER
Melisa Printers from [address] [place] [address] would like to know if Dr. Brennan Felty would follow his home health care? He was discharged from [facility] 11/10/2020.  Phone [phone]

## 2020-11-19 ENCOUNTER — TELEPHONE (OUTPATIENT)
Dept: FAMILY MEDICINE CLINIC | Age: 55
End: 2020-11-19

## 2020-11-19 NOTE — TELEPHONE ENCOUNTER
Patient's home care provider called to see if we had any update on why patient has been cancelling his appointments. She just wanted to make note that she called.

## 2020-11-20 ENCOUNTER — CARE COORDINATION (OUTPATIENT)
Dept: CARE COORDINATION | Age: 55
End: 2020-11-20

## 2020-11-20 NOTE — CARE COORDINATION
Follow up CC call attempted. Patient was not available. Message left requesting return call. Call back info provided. Will attempt follow up with patient tomorrow morning while in dialysis if no response received.

## 2020-11-21 ENCOUNTER — CARE COORDINATION (OUTPATIENT)
Dept: CARE COORDINATION | Age: 55
End: 2020-11-21

## 2020-11-21 NOTE — CARE COORDINATION
Second attempt at [facility] follow up call attempted. Patient was not available. Voicemail received. Message left requesting return call    Will attempt follow up with patient Tuesday, 24 November  if no response received.

## 2020-11-23 ENCOUNTER — TELEPHONE (OUTPATIENT)
Dept: FAMILY MEDICINE CLINIC | Age: 55
End: 2020-11-23

## 2020-11-24 ENCOUNTER — CARE COORDINATION (OUTPATIENT)
Dept: CARE COORDINATION | Age: 55
End: 2020-11-24

## 2020-11-24 NOTE — CARE COORDINATION
Second attempt at [facility] follow up call attempted. Patient was not available. Voicemail received. Message left requesting return call    Will attempt follow up with patient in one within one week if no response received.

## 2020-12-01 ENCOUNTER — CARE COORDINATION (OUTPATIENT)
Dept: CARE COORDINATION | Age: 55
End: 2020-12-01

## 2020-12-01 NOTE — CARE COORDINATION
Third attempt at [facility] follow up call. Patient was not available. Voicemail received. Message left requesting return call. Will follow up with patient within 1 week if no response.

## 2020-12-02 ENCOUNTER — TELEPHONE (OUTPATIENT)
Dept: FAMILY MEDICINE CLINIC | Age: 55
End: 2020-12-02

## 2020-12-02 ENCOUNTER — OFFICE VISIT (OUTPATIENT)
Dept: FAMILY MEDICINE CLINIC | Age: 55
End: 2020-12-02
Payer: MEDICARE

## 2020-12-02 VITALS
WEIGHT: 198.8 LBS | TEMPERATURE: 97.1 F | BODY MASS INDEX: 28.46 KG/M2 | OXYGEN SATURATION: 98 % | HEIGHT: 70 IN | SYSTOLIC BLOOD PRESSURE: 138 MMHG | HEART RATE: 84 BPM | DIASTOLIC BLOOD PRESSURE: 84 MMHG

## 2020-12-02 PROBLEM — H54.7 VISION LOSS: Status: ACTIVE | Noted: 2020-12-02

## 2020-12-02 PROBLEM — R42 VERTIGO: Status: ACTIVE | Noted: 2020-12-02

## 2020-12-02 PROBLEM — H91.92 HEARING PROBLEM OF LEFT EAR: Status: ACTIVE | Noted: 2020-12-02

## 2020-12-02 LAB — HBA1C MFR BLD: 5 %

## 2020-12-02 PROCEDURE — G8419 CALC BMI OUT NRM PARAM NOF/U: HCPCS | Performed by: FAMILY MEDICINE

## 2020-12-02 PROCEDURE — 99215 OFFICE O/P EST HI 40 MIN: CPT | Performed by: FAMILY MEDICINE

## 2020-12-02 PROCEDURE — G8427 DOCREV CUR MEDS BY ELIG CLIN: HCPCS | Performed by: FAMILY MEDICINE

## 2020-12-02 PROCEDURE — 3017F COLORECTAL CA SCREEN DOC REV: CPT | Performed by: FAMILY MEDICINE

## 2020-12-02 PROCEDURE — 83036 HEMOGLOBIN GLYCOSYLATED A1C: CPT | Performed by: FAMILY MEDICINE

## 2020-12-02 PROCEDURE — 1036F TOBACCO NON-USER: CPT | Performed by: FAMILY MEDICINE

## 2020-12-02 PROCEDURE — G8482 FLU IMMUNIZE ORDER/ADMIN: HCPCS | Performed by: FAMILY MEDICINE

## 2020-12-02 PROCEDURE — 3044F HG A1C LEVEL LT 7.0%: CPT | Performed by: FAMILY MEDICINE

## 2020-12-02 PROCEDURE — 2022F DILAT RTA XM EVC RTNOPTHY: CPT | Performed by: FAMILY MEDICINE

## 2020-12-02 RX ORDER — HYDRALAZINE HYDROCHLORIDE 100 MG/1
100 TABLET, FILM COATED ORAL 3 TIMES DAILY
COMMUNITY
Start: 2020-11-06 | End: 2021-12-06 | Stop reason: SDUPTHER

## 2020-12-02 RX ORDER — NIFEDIPINE 60 MG/1
TABLET, EXTENDED RELEASE ORAL
COMMUNITY
Start: 2020-11-06 | End: 2021-05-14 | Stop reason: SDUPTHER

## 2020-12-02 RX ORDER — OMEPRAZOLE 10 MG/1
CAPSULE, DELAYED RELEASE ORAL
COMMUNITY
Start: 2020-11-06 | End: 2021-07-02

## 2020-12-02 RX ORDER — HYDROCODONE BITARTRATE AND ACETAMINOPHEN 5; 325 MG/1; MG/1
TABLET ORAL
COMMUNITY
Start: 2020-11-06 | End: 2021-01-13 | Stop reason: ALTCHOICE

## 2020-12-02 RX ORDER — MECLIZINE HCL 12.5 MG/1
12.5 TABLET ORAL 3 TIMES DAILY PRN
Qty: 30 TABLET | Refills: 0 | Status: SHIPPED | OUTPATIENT
Start: 2020-12-02 | End: 2020-12-12

## 2020-12-02 ASSESSMENT — PATIENT HEALTH QUESTIONNAIRE - PHQ9
1. LITTLE INTEREST OR PLEASURE IN DOING THINGS: 0
SUM OF ALL RESPONSES TO PHQ9 QUESTIONS 1 & 2: 0
2. FEELING DOWN, DEPRESSED OR HOPELESS: 0
SUM OF ALL RESPONSES TO PHQ QUESTIONS 1-9: 0

## 2020-12-02 ASSESSMENT — ENCOUNTER SYMPTOMS
ABDOMINAL PAIN: 0
CONSTIPATION: 0
SINUS PRESSURE: 0
WHEEZING: 0
EYE PAIN: 0
DIARRHEA: 0
NAUSEA: 1
VOMITING: 0
CHEST TIGHTNESS: 0
EYE ITCHING: 0
SHORTNESS OF BREATH: 0
BACK PAIN: 0
COUGH: 0
ABDOMINAL DISTENTION: 0
EYE DISCHARGE: 0
EYE REDNESS: 0

## 2020-12-02 NOTE — PROGRESS NOTES
Chief Complaint   Patient presents with    Dizziness     HAD THIS PROBLEM PAST FEW YEARS, THE PAST MONTH HAS BEEN BAD         Eugena Pineda  here today for follow up on chronic medical problems, go over labs and/or diagnostic studies, and medication refills. Dizziness (HAD THIS PROBLEM PAST FEW YEARS, THE PAST MONTH HAS BEEN BAD)      HPI: Patient is new to me never seen by me before. Last seen in the office in July. Patient was in the hospital and [facility] 3 weeks before for possible chest pain and pneumonia. Patient was treated with Augmentin for questionable pneumonia. Patient reports after he was discharged since then he has severe vertigo that he has to go ER, he feels dizziness and room spinning continuously. And he had fall because of vertigo. Patient has chronic history of vertigo reports he was taking over-the-counter medications in the past but that was not helping. Patient reports he has not been evaluated has not seen any specialist.  But recently his vertigo is getting worse, he also has noticed left ear hearing problems, he feels some weird sounds. Denies any recent upper respiratory symptoms or any fever chills. He had CT brain done in [facility] and did not show any mass-effect but they recommended MRI if symptoms persist.  Patient also had CT angiogram done and also had echocardiogram done in August which was showing normal ejection fraction. Patient also complains of vision problems in left eye reports he feels blurry and also some redness and curtain like feeling in vision. He has not seen ophthalmologist in years. Patient complains of mild headache. He also has history of right blind eye. He is able to see for objects as well as near, also able to see peripheral vision. Patient denies any pain. Patient has diabetes which is controlled A1c is 5 today, patient is on 5 units of Levemir, check sugars.     History of end-stage renal disease on dialysis 3 times in a week denies any hypotension during dialysis. Blood pressure is running normal, patient is on Lopressor 100 mg tablet. Patient has history of chronic pancreatitis, reports he has quit drinking years before follows with GI. Discharge summary  Per HPI:  Chest pain  rt sided  chest wall pain   cxr Congestion  pulm edema  Was extensively treated with iv atb last admission  wbc nl   doubt pneumonia      esrd     htn  poor control     CT brain on third November  IMPRESSION:  1. No acute intracranial abnormality seen. 2.  If symptoms persist consider MRI. /84   Pulse 84   Temp 97.1 °F (36.2 °C)   Ht 5' 10\" (1.778 m)   Wt 198 lb 12.8 oz (90.2 kg)   SpO2 98%   BMI 28.52 kg/m²    Body mass index is 28.52 kg/m². Wt Readings from Last 3 Encounters:   12/02/20 198 lb 12.8 oz (90.2 kg)   10/23/20 198 lb 3 oz (89.9 kg)   10/14/20 198 lb 3.2 oz (89.9 kg)        [x]Negative depression screening. PHQ Scores 12/2/2020 4/22/2019 11/19/2018 10/22/2018 6/18/2018 9/21/2017 8/7/2017   PHQ2 Score 0 0 0 0 0 1 0   PHQ9 Score 0 0 0 0 0 2 0      []1-4 = Minimal depression   []5-9 = Milddepression   []10-14 = Moderate depression   []15-19 = Moderately severe depression   []20-27 = Severe depression    Discussed testing with the patient and all questions fully answered. Hospital Outpatient Visit on 10/19/2020   Component Date Value Ref Range Status    SARS-CoV-2 10/19/2020 Not Detected  Not Detected Final    Comment:       The specimen is NEGATIVE for SARS-CoV-2, the novel coronavirus associated with COVID-19. A negative result does not rule out COVID-19. This test has been authorized by the FDA under an Emergency Use Authorization (EUA) for use   by authorized laboratories. [employer] SARS-CoV-2 Reagents for BD MAX System are designed to detect the virus that causes   COVID-19 in patients with signs and symptoms of infection who are suspected of COVID-19.   An individual without symptoms of COVID-19 and who release capsule       hydrALAZINE (APRESOLINE) 100 MG tablet       NIFEdipine (PROCARDIA XL) 60 MG extended release tablet       meclizine (ANTIVERT) 12.5 MG tablet Take 1 tablet by mouth 3 times daily as needed for Dizziness 30 tablet 0    doxazosin (CARDURA) 2 MG tablet Take 2 mg by mouth nightly      zoster recombinant adjuvanted vaccine (SHINGRIX) 50 MCG/0.5ML SUSR injection Inject 0.5 mLs into the muscle See Admin Instructions 1 dose now and repeat in 2-6 months 0.5 mL 0    metoprolol (LOPRESSOR) 100 MG tablet Take 1 tablet by mouth daily TAKE 1 TABLET BY MOUTH 2 TIMES DAILY 30 tablet 6    CREON 6000 units delayed release capsule TAKE ONE CAPSULE BY MOUTH 3 TIMES A DAY WITH MEALS  0    glucose monitoring kit (FREESTYLE) monitoring kit 1 kit by Does not apply route daily 1 kit 0    Misc. Devices MISC Check blood sugar one time a day 50 each 5    Insulin Pen Needle 32G X 6 MM MISC 1 Package by Does not apply route daily 100 each 3    pregabalin (LYRICA) 50 MG capsule Take 1 capsule by mouth every evening for 90 days. 90 capsule 3    doxercalciferol (HECTOROL) 4 MCG/2ML injection Infuse 1.25 mLs intravenously Once in dialysis for 1 dose Given at the end of dialysis Tues, Thurs, Sat 1.25 mL 0     No current facility-administered medications for this visit. Social History     Socioeconomic History    Marital status: Single     Spouse name: Not on file    Number of children: 4    Years of education: 12    Highest education level: Bachelor's degree (e.g., BA, AB, BS)   Occupational History    Not on file   Social Needs    Financial resource strain: Not hard at all   [employer] insecurity     Worry: Never true     Inability: Never true   [employer] needs     Medical: No     Non-medical: No   Tobacco Use    Smoking status: Never Smoker    Smokeless tobacco: Never Used   Substance and Sexual Activity    Alcohol use:  Yes     Alcohol/week: 0.0 standard drinks     Frequency: Monthly or less     Drinks per session: 1 or 2     Binge frequency: Never     Comment: rarely    Drug use: No    Sexual activity: Yes     Partners: Female   Lifestyle    Physical activity     Days per week: 7 days     Minutes per session: 40 min    Stress: Not at all   Relationships    Social connections     Talks on phone: More than three times a week     Gets together: More than three times a week     Attends Synagogue service: Never     Active member of club or organization: Yes     Attends meetings of clubs or organizations: 1 to 4 times per year     Relationship status: Never     Intimate partner violence     Fear of current or ex partner: Not on file     Emotionally abused: Not on file     Physically abused: Not on file     Forced sexual activity: Not on file   Other Topics Concern    Not on file   Social History Narrative    ** Merged History Encounter **          Counseling given: Not Answered        Family History   Problem Relation Age of Onset    No Known Problems Mother     No Known Problems Father              -rest of complaints with corresponding details per ROS    The patient's past medical, surgical, social, and family history as well as his current medications and allergies were reviewed as documented intoday's encounter. Review of Systems   Constitutional: Positive for activity change and fatigue. Negative for appetite change, diaphoresis and unexpected weight change. HENT: Positive for hearing loss. Negative for congestion, ear discharge, ear pain, mouth sores, nosebleeds, postnasal drip and sinus pressure. Eyes: Positive for visual disturbance. Negative for pain, discharge, redness and itching. Respiratory: Negative for cough, chest tightness, shortness of breath and wheezing. Cardiovascular: Negative for chest pain, palpitations and leg swelling. Gastrointestinal: Positive for nausea. Negative for abdominal distention, abdominal pain, constipation, diarrhea and vomiting. Endocrine: Negative for polyphagia and polyuria. Genitourinary: Negative for difficulty urinating, frequency and urgency. Musculoskeletal: Positive for arthralgias and gait problem. Negative for back pain and myalgias. Neurological: Positive for dizziness, weakness, numbness and headaches. Negative for tremors, seizures, speech difficulty and light-headedness. Psychiatric/Behavioral: Negative for agitation, decreased concentration, dysphoric mood and sleep disturbance. The patient is nervous/anxious. Physical Exam  Vitals signs and nursing note reviewed. Constitutional:       Appearance: Normal appearance. HENT:      Head: Normocephalic and atraumatic. Right Ear: Hearing and tympanic membrane normal. Tympanic membrane is not scarred. Left Ear: Hearing normal. Tympanic membrane is bulging. Tympanic membrane is not scarred or perforated. Nose: Nose normal. No nasal deformity. Mouth/Throat:      Mouth: Mucous membranes are moist.   Eyes:      General: Lids are normal.         Right eye: No foreign body. Left eye: No foreign body. Extraocular Movements: Extraocular movements intact. Conjunctiva/sclera: Conjunctivae normal.      Comments: Both pupils were reactive. Vision in left eye was normal.  Patient was able to count fingers at 3 feet distance. Cardiovascular:      Rate and Rhythm: Normal rate and regular rhythm. Heart sounds: Normal heart sounds, S1 normal and S2 normal.   Pulmonary:      Effort: Pulmonary effort is normal.      Breath sounds: Normal breath sounds. No decreased air movement. No decreased breath sounds or wheezing. Abdominal:      General: Abdomen is flat. Bowel sounds are normal.      Palpations: Abdomen is soft. Lymphadenopathy:      Cervical: No cervical adenopathy. Skin:     General: Skin is warm. Neurological:      Mental Status: He is alert and oriented to person, place, and time.       Cranial Nerves: Cranial nerves are intact. No cranial nerve deficit or dysarthria. Sensory: Sensation is intact. Motor: Motor function is intact. Coordination: Romberg sign positive. Coordination abnormal.      Gait: Gait abnormal.   Psychiatric:         Attention and Perception: Attention and perception normal.         Mood and Affect: Affect normal. Mood is anxious. Speech: Speech normal.             ASSESSMENT AND PLAN      1. Vision loss  Congenital detachment, urgent appointment made with ophthalmologist MRI brain due to continues dizziness and vision loss. - RAYMUNDO Mckeon MD, Ophthalmology, [facility]; Future    2. Vertigo  MRI brain patient needs evaluation by ENT also. Start on meclizine as needed. - RAYMUNDO Lopez MD, Otolaryngology, [facility]; Future    3. Type 2 diabetes mellitus with chronic kidney disease on chronic dialysis, with long-term current use of insulin (HCC)  Discontinue Levemir A1c is 5 advised to monitor blood sugars daily close follow-up in 1 month  - POCT glycosylated hemoglobin (Hb A1C)    4. Hearing problem of left ear  MRI brain follow-up with ENT  - MRI BRAIN WO CONTRAST; Future    5. ESRD (end stage renal disease) on dialysis [facility])  Continue dialysis all test results and imaging studies reviewed with patient    6. Alcohol-induced chronic pancreatitis (HCC)  Stable continue to follow with GI    7. Essential hypertension  Controlled continue same medications    8. Mixed hyperlipidemia  Stable repeat lipid panel  - Lipid, Fasting;  Future      Orders Placed This Encounter   Procedures    MRI BRAIN WO CONTRAST     Standing Status:   Future     Standing Expiration Date:   12/2/2021     Order Specific Question:   Reason for exam:     Answer:   SEVERE VERTIGO    Lipid, Fasting     Standing Status:   Future     Standing Expiration Date:   12/2/2021    RAYMUNDO Mckeon MD, Ophthalmology, [place]     Referral Priority:   Urgent     Referral Type:   Eval and Treat     Referral Reason:   Specialty Services Required     Referred to Provider:   Maribell Alejandra MD     Requested Specialty:   Ophthalmology     Number of Visits Requested:   1    RAYMUNDO - Kait Grubbs MD, Otolaryngology, [place]     Referral Priority:   Routine     Referral Type:   Eval and Treat     Referral Reason:   Specialty Services Required     Referred to Provider:   Luna Julio MD     Requested Specialty:   Otolaryngology     Number of Visits Requested:   1    POCT glycosylated hemoglobin (Hb A1C)         Medications Discontinued During This Encounter   Medication Reason    hydrALAZINE (APRESOLINE) 50 MG tablet     NIFEdipine (PROCARDIA XL) 90 MG extended release tablet     omeprazole (PRILOSEC) 20 MG delayed release capsule     insulin detemir (LEVEMIR) 100 UNIT/ML injection pen Therapy completed       [facility] received counseling on the following healthy behaviors: nutrition, exercise and medication adherence  Reviewed prior labs and health maintenance  Continue current medications, diet and exercise. Discussed use, benefit, and side effects of prescribed medications. Barriers to medication compliance addressed. Patient given educational materials - see patient instructions  Was a self-tracking handout given in paper form or via [employer]? Yes    Requested Prescriptions     Signed Prescriptions Disp Refills    meclizine (ANTIVERT) 12.5 MG tablet 30 tablet 0     Sig: Take 1 tablet by mouth 3 times daily as needed for Dizziness       All patient questions answered. Patient voiced understanding. Quality Measures    Body mass index is 28.52 kg/m². Normal. Weight control planned discussed Healthy diet and regular exercise. BP: 138/84 Blood pressure is normal. Treatment plan consists of No treatment change needed.     Lab Results   Component Value Date    LDLCHOLESTEROL 81 05/10/2019    (goal LDL reduction with dx if diabetes is 50% LDL reduction)      PHQ Scores 12/2/2020 4/22/2019 11/19/2018 10/22/2018 6/18/2018 9/21/2017 8/7/2017   PHQ2 Score 0 0 0 0 0 1 0   PHQ9 Score 0 0 0 0 0 2 0     Interpretation of Total Score Depression Severity: 1-4 = Minimal depression, 5-9 = Mild depression, 10-14 = Moderate depression, 15-19 = Moderately severe depression, 20-27 = Severe depression    The patient'spast medical, surgical, social, and family history as well as his   current medications and allergies were reviewed as documented in today's encounter. Medications, labs, diagnostic studies, consultations andfollow-up as documented in this encounter. Return in about 4 weeks (around 12/30/2020) for [facility] lab work. Patient wasseen with total face to face time of 40 minutes. More than 50% of this visit was counseling and education. Future Appointments   Date Time Provider Britney Quigley   1/6/2021 11:30 AM [facility]     This note was completed by using the assistance of a speech-recognition program. However, inadvertent computerized transcription errors may be present. Althoughevery effort was made to ensure accuracy, no guarantees can be provided that every mistake has been identified and corrected by editing.   Electronically signed by Simona Rocha MD on 12/2/2020  12:10 PM

## 2020-12-03 ENCOUNTER — TELEPHONE (OUTPATIENT)
Dept: FAMILY MEDICINE CLINIC | Age: 55
End: 2020-12-03

## 2020-12-09 ENCOUNTER — TELEPHONE (OUTPATIENT)
Dept: FAMILY MEDICINE CLINIC | Age: 55
End: 2020-12-09

## 2020-12-09 NOTE — TELEPHONE ENCOUNTER
Patient needs MRI but he is very claustrophobic and needs something to help him relax so he can get MRI. ENT will not see him until he gets MRI.      [facility]

## 2020-12-18 RX ORDER — MECLIZINE HCL 12.5 MG/1
12.5 TABLET ORAL 3 TIMES DAILY PRN
COMMUNITY
End: 2020-12-18 | Stop reason: SDUPTHER

## 2020-12-18 RX ORDER — MECLIZINE HCL 12.5 MG/1
12.5 TABLET ORAL 3 TIMES DAILY PRN
Qty: 60 TABLET | Refills: 0 | Status: SHIPPED | OUTPATIENT
Start: 2020-12-18 | End: 2021-01-13

## 2020-12-18 NOTE — TELEPHONE ENCOUNTER
Please Approve or Refuse.   Send to Pharmacy per Pt's Request:      Next Visit Date:  1/6/2021   Last Visit Date: 12/2/2020    Hemoglobin A1C (%)   Date Value   12/02/2020 5.0   07/30/2020 7.1   03/02/2019 6.5 (H)             ( goal A1C is < 7)   BP Readings from Last 3 Encounters:   12/02/20 138/84   10/23/20 (!) 164/76   10/23/20 (!) 148/69          (goal 120/80)  BUN   Date Value Ref Range Status   05/10/2019 41 (H) 6 - 20 mg/dL Final     CREATININE   Date Value Ref Range Status   05/10/2019 6.44 (HH) 0.70 - 1.20 mg/dL Final     Potassium   Date Value Ref Range Status   07/13/2020 5.3 3.7 - 5.3 mmol/L Final

## 2021-01-06 ENCOUNTER — TELEPHONE (OUTPATIENT)
Dept: FAMILY MEDICINE CLINIC | Age: 56
End: 2021-01-06

## 2021-01-06 DIAGNOSIS — F40.240 CLAUSTROPHOBIA: Primary | ICD-10-CM

## 2021-01-06 RX ORDER — LORAZEPAM 0.5 MG/1
0.5 TABLET ORAL EVERY 8 HOURS PRN
Qty: 2 TABLET | Refills: 0 | Status: SHIPPED | OUTPATIENT
Start: 2021-01-06 | End: 2021-01-07

## 2021-01-06 NOTE — TELEPHONE ENCOUNTER
Per Pt wife, pt has next MRI 1/11/2021 and needs medication called in to the pharmacy prior to the visit to help him relax. She said Dr. Sofia Dee does this for him each time.       Pharm-  [facility]

## 2021-01-11 ENCOUNTER — TELEPHONE (OUTPATIENT)
Dept: FAMILY MEDICINE CLINIC | Age: 56
End: 2021-01-11

## 2021-01-11 ENCOUNTER — HOSPITAL ENCOUNTER (OUTPATIENT)
Dept: MRI IMAGING | Facility: CLINIC | Age: 56
Discharge: HOME | End: 2021-01-13
Payer: MEDICARE

## 2021-01-11 PROCEDURE — 70551 MRI BRAIN STEM W/O DYE: CPT

## 2021-01-11 NOTE — TELEPHONE ENCOUNTER
Char Levi from [facility] is requesting a call regarding results for patient. She can be reached at [phone]. Okay to leave a message.

## 2021-01-11 NOTE — TELEPHONE ENCOUNTER
[facility] and results were give for MRI.  Results are in patients chart

## 2021-01-11 NOTE — TELEPHONE ENCOUNTER
Patient scheduled for MRI today and was given a prescription for lorazapam.  Wants to know when to take there were no instructions.

## 2021-01-12 DIAGNOSIS — H54.7 VISION LOSS: ICD-10-CM

## 2021-01-12 DIAGNOSIS — R42 VERTIGO: Primary | ICD-10-CM

## 2021-01-13 ENCOUNTER — CARE COORDINATION (OUTPATIENT)
Dept: CARE COORDINATION | Age: 56
End: 2021-01-13

## 2021-01-13 ENCOUNTER — OFFICE VISIT (OUTPATIENT)
Dept: FAMILY MEDICINE CLINIC | Age: 56
End: 2021-01-13
Payer: MEDICARE

## 2021-01-13 VITALS
DIASTOLIC BLOOD PRESSURE: 88 MMHG | HEIGHT: 70 IN | SYSTOLIC BLOOD PRESSURE: 145 MMHG | OXYGEN SATURATION: 98 % | WEIGHT: 190 LBS | TEMPERATURE: 97.8 F | BODY MASS INDEX: 27.2 KG/M2 | HEART RATE: 55 BPM

## 2021-01-13 DIAGNOSIS — E83.19 MULTIPLE HEMOSIDERIN DEPOSITS IN BRAIN: Primary | ICD-10-CM

## 2021-01-13 DIAGNOSIS — G93.89 MULTIPLE HEMOSIDERIN DEPOSITS IN BRAIN: Primary | ICD-10-CM

## 2021-01-13 DIAGNOSIS — N18.6 ESRD (END STAGE RENAL DISEASE) ON DIALYSIS (HCC): ICD-10-CM

## 2021-01-13 DIAGNOSIS — N25.81 SECONDARY HYPERPARATHYROIDISM OF RENAL ORIGIN (HCC): ICD-10-CM

## 2021-01-13 DIAGNOSIS — D69.6 THROMBOCYTOPENIA (HCC): ICD-10-CM

## 2021-01-13 DIAGNOSIS — F41.9 ANXIETY: ICD-10-CM

## 2021-01-13 DIAGNOSIS — Z79.4 TYPE 2 DIABETES MELLITUS WITH CHRONIC KIDNEY DISEASE ON CHRONIC DIALYSIS, WITH LONG-TERM CURRENT USE OF INSULIN (HCC): ICD-10-CM

## 2021-01-13 DIAGNOSIS — N18.6 END STAGE RENAL DISEASE (HCC): ICD-10-CM

## 2021-01-13 DIAGNOSIS — E11.22 TYPE 2 DIABETES MELLITUS WITH CHRONIC KIDNEY DISEASE ON CHRONIC DIALYSIS, WITH LONG-TERM CURRENT USE OF INSULIN (HCC): ICD-10-CM

## 2021-01-13 DIAGNOSIS — N18.6 TYPE 2 DIABETES MELLITUS WITH CHRONIC KIDNEY DISEASE ON CHRONIC DIALYSIS, WITH LONG-TERM CURRENT USE OF INSULIN (HCC): ICD-10-CM

## 2021-01-13 DIAGNOSIS — G62.9 POLYNEUROPATHY: ICD-10-CM

## 2021-01-13 DIAGNOSIS — Z99.2 ESRD (END STAGE RENAL DISEASE) ON DIALYSIS (HCC): ICD-10-CM

## 2021-01-13 DIAGNOSIS — I50.32 CHRONIC DIASTOLIC CONGESTIVE HEART FAILURE (HCC): ICD-10-CM

## 2021-01-13 DIAGNOSIS — Z99.2 TYPE 2 DIABETES MELLITUS WITH CHRONIC KIDNEY DISEASE ON CHRONIC DIALYSIS, WITH LONG-TERM CURRENT USE OF INSULIN (HCC): ICD-10-CM

## 2021-01-13 DIAGNOSIS — D63.8 ANEMIA OF CHRONIC DISEASE: ICD-10-CM

## 2021-01-13 PROCEDURE — G8482 FLU IMMUNIZE ORDER/ADMIN: HCPCS | Performed by: FAMILY MEDICINE

## 2021-01-13 PROCEDURE — 3017F COLORECTAL CA SCREEN DOC REV: CPT | Performed by: FAMILY MEDICINE

## 2021-01-13 PROCEDURE — G8419 CALC BMI OUT NRM PARAM NOF/U: HCPCS | Performed by: FAMILY MEDICINE

## 2021-01-13 PROCEDURE — 1036F TOBACCO NON-USER: CPT | Performed by: FAMILY MEDICINE

## 2021-01-13 PROCEDURE — 2022F DILAT RTA XM EVC RTNOPTHY: CPT | Performed by: FAMILY MEDICINE

## 2021-01-13 PROCEDURE — 99214 OFFICE O/P EST MOD 30 MIN: CPT | Performed by: FAMILY MEDICINE

## 2021-01-13 PROCEDURE — 3046F HEMOGLOBIN A1C LEVEL >9.0%: CPT | Performed by: FAMILY MEDICINE

## 2021-01-13 PROCEDURE — G8427 DOCREV CUR MEDS BY ELIG CLIN: HCPCS | Performed by: FAMILY MEDICINE

## 2021-01-13 RX ORDER — HYDROCODONE BITARTRATE AND ACETAMINOPHEN 5; 325 MG/1; MG/1
1 TABLET ORAL EVERY 6 HOURS PRN
Qty: 60 TABLET | Refills: 0 | Status: CANCELLED | OUTPATIENT
Start: 2021-01-13 | End: 2021-02-12

## 2021-01-13 RX ORDER — MECLIZINE HYDROCHLORIDE 25 MG/1
25 TABLET ORAL 3 TIMES DAILY PRN
Qty: 60 TABLET | Refills: 0 | Status: SHIPPED | OUTPATIENT
Start: 2021-01-13 | End: 2021-02-19

## 2021-01-13 RX ORDER — PREGABALIN 75 MG/1
75 CAPSULE ORAL EVERY EVENING
Qty: 30 CAPSULE | Refills: 0 | Status: SHIPPED | OUTPATIENT
Start: 2021-01-13 | End: 2021-02-09

## 2021-01-13 RX ORDER — VENLAFAXINE HYDROCHLORIDE 37.5 MG/1
37.5 CAPSULE, EXTENDED RELEASE ORAL DAILY
Qty: 30 CAPSULE | Refills: 3 | Status: SHIPPED | OUTPATIENT
Start: 2021-01-13 | End: 2021-05-05

## 2021-01-13 ASSESSMENT — ENCOUNTER SYMPTOMS
EYE REDNESS: 0
EYE PAIN: 0
BLOOD IN STOOL: 0
CHEST TIGHTNESS: 0
NAUSEA: 0
VOMITING: 0
RHINORRHEA: 0
CONSTIPATION: 0
WHEEZING: 0
SHORTNESS OF BREATH: 1
ABDOMINAL DISTENTION: 0
COUGH: 0
ABDOMINAL PAIN: 0
BACK PAIN: 1

## 2021-01-13 ASSESSMENT — PATIENT HEALTH QUESTIONNAIRE - PHQ9
SUM OF ALL RESPONSES TO PHQ9 QUESTIONS 1 & 2: 0
2. FEELING DOWN, DEPRESSED OR HOPELESS: 0
SUM OF ALL RESPONSES TO PHQ QUESTIONS 1-9: 0
SUM OF ALL RESPONSES TO PHQ QUESTIONS 1-9: 0

## 2021-01-13 NOTE — PROGRESS NOTES
Chief Complaint   Patient presents with    Discuss Labs     MRI          Pablo White  here today for follow up on chronic medical problems, go over labs and/or diagnostic studies, and medication refills. Discuss Labs (MRI )    Patient presented with her significant other. HPI\": Patient is here for follow-up on MRI results that showed hemosiderin deposit possible old hemorrhage. Patient was referred to a neurologist has not scheduled appointment. Patient has chronic stable dizziness which is not improving he had 1 more ER visit for his dizziness. He was started on meclizine smaller dose reports that gives mild relief from dizziness. He also had vision loss has appointment with ophthalmologist in February. Patient referred to ENT reports he was seen had audiometry and also other test done, he has not scheduled follow-up appointment. No test results in Care Everywhere. Patient has congestive heart failure, stable follows with cardiologist.  He feels short of breath at night, he had recent echocardiogram done in [facility] not able to see results. Blood pressure is slightly high, is on multiple medications, he is not sure about Cardura which is on his medication list.  He takes Lopressor and nifedipine. He has stopped drinking 5 years before. He was started on Lyrica by nurse practitioner for his chronic neuropathy and also back pain, patient reports that is helping but he has not refilled that since last 2 months. Patient has history of end-stage renal disease is on dialysis. He has anxiety which has gotten worse due to severe dizziness, girlfriend reported he feels anxious at nighttime. He denies any chest pain. He is not able to sleep because of his anxiety. She does not think he is depressed. He also lost his mother recently. ER note:  On Pt is a 48 y.o male with c/o vertigo x8 months. Pt states his sx have worsened in the past month since being discharged from [facility], he has been experiencing worsening dizziness, imbalance, and double vision. Pt fell once yesterday and fell twice two days prior. Pt is seen by a physical therapist and a home health nurse at home. Pt denies any numbness, weakness, or tingling. Pt denies any LOC but states he has hit his head. Pt is on dialysis for CKD, has hx of HTN, diabetes mellitus type II.        BP (!) 145/88 (Site: Right Upper Arm, Position: Sitting, Cuff Size: Medium Adult)   Pulse 55   Temp 97.8 °F (36.6 °C) (Temporal)   Ht 5' 10\" (1.778 m)   Wt 190 lb (86.2 kg)   SpO2 98%   BMI 27.26 kg/m²    Body mass index is 27.26 kg/m². Wt Readings from Last 3 Encounters:   01/13/21 190 lb (86.2 kg)   12/02/20 198 lb 12.8 oz (90.2 kg)   10/23/20 198 lb 3 oz (89.9 kg)        [x]Negative depression screening. PHQ Scores 1/13/2021 12/2/2020 4/22/2019 11/19/2018 10/22/2018 6/18/2018 9/21/2017   PHQ2 Score 0 0 0 0 0 0 1   PHQ9 Score 0 0 0 0 0 0 2      []1-4 = Minimal depression   []5-9 = Milddepression   []10-14 = Moderate depression   []15-19 = Moderately severe depression   []20-27 = Severe depression    Discussed testing with the patient and all questions fully answered.     Office Visit on 12/02/2020   Component Date Value Ref Range Status    Hemoglobin A1C 12/02/2020 5.0  % Final         Most recent labs reviewed:     Lab Results   Component Value Date    WBC 5.6 03/17/2019    HGB 7.8 (A) 09/05/2020    HCT 27.5 (L) 03/17/2019    MCV 91.1 03/17/2019     03/17/2019       @BRIEFLAB(NA,K,CL,CO2,BUN,CREATININE,GLUCOSE,CALCIUM)@     Lab Results   Component Value Date    ALT 18 05/10/2019    AST 19 05/10/2019    ALKPHOS 115 05/10/2019    BILITOT 0.47 05/10/2019       Lab Results   Component Value Date    TSH 2.82 02/26/2019       Lab Results   Component Value Date    CHOL 149 05/10/2019     Lab Results Component Value Date    TRIG 99 05/10/2019     Lab Results   Component Value Date    HDL 48 05/10/2019     Lab Results   Component Value Date    LDLCHOLESTEROL 81 05/10/2019     Lab Results   Component Value Date    VLDL NOT REPORTED 05/10/2019     Lab Results   Component Value Date    CHOLHDLRATIO 3.1 05/10/2019       Lab Results   Component Value Date    LABA1C 5.0 12/02/2020       No results found for: [id number]    No results found for: FOLATE    Lab Results   Component Value Date    IRON 36 (L) 03/07/2019    TIBC 164 (L) 03/07/2019    FERRITIN 1,960 (H) 03/07/2019       No results found for: VITD25          Current Outpatient Medications   Medication Sig Dispense Refill    pregabalin (LYRICA) 75 MG capsule Take 1 capsule by mouth every evening for 30 days. 30 capsule 0    venlafaxine (EFFEXOR XR) 37.5 MG extended release capsule Take 1 capsule by mouth daily 30 capsule 3    diclofenac sodium (VOLTAREN) 1 % GEL Apply topically 2 times daily 120 g 1    meclizine (ANTIVERT) 25 MG tablet Take 1 tablet by mouth 3 times daily as needed for Dizziness 60 tablet 0    omeprazole (PRILOSEC) 10 MG delayed release capsule       hydrALAZINE (APRESOLINE) 100 MG tablet       NIFEdipine (PROCARDIA XL) 60 MG extended release tablet       metoprolol (LOPRESSOR) 100 MG tablet Take 1 tablet by mouth daily TAKE 1 TABLET BY MOUTH 2 TIMES DAILY 30 tablet 6    doxercalciferol (HECTOROL) 4 MCG/2ML injection Infuse 1.25 mLs intravenously Once in dialysis for 1 dose Given at the end of dialysis [place], Rubinaurs, Sat 1.25 mL 0    CREON 6000 units delayed release capsule TAKE ONE CAPSULE BY MOUTH 3 TIMES A DAY WITH MEALS  0    glucose monitoring kit (FREESTYLE) monitoring kit 1 kit by Does not apply route daily 1 kit 0    Misc.  Devices MISC Check blood sugar one time a day 50 each 5    Insulin Pen Needle 32G X 6 MM MISC 1 Package by Does not apply route daily 100 each 3  doxazosin (CARDURA) 2 MG tablet Take 2 mg by mouth nightly      zoster recombinant adjuvanted vaccine (SHINGRIX) 50 MCG/0.5ML SUSR injection Inject 0.5 mLs into the muscle See Admin Instructions 1 dose now and repeat in 2-6 months 0.5 mL 0     No current facility-administered medications for this visit. Social History     Socioeconomic History    Marital status: Single     Spouse name: Not on file    Number of children: 4    Years of education: 12    Highest education level: Bachelor's degree (e.g., BA, AB, BS)   Occupational History    Not on file   Social Needs    Financial resource strain: Not hard at all   [employer] insecurity     Worry: Never true     Inability: Never true   [employer] needs     Medical: No     Non-medical: No   Tobacco Use    Smoking status: Never Smoker    Smokeless tobacco: Never Used   Substance and Sexual Activity    Alcohol use:  Yes     Alcohol/week: 0.0 standard drinks     Frequency: Monthly or less     Drinks per session: 1 or 2     Binge frequency: Never     Comment: rarely    Drug use: No    Sexual activity: Yes     Partners: Female   Lifestyle    Physical activity     Days per week: 7 days     Minutes per session: 40 min    Stress: Not at all   Relationships    Social connections     Talks on phone: More than three times a week     Gets together: More than three times a week     Attends [employer]ical service: Never     Active member of club or organization: Yes     Attends meetings of clubs or organizations: 1 to 4 times per year     Relationship status: Never     Intimate partner violence     Fear of current or ex partner: Not on file     Emotionally abused: Not on file     Physically abused: Not on file     Forced sexual activity: Not on file   Other Topics Concern    Not on file   Social History Narrative    ** Merged History Encounter **          Counseling given: Not Answered        Family History   Problem Relation Age of Onset  No Known Problems Mother     No Known Problems Father              -rest of complaints with corresponding details per ROS    The patient's past medical, surgical, social, and family history as well as his current medications and allergies were reviewed as documented intoday's encounter. Review of Systems   Constitutional: Positive for activity change and fatigue. Negative for appetite change, diaphoresis and unexpected weight change. HENT: Negative for congestion, ear discharge, ear pain, hearing loss, postnasal drip and rhinorrhea. Eyes: Positive for visual disturbance. Negative for pain and redness. Respiratory: Positive for shortness of breath. Negative for cough, chest tightness and wheezing. Cardiovascular: Negative for chest pain, palpitations and leg swelling. Gastrointestinal: Negative for abdominal distention, abdominal pain, blood in stool, constipation, nausea and vomiting. Endocrine: Negative for polyuria. Genitourinary: Negative for difficulty urinating, flank pain, frequency, hematuria and urgency. Musculoskeletal: Positive for arthralgias, back pain, gait problem and myalgias. Negative for neck pain and neck stiffness. Skin: Negative for rash. Neurological: Positive for dizziness, weakness, light-headedness and numbness. Negative for seizures, facial asymmetry, speech difficulty and headaches. Psychiatric/Behavioral: Positive for dysphoric mood and sleep disturbance. Negative for agitation, confusion, decreased concentration, hallucinations and suicidal ideas. The patient is nervous/anxious. The patient is not hyperactive. Physical Exam  Vitals signs and nursing note reviewed. Constitutional:       Appearance: Normal appearance. HENT:      Head: Atraumatic. Nose: Nose normal.      Mouth/Throat:      Mouth: Mucous membranes are moist.   Eyes:      General: Visual field deficit present. Extraocular Movements: Extraocular movements intact. Conjunctiva/sclera: Conjunctivae normal.      Pupils: Pupils are equal, round, and reactive to light. Neck:      Musculoskeletal: Normal range of motion. Cardiovascular:      Rate and Rhythm: Normal rate and regular rhythm. Heart sounds: Normal heart sounds. Pulmonary:      Effort: Pulmonary effort is normal. No respiratory distress. Breath sounds: Normal breath sounds. No wheezing or rhonchi. Abdominal:      General: Bowel sounds are normal. There is no distension. Palpations: There is no mass. Tenderness: There is no abdominal tenderness. There is no guarding or rebound. Musculoskeletal:      Lumbar back: He exhibits decreased range of motion, tenderness, pain and spasm. He exhibits no bony tenderness and no deformity. Lymphadenopathy:      Cervical: No cervical adenopathy. Skin:     General: Skin is warm. Findings: No abrasion, rash or wound. Neurological:      Mental Status: He is alert and oriented to person, place, and time. Cranial Nerves: No cranial nerve deficit. Sensory: Sensation is intact. Motor: Weakness present. Coordination: Romberg sign positive. Gait: Gait abnormal.             ASSESSMENT AND PLAN    1. 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  Yennifer Sin ordered he has not done previously  - Comprehensive Metabolic Panel; Future  - CBC Auto Differential; Future  - Microalbumin / Creatinine Urine Ratio; Future  - Vitamin D 25 Hydroxy; Future  - Urinalysis With Microscopic; Future    2. Polyneuropathy  Restart on Lyrica, and topical diclofenac.  - pregabalin (LYRICA) 75 MG capsule; Take 1 capsule by mouth every evening for 30 days. Dispense: 30 capsule; Refill: 0  - diclofenac sodium (VOLTAREN) 1 % GEL; Apply topically 2 times daily  Dispense: 120 g; Refill: 1  - Vitamin D 25 Hydroxy; Future  - Vitamin B12 & Folate; Future  - Urinalysis With Microscopic;  Future 3. Chronic diastolic congestive heart failure ([facility]  Stable continue same medications follow-up with cardiologist    4. Anemia of chronic disease  Stable reorder labs  - Comprehensive Metabolic Panel; Future  - CBC Auto Differential; Future  - TSH without Reflex; Future  - Vitamin D 25 Hydroxy; Future  - Vitamin B12 & Folate; Future    5. Type 2 diabetes mellitus with chronic kidney disease on chronic dialysis, with long-term current use of insulin (HCC)  Off of medications due to severe weight loss  - Microalbumin / Creatinine Urine Ratio; Future  - TSH without Reflex; Future    6. ESRD (end stage renal disease) on [facility]  Stable on dialysis    7. Secondary hyperparathyroidism of renal origin ([facility]  Stable continue to monitor    8. Anxiety   start on Effexor follow-up in 1 month  - venlafaxine (EFFEXOR XR) 37.5 MG extended release capsule; Take 1 capsule by mouth daily  Dispense: 30 capsule; Refill: 3    9. Thrombocytopenia ([facility]  Stable labs ordered    10. End stage renal disease (HCC)     - Vitamin D 25 Hydroxy; Future        Orders Placed This Encounter   Procedures    Comprehensive Metabolic Panel     Fasting 8 hrs     Standing Status:   Future     Standing Expiration Date:   1/13/2022    CBC Auto Differential     Standing Status:   Future     Standing Expiration Date:   1/14/2022    Microalbumin / Creatinine Urine Ratio     Standing Status:   Future     Standing Expiration Date:   1/13/2022    TSH without Reflex     Standing Status:   Future     Standing Expiration Date:   1/13/2022    Vitamin D 25 Hydroxy     Standing Status:   Future     Standing Expiration Date:   1/13/2022    Vitamin B12 & Folate     Standing Status:   Future     Standing Expiration Date:   1/13/2022    Urinalysis With Microscopic     Standing Status:   Future     Standing Expiration Date:   1/13/2022     Order Specific Question:   SPECIFY(EX-CATH,MIDSTREAM,CYSTO,ETC)?      Answer:   midstream Medications Discontinued During This Encounter   Medication Reason    pregabalin (LYRICA) 50 MG capsule REORDER    meclizine (ANTIVERT) 12.5 MG tablet     HYDROcodone-acetaminophen (NORCO) 5-325 MG per tablet Therapy completed    darbepoetin paxton-polysorbate (ARANESP) 100 MCG/0.5ML SOSY injection Therapy completed       Scarlett Fowlermoe received counseling on the following healthy behaviors: nutrition, exercise and medication adherence  Reviewed prior labs and health maintenance  Continue current medications, diet and exercise. Discussed use, benefit, and side effects of prescribed medications. Barriers to medication compliance addressed. Patient given educational materials - see patient instructions  Was a self-tracking handout given in paper form or via [employer]? Yes    Requested Prescriptions     Signed Prescriptions Disp Refills    pregabalin (LYRICA) 75 MG capsule 30 capsule 0     Sig: Take 1 capsule by mouth every evening for 30 days.  venlafaxine (EFFEXOR XR) 37.5 MG extended release capsule 30 capsule 3     Sig: Take 1 capsule by mouth daily    diclofenac sodium (VOLTAREN) 1 %  g 1     Sig: Apply topically 2 times daily    meclizine (ANTIVERT) 25 MG tablet 60 tablet 0     Sig: Take 1 tablet by mouth 3 times daily as needed for Dizziness       All patient questions answered. Patient voiced understanding. Quality Measures    Body mass index is 27.26 kg/m². Normal. Weight control planned discussed daily exercise regimen and Healthy diet and regular exercise. BP: (!) 145/88 Blood pressure is high. Treatment plan consists of Weight Reduction, DASH Eating Plan, Dietary Sodium Restriction, Increased Physical Activity and No treatment change needed.     Lab Results   Component Value Date    LDLCHOLESTEROL 81 05/10/2019    (goal LDL reduction with dx if diabetes is 50% LDL reduction)      PHQ Scores 1/13/2021 12/2/2020 4/22/2019 11/19/2018 10/22/2018 6/18/2018 9/21/2017   PHQ2 Score 0 0 0 0 0 0 1 PHQ9 Score 0 0 0 0 0 0 2     Interpretation of Total Score Depression Severity: 1-4 = Minimal depression, 5-9 = Mild depression, 10-14 = Moderate depression, 15-19 = Moderately severe depression, 20-27 = Severe depression    The patient'spast medical, surgical, social, and family history as well as his   current medications and allergies were reviewed as documented in today's encounter. Medications, labs, diagnostic studies, consultations andfollow-up as documented in this encounter. Return in about 4 weeks (around 2/10/2021) for 30min,always chronic conditons. Patient wasseen with total face to face time of 35 minutes. More than 50% of this visit was counseling and education. Future Appointments   Date Time Provider Britney Soraida   2/10/2021  9:15 AM Fela Lugo MD [email or web address]     This note was completed by using the assistance of a speech-recognition program. However, inadvertent computerized transcription errors may be present. Althoughevery effort was made to ensure accuracy, no guarantees can be provided that every mistake has been identified and corrected by editing.   Electronically signed by Fela Lugo MD on 1/13/2021  4:33 PM

## 2021-01-13 NOTE — PROGRESS NOTES
Visit Information    Have you changed or started any medications since your last visit including any over-the-counter medicines, vitamins, or herbal medicines? no   Have you stopped taking any of your medications? Is so, why? -  no  Are you having any side effects from any of your medications? - no    Have you seen any other physician or provider since your last visit?  no   Have you had any other diagnostic tests since your last visit?  no   Have you been seen in the emergency room and/or had an admission in a hospital since we last saw you? Yes [facility]  Have you had your routine dental cleaning in the past 6 months?  no     Do you have an active MyChart account? If no, what is the barrier?   Yes    Patient Care Team:  Moose Maxwell MD as PCP - General (Family Medicine)  Moose Maxwell MD as PCP - [facility] Provider  Rosendo Jeans, APRN - CNP  Jade Garcias MD as Consulting Physician (Gastroenterology)  Jade Garcias RN as Ambulatory Care Manager    Medical History Review  Past Medical, Family, and Social History reviewed and does not contribute to the patient presenting condition    Health Maintenance   Topic Date Due    Hepatitis C screen  1965    HIV screen  06/24/1982    Shingles Vaccine (1 of 2) 06/24/2017    Annual Wellness Visit (AWV)  05/29/2019    Diabetic retinal exam  07/10/2019    Hepatitis B vaccine (2 of 4 - Risk Engerix-B 4-dose series) 10/15/2019    Diabetic foot exam  04/22/2020    Lipid screen  05/10/2020    Creatinine monitoring  05/10/2020    Potassium monitoring  07/13/2021    Colon cancer screen colonoscopy  07/13/2021    A1C test (Diabetic or Prediabetic)  12/02/2021    Pneumococcal 0-64 years Vaccine (3 of 3 - PPSV23) 02/15/2023    DTaP/Tdap/Td vaccine (2 - Td) 07/20/2028    Flu vaccine  Completed    Hepatitis A vaccine  Aged Out    Hib vaccine  Aged Out    Meningococcal (ACWY) vaccine  Aged Out

## 2021-01-13 NOTE — CARE COORDINATION
Ambulatory Care Coordination Note  1/13/2021  CM Risk Score: 6  Charlson 10 Year Mortality Risk Score: 100%     ACC: Isha Craft RN    Summary  Date Care Coordination Episode Started:  15 October 2020     Reason for Call Today:      Follow up assessment     Reason patient is in Care Coordination:      1. CHF  2. Diabetes   3. ESRD  4. RAEV 93%     Topics Discussed Today:         1) Dizziness            -  Patient reports he recently had a MRI. Has appointment with PCP today for results. Also reports dizziness results in nausea causing a lack of appetite and a series of falls/near falls. 2) CHF             - Complains of exertional dyspnea, intermittent BLE edema, and fatigue. No weight reported today. 3) Diabetes             - Does not check glucose levels. Reports symptoms of hypoglycemia due to not eating regularly. Patient attributes this to the nausea caused by his dizziness.   Most recent A1C, 5.0% (02 December 2020)    Interventions completed today:     1. Update PCP     Assessments completed today:      · Fall risk  · CHF  · DIabetes     Care Coordinator plan of care:      1) Zone Management        CHF - YELLOW Zone        DM -   YELLOW Zone  2) Consider referral to dietician if nausea and hypoglycemia continue  3) Review ACP during next encounter  4) Patient encouraged to contact ACM with any issues  5) Follow up with PCP today at 12:15 PM   6) Follow up with patient in one week    Congestive Heart Failure Assessment    Are you currently restricting fluids?: Other (Comment: 1500ml/day)  Do you understand a low sodium diet?: Yes  Do you understand how to read food labels?: Yes  How many restaurant meals do you eat per week?: 0  Do you salt your food before tasting it?: No         Symptoms:  CHF associated dyspnea on exertion: Pos, CHF associated fatigue: Pos, CHF associated leg swelling: Pos      Symptom course: stable Patient-reported weight (lb):  (Comment: No weight reported this morning)  Salt intake watch compared to last visit: stable       Diabetes Assessment    Medic Alert ID: No  Meal Planning: None   How often do you test your blood sugar?: No Testing (Comment: Three times per week at dialysis)   Do you have barriers with adherence to non-pharmacologic self-management interventions?  (Nutrition/Exercise/Self-Monitoring): No   Have you ever had to go to the ED for symptoms of low blood sugar?: No       Do you have hyperglycemia symptoms?: No   Do you have hypoglycemia symptoms?: Yes   Blood Sugar Monitoring Regimen: Not Testing        Wt Readings from Last 3 Encounters:   12/02/20 198 lb 12.8 oz (90.2 kg)   10/23/20 198 lb 3 oz (89.9 kg)   10/14/20 198 lb 3.2 oz (89.9 kg)     BP Readings from Last 3 Encounters:   12/02/20 138/84   10/23/20 (!) 164/76   10/23/20 (!) 148/69     Lab Results   Component Value Date    LABA1C 5.0 12/02/2020    LABA1C 7.1 07/30/2020    LABA1C 6.5 (H) 03/02/2019     Lab Results   Component Value Date     03/02/2019     01/16/2015       Care Coordination Interventions    Program Enrollment: Complex Care  Referral from Primary Care Provider: No  Suggested Interventions and Community Resources  Other Services: Completed (Comment: Chemo [facility], [facility]-, 5A-9A)  Zone Management Tools: Completed (Comment: CHF, Diabetes)         Goals Addressed                 This Visit's Progress     Care Coordination Self Management   Improving     CC Self Management Goal  Patient Goal (What steps will patient take to achieve goal?): Monitor blood sugars, be adherent with diet and medications, dialysis treatments  Patient is able to discuss self-management of condition(s): DM, CRF  Pt demonstrates adherence to medications  Pt demonstrates understanding of self-monitoring  Patient is able to identify Red Flags: Alert to potential adverse drug reactions(s) or side effects and actions to take should they arise  Discuss target symptoms and actions to take should they arise  Identify problems that require immediate PCP or specialist visit  Patient demonstrates understanding of access to PCP/Specialist:  Understands about scheduling routine Follow Up appointments   Understands about sick day appointment options for worsening of symptoms/progression (Same Day, E Visits)       Conditions and Symptoms   Improving     I will schedule office visits, as directed by my provider. I will keep my appointment or reschedule if I have to cancel. I will notify my provider of any barriers to my plan of care. I will follow my Zone Management tool to seek urgent or emergent care. I will notify my provider of any symptoms that indicate a worsening of my condition. Barriers: impairment:  physical: ESRD, financial, and lack of support  Plan for overcoming my barriers: Care Coordination  Confidence: 7/10  Anticipated Goal Completion Date: 27 January 2021              Prior to Admission medications    Medication Sig Start Date End Date Taking? Authorizing Provider   meclizine (ANTIVERT) 12.5 MG tablet Take 1 tablet by mouth 3 times daily as needed for Dizziness 12/18/20   Tala Osman MD   darbepoetin paxton-polysorbate (ARANESP) 100 MCG/0.5ML SOSY injection Inject 100 mcg into the skin once a week 11/12/20   Historical Provider, MD   HYDROcodone-acetaminophen (NORCO) 5-325 MG per tablet TAKE 1 TABLET BY MOUTH EVERY 8 (EIGHT) HOURS AS NEEDED FOR PAIN FOR UP TO 7 DAYS.  MAX DAILY 3 TABS 11/6/20   Historical Provider, MD   omeprazole (PRILOSEC) 10 MG delayed release capsule  11/6/20   Historical Provider, MD   hydrALAZINE (APRESOLINE) 100 MG tablet  11/6/20   Historical Provider, MD   NIFEdipine (PROCARDIA XL) 60 MG extended release tablet  11/6/20   Historical Provider, MD doxazosin (CARDURA) 2 MG tablet Take 2 mg by mouth nightly 9/1/20   Historical Provider, MD   zoster recombinant adjuvanted vaccine [facility]) 50 MCG/0.5ML SUSR injection Inject 0.5 mLs into the muscle See Admin Instructions 1 dose now and repeat in 2-6 months 7/30/20 1/26/21  HEATHER Elizondo CNP   pregabalin (LYRICA) 50 MG capsule Take 1 capsule by mouth every evening for 90 days. 7/30/20 10/28/20  HEATHER Elizondo CNP   metoprolol (LOPRESSOR) 100 MG tablet Take 1 tablet by mouth daily TAKE 1 TABLET BY MOUTH 2 TIMES DAILY 9/17/19   Mckenna Anderson MD   doxercalciferol (HECTOROL) 4 MCG/2ML injection Infuse 1.25 mLs intravenously Once in dialysis for 1 dose Given at the end of dialysis Mylinda Coad, Sat 3/19/19 10/23/20  Dandre Dumont MD   CREON 6000 units delayed release capsule TAKE ONE CAPSULE BY MOUTH 3 TIMES A DAY WITH MEALS 6/5/18   Historical Provider, MD   glucose monitoring kit (FREESTYLE) monitoring kit 1 kit by Does not apply route daily 8/7/17   [facility]   Misc.  Devices MISC Check blood sugar one time a day 8/7/17   [facility]   Insulin Pen Needle 32G X 6 MM MISC 1 Package by Does not apply route daily 6/23/17   [facility]       Future Appointments   Date Time Provider Britney Soraida   1/13/2021 12:15 PM Teodora Saint, MD fp [facility]

## 2021-01-14 ENCOUNTER — TELEPHONE (OUTPATIENT)
Dept: FAMILY MEDICINE CLINIC | Age: 56
End: 2021-01-14

## 2021-01-14 NOTE — TELEPHONE ENCOUNTER
Patient has a question for the doctor he can get in tomorrow for his mri with a nurse or should he wait for the doctor until march 19

## 2021-01-15 ENCOUNTER — OFFICE VISIT (OUTPATIENT)
Dept: NEUROLOGY | Age: 56
End: 2021-01-15
Payer: MEDICARE

## 2021-01-15 VITALS
HEIGHT: 70 IN | SYSTOLIC BLOOD PRESSURE: 163 MMHG | HEART RATE: 50 BPM | WEIGHT: 189 LBS | BODY MASS INDEX: 27.06 KG/M2 | TEMPERATURE: 97.1 F | DIASTOLIC BLOOD PRESSURE: 79 MMHG

## 2021-01-15 DIAGNOSIS — G89.29 CHRONIC LOW BACK PAIN WITH SCIATICA, SCIATICA LATERALITY UNSPECIFIED, UNSPECIFIED BACK PAIN LATERALITY: ICD-10-CM

## 2021-01-15 DIAGNOSIS — R42 DIZZINESS: Primary | ICD-10-CM

## 2021-01-15 DIAGNOSIS — I61.9 CEREBRAL HEMORRHAGE (HCC): ICD-10-CM

## 2021-01-15 DIAGNOSIS — R29.898 WEAKNESS OF BOTH LOWER EXTREMITIES: ICD-10-CM

## 2021-01-15 DIAGNOSIS — M54.40 CHRONIC LOW BACK PAIN WITH SCIATICA, SCIATICA LATERALITY UNSPECIFIED, UNSPECIFIED BACK PAIN LATERALITY: ICD-10-CM

## 2021-01-15 DIAGNOSIS — R26.81 GAIT INSTABILITY: ICD-10-CM

## 2021-01-15 PROCEDURE — G8427 DOCREV CUR MEDS BY ELIG CLIN: HCPCS | Performed by: PSYCHIATRY & NEUROLOGY

## 2021-01-15 PROCEDURE — 99204 OFFICE O/P NEW MOD 45 MIN: CPT | Performed by: PSYCHIATRY & NEUROLOGY

## 2021-01-15 PROCEDURE — 3017F COLORECTAL CA SCREEN DOC REV: CPT | Performed by: PSYCHIATRY & NEUROLOGY

## 2021-01-15 PROCEDURE — 1036F TOBACCO NON-USER: CPT | Performed by: PSYCHIATRY & NEUROLOGY

## 2021-01-15 PROCEDURE — G8482 FLU IMMUNIZE ORDER/ADMIN: HCPCS | Performed by: PSYCHIATRY & NEUROLOGY

## 2021-01-15 PROCEDURE — G8419 CALC BMI OUT NRM PARAM NOF/U: HCPCS | Performed by: PSYCHIATRY & NEUROLOGY

## 2021-01-15 RX ORDER — ALPRAZOLAM 0.5 MG/1
TABLET ORAL
Qty: 2 TABLET | Refills: 0 | Status: SHIPPED | OUTPATIENT
Start: 2021-01-15 | End: 2021-03-11 | Stop reason: SDUPTHER

## 2021-01-15 NOTE — PROGRESS NOTES
51 yo wm with dizziness along with imbalance and bilaetral leg weakness. He is having dizziness , falling down having gait imbalance . He has had intermittent dizziness for the past two years brought upon with head truncal change . Dizziness will be vertigio triggered by movement of head or truncal change worse with activity thereon on laying he may still have some mild baseline swaying sensation . Wava Prim There is imbalance on his feet with tendency two sway to either side with falling loosing balance . He is falling 2 to 3 times per week . He reports that dizziness got worse 6 weeks ago occurring with any movement . Before dizziness was 2 to 3 days per week triggered by movement . Falling has become more frequent over the past 6 weeks before being about twice per month , He reports imbalance with postural instability . He reports hat his legs feel weak being stiff neeing to push with hands when getting up from chair . There has been nausea and vomiting with dizziness . He was placed on meclizine 25 mg po tid that will ease dizziness by about 20 % . There is no hearing loss or tinnitus. This in mid low back pain present all the time of aching component of grade 7 over 10 nonradaiting for which he takes norco about twice per week . He has diabetes mellitus with numbness of his feet up to ankle on lyrica 75 mg po qd for neuopathic burning pain. Neck pain is intermittent grade 3 over 10 nonradiating . There are no bowel or bladder complaints . He has renal failure on hemodialysis . Significant medications lyrica 75 mg po qd , antivert 25 mg o tid . Testing MRI of head left mesial occipital old bleed with feeding vesse      Past Medical History:   Diagnosis Date    Chronic kidney disease     left renal infarct-on dialysis Tues. , Thurs, Sat.     [facility] pancreat[facility] [address]     Diabetes mellitus ([facility] [address]     Diverticulosis     Hyperlipidemia     Hypertension     Mild malnutrition (HCC)     MRSA (methicillin resistant staph aureus) culture positive 02/26/2019    nares    Pancreatitis     Personal history of colonic polyps 7/14/2020       Past Surgical History:   Procedure Laterality Date    AV FISTULA CREATION Left     CATHETER REMOVAL N/A 6/7/2017    CATHETER REMOVAL TUNNEL H-D  performed by Jessie Mitchell MD at [address] [place] [address]      with bile duct repair    COLONOSCOPY N/A 7/13/2020    COLONOSCOPY POLYPECTOMY COLD BIOPSY, COLD SNARE performed by Rio Coughlin MD at [address] OTHER SURGICAL HISTORY      bile duct surgery    OTHER SURGICAL HISTORY  06/07/2017    removal tunneled dialysis catheter    OTHER SURGICAL HISTORY      hernia mesh removed    SEPTOPLASTY      TONSILLECTOMY      UPPER GASTROINTESTINAL ENDOSCOPY N/A 10/23/2020    EGD BIOPSY AND DILATION performed by Rio Coughlin MD at [facility]       Family History   Problem Relation Age of Onset    No Known Problems Mother     No Known Problems Father        Social History     Socioeconomic History    Marital status: Single     Spouse name: None    Number of children: 4    Years of education: 12    Highest education level:  Bachelor's degree (e.g., BA, AB, BS)   Occupational History    None   Social Needs    Financial resource strain: Not hard at all   [employer] insecurity     Worry: Never true     Inability: Never true   [employer] needs     Medical: No     Non-medical: No   Tobacco Use    Smoking status: Never Smoker    Smokeless tobacco: Never Used   Substance and Sexual Activity    Alcohol use: Not Currently     Alcohol/week: 0.0 standard drinks     Frequency: Monthly or less     Drinks per session: 1 or 2     Binge frequency: Never     Comment: rarely    Drug use: No    Sexual activity: Yes     Partners: Female   Lifestyle    Physical activity     Days per week: 7 days     Minutes per session: 40 min    Stress: Not at all   Relationships    Social connections     Talks on phone: More than three times a week     Gets together: More than three times a week     Attends [employer] service: Never     Active member of club or organization: Yes     Attends meetings of clubs or organizations: 1 to 4 times per year     Relationship status: Never     Intimate partner violence     Fear of current or ex partner: None     Emotionally abused: None     Physically abused: None     Forced sexual activity: None   Other Topics Concern    None   Social History Narrative    ** Merged History Encounter **            Current Outpatient Medications   Medication Sig Dispense Refill    pregabalin (LYRICA) 75 MG capsule Take 1 capsule by mouth every evening for 30 days. 30 capsule 0    venlafaxine (EFFEXOR XR) 37.5 MG extended release capsule Take 1 capsule by mouth daily 30 capsule 3    diclofenac sodium (VOLTAREN) 1 % GEL Apply topically 2 times daily 120 g 1    meclizine (ANTIVERT) 25 MG tablet Take 1 tablet by mouth 3 times daily as needed for Dizziness 60 tablet 0    omeprazole (PRILOSEC) 10 MG delayed release capsule       NIFEdipine (PROCARDIA XL) 60 MG extended release tablet       metoprolol (LOPRESSOR) 100 MG tablet Take 1 tablet by mouth daily TAKE 1 TABLET BY MOUTH 2 TIMES DAILY 30 tablet 6    Misc.  Devices MISC Check blood sugar one time a day 50 each 5    Insulin Pen Needle 32G X 6 MM MISC 1 Package by Does not apply route daily 100 each 3    hydrALAZINE (APRESOLINE) 100 MG tablet       doxazosin (CARDURA) 2 MG tablet Take 2 mg by mouth nightly      zoster recombinant adjuvanted vaccine [facility]) 50 MCG/0.5ML SUSR injection Inject 0.5 mLs into the muscle See Admin Instructions 1 dose now and repeat in 2-6 months (Patient not taking: Reported on 1/15/2021) 0.5 mL 0    doxercalciferol (HECTOROL) 4 MCG/2ML injection Infuse 1.25 mLs intravenously Once in dialysis for 1 dose Given at the end of dialysis Tues, Thurs, Sat (Patient not taking: Reported on 1/15/2021) 1.25 mL 0    CREON 6000 units delayed release capsule TAKE ONE CAPSULE BY MOUTH 3 TIMES A DAY WITH MEALS  0    glucose monitoring kit (FREESTYLE) monitoring kit 1 kit by Does not apply route daily (Patient not taking: Reported on 1/15/2021) 1 kit 0     No current facility-administered medications for this visit. Allergies   Allergen Reactions    Clonidine Nausea Only     Requested by dr Starr Bullock to add to allergy list, pt should not be on catapress. 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)       ROS:   Constitutional                  Negative for fever and chills   HEENT                            Negative for ear discharge, ear pain, nosebleed  Eyes                                Negative for photophobia, pain and discharge  Respiratory                      Negative for hemoptysis and sputum  Cardiovascular                Negative for orthopnea, claudication and PND  Gastrointestinal               Negative for abdominal pain, diarrhea, blood in stool  Musculoskeletal               Negative for joint pain, negative for myalgia  Skin                                 Negative for rash or itching  Endo/heme/allergies       Negative for polydipsia, environmental allergy  Psychiatric                       Negative for suicidal ideation. Patient is not anxious    Vitals:    01/15/21 1347   BP: (!) 163/79   Pulse: 50   Temp:      Admission weight: 189 lb (85.7 kg)    Neurological Examination  Constitutional .General exam well groomed   Head/ Ears /Nose/Throat/external ear . Normal exam  Neck and thyroid . Normal size. No bruits  Respiratory . Breathsounds clear bilaterally  Cardiovascular:  Auscultation of heart with regular rate and rhythm   Musculoskeletal. Muscle bulk and tone normal                                                           Muscle strength iliopsoas 4-/5 otherwise full strength  5/5 strength throughout No dysmetria or dysdiadokinesis  No tremor   Normal fine motor  Gait wide based   Orientation Alert and oriented x 3   Attention and concentration normal  Short term memory normal  Language process and speech normal . No aphasia   Cranial nerve 2 normal acuety and visual fields  Cranial nerve 3, 4 and 6 . Extraocular muscles are intact . Pupils are equal and reactive   Cranial nerve 5 . Intact corneal reflex. Normal facial sensation  Cranial nerve 7 normal exam   Cranial nerve 8. Grossly intact hearing   Cranial nerve 9 and 10. Symmetric palate elevation   Cranial nerve 11 , 5 out of 5 strength   Cranial Nerve 12 midline tongue . No atrophy  Sensation . Decreased at toe level    Deep Tendon Reflexes with absent knee and ankle jerks   Plantar response equivocal bilaterally    Assessment :     Diagnosis Orders   1. Dizziness  MRA HEAD WO CONTRAST    ALPRAZolam (XANAX) 0.5 MG tablet   2. Gait instability  MRI CERVICAL SPINE WO CONTRAST    MRI LUMBAR SPINE WO CONTRAST   3. Weakness of both lower extremities  MRI CERVICAL SPINE WO CONTRAST    MRI LUMBAR SPINE WO CONTRAST   4. Chronic low back pain with sciatica, sciatica laterality unspecified, unspecified back pain laterality  MRI LUMBAR SPINE WO CONTRAST   5. Cerebral hemorrhage ([address]  MRA HEAD WO CONTRAST   Patient has dizziness that appears to be peripheral vestibular . There is left occipital lobe old hemorrhage with feeding vessel to undergo MRA of head with renal failure on hemodialysis to rule out cerebral AVM .  There is bilateral lower extremity weakness with broad based gait that appears myelopathic to undergo MRI of cervical spine but also MRI lumbar spine with chronic low back pain     Plan:    Orders Placed This Encounter   Procedures    MRA HEAD WO CONTRAST     Standing Status:   Future     Standing Expiration Date:   1/15/2022    MRI CERVICAL SPINE WO CONTRAST     Standing Status:   Future     Standing Expiration Date: 1/15/2022    MRI LUMBAR SPINE WO CONTRAST     Standing Status:   Future     Standing Expiration Date:   1/15/2022

## 2021-01-20 ENCOUNTER — HOSPITAL ENCOUNTER (OUTPATIENT)
Dept: MRI IMAGING | Facility: CLINIC | Age: 56
Discharge: HOME | End: 2021-01-22
Payer: MEDICARE

## 2021-01-20 DIAGNOSIS — M54.40 CHRONIC LOW BACK PAIN WITH SCIATICA, SCIATICA LATERALITY UNSPECIFIED, UNSPECIFIED BACK PAIN LATERALITY: ICD-10-CM

## 2021-01-20 DIAGNOSIS — G89.29 CHRONIC LOW BACK PAIN WITH SCIATICA, SCIATICA LATERALITY UNSPECIFIED, UNSPECIFIED BACK PAIN LATERALITY: ICD-10-CM

## 2021-01-20 DIAGNOSIS — I61.9 CEREBRAL HEMORRHAGE (HCC): ICD-10-CM

## 2021-01-20 DIAGNOSIS — R26.81 GAIT INSTABILITY: ICD-10-CM

## 2021-01-20 DIAGNOSIS — R29.898 WEAKNESS OF BOTH LOWER EXTREMITIES: ICD-10-CM

## 2021-01-20 DIAGNOSIS — R42 DIZZINESS: ICD-10-CM

## 2021-01-20 PROCEDURE — 72141 MRI NECK SPINE W/O DYE: CPT

## 2021-01-20 PROCEDURE — 70544 MR ANGIOGRAPHY HEAD W/O DYE: CPT

## 2021-01-20 PROCEDURE — 72148 MRI LUMBAR SPINE W/O DYE: CPT

## 2021-01-26 ENCOUNTER — TELEPHONE (OUTPATIENT)
Dept: NEUROLOGY | Age: 56
End: 2021-01-26

## 2021-01-26 DIAGNOSIS — Q76.49 CONGENITAL CERVICAL SPINE STENOSIS: Primary | ICD-10-CM

## 2021-01-26 NOTE — TELEPHONE ENCOUNTER
----- Message from Doreen James MD sent at 1/24/2021 11:09 AM EST -----  Let pt know MRI cervical spine does sj how congenital canal stenosis . Would have him evaluated with leg weakness by neurosurgery .  Please make appointment for him to see neurosurgery Dr Chon Boucher

## 2021-02-04 ENCOUNTER — CARE COORDINATION (OUTPATIENT)
Dept: CARE COORDINATION | Age: 56
End: 2021-02-04

## 2021-02-04 NOTE — CARE COORDINATION
I agree with the [id number] can follow with neuro surgeon . If he needs wheechair ,I can order that or any help at home.

## 2021-02-04 NOTE — CARE COORDINATION
Ambulatory Care Coordination Note  2/4/2021  CM Risk Score: 6  Charlson 10 Year Mortality Risk Score: 100%     ACC: Britton Escobar RN    Summary  Date Care Coordination Episode Started:  15 October 2020     Reason for Call Today:      Follow up assessment     Reason patient is in Care Coordination:      1. CHF  2. Diabetes   3. ESRD  4. RAEV 93%     Topics Discussed Today:         1) Dizziness            - Recently had a MRA of the head. Impression: Unremarkable MRA of the brain. 2) Bilateral leg weakness, Frequent falls             - C/O bilateral leg weakness stating \"My legs won't work like they're suppose too. \" Reports falling in his home five (5) times on 01 February. Ambulates with a walker within the home. Uses a cane outside the home. MRI of cervical and lumbar spine completed recently. See impressions below. Patient has appointment with neurosurgery on Tuesday, 09 February. 3) CHF             - Denies any complaints today   Weight 187 lbs. (87 KG)         4) Diabetes             - Does not check glucose levels. No complaints today  Most recent A1C, 5.0% (02 December 2020)    Cervical spine impression:     The bony spinal canal overall is congenitally small.  Disc and osteophytes   result in mild narrowing of the neural foramina and stenosis of the thecal   sac at C5-6 and C6-7 as discussed above.  There is mild stenosis of the   thecal sac at C4-5.         Lumbar spine impression:   Disc protrusion osteophyte toward the left at L5-S1 abutting and displacing   the left S1 nerve root posteriorly laterally within the spinal canal and   narrowing the left neural foramen as discussed above.  Disc and osteophytes   narrow the neural foramina bilaterally at L4-L5.  No stenosis of the thecal   sac in the lumbar region.      Interventions completed today:     1. Update PCP     Assessments completed today:      · Fall risk  · CHF  · DIabetes     Care Coordinator plan of care:     1) Zone Management        CHF - YELLOW Zone        DM -   YELLOW Zone  2) Continue use of assistive devices when ambulating  3) Review ACP during next encounter  4) Follow up with neurosurgery as scheduled  5) Follow up with patient in one week     Congestive Heart Failure Assessment    Are you currently restricting fluids?: Other (Comment: 1500ml/day)  Do you understand a low sodium diet?: Yes  Do you understand how to read food labels?: Yes  How many restaurant meals do you eat per week?: 0  Do you salt your food before tasting it?: No         Symptoms:  None: Yes      Symptom course: stable  Patient-reported weight (lb): 187  Weight trend: fluctuating minimally  Salt intake watch compared to last visit: stable       Diabetes Assessment    Medic Alert ID: No  Meal Planning: None   How often do you test your blood sugar?: No Testing (Comment: Three times per week at dialysis)   Do you have barriers with adherence to non-pharmacologic self-management interventions?  (Nutrition/Exercise/Self-Monitoring): No   Have you ever had to go to the ED for symptoms of low blood sugar?: No       No patient-reported symptoms        Wt Readings from Last 3 Encounters:   01/15/21 189 lb (85.7 kg)   01/13/21 190 lb (86.2 kg)   12/02/20 198 lb 12.8 oz (90.2 kg)     BP Readings from Last 3 Encounters:   01/15/21 (!) 163/79   01/13/21 (!) 145/88   12/02/20 138/84     Lab Results   Component Value Date    LABA1C 5.0 12/02/2020    LABA1C 7.1 07/30/2020    LABA1C 6.5 (H) 03/02/2019     Lab Results   Component Value Date     03/02/2019     01/16/2015         Care Coordination Interventions    Program Enrollment: Complex Care  Referral from Primary Care Provider: No  Suggested Interventions and Community Resources  Other Services: Completed (Comment: Chemo [place], [facility], 5A-9A)  Zone Management Tools: Completed (Comment: CHF, Diabetes)         Goals Addressed                 This Visit's Progress  Care Coordination Self Management   On track     CC Self Management Goal  Patient Goal (What steps will patient take to achieve goal?): Monitor blood sugars, be adherent with diet and medications, dialysis treatments               (Not monitoring glucose at home. Most recent A1C was 5.0% . Glucose check at dialysis 3 times weekly)  Patient is able to discuss self-management of condition(s): DM, CRF  Pt demonstrates adherence to medications  Pt demonstrates understanding of self-monitoring  Patient is able to identify Red Flags:  Alert to potential adverse drug reactions(s) or side effects and actions to take should they arise  Discuss target symptoms and actions to take should they arise  Identify problems that require immediate PCP or specialist visit  Patient demonstrates understanding of access to PCP/Specialist:  Understands about scheduling routine Follow Up appointments   Understands about sick day appointment options for worsening of symptoms/progression (Same Day, E Visits)       Conditions and Symptoms   On track     I will schedule office visits, as directed by my provider. I will keep my appointment or reschedule if I have to cancel. I will notify my provider of any barriers to my plan of care. I will follow my Zone Management tool to seek urgent or emergent care. I will notify my provider of any symptoms that indicate a worsening of my condition. Barriers: impairment:  physical: ESRD, financial, and lack of support  Plan for overcoming my barriers: Care Coordination  Confidence: 7/10  Anticipated Goal Completion Date: 27 January 2021              Prior to Admission medications    Medication Sig Start Date End Date Taking? Authorizing Provider   ALPRAZolam (XANAX) 0.5 MG tablet Take 1 po 15 minutes before study .  May repeat x 1 1/15/21 2/15/21 Yes Jose Kong MD pregabalin (LYRICA) 75 MG capsule Take 1 capsule by mouth every evening for 30 days. 1/13/21 2/12/21 Yes Clint Mix MD   venlafaxine (EFFEXOR XR) 37.5 MG extended release capsule Take 1 capsule by mouth daily 1/13/21  Yes Clint Mix MD   diclofenac sodium (VOLTAREN) 1 % GEL Apply topically 2 times daily 1/13/21  Yes Clint Mix MD   meclizine (ANTIVERT) 25 MG tablet Take 1 tablet by mouth 3 times daily as needed for Dizziness 1/13/21  Yes Clint Mix MD   omeprazole ([address]) 10 MG delayed release capsule  11/6/20  Yes Historical Provider, MD   hydrALAZINE (APRESOLINE) 100 MG tablet  11/6/20  Yes Historical Provider, MD   NIFEdipine (PROCARDIA XL) 60 MG extended release tablet  11/6/20  Yes Historical Provider, MD   doxazosin (CARDURA) 2 MG tablet Take 2 mg by mouth nightly 9/1/20  Yes Historical Provider, MD   metoprolol (LOPRESSOR) 100 MG tablet Take 1 tablet by mouth daily TAKE 1 TABLET BY MOUTH 2 TIMES DAILY 9/17/19  Yes Kevin Gaytan MD   CREON 6000 units delayed release capsule TAKE ONE CAPSULE BY MOUTH 3 TIMES A DAY WITH MEALS 6/5/18  Yes Historical Provider, MD   Misc.  Devices MISC Check blood sugar one time a day 8/7/17  Yes eDnae Hendrickson   Insulin Pen Needle 32G X 6 MM MISC 1 Package by Does not apply route daily 6/23/17  Yes Denae Hendrickson   doxercalciferol (HECTOROL) 4 MCG/2ML injection Infuse 1.25 mLs intravenously Once in dialysis for 1 dose Given at the end of dialysis [place], Sat  Patient not taking: Reported on 1/15/2021 3/19/19 1/13/21  Modesto Faust MD   glucose monitoring kit (FREESTYLE) monitoring kit 1 kit by Does not apply route daily  Patient not taking: Reported on 1/15/2021 8/7/17   [facility]       Future Appointments   Date Time Provider Britney Quigley   2/9/2021  8:30 AM Cristo [place]adigna, [facility]   2/10/2021  9:15 AM [facility]   3/19/2021  2:20 PM Rafy Tilley

## 2021-02-09 ENCOUNTER — OFFICE VISIT (OUTPATIENT)
Dept: NEUROSURGERY | Age: 56
End: 2021-02-09
Payer: MEDICARE

## 2021-02-09 VITALS
HEART RATE: 51 BPM | WEIGHT: 185 LBS | SYSTOLIC BLOOD PRESSURE: 166 MMHG | DIASTOLIC BLOOD PRESSURE: 79 MMHG | BODY MASS INDEX: 26.54 KG/M2

## 2021-02-09 DIAGNOSIS — R26.0 ATAXIC GAIT: Primary | ICD-10-CM

## 2021-02-09 DIAGNOSIS — R29.898 LEG WEAKNESS, BILATERAL: ICD-10-CM

## 2021-02-09 PROCEDURE — 3017F COLORECTAL CA SCREEN DOC REV: CPT | Performed by: NEUROLOGICAL SURGERY

## 2021-02-09 PROCEDURE — G8427 DOCREV CUR MEDS BY ELIG CLIN: HCPCS | Performed by: NEUROLOGICAL SURGERY

## 2021-02-09 PROCEDURE — 1036F TOBACCO NON-USER: CPT | Performed by: NEUROLOGICAL SURGERY

## 2021-02-09 PROCEDURE — G8419 CALC BMI OUT NRM PARAM NOF/U: HCPCS | Performed by: NEUROLOGICAL SURGERY

## 2021-02-09 PROCEDURE — 99204 OFFICE O/P NEW MOD 45 MIN: CPT | Performed by: NEUROLOGICAL SURGERY

## 2021-02-09 PROCEDURE — G8482 FLU IMMUNIZE ORDER/ADMIN: HCPCS | Performed by: NEUROLOGICAL SURGERY

## 2021-02-09 RX ORDER — CALCIUM ACETATE 667 MG/1
CAPSULE ORAL
COMMUNITY
Start: 2021-02-08

## 2021-02-09 NOTE — PROGRESS NOTES
Department of Neurosurgery                                                 New patient clinic note      Reason for Consult: Congenital stenosis of the cervical spine  Requesting Physician: Dr. Alice Ace: Diedre Claude, DO      History Obtained From:  patient, domestic partner, EMR    CHIEF COMPLAINT:         Chief Complaint   Patient presents with    New Patient     Congential cervical stenosis    Results     MRI C/T/L-1/20 and CT C/T/L-2/26/19       HISTORY OF PRESENT ILLNESS:       The patient is a 50 y.o. male who presents with progressive dizziness, gait ataxia, leg weakness since October. He denies loss of sensation in his arms or legs. He has chronic mild back and leg pain that is not different. He has occasional shooting pain down the legs that is tolerable. He is stable foot numbness from diabetic neuropathy. He is very wobbly to stand and walk and he falls very easily, one  time 5 time in one day day. He denies any weakness in his upper extremities. In retrospect he has symptoms prior to October but since October his fall has worsened. He also admits to weakness in the hands with trouble buttoning buttons, trouble opening jars for long time. He denies trouble swallowing. He does complain of the room moving when he is standing still    PAST MEDICAL HISTORY :       Past Medical History:        Diagnosis Date    Chronic kidney disease     left renal infarct-on dialysis Tues. , Thurs, Sat.     Chronic pancreatitis ([address]     Diabetes mellitus ([address]     Diverticulosis     Hyperlipidemia     Hypertension     Mild malnutrition (HCC)     MRSA (methicillin resistant staph aureus) culture positive 02/26/2019    nares    Pancreatitis     Personal history of colonic polyps 7/14/2020       Past Surgical History:        Procedure Laterality Date    AV FISTULA CREATION Left     CATHETER REMOVAL N/A 6/7/2017 Fear of current or ex partner: Not on file     Emotionally abused: Not on file     Physically abused: Not on file     Forced sexual activity: Not on file   Other Topics Concern    Not on file   Social History Narrative    ** Merged History Encounter **            Family History:       Problem Relation Age of Onset    No Known Problems Mother     No Known Problems Father        Allergies:  Clonidine and Heparin    Home Medications:  Prior to Admission medications    Medication Sig Start Date End Date Taking? Authorizing Provider   Calcium Acetate, Phos Binder, 667 MG CAPS  2/8/21  Yes Historical Provider, MD   ALPRAZolam (XANAX) 0.5 MG tablet Take 1 po 15 minutes before study . May repeat x 1 1/15/21 2/15/21 Yes Ekta Woods MD   pregabalin (LYRICA) 75 MG capsule Take 1 capsule by mouth every evening for 30 days.  1/13/21 2/12/21 Yes Thom Whaley MD   venlafaxine (EFFEXOR XR) 37.5 MG extended release capsule Take 1 capsule by mouth daily 1/13/21  Yes Thom Whaley MD   diclofenac sodium (VOLTAREN) 1 % GEL Apply topically 2 times daily 1/13/21  Yes Thom Whaley MD   meclizine (ANTIVERT) 25 MG tablet Take 1 tablet by mouth 3 times daily as needed for Dizziness 1/13/21  Yes Thom Whaley MD   omeprazole (PRILOSEC) 10 MG delayed release capsule  11/6/20  Yes Historical Provider, MD   hydrALAZINE (APRESOLINE) 100 MG tablet  11/6/20  Yes Historical Provider, MD   NIFEdipine (PROCARDIA XL) 60 MG extended release tablet  11/6/20  Yes Historical Provider, MD   doxazosin (CARDURA) 2 MG tablet Take 2 mg by mouth nightly 9/1/20  Yes Historical Provider, MD   metoprolol (LOPRESSOR) 100 MG tablet Take 1 tablet by mouth daily TAKE 1 TABLET BY MOUTH 2 TIMES DAILY 9/17/19  Yes Bonnie Espitia MD   doxercalciferol (HECTOROL) 4 MCG/2ML injection Infuse 1.25 mLs intravenously Once in dialysis for 1 dose Given at the end of dialysis Aziza Ramsay, Sat 3/19/19 2/9/21 Yes Yvonna Rinne, MD CREON 6000 units delayed release capsule TAKE ONE CAPSULE BY MOUTH 3 TIMES A DAY WITH MEALS 6/5/18  Yes Historical Provider, MD   glucose monitoring kit (FREESTYLE) monitoring kit 1 kit by Does not apply route daily 8/7/17  Yes [address] [facility]. Devices MISC Check blood sugar one time a day 8/7/17  Yes Kaelynadamaris [place]   Insulin Pen Needle 32G X 6 MM MISC 1 Package by Does not apply route daily 6/23/17  Yes Madi Lechuga       Current Medications:   No current facility-administered medications for this visit. REVIEW OF SYSTEMS:       CONSTITUTIONAL: negative for fatigue and malaise   EYES: negative for double vision and photophobia    HEENT: negative for tinnitus and sore throat   RESPIRATORY: negative for cough, shortness of breath   CARDIOVASCULAR: negative for chest pain, palpitations   GASTROINTESTINAL: negative for nausea, vomiting   GENITOURINARY: negative for incontinence   MUSCULOSKELETAL: negative for neck or back pain   NEUROLOGICAL: negative for seizures   PSYCHIATRIC: negative for agitated     Review of systems otherwise negative. PHYSICAL EXAM:       BP (!) 193/87 (Site: Right Upper Arm, Position: Sitting, Cuff Size: Medium Adult)   Pulse 50   Wt 185 lb (83.9 kg) Comment: verbal  BMI 26.54 kg/m²     Gen: NAD, comfortable  HEENT: moist mucus membranes  Cardio: RRR  Pulm: chest rise symmetrically  GI: abd soft  Ext: no edema  Skin: warm    Neuro:    AOX3  CN 2-12 grossly intact, no nystagmus, no tongue fasciculation   speech articulate  Motor 5/5 except bilateral hip flexion 4 out of 5. Dorsi flexion is 4+ out of 5  Can stand on toes for a few seconds. There is bilateral atrophy of the first webspace of the hands. Sensation symmetrical   No willoughby or clonus  Ataxic broad-based gait  Positive Romberg sign  No dysmetria no dysdiadochokinesia  DTRs are symmetrical hyporeflexic.   Achilles is very diminished    LABS AND IMAGING:     CBC with Differential:    Lab Results   Component Value Date WBC 5.6 03/17/2019    RBC 3.02 03/17/2019    RBC 4.13 11/12/2011    HGB 7.8 09/05/2020    HCT 27.5 03/17/2019     03/17/2019     11/12/2011    MCV 91.1 03/17/2019    MCH 31.0 03/17/2019    MCHC 34.0 03/17/2019    RDW 14.9 03/17/2019    LYMPHOPCT 9 03/06/2019    MONOPCT 15 03/06/2019    MYELOPCT 1 10/10/2011    BASOPCT 1 03/06/2019    MONOSABS 1.30 03/06/2019    LYMPHSABS 0.70 03/06/2019    EOSABS 0.10 03/06/2019    BASOSABS 0.10 03/06/2019    DIFFTYPE NOT REPORTED 03/06/2019     BMP:    Lab Results   Component Value Date     05/10/2019    K 5.3 07/13/2020     05/10/2019    CO2 23 05/10/2019    BUN 41 05/10/2019    LABALBU 4.2 05/10/2019    LABALBU 3.8 11/12/2011    CREATININE 6.44 05/10/2019    CALCIUM 8.4 05/10/2019    GFRAA 11 05/10/2019    LABGLOM 9 05/10/2019    GLUCOSE 215 05/10/2019    GLUCOSE 182 11/12/2011       Radiology Review: MRI cervical spine: Congenital cervical canal stenosis without focal stenosis or cord compression  MRI lumbar spine shows degenerative changes disc degeneration at L4-5 L5-S1 with left paracentral disc herniation and L5-S1.   MRI Brain is negative for acute disease  ASSESSMENT AND PLAN:       Patient Active Problem List   Diagnosis    Hypertensive heart and renal disease with CHF and ESRD ([address]    ESRD (end stage renal disease) on dialysis ([address]    Hypertensive encephalopathy    Type 2 diabetes mellitus with chronic kidney disease on chronic dialysis, with long-term current use of insulin ([address]    Essential hypertension    Polyp of ascending colon    Personal history of colonic polyps    Duodenal mass    Incisional hernia    Left inguinal hernia    MRSA (methicillin resistant Staphylococcus aureus)    Noncompliance with medications    Obstructive sleep apnea syndrome    Thrombocytopenia (HCC)    Mixed hyperlipidemia    Vitamin D deficiency    Chronic fatigue    Stage 5 chronic kidney disease on chronic dialysis ([address]    Ileus ([address]  Gastroesophageal reflux disease without esophagitis    Chronic diastolic congestive heart failure (HCC)    Anemia of chronic disease    Vertigo    Hearing problem of left ear    Vision loss    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 male with progressive gait ataxia and proximal leg weakness and also more chronic onset of hand weakness. Has ataxia is highly associated with subjective feeling of vertigo    This does not seem to be related to the canal stenosis, MRI of cervical brain and lumbar spine is negative for compressive lesions of the cord    I will recommend a MRI of the thoracic spine for completeness  Also he should get a EMG to evaluate for peripheral neuropathy as well as CK aldolase for muscle disease  He has had a test for vertigo and he should follow-up with his physician  I showed the patient the imagings and explained the diagnosis and the various treatment options    He should also follow with Dr. Anupama Magdaleno DO     2/9/2021  8:54 AM    Michael Pappas DO  Staff Neurosurgeon    This note was created using voice recognition software. There may be inaccuracies of transcription  that are inadvertently overlooked prior to the signature. There is any questions about the transcription please contact me.

## 2021-02-11 ENCOUNTER — CARE COORDINATION (OUTPATIENT)
Dept: CARE COORDINATION | Age: 56
End: 2021-02-11

## 2021-02-19 RX ORDER — MECLIZINE HYDROCHLORIDE 25 MG/1
TABLET ORAL
Qty: 60 TABLET | Refills: 0 | Status: SHIPPED | OUTPATIENT
Start: 2021-02-19 | End: 2021-03-25

## 2021-02-19 NOTE — TELEPHONE ENCOUNTER
Please Approve or Refuse.   Send to Pharmacy per Pt's Request:      Next Visit Date:  Visit date not found   Last Visit Date: 1/13/2021    Hemoglobin A1C (%)   Date Value   12/02/2020 5.0   07/30/2020 7.1   03/02/2019 6.5 (H)             ( goal A1C is < 7)   BP Readings from Last 3 Encounters:   02/09/21 (!) 166/79   01/15/21 (!) 163/79   01/13/21 (!) 145/88          (goal 120/80)  BUN   Date Value Ref Range Status   05/10/2019 41 (H) 6 - 20 mg/dL Final     CREATININE   Date Value Ref Range Status   05/10/2019 6.44 (HH) 0.70 - 1.20 mg/dL Final     Potassium   Date Value Ref Range Status   07/13/2020 5.3 3.7 - 5.3 mmol/L Final

## 2021-03-11 ENCOUNTER — TELEPHONE (OUTPATIENT)
Dept: NEUROSURGERY | Age: 56
End: 2021-03-11

## 2021-03-11 DIAGNOSIS — R42 DIZZINESS: ICD-10-CM

## 2021-03-11 RX ORDER — ALPRAZOLAM 0.5 MG/1
TABLET ORAL
Qty: 2 TABLET | Refills: 0 | Status: SHIPPED | OUTPATIENT
Start: 2021-03-11 | End: 2021-04-11

## 2021-03-11 NOTE — TELEPHONE ENCOUNTER
Brenda Petit called stating pt is scheduled to have MRI tmrw at 1p, and is in need of something for his anxiety. Pt had been prescribed alprazolam at his last imaging appt in Feb.     Medication pending.

## 2021-03-12 ENCOUNTER — HOSPITAL ENCOUNTER (OUTPATIENT)
Dept: MRI IMAGING | Facility: CLINIC | Age: 56
Discharge: HOME | End: 2021-03-14
Payer: MEDICARE

## 2021-03-12 DIAGNOSIS — R26.0 ATAXIC GAIT: ICD-10-CM

## 2021-03-12 PROCEDURE — 72146 MRI CHEST SPINE W/O DYE: CPT

## 2021-03-19 ENCOUNTER — HOSPITAL ENCOUNTER (OUTPATIENT)
Age: 56
Setting detail: SPECIMEN
Discharge: HOME | End: 2021-03-19
Payer: MEDICARE

## 2021-03-19 ENCOUNTER — OFFICE VISIT (OUTPATIENT)
Dept: NEUROLOGY | Age: 56
End: 2021-03-19
Payer: MEDICARE

## 2021-03-19 VITALS
TEMPERATURE: 97.7 F | HEART RATE: 55 BPM | BODY MASS INDEX: 27.2 KG/M2 | HEIGHT: 70 IN | WEIGHT: 190 LBS | DIASTOLIC BLOOD PRESSURE: 109 MMHG | SYSTOLIC BLOOD PRESSURE: 203 MMHG

## 2021-03-19 DIAGNOSIS — R29.898 WEAKNESS OF BOTH LOWER EXTREMITIES: ICD-10-CM

## 2021-03-19 DIAGNOSIS — E83.19 MULTIPLE HEMOSIDERIN DEPOSITS IN BRAIN: ICD-10-CM

## 2021-03-19 DIAGNOSIS — R26.81 GAIT INSTABILITY: ICD-10-CM

## 2021-03-19 DIAGNOSIS — E08.40 DIABETES MELLITUS DUE TO UNDERLYING CONDITION WITH DIABETIC NEUROPATHY, UNSPECIFIED WHETHER LONG TERM INSULIN USE (HCC): ICD-10-CM

## 2021-03-19 DIAGNOSIS — R26.0 ATAXIC GAIT: ICD-10-CM

## 2021-03-19 DIAGNOSIS — E08.42 DIABETIC POLYNEUROPATHY ASSOCIATED WITH DIABETES MELLITUS DUE TO UNDERLYING CONDITION (HCC): ICD-10-CM

## 2021-03-19 DIAGNOSIS — G62.9 POLYNEUROPATHY: ICD-10-CM

## 2021-03-19 DIAGNOSIS — D63.8 ANEMIA OF CHRONIC DISEASE: ICD-10-CM

## 2021-03-19 DIAGNOSIS — Q76.49 CONGENITAL CERVICAL SPINE STENOSIS: Primary | ICD-10-CM

## 2021-03-19 DIAGNOSIS — E09.40: ICD-10-CM

## 2021-03-19 DIAGNOSIS — N18.6 END STAGE RENAL DISEASE (HCC): ICD-10-CM

## 2021-03-19 DIAGNOSIS — G93.89 MULTIPLE HEMOSIDERIN DEPOSITS IN BRAIN: ICD-10-CM

## 2021-03-19 LAB
FOLATE: 5.7 NG/ML
SEDIMENTATION RATE, ERYTHROCYTE: 8 MM (ref 0–20)
TSH SERPL DL<=0.05 MIU/L-ACNC: 2.89 MIU/L (ref 0.3–5)
VITAMIN B-12: 501 PG/ML (ref 232–1245)
VITAMIN D 25-HYDROXY: 18.5 NG/ML (ref 30–100)

## 2021-03-19 PROCEDURE — G8419 CALC BMI OUT NRM PARAM NOF/U: HCPCS | Performed by: PSYCHIATRY & NEUROLOGY

## 2021-03-19 PROCEDURE — 3017F COLORECTAL CA SCREEN DOC REV: CPT | Performed by: PSYCHIATRY & NEUROLOGY

## 2021-03-19 PROCEDURE — 2022F DILAT RTA XM EVC RTNOPTHY: CPT | Performed by: PSYCHIATRY & NEUROLOGY

## 2021-03-19 PROCEDURE — 99214 OFFICE O/P EST MOD 30 MIN: CPT | Performed by: PSYCHIATRY & NEUROLOGY

## 2021-03-19 PROCEDURE — G8482 FLU IMMUNIZE ORDER/ADMIN: HCPCS | Performed by: PSYCHIATRY & NEUROLOGY

## 2021-03-19 PROCEDURE — 1036F TOBACCO NON-USER: CPT | Performed by: PSYCHIATRY & NEUROLOGY

## 2021-03-19 PROCEDURE — G8427 DOCREV CUR MEDS BY ELIG CLIN: HCPCS | Performed by: PSYCHIATRY & NEUROLOGY

## 2021-03-19 NOTE — PROGRESS NOTES
Active problem dizziness that appears to be peripheral vestibular . There is left occipital lobe old hemorrhage on MRI to undergo MRA of head . Endstage renal failure on hemodialysis . There is bilateral lower extremity weakness with broad based gait that appears myelopathic to undergo MRI of cervical spine but also MRI lumbar spine with chronic low back pain . 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 He did see neurosurgery Dr Claire Paniagua who feels per patient cervical congenital spinal amy stenosis is asymptomatic. There continues positional vertigo improved on antivert 25 mg po tid . Unsteadiness of gait with leg weaknes continues with buckling of legs with falls twice per day . He is ambulating with walker  . There is numbness of his feet to mid level . 51 yo wm with  dizziness along with imbalance and bilateral leg weakness. He is having dizziness , falling down with gait imbalance . He has had intermittent dizziness for the past two years brought upon with head truncal change . Dizziness will be vertigio triggered by movement of head or truncal change worse with activity thereon on laying he may still have some mild baseline swaying sensation . Ulus Reusing There is imbalance on his feet with tendency two sway to either side with falling loosing balance . He reports hat his legs feel weak being stiff neeing to push with hands when getting up from chair . There is no hearing loss or tinnitus. This in mid low back pain present all the time of aching component of grade 7 over 10 nonradaiting for which he takes norco about twice per week . He has diabetes mellitus with numbness of his feet up to ankle on lyrica 75 mg po qd for neuopathic burning pain. Neck pain is intermittent grade 3 over 10 nonradiating . There are no bowel or bladder complaints . He has renal failure on hemodialysis . Significant medications lyrica 75 mg po qd , antivert 25 mg o tid . Testing MRI of head left mesial occipital old bleed with feeding vessel . MRA of Head normal . 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 MRI thoracic spine with multilevel degenerative change without significant stenosis  MRI lumbar spine with dsc protrusion osteophyte towards the left abutting L5-S1 and displacing left S1 nerve root . Narrowing bilateral L4-5 neural foramina from disc osteophytes      Past Medical History:   Diagnosis Date    Chronic kidney disease     left renal infarct-on dialysis Tues. , Thurs, Sat.  [facility]     Diabetes mellitus ([facility]     Diverticulosis     Hyperlipidemia     Hypertension     Mild malnutrition (HCC)     MRSA (methicillin resistant staph aureus) culture positive 02/26/2019    nares    Pancreatitis     Personal history of colonic polyps 7/14/2020       Past Surgical History:   Procedure Laterality Date    AV FISTULA CREATION Left     CATHETER REMOVAL N/A 6/7/2017    CATHETER REMOVAL TUNNEL H-D  performed by Ajay Tobar MD at [address]      with bile duct repair    COLONOSCOPY N/A 7/13/2020    COLONOSCOPY POLYPECTOMY COLD BIOPSY, COLD SNARE performed by Geraldine Zamora MD at [address] OTHER SURGICAL HISTORY      bile duct surgery    OTHER SURGICAL HISTORY  06/07/2017    removal tunneled dialysis catheter    OTHER SURGICAL HISTORY      hernia mesh removed    SEPTOPLASTY      TONSILLECTOMY      UPPER GASTROINTESTINAL ENDOSCOPY N/A 10/23/2020    EGD BIOPSY AND DILATION performed by Geraldine Zamora MD at [facility]       Family History   Problem Relation Age of Onset    No Known Problems Mother     No Known Problems Father        Social History     Socioeconomic History    Marital status: Single     Spouse name: None    Number of children: 4    Years of education: 12    Highest education level:  Bachelor's degree (e.g., BA, AB, BS) 60 MG extended release tablet       metoprolol (LOPRESSOR) 100 MG tablet Take 1 tablet by mouth daily TAKE 1 TABLET BY MOUTH 2 TIMES DAILY (Patient taking differently: TAKE 1 TABLET BY MOUTH 2 TIMES DAILY) 30 tablet 6    doxercalciferol (HECTOROL) 4 MCG/2ML injection Infuse 1.25 mLs intravenously Once in dialysis for 1 dose Given at the end of dialysis Tues, Thurs, Sat 1.25 mL 0    Insulin Pen Needle 32G X 6 MM MISC 1 Package by Does not apply route daily 100 each 3    metFORMIN (GLUCOPHAGE-XR) 500 MG extended release tablet Take 1 tablet by mouth daily (with breakfast) 90 tablet 3    vitamin D (ERGOCALCIFEROL) 1.25 MG (06393 UT) CAPS capsule Take 1 capsule by mouth once a week 12 capsule 1    diclofenac sodium (VOLTAREN) 1 % GEL Apply topically 2 times daily (Patient not taking: Reported on 3/19/2021) 120 g 1    doxazosin (CARDURA) 2 MG tablet Take 2 mg by mouth nightly      CREON 6000 units delayed release capsule TAKE ONE CAPSULE BY MOUTH 3 TIMES A DAY WITH MEALS  0    glucose monitoring kit (FREESTYLE) monitoring kit 1 kit by Does not apply route daily (Patient not taking: Reported on 3/19/2021) 1 kit 0    Misc. Devices MISC Check blood sugar one time a day (Patient not taking: Reported on 3/19/2021) 50 each 5     No current facility-administered medications for this visit. Allergies   Allergen Reactions    Clonidine Nausea Only     Requested by dr Luz Marina Barajas to add to allergy list, pt should not be on catapress. 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)       ROS:   Constitutional                  Negative for fever and chills   HEENT                            Negative for ear discharge, ear pain, nosebleed  Eyes                                Negative for photophobia, pain and discharge  Respiratory                      Negative for hemoptysis and sputum  Cardiovascular                Negative for orthopnea, claudication and PND  Gastrointestinal               Negative for abdominal pain, diarrhea, blood in stool  Musculoskeletal               Negative for joint pain, negative for myalgia  Skin                                 Negative for rash or itching  Endo/heme/allergies       Negative for polydipsia, environmental allergy  Psychiatric                       Negative for suicidal ideation. Patient is not anxious    Vitals:    03/19/21 1429   BP: (!) 203/109   Pulse: 55   Temp:      Admission weight: 190 lb (86.2 kg)    Neurological Examination  Constitutional .General exam well groomed   Head/ Ears /Nose/Throat/external ear . Normal exam  Neck and thyroid . Normal size. No bruits  Respiratory . Breathsounds clear bilaterally  Cardiovascular: Auscultation of heart with regular rate and rhythm   Musculoskeletal. Muscle bulk and tone normal                                                           Muscle strength iliopsoas 4-/5 otherwise full strength  5/5 strength throughout                                                                                No dysmetria or dysdiadokinesis  No tremor   Normal fine motor  Gait wide based   Orientation Alert and oriented x 3   Attention and concentration normal  Short term memory normal  Language process and speech normal . No aphasia   Cranial nerve 2 normal acuety and visual fields  Cranial nerve 3, 4 and 6 . Extraocular muscles are intact . Pupils are equal and reactive   Cranial nerve 5 . Intact corneal reflex. Normal facial sensation  Cranial nerve 7 normal exam   Cranial nerve 8. Grossly intact hearing   Cranial nerve 9 and 10. Symmetric palate elevation   Cranial nerve 11 , 5 out of 5 strength   Cranial Nerve 12 midline tongue . No atrophy  Sensation . Decreased at toe level    Deep Tendon Reflexes with absent knee and ankle jerks   Plantar response equivocal bilaterally    Assessment :     Diagnosis Orders   1. Congenital cervical spine stenosis     2.  Gait instability 3. Weakness of both lower extremities  Hemoglobin A1C    Sedimentation rate, automated    TADEO    Electrophoresis Protein, Serum without Reflex to Immunofixation    TSH without Reflex   4. Diabetic polyneuropathy associated with diabetes mellitus due to underlying condition ([facility])  EMG   5. Diabetes mellitus due to underlying condition with diabetic neuropathy, unspecified whether long term insulin use ([facility])   Hemoglobin A1C   Suspicion is that that he ha symptomatic cervical spinal stenosis with imbalance and falling with cojoint diabetic polyneuropathy to undergo EMG of legs with neuropathic bloodwork . Will discuss with neurosurgery Dr Paulo Hu:    Orders Placed This Encounter   Procedures    Hemoglobin A1C     Standing Status:   Future     Number of Occurrences:   1     Standing Expiration Date:   3/19/2022    Sedimentation rate, automated     Standing Status:   Future     Number of Occurrences:   1     Standing Expiration Date:   3/19/2022    TADEO     Standing Status:   Future     Number of Occurrences:   1     Standing Expiration Date:   3/19/2022    Electrophoresis Protein, Serum without Reflex to Immunofixation     Standing Status:   Future     Number of Occurrences:   1     Standing Expiration Date:   3/19/2022    TSH without Reflex     Standing Status:   Future     Number of Occurrences:   1     Standing Expiration Date:   3/19/2022    EMG     Standing Status:   Future     Standing Expiration Date:   5/18/2021     Order Specific Question:   Which body part?      Answer:   lower legs

## 2021-03-21 DIAGNOSIS — E55.9 VITAMIN D DEFICIENCY: ICD-10-CM

## 2021-03-21 DIAGNOSIS — N18.6 TYPE 2 DIABETES MELLITUS WITH CHRONIC KIDNEY DISEASE ON CHRONIC DIALYSIS, WITH LONG-TERM CURRENT USE OF INSULIN (HCC): Primary | ICD-10-CM

## 2021-03-21 DIAGNOSIS — Z79.4 TYPE 2 DIABETES MELLITUS WITH CHRONIC KIDNEY DISEASE ON CHRONIC DIALYSIS, WITH LONG-TERM CURRENT USE OF INSULIN (HCC): Primary | ICD-10-CM

## 2021-03-21 DIAGNOSIS — Z99.2 TYPE 2 DIABETES MELLITUS WITH CHRONIC KIDNEY DISEASE ON CHRONIC DIALYSIS, WITH LONG-TERM CURRENT USE OF INSULIN (HCC): Primary | ICD-10-CM

## 2021-03-21 DIAGNOSIS — E11.22 TYPE 2 DIABETES MELLITUS WITH CHRONIC KIDNEY DISEASE ON CHRONIC DIALYSIS, WITH LONG-TERM CURRENT USE OF INSULIN (HCC): Primary | ICD-10-CM

## 2021-03-21 LAB
ESTIMATED AVERAGE GLUCOSE: 131 MG/DL
HBA1C MFR BLD: 6.2 % (ref 4–6)

## 2021-03-21 RX ORDER — ERGOCALCIFEROL 1.25 MG/1
50000 CAPSULE ORAL WEEKLY
Qty: 12 CAPSULE | Refills: 1 | Status: SHIPPED | OUTPATIENT
Start: 2021-03-21

## 2021-03-21 RX ORDER — METFORMIN HYDROCHLORIDE 500 MG/1
500 TABLET, EXTENDED RELEASE ORAL
Qty: 90 TABLET | Refills: 3 | Status: SHIPPED | OUTPATIENT
Start: 2021-03-21

## 2021-03-22 LAB
ALBUMIN (CALCULATED): 5.4 G/DL (ref 3.2–5.2)
ALBUMIN PERCENT: 72 % (ref 45–65)
ALPHA 1 PERCENT: 2 % (ref 3–6)
ALPHA 2 PERCENT: 6 % (ref 6–13)
ALPHA-1-GLOBULIN: 0.2 G/DL (ref 0.1–0.4)
ALPHA-2-GLOBULIN: 0.5 G/DL (ref 0.5–0.9)
ANTI-NUCLEAR ANTIBODY (ANA): NEGATIVE
BETA GLOBULIN: 0.6 G/DL (ref 0.5–1.1)
BETA PERCENT: 8 % (ref 11–19)
GAMMA GLOBULIN %: 13 % (ref 9–20)
GAMMA GLOBULIN: 1 G/DL (ref 0.5–1.5)
PATHOLOGIST: ABNORMAL
PROTEIN ELECTROPHORESIS, SERUM: ABNORMAL
TOTAL PROT. SUM,%: 101 % (ref 98–102)
TOTAL PROT. SUM: 7.7 G/DL (ref 6.3–8.2)
TOTAL PROTEIN: 7.6 G/DL (ref 6.4–8.3)

## 2021-03-24 DIAGNOSIS — E11.22 TYPE 2 DIABETES MELLITUS WITH CHRONIC KIDNEY DISEASE ON CHRONIC DIALYSIS, WITH LONG-TERM CURRENT USE OF INSULIN (HCC): ICD-10-CM

## 2021-03-24 DIAGNOSIS — N18.6 TYPE 2 DIABETES MELLITUS WITH CHRONIC KIDNEY DISEASE ON CHRONIC DIALYSIS, WITH LONG-TERM CURRENT USE OF INSULIN (HCC): ICD-10-CM

## 2021-03-24 DIAGNOSIS — G62.9 POLYNEUROPATHY: ICD-10-CM

## 2021-03-24 DIAGNOSIS — Z99.2 ESRD (END STAGE RENAL DISEASE) ON DIALYSIS (HCC): Primary | ICD-10-CM

## 2021-03-24 DIAGNOSIS — Z79.4 TYPE 2 DIABETES MELLITUS WITH CHRONIC KIDNEY DISEASE ON CHRONIC DIALYSIS, WITH LONG-TERM CURRENT USE OF INSULIN (HCC): ICD-10-CM

## 2021-03-24 DIAGNOSIS — Z99.2 TYPE 2 DIABETES MELLITUS WITH CHRONIC KIDNEY DISEASE ON CHRONIC DIALYSIS, WITH LONG-TERM CURRENT USE OF INSULIN (HCC): ICD-10-CM

## 2021-03-24 DIAGNOSIS — R42 DIZZINESS: ICD-10-CM

## 2021-03-24 DIAGNOSIS — N18.6 ESRD (END STAGE RENAL DISEASE) ON DIALYSIS (HCC): Primary | ICD-10-CM

## 2021-03-25 ENCOUNTER — CARE COORDINATION (OUTPATIENT)
Dept: CARE COORDINATION | Age: 56
End: 2021-03-25

## 2021-03-25 RX ORDER — PREGABALIN 75 MG/1
CAPSULE ORAL
Qty: 30 CAPSULE | Refills: 0 | Status: SHIPPED | OUTPATIENT
Start: 2021-03-25 | End: 2022-03-01

## 2021-03-25 RX ORDER — MECLIZINE HYDROCHLORIDE 25 MG/1
TABLET ORAL
Qty: 60 TABLET | Refills: 0 | Status: SHIPPED | OUTPATIENT
Start: 2021-03-25

## 2021-03-25 NOTE — TELEPHONE ENCOUNTER
03/11/2021  1   03/11/2021  Alprazolam 0.5 MG Tablet  2.00  1 Da [facility]obani [facility]   [id number]   [place] ([id number])   0  2.00 LME  [place]   [place]   02/09/2021  1   02/09/2021  Pregabalin 75 MG Capsule  30.00  30 Ra [facility]   [id number]   [place] ([id number])   0  0.50 LME  Medicare        Controlled Substance Monitoring:    Acute and Chronic Pain Monitoring:   No flowsheet data found.

## 2021-03-25 NOTE — CARE COORDINATION
Follow up CC call attempted. Patient was not available. Message left requesting return call. Call back info provided. Will attempt follow up with patient Tuesday, 30 March if no response received.

## 2021-03-25 NOTE — TELEPHONE ENCOUNTER
Please Approve or Refuse.   Send to Pharmacy per Pt's Request:      Next Visit Date:  Visit date not found   Last Visit Date: 1/13/2021    Hemoglobin A1C (%)   Date Value   03/19/2021 6.2 (H)   12/02/2020 5.0   07/30/2020 7.1             ( goal A1C is < 7)   BP Readings from Last 3 Encounters:   03/19/21 (!) 203/109   02/09/21 (!) 166/79   01/15/21 (!) 163/79          (goal 120/80)  BUN   Date Value Ref Range Status   05/10/2019 41 (H) 6 - 20 mg/dL Final     CREATININE   Date Value Ref Range Status   05/10/2019 6.44 (HH) 0.70 - 1.20 mg/dL Final     Potassium   Date Value Ref Range Status   07/13/2020 5.3 3.7 - 5.3 mmol/L Final

## 2021-03-25 NOTE — CARE COORDINATION
Ambulatory Care Coordination Note  3/25/2021  CM Risk Score: 6  Charlson 10 Year Mortality Risk Score: 100%     ACC: Aquilino David, CURLY    Summary  Date Care Coordination Episode Started:  15 October 2020     Reason for Call Today:      Follow up assessment     Reason patient is in Care Coordination:      1. CHF  2. Diabetes   3. ESRD  4. RAEV 93%     Topics Discussed Today:         1) Dizziness            - Patient reports dizziness had gotten worse causing nausea and vomiting. Was started on meclizine which controls the N/V         2) Bilateral leg weakness, Frequent falls             - Followed up with Neurosurgery. No surgical intervention required for cervical and lumbar spine. MRI was ordered for thoracic spine which showed degenerative changes. Patient states neurology and neurosurgery ar discussing options. Patient reports he is using a walker at all time to assist ambulation. Most recent fall, 3/24/2021         3) CHF             - Denies any complaints today   Weight 190 lbs. (86.36 KG)         4) Diabetes             - No complaints today  Most recent A1C, 6.2% (21 March 2021).   Glucose checked recently at dialysis, 139 mg/dl          5) End Stage Renal Disease              - Patient is in the process of work-up for kidney transplant at the [facility]    Interventions completed today:     1. Update PCP     Assessments completed today:      · Fall risk  · CHF  · DIabetes     Care Coordinator plan of care:      1) Zone Management        CHF -Rimma Stack        DM -   YELLOW Zone  2) Continue use of assistive devices when ambulating  3) Continue work-up with [facility] for kidney transplant  4) Follow up with neurology and neurosurgery as scheduled  5) Review ACP during next encounter  5) Follow up with patient in two week        Congestive Heart Failure Assessment    Are you currently restricting fluids?: Other (Comment: 1500ml/day)  Do you understand a low sodium diet?: Yes  Do you understand how to read food labels?: Yes  How many restaurant meals do you eat per week?: 0  Do you salt your food before tasting it?: No         Symptoms:          Diabetes Assessment    Medic Alert ID: No  Meal Planning: None   How often do you test your blood sugar?: No Testing (Comment: Three times per week at dialysis)   Do you have barriers with adherence to non-pharmacologic self-management interventions? (Nutrition/Exercise/Self-Monitoring): No   Have you ever had to go to the ED for symptoms of low blood sugar?: No       Do you have hyperglycemia symptoms?: No   Do you have hypoglycemia symptoms?: No   Last Blood Sugar Value: 139   Blood Sugar Trends: Fluctuating          Care Coordination Interventions    Program Enrollment: Complex Care  Referral from Primary Care Provider: No  Suggested Interventions and Community Resources  Other Services: Completed (Comment: Chemo [facility], TU-TH-SA, 5A-9A)  Zone Management Tools: Completed (Comment: CHF, Diabetes)       Wt Readings from Last 3 Encounters:   03/19/21 190 lb (86.2 kg)   02/09/21 185 lb (83.9 kg)   01/15/21 189 lb (85.7 kg)     BP Readings from Last 3 Encounters:   03/19/21 (!) 203/109   02/09/21 (!) 166/79   01/15/21 (!) 163/79     Lab Results   Component Value Date    LABA1C 6.2 (H) 03/19/2021    LABA1C 5.0 12/02/2020    LABA1C 7.1 07/30/2020     Lab Results   Component Value Date     03/19/2021     03/02/2019     01/16/2015       Goals Addressed                 This Visit's Progress     Conditions and Symptoms   On track     I will schedule office visits, as directed by my provider. I will keep my appointment or reschedule if I have to cancel. I will notify my provider of any barriers to my plan of care. I will follow my Zone Management tool to seek urgent or emergent care. I will notify my provider of any symptoms that indicate a worsening of my condition.     Barriers: impairment:  physical: ESRD, financial, and lack of support  Plan for overcoming my barriers: Care Coordination  Confidence: 7/10  Anticipated Goal Completion Date: 27 January 2021              Prior to Admission medications    Medication Sig Start Date End Date Taking? Authorizing Provider   meclizine (ANTIVERT) 25 MG tablet TAKE 1 TABLET BY MOUTH 3 TIMES A DAY AS NEEDED FOR DIZZINESS 3/25/21  Yes Welby Schilder, MD   pregabalin (LYRICA) 75 MG capsule TAKE 1 CAPSULE BY MOUTH EVERY EVENING 3/25/21 4/24/21 Yes Welby Schilder, MD   metFORMIN (GLUCOPHAGE-XR) 500 MG extended release tablet Take 1 tablet by mouth daily (with breakfast) 3/21/21  Yes Juve Gonzalez MD   vitamin D (ERGOCALCIFEROL) 1.25 MG (63724 UT) CAPS capsule Take 1 capsule by mouth once a week 3/21/21  Yes Juve Gonzalez MD   ALPRAZolam Paramjit Ghee) 0.5 MG tablet Take 1 po 30 minutes before study . May repeat x 1 3/11/21 4/11/21 Yes HEATHER Reyes - CNP   Calcium Acetate, Phos Binder, [address]  2/8/21  Yes Historical Provider, MD   venlafaxine (EFFEXOR XR) 37.5 MG extended release capsule Take 1 capsule by mouth daily 1/13/21  Yes Juve Gonzalez MD   omeprazole (PRILOSEC) 10 MG delayed release capsule  11/6/20  Yes Historical Provider, MD   hydrALAZINE (APRESOLINE) 100 MG tablet  11/6/20  Yes Historical Provider, MD   NIFEdipine (PROCARDIA XL) 60 MG extended release tablet  11/6/20  Yes Historical Provider, MD   doxazosin (CARDURA) 2 MG tablet Take 2 mg by mouth nightly 9/1/20  Yes Historical Provider, MD   metoprolol (LOPRESSOR) 100 MG tablet Take 1 tablet by mouth daily TAKE 1 TABLET BY MOUTH 2 TIMES DAILY  Patient taking differently: TAKE 1 TABLET BY MOUTH 2 TIMES DAILY 9/17/19  Yes Basilio Snow MD   CREON 6000 units delayed release capsule TAKE ONE CAPSULE BY MOUTH 3 TIMES A DAY WITH MEALS 6/5/18  Yes Historical Provider, MD   Misc.  Devices MISC Check blood sugar one time a day 8/7/17  Yes Luz Maria Hendrickson   diclofenac sodium (VOLTAREN) 1 % GEL Apply topically 2 times daily  Patient not taking: Reported on 3/19/2021 1/13/21   Juve Gonzalez MD   doxercalciferol (HECTOROL) 4 MCG/2ML injection Infuse 1.25 mLs intravenously Once in dialysis for 1 dose Given at the end of dialysis Brady Martinez, Sat 3/19/19 3/19/21  Trudi Ardon MD   glucose monitoring kit (FREESTYLE) monitoring kit 1 kit by Does not apply route daily  Patient not taking: Reported on 3/19/2021 8/7/17   Lonia Smoker   Insulin Pen Needle 32G X 6 MM MISC 1 Package by Does not apply route daily 6/23/17   Lonia Smoker       Future Appointments   Date Time Provider Britney Quigley   3/25/2021  4:00 PM [facility]  [facility]   3/25/2021  4:00 PM Madyson Delong MD [facility] med/reha [facility]   3/30/2021  1:30 PM Fernando Herring DO [facility]   7/16/2021  8:40 AM Erin Garcia MD [address] [place] [address]

## 2021-03-26 ENCOUNTER — TELEPHONE (OUTPATIENT)
Dept: NEUROLOGY | Age: 56
End: 2021-03-26

## 2021-03-26 DIAGNOSIS — Q76.49 CONGENITAL CERVICAL SPINE STENOSIS: Primary | ICD-10-CM

## 2021-03-26 DIAGNOSIS — R29.898 WEAKNESS OF BOTH LOWER EXTREMITIES: ICD-10-CM

## 2021-03-30 ENCOUNTER — OFFICE VISIT (OUTPATIENT)
Dept: NEUROSURGERY | Age: 56
End: 2021-03-30
Payer: MEDICARE

## 2021-03-30 VITALS
WEIGHT: 190 LBS | HEART RATE: 60 BPM | HEIGHT: 70 IN | SYSTOLIC BLOOD PRESSURE: 196 MMHG | DIASTOLIC BLOOD PRESSURE: 77 MMHG | BODY MASS INDEX: 27.2 KG/M2 | OXYGEN SATURATION: 98 %

## 2021-03-30 DIAGNOSIS — M48.02 CERVICAL STENOSIS OF SPINAL CANAL: Primary | ICD-10-CM

## 2021-03-30 PROCEDURE — G8427 DOCREV CUR MEDS BY ELIG CLIN: HCPCS | Performed by: NEUROLOGICAL SURGERY

## 2021-03-30 PROCEDURE — 1036F TOBACCO NON-USER: CPT | Performed by: NEUROLOGICAL SURGERY

## 2021-03-30 PROCEDURE — G8419 CALC BMI OUT NRM PARAM NOF/U: HCPCS | Performed by: NEUROLOGICAL SURGERY

## 2021-03-30 PROCEDURE — 99214 OFFICE O/P EST MOD 30 MIN: CPT | Performed by: NEUROLOGICAL SURGERY

## 2021-03-30 PROCEDURE — G8482 FLU IMMUNIZE ORDER/ADMIN: HCPCS | Performed by: NEUROLOGICAL SURGERY

## 2021-03-30 PROCEDURE — 3017F COLORECTAL CA SCREEN DOC REV: CPT | Performed by: NEUROLOGICAL SURGERY

## 2021-03-30 RX ORDER — DIAZEPAM 5 MG/1
5 TABLET ORAL
Qty: 1 TABLET | Refills: 0 | Status: SHIPPED | OUTPATIENT
Start: 2021-03-30 | End: 2021-03-30

## 2021-03-30 NOTE — PATIENT INSTRUCTIONS
Schedule a Vaccine  When you qualify to receive the vaccine, call the [facility]) COVID-19 Vaccination Hotline to schedule your appointment or to get additional information about the [facility]) locations which are offering the COVID-19 vaccine. To be 94% effective, it's important that you receive two doses of one of the COVID-19 vaccines. -If you are receiving the Alonzo Peter vaccine, your second shot will be scheduled as close to 21 days after the first shot as possible. -If you are receiving the Moderna vaccine, your second shot will be scheduled as close to 28 days after the first shot as possible. [facility]) COVID-19 Vaccination Hotline: [phone]    Links to [facility]) website and [facility] website:    [employer]/[facility]-monitoring-coronavirus-covid-19/covid-19-vaccine/[email or web address]-[email or web address]    [email or web address]

## 2021-03-30 NOTE — PROGRESS NOTES
Department of Neurosurgery                                                      Follow up visit      History Obtained From:  patient    CHIEF COMPLAINT:         Chief Complaint   Patient presents with    Follow-up     Ataxic gait    Results     EMG, MRI        HISTORY OF PRESENT ILLNESS:       The patient is a 50 y.o. male who presents for follow up for gait ataxia. Refer to my previous note for more additional history. Since last visit he has not had significant increase in worsening of his gait ataxia. He did have MRI thoracic spine which does not show cord compression. To summarize , his gait and leg weakness seem to suddenly worsened in the last summer. He denies any back pain or neck pain or leg pain. PAST MEDICAL HISTORY :       Past Medical History:        Diagnosis Date    Chronic kidney disease     left renal infarct-on dialysis Tues. , Thurs, Sat.     Chronic pancreatitis ([facility] [address]     Diabetes mellitus ([facility] [address]     Diverticulosis     Hyperlipidemia     Hypertension     Mild malnutrition (HCC)     MRSA (methicillin resistant staph aureus) culture positive 02/26/2019    nares    Pancreatitis     Personal history of colonic polyps 7/14/2020       Past Surgical History:        Procedure Laterality Date    AV FISTULA CREATION Left     CATHETER REMOVAL N/A 6/7/2017    CATHETER REMOVAL TUNNEL H-D  performed by Maylin Fraser MD at [address] [place] [address]      with bile duct repair    COLONOSCOPY N/A 7/13/2020    COLONOSCOPY POLYPECTOMY COLD BIOPSY, COLD SNARE performed by Jade Garcias MD at [facility] OTHER SURGICAL HISTORY      bile duct surgery    OTHER SURGICAL HISTORY  06/07/2017    removal tunneled dialysis catheter    OTHER SURGICAL HISTORY      hernia mesh removed    SEPTOPLASTY      TONSILLECTOMY      UPPER GASTROINTESTINAL ENDOSCOPY N/A 10/23/2020    EGD BIOPSY AND DILATION performed by Jade Garcias MD at [address] [place] [address] History:   Social History     Socioeconomic History    Marital status: Single     Spouse name: Not on file    Number of children: 4    Years of education: 12    Highest education level: Bachelor's degree (e.g., BA, AB, BS)   Occupational History    Not on file   Social Needs    Financial resource strain: Not hard at all   Brent-Greg insecurity     Worry: Never true     Inability: Never true   [employer] needs     Medical: No     Non-medical: No   Tobacco Use    Smoking status: Never Smoker    Smokeless tobacco: Never Used   Substance and Sexual Activity    Alcohol use: Not Currently     Alcohol/week: 0.0 standard drinks     Frequency: Monthly or less     Drinks per session: 1 or 2     Binge frequency: Never     Comment: rarely    Drug use: No    Sexual activity: Yes     Partners: Female   Lifestyle    Physical activity     Days per week: 7 days     Minutes per session: 40 min    Stress: Not at all   Relationships    Social connections     Talks on phone: More than three times a week     Gets together: More than three times a week     Attends Restorationism service: Never     Active member of club or organization: Yes     Attends meetings of clubs or organizations: 1 to 4 times per year     Relationship status: Never     Intimate partner violence     Fear of current or ex partner: Not on file     Emotionally abused: Not on file     Physically abused: Not on file     Forced sexual activity: Not on file   Other Topics Concern    Not on file   Social History Narrative    ** Merged History Encounter **            Family History:       Problem Relation Age of Onset    No Known Problems Mother     No Known Problems Father        Allergies:  Clonidine and Heparin    Home Medications:  Prior to Admission medications    Medication Sig Start Date End Date Taking?  Authorizing Provider   meclizine (ANTIVERT) 25 MG tablet TAKE 1 TABLET BY MOUTH 3 TIMES A DAY AS NEEDED FOR DIZZINESS 3/25/21  Yes Nathaly MD Sun   pregabalin (LYRICA) 75 MG capsule TAKE 1 CAPSULE BY MOUTH EVERY EVENING 3/25/21 4/24/21 Yes Kolby Gupta MD   metFORMIN (GLUCOPHAGE-XR) 500 MG extended release tablet Take 1 tablet by mouth daily (with breakfast) 3/21/21  Yes Jessica Calvin MD   Calcium Acetate, Phos Binder, [address]  2/8/21  Yes Historical Provider, MD   venlafaxine (EFFEXOR XR) 37.5 MG extended release capsule Take 1 capsule by mouth daily 1/13/21  Yes Jessica Calvin MD   hydrALAZINE (APRESOLINE) 100 MG tablet  11/6/20  Yes Historical Provider, MD   NIFEdipine (PROCARDIA XL) 60 MG extended release tablet  11/6/20  Yes Historical Provider, MD   metoprolol (LOPRESSOR) 100 MG tablet Take 1 tablet by mouth daily TAKE 1 TABLET BY MOUTH 2 TIMES DAILY  Patient taking differently: TAKE 1 TABLET BY MOUTH 2 TIMES DAILY 9/17/19  Yes Roshni Rush MD   doxercalciferol (HECTOROL) 4 MCG/2ML injection Infuse 1.25 mLs intravenously Once in dialysis for 1 dose Given at the end of dialysis [place], Sat 3/19/19 3/30/21 Yes Jackson Spears MD   Misc. Devices MISC Check blood sugar one time a day 8/7/17  Yes Selin Hendrickson   Insulin Pen Needle 32G X 6 MM MISC 1 Package by Does not apply route daily 6/23/17  Yes Selin Hendrickson   vitamin D (ERGOCALCIFEROL) 1.25 MG (25901 UT) CAPS capsule Take 1 capsule by mouth once a week  Patient not taking: Reported on 3/30/2021 3/21/21   Jessica Calvin MD   ALPRAZolam Zehra Framaykel) 0.5 MG tablet Take 1 po 30 minutes before study .  May repeat x 1  Patient not taking: Reported on 3/30/2021 3/11/21 4/11/21  HEATHER Pride - FRED   diclofenac sodium (VOLTAREN) 1 % GEL Apply topically 2 times daily  Patient not taking: Reported on 3/19/2021 1/13/21   Jessica Calvin MD   omeprazole ([address]) 10 MG delayed release capsule  11/6/20   Historical Provider, MD   doxazosin (CARDURA) 2 MG tablet Take 2 mg by mouth nightly 9/1/20   Historical Provider, MD   CREON 6000 units delayed release capsule TAKE ONE CAPSULE BY MOUTH 3 TIMES A DAY WITH MEALS 6/5/18   Historical Provider, MD   glucose monitoring kit (FREESTYLE) monitoring kit 1 kit by Does not apply route daily  Patient not taking: Reported on 3/19/2021 8/7/17   Radha Abreu       Current Medications:   No current facility-administered medications for this visit. PHYSICAL EXAM:       BP (!) 182/75   Pulse 60   Ht 5' 10\" (1.778 m)   Wt 190 lb (86.2 kg)   SpO2 98%   BMI 27.26 kg/m²   Physical Exam     Gen: NAD  HEENT: moist mucus membranes  Cardio: RRR  Pulm: chest rise symmetrically  GI: abd soft  Ext: no edema  Skin: warm    Neuro:  AOX3  CN 2-12 grossly intact, no nystagmus, no tongue fasciculation   speech articulate  Motor 5/5 except bilateral hip flexion 4 out of 5. Dorsi flexion is 4+ out of 5  Can stand on toes for a few seconds. There is bilateral atrophy of the first webspace of the hands. Sensation symmetrical   No willoughby or clonus  Ataxic broad-based gait  Positive Romberg sign  No dysmetria no dysdiadochokinesia  DTRs are symmetrical 2+ in patella and upper extremities hyporeflexic.           Radiology Review: MRI thoracic spine shows no cord compression      ASSESSMENT AND PLAN:       Patient Active Problem List   Diagnosis    Hypertensive heart and renal disease with CHF and ESRD ([address]    ESRD (end stage renal disease) on dialysis ([address]    Hypertensive encephalopathy    Type 2 diabetes mellitus with chronic kidney disease on chronic dialysis, with long-term current use of insulin ([address]    Essential hypertension    Polyp of ascending colon    Personal history of colonic polyps    Duodenal mass    Incisional hernia    Left inguinal hernia    MRSA (methicillin resistant Staphylococcus aureus)    Noncompliance with medications    Obstructive sleep apnea syndrome    Thrombocytopenia (HCC)    Mixed hyperlipidemia    Vitamin D deficiency    Chronic fatigue    Stage 5 chronic kidney disease on chronic dialysis ([address]    Ileus ([address]  Gastroesophageal reflux disease without esophagitis    Chronic diastolic congestive heart failure (HCC)    Anemia of chronic disease    Vertigo    Hearing problem of left ear    Vision loss    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 male with gait ataxia MRI cervical spine shows mild cervical stenosis  I spoke with Dr. Chasidy Irwin about his case and we will repeat MRI cervical spine as a was a poor quality to see if there is truly significant cord compression      Patient and/or family was counseled on the diagnosis and treatment plan    Trudy Martini DO     Board Certified Neurosurgeon  3/30/2021  2:19 PM      This note was created using voice recognition software. There may be inaccuracies of transcription  that are inadvertently overlooked prior to the signature. There is any questions about the transcription please contact me.

## 2021-04-06 ENCOUNTER — CARE COORDINATION (OUTPATIENT)
Dept: CARE COORDINATION | Age: 56
End: 2021-04-06

## 2021-04-06 NOTE — CARE COORDINATION
I agree with the Care Coordinator's Plan of Care\    Noted, pt has been evaluated by neurologist here as well.

## 2021-04-06 NOTE — CARE COORDINATION
Ambulatory Care Coordination Note  4/6/2021  CM Risk Score: 6  Charlson 10 Year Mortality Risk Score: 100%     ACC: Warden Nitin RN    Summary  Date Care Coordination Episode Started:  15 October 2020     Reason for Call Today:      Follow up assessment     Reason patient is in Care Coordination:      1. CHF  2. Diabetes   3. ESRD  4. RAEV 93%     Topics Discussed Today:         1) Dizziness            - Dizziness continues. Nausea and vomiting has resolved. Continues on Meclizine        5) End Stage Renal Disease             - Patient was seen at [facility] yesterday for complete work-up. Patient is not a candidate for transplant at this time due to neurological issues. Patient states transplant surgeon wants patient to follow up with Neurology at [facility]. 3) Bilateral leg weakness, Frequent falls             - Patient states local neurology and neurosurgery plan to repeat MRI. Patient reports he is using a walker at all time to assist ambulation. Most recent fall, 4/4/2021. Patient struck his head resulting in four staples in his scalp.         4) CHF             - Denies any complaints today   Weight 189 lbs. (86.36 KG)         5) Diabetes             - No complaints today  Most recent A1C, 6.2% (21 March 2021).   Glucose checked recently at dialysis, 139 mg/dl               Interventions completed today:          Assessments completed today:      · Fall risk  · CHF  · DIabetes     Care Coordinator plan of care:      1) Zone Management        CHF -Kashmir Mclain        DM -   YELLOW Zone  2) Continue use of assistive devices when ambulating  3) [facility]  4) Patient to notify ACM of appointment date & time with [facility] Neurology  5) Follow up with patient in next week    Care Coordination Interventions    Program Enrollment: Complex Care  Referral from Primary Care Provider: No  Suggested Interventions and [employer]  Other Services: Completed (Comment: Cassy Aguilar, [facility], 5A-9A)  Zone Management Tools: Completed (Comment: CHF, Diabetes)         Goals Addressed                 This Visit's Progress     Care Coordination Self Management   On track     CC Self Management Goal  Patient Goal (What steps will patient take to achieve goal?): Monitor blood sugars, be adherent with diet and medications, dialysis treatments               (Not monitoring glucose at home. Most recent A1C was 5.0% . Glucose check at dialysis 3 times weekly)  Patient is able to discuss self-management of condition(s): DM, CRF  Pt demonstrates adherence to medications  Pt demonstrates understanding of self-monitoring  Patient is able to identify Red Flags:  Alert to potential adverse drug reactions(s) or side effects and actions to take should they arise  Discuss target symptoms and actions to take should they arise  Identify problems that require immediate PCP or specialist visit  Patient demonstrates understanding of access to PCP/Specialist:  Understands about scheduling routine Follow Up appointments   Understands about sick day appointment options for worsening of symptoms/progression (Same Day, E Visits)       Conditions and Symptoms   On track     I will schedule office visits, as directed by my provider. I will keep my appointment or reschedule if I have to cancel. I will notify my provider of any barriers to my plan of care. I will follow my Zone Management tool to seek urgent or emergent care. I will notify my provider of any symptoms that indicate a worsening of my condition. Barriers: impairment:  physical: ESRD, financial, and lack of support  Plan for overcoming my barriers: Care Coordination  Confidence: 7/10  Anticipated Goal Completion Date: 27 January 2021              Prior to Admission medications    Medication Sig Start Date End Date Taking?  Authorizing Provider   meclizine (ANTIVERT) 25 MG tablet TAKE 1 TABLET BY MOUTH 3 TIMES A DAY AS NEEDED FOR DIZZINESS 3/25/21   Annette Gonzales MD   pregabalin (LYRICA) 75 MG capsule TAKE 1 CAPSULE BY MOUTH EVERY EVENING 3/25/21 4/24/21  Annette Gonzales MD   metFORMIN (GLUCOPHAGE-XR) 500 MG extended release tablet Take 1 tablet by mouth daily (with breakfast) 3/21/21   Alesia Santiago MD   vitamin D (ERGOCALCIFEROL) 1.25 MG (48883 UT) CAPS capsule Take 1 capsule by mouth once a week  Patient not taking: Reported on 3/30/2021 3/21/21   Alesia Santiago MD   ALPRAZolam Paul Mix) 0.5 MG tablet Take 1 po 30 minutes before study .  May repeat x 1  Patient not taking: Reported on 3/30/2021 3/11/21 4/11/21  HEATHER Nielsen - CNP   Calcium Acetate, Phos Binder, [address]  2/8/21   Historical Provider, MD   venlafaxine (EFFEXOR XR) 37.5 MG extended release capsule Take 1 capsule by mouth daily 1/13/21   Alesia Santiago MD   diclofenac sodium (VOLTAREN) 1 % GEL Apply topically 2 times daily  Patient not taking: Reported on 3/19/2021 1/13/21   Alesia Santiago MD   omeprazole ([address]) 10 MG delayed release capsule  11/6/20   Historical Provider, MD   hydrALAZINE (APRESOLINE) 100 MG tablet  11/6/20   Historical Provider, MD   NIFEdipine (PROCARDIA XL) 60 MG extended release tablet  11/6/20   Historical Provider, MD   doxazosin (CARDURA) 2 MG tablet Take 2 mg by mouth nightly 9/1/20   Historical Provider, MD   metoprolol (LOPRESSOR) 100 MG tablet Take 1 tablet by mouth daily TAKE 1 TABLET BY MOUTH 2 TIMES DAILY  Patient taking differently: TAKE 1 TABLET BY MOUTH 2 TIMES DAILY 9/17/19   Danielle Lopez MD   doxercalciferol (HECTOROL) 4 MCG/2ML injection Infuse 1.25 mLs intravenously Once in dialysis for 1 dose Given at the end of dialysis Paula Mean, Sat 3/19/19 3/30/21  Christiano Doherty MD   CREON 6000 units delayed release capsule TAKE ONE CAPSULE BY MOUTH 3 TIMES A DAY WITH MEALS 6/5/18   Historical Provider, MD   glucose monitoring kit (FREESTYLE) monitoring kit 1 kit by Does not apply route daily  Patient not taking: Reported on 3/19/2021 8/7/17   Sher Terry   Misc.  Devices MISC Check blood sugar one time a day 8/7/17   Sher Terry   Insulin Pen Needle 32G X 6 MM MISC 1 Package by Does not apply route daily 6/23/17   Sher Terry       Future Appointments   Date Time Provider Britney Quigley   7/16/2021  8:40 AM Rafy Doan 42

## 2021-04-13 ENCOUNTER — CARE COORDINATION (OUTPATIENT)
Dept: CARE COORDINATION | Age: 56
End: 2021-04-13

## 2021-04-13 NOTE — CARE COORDINATION
Follow up CC call attempted. Patient was not available. Message left requesting return call. Call back info provided. Will attempt follow up with patient 7-14 days if no response received.
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
## 2021-04-13 NOTE — CARE COORDINATION
I agree with the Care Coordinator's Plan of Care     Pt is progressing fast , let me know if I will be any help.

## 2021-04-20 ENCOUNTER — HOSPITAL ENCOUNTER (OUTPATIENT)
Dept: MRI IMAGING | Age: 56
Discharge: HOME | End: 2021-04-22
Payer: MEDICARE

## 2021-04-20 DIAGNOSIS — Q76.49 CONGENITAL CERVICAL SPINE STENOSIS: ICD-10-CM

## 2021-04-20 DIAGNOSIS — R29.898 WEAKNESS OF BOTH LOWER EXTREMITIES: ICD-10-CM

## 2021-04-20 PROCEDURE — 72141 MRI NECK SPINE W/O DYE: CPT

## 2021-04-22 ENCOUNTER — TELEPHONE (OUTPATIENT)
Dept: FAMILY MEDICINE CLINIC | Age: 56
End: 2021-04-22

## 2021-04-22 NOTE — TELEPHONE ENCOUNTER
for the last 3-4 months patient has had numbness and tingling in his legs he is seeing a specialist, he also c/o dizziness , states he has not been to dialysis for a week states his legs dont work patient was informed to call 911 and go to the hospital since he has not had dialysis in a week.

## 2021-04-22 NOTE — TELEPHONE ENCOUNTER
Attempted to contact patient to schedule Medicare Annual Wellness visit, patient refused. Sy Pineda he may be going in nursing home. Will call back later when he decides.

## 2021-04-24 ENCOUNTER — CARE COORDINATION (OUTPATIENT)
Dept: CARE COORDINATION | Age: 56
End: 2021-04-24

## 2021-04-24 NOTE — CARE COORDINATION
Spoke with patient who is currently inpatient at [facility] [place] Patient was seen in ED at [facility] on 18 April. Discharged home. Patient reports he fell down the steps at home recently. Reports he has no sensation in the entire right side of his body. Repeat MRI was completed on 20 April. Will discontinue care coordination at this time and pick patient up again after discharge.

## 2021-05-04 NOTE — TELEPHONE ENCOUNTER
Angel Duckworth and his wife. They are in agreement to a referral to Dr. Vicente Bennett. Referral placed. I did give them Dr. Juares Fuel office number. Oncology Progress Note    Please refer to attending note for official recommendations.     Subjective  No acute events overnight.  Continues to complain of tenderness in the right breast area.  Had IR drainage of 20 cc of pus yesterday.  Continues to be hypertensive overnight      Objective  Vitals with min/max:      Vital Last Value 24 Hour Range   Temperature 97.9 °F (36.6 °C) (05/04/21 0452) Temp  Min: 97.5 °F (36.4 °C)  Max: 98.2 °F (36.8 °C)   Pulse 70 (05/04/21 0501) Pulse  Min: 64  Max: 77   Respiratory 18 (05/04/21 0452) Resp  Min: 16  Max: 18   Non-Invasive  Blood Pressure (!) 176/85 (05/04/21 0501) BP  Min: 156/72  Max: 194/101   Pulse Oximetry 98 % (05/04/21 0452) SpO2  Min: 93 %  Max: 98 %   Arterial   Blood Pressure   No data recorded      I/O's    Intake/Output Summary (Last 24 hours) at 5/4/2021 1051  Last data filed at 5/4/2021 0610  Gross per 24 hour   Intake 720.9 ml   Output 300 ml   Net 420.9 ml       Physical Exam  Constitutional:       Appearance: Normal appearance.   HENT:      Head: Normocephalic and atraumatic.      Mouth/Throat:      Mouth: Mucous membranes are moist.      Pharynx: Oropharynx is clear.   Eyes:      Extraocular Movements: Extraocular movements intact.      Conjunctiva/sclera: Conjunctivae normal.   Cardiovascular:      Rate and Rhythm: Normal rate and regular rhythm.      Pulses: Normal pulses.      Heart sounds: Normal heart sounds. No murmur. No gallop.    Pulmonary:      Effort: Pulmonary effort is normal. No respiratory distress.      Breath sounds: Normal breath sounds. No wheezing.   Abdominal:      General: Abdomen is flat. Bowel sounds are normal.      Palpations: Abdomen is soft.   Musculoskeletal:         General: No swelling or deformity. Normal range of motion.      Cervical back: Normal range of motion and neck supple. No rigidity.   Skin:     General: Skin is warm and dry.      Capillary Refill: Capillary refill takes less than 2 seconds.      Comments: IGLESIA  breast: scar breast, erythema without drainage present, tendereness to palpation present.   Neurological:      General: No focal deficit present.      Mental Status: She is alert and oriented to person, place, and time. Mental status is at baseline.   Psychiatric:         Mood and Affect: Mood and affect normal.         Behavior: Behavior normal.         Labs     Recent Labs   Lab 05/04/21  0506 05/03/21  0532 05/02/21  0545 05/01/21  1358 05/01/21  1338   SODIUM 139 138 139  --  133*   POTASSIUM 3.2* 3.7 3.2*  --  4.1   CHLORIDE 108* 107 109*  --  106   CO2 27 25 23  --  21   BUN 10 9 10  --  11   CREATININE 0.58 0.60 0.53  --  0.54   CALCIUM 8.6 8.5 8.9  --  8.7   MG 2.2  --  2.1  --  1.9   PHOS 2.9  --  2.8 2.2*  --    ALBUMIN 2.8* 2.9* 2.9*  --   --    BILIRUBIN 0.5 0.6 0.6  --   --    ALKPT 94 93 96  --   --    GPT 19 16 18  --   --    AST 16 11 11  --   --    GLUCOSE 144* 144* 154*  --  201*     Recent Labs   Lab 05/04/21  0506 05/03/21  0532 05/02/21  0545   WBC 8.1 11.0 11.1*   HGB 12.1 12.5 12.7   HCT 38.3 37.8 38.5    304 273     Recent Labs   Lab 05/02/21  0545   PT 11.1   PTT 31*   INR 1.1       Imaging    US GUIDED ABSCESS DRAIN   Final Result   Impression: Successful ultrasound-guided aspiration of the subcutaneous   right breast collection with removal of 20 mL of pus.      Electronically Signed by: ARYAN LICONA MD    Signed on: 5/3/2021 11:58 AM          US SOFT TISSUE CHEST WALL   Final Result   FINDINGS/IMPRESSION: Targeted ultrasound is undertaken overlying the right   chest wall region of maximal tenderness.  There is diffuse edema of the   right chest wall.  There is a focal anechoic, internally septated fluid   collection subjacent to the region of tenderness within the right chest   wall measuring 1.26 centers by 1.51 cm, which may represent focal abscess.    There is edema about this focal fluid collection within the right chest   wall.  No concerning sonographic masses is  otherwise identified..                Electronically Signed by: MARCUS LYNN M.D.    Signed on: 5/1/2021 5:00 PM               Assessment and Plan:   Stacey Weldon is a 74 year old female with PMHx of triple negative right invasive ductal carcinoma s/p right mastectomy (4/8/21), HTN, HLD, DM2 presenting to the ED with right breast pain and swelling.     #Right breast  abscess vs seroma  #Breast pain 2/2 above  #Leukocytosis 2/2 above  -WBC 12.9 on admission ->11.0  -s/p vancomycin on admission  -follow up blood cultures  -Breast surgery (Dr. Ragland) consulted, appreciate recs  -US soft tissue chest wall (5/1/21): Diffuse edema of the  right chest wall.  There is a focal anechoic, internally septated fluid collection subjacent to the region of tenderness within the right chest wall measuring 1.26 centers by 1.51 cm, which may represent focal abscess.There is edema about this focal fluid collection within the right chest wall.  -continue cefepime and vancomycin   -If pain worsening, may take to OR emergently tomorrow per breast surgery  -Norco 5 PRN for pain control  -US guided aspiration of R breast collection with 20cc pus removal.  Fluid studies pending  -Possible OR for I&D later today      #Lactic acidosis, resolved  -s/p 1.5L fluid bolus  -Lactic acid 2.2 on admission. Repeat lactic acid 1.4. Stop trending     Right invasive ductal carcinoma, triple negative  -ultrasound-guided biopsy performed 3/4/2021.  ER 2%, IL 0%, HER-2 nonamplified. Grade 3 and ki67 90%.  -s/p mastectomy with SLN 4/8/21. Tumor measured to be 2.6 cm; 0/1 SLN biopsies.  -Right chest port placed 4/30  -plan to start Taxotere and cyclophosphamide     Left HR+ breast cancer, stage IIB  -pT2N1M with tumor measuring 3.4 cm and 3/11 lymph nodes ppsitive  - s/p mastectomy with ALND and RT  -only received a 3 cycles of CMF due to intolerance  -completed >10 years of anastrozole     Chronic  DM2  -SSI, acchucheks, hypoglycemia  protocol  HTN  -continue Toprol-XL and Diltiazem  HLD  -continue statin  CAD  -on Plavix (hypertensive with aspirin per cards note)  HFpEF  -Previous Echo in 2019 with EF 60%, grade 1 DD  -continue PO Lasix  Reactive airway disease  -continue Breo     FEN: None, Replete electrolytes as needed, NPO after midnight for possible procedure  DVT Prophylaxis: SCD's, hold AC for possible procedure  GI Prophylaxis: Protonix  Code Status: Full code, decisional, son is surrogate decision maker  Primary Care Provider: Javi Louie MD    Discussed with Dr. Aldana.      Megan Parra   Internal Medicine, PGY1  #[id number]      5/4/2021 10:51 AM

## 2021-05-05 DIAGNOSIS — F41.9 ANXIETY: ICD-10-CM

## 2021-05-05 RX ORDER — VENLAFAXINE HYDROCHLORIDE 37.5 MG/1
CAPSULE, EXTENDED RELEASE ORAL
Qty: 90 CAPSULE | Refills: 1 | Status: SHIPPED | OUTPATIENT
Start: 2021-05-05 | End: 2022-02-15 | Stop reason: SDUPTHER

## 2021-05-05 NOTE — TELEPHONE ENCOUNTER
Please Approve or Refuse.   Send to Pharmacy per Pt's Request:      Next Visit Date:  Visit date not found   Last Visit Date: 1/13/2021    Hemoglobin A1C (%)   Date Value   03/19/2021 6.2 (H)   12/02/2020 5.0   07/30/2020 7.1             ( goal A1C is < 7)   BP Readings from Last 3 Encounters:   03/30/21 (!) 196/77   03/19/21 (!) 203/109   02/09/21 (!) 166/79          (goal 120/80)  BUN   Date Value Ref Range Status   05/10/2019 41 (H) 6 - 20 mg/dL Final     CREATININE   Date Value Ref Range Status   05/10/2019 6.44 (HH) 0.70 - 1.20 mg/dL Final     Potassium   Date Value Ref Range Status   07/13/2020 5.3 3.7 - 5.3 mmol/L Final

## 2021-05-11 ENCOUNTER — OFFICE VISIT (OUTPATIENT)
Dept: NEUROLOGY | Age: 56
End: 2021-05-11

## 2021-05-11 VITALS
DIASTOLIC BLOOD PRESSURE: 72 MMHG | HEIGHT: 70 IN | BODY MASS INDEX: 26.05 KG/M2 | HEART RATE: 65 BPM | WEIGHT: 182 LBS | SYSTOLIC BLOOD PRESSURE: 149 MMHG

## 2021-05-11 DIAGNOSIS — E08.40 DIABETES MELLITUS DUE TO UNDERLYING CONDITION WITH DIABETIC NEUROPATHY, UNSPECIFIED WHETHER LONG TERM INSULIN USE (HCC): ICD-10-CM

## 2021-05-11 DIAGNOSIS — R26.81 GAIT INSTABILITY: ICD-10-CM

## 2021-05-11 DIAGNOSIS — R42 DIZZINESS: ICD-10-CM

## 2021-05-11 DIAGNOSIS — E08.42 DIABETIC POLYNEUROPATHY ASSOCIATED WITH DIABETES MELLITUS DUE TO UNDERLYING CONDITION (HCC): Primary | ICD-10-CM

## 2021-05-11 DIAGNOSIS — R29.898 WEAKNESS OF BOTH LOWER EXTREMITIES: ICD-10-CM

## 2021-05-11 PROCEDURE — 99214 OFFICE O/P EST MOD 30 MIN: CPT | Performed by: PSYCHIATRY & NEUROLOGY

## 2021-05-11 PROCEDURE — 2022F DILAT RTA XM EVC RTNOPTHY: CPT | Performed by: PSYCHIATRY & NEUROLOGY

## 2021-05-11 PROCEDURE — 3017F COLORECTAL CA SCREEN DOC REV: CPT | Performed by: PSYCHIATRY & NEUROLOGY

## 2021-05-11 PROCEDURE — G8419 CALC BMI OUT NRM PARAM NOF/U: HCPCS | Performed by: PSYCHIATRY & NEUROLOGY

## 2021-05-11 PROCEDURE — 1036F TOBACCO NON-USER: CPT | Performed by: PSYCHIATRY & NEUROLOGY

## 2021-05-11 PROCEDURE — G8427 DOCREV CUR MEDS BY ELIG CLIN: HCPCS | Performed by: PSYCHIATRY & NEUROLOGY

## 2021-05-11 ASSESSMENT — ENCOUNTER SYMPTOMS
RESPIRATORY NEGATIVE: 1
ALLERGIC/IMMUNOLOGIC NEGATIVE: 1
GASTROINTESTINAL NEGATIVE: 1
EYES NEGATIVE: 1

## 2021-05-11 NOTE — PROGRESS NOTES
Active problem dizziness with bilateral lower extremity weakness and numbness . Old left occipital lobe old hemorrhage on MRI. Endstage renal failure on hemodialysis . The condition is he did see neurosurgery Dr Allison Patten who feels cervical congenital spinal canal stenosis is asymptomatic. He had followup MRI at Dr Rosalind Schmidt request showing only mild canal narrowing at C3-4 and C4-5 , minimal at C5-6 with overlying cogenital narrowing of canal  . He was hospitalized at [facility] after a fall hitting his head needing staples reporting stable numbness in legs and toes . He is current at [facility] undergoing rehabilitation using walker with PT walking 300 feet with baseline imbalance . He does report lightheadedness when up on his feet with occasional vertigo . He has not had EMG . He has diabetes on metformin having been on insulin with last Hga1c 6.2 along with ESRD on hemodialysis  . He is on lyrica 75 mg po qd for neuropathic burning pain. Neck pain is intermittent grade 3 over 10 nonradiating . There are no bowel or bladder complaints . Significant medications lyrica 75 mg po qd , antivert 25 mg o tid . Testing MRI of head left mesial occipital old bleed with feeding vessel . MRA of Head normal . 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 MRI thoracic spine with multilevel degenerative change without significant stenosis  MRI lumbar spine with dsc protrusion osteophyte towards the left abutting L5-S1 and displacing left S1 nerve root . Narrowing bilateral L4-5 neural foramina from disc osteophytes . Hga1c 6.2 , TADEO negative ,  ESR 8 , SPEP normal , TSH normal . FU MRI cervical spine shpwing only mild canal narrowing at C3-4 and C4-5 , minimal at C5-6 with overlying congenital narrowing of canal. Head CT normal        Past Medical History:   Diagnosis Date    Chronic kidney disease     left renal infarct-on dialysis Tues. , Thurs, Sat.     Chronic pancreatitis ([facility] [address] Days per week: 7 days     Minutes per session: 40 min    Stress: Not at all   Relationships    Social connections     Talks on phone: More than three times a week     Gets together: More than three times a week     Attends Hoah[employer] service: Never     Active member of club or organization: Yes     Attends meetings of clubs or organizations: 1 to 4 times per year     Relationship status: Never     Intimate partner violence     Fear of current or ex partner: None     Emotionally abused: None     Physically abused: None     Forced sexual activity: None   Other Topics Concern    None   Social History Narrative    ** Merged History Encounter **            Current Outpatient Medications   Medication Sig Dispense Refill    venlafaxine (EFFEXOR XR) 37.5 MG extended release capsule TAKE 1 CAPSULE BY MOUTH EVERY DAY 90 capsule 1    meclizine (ANTIVERT) 25 MG tablet TAKE 1 TABLET BY MOUTH 3 TIMES A DAY AS NEEDED FOR DIZZINESS (Patient taking differently: Take 50 mg by mouth 3 times daily as needed ) 60 tablet 0    pregabalin (LYRICA) 75 MG capsule TAKE 1 CAPSULE BY MOUTH EVERY EVENING 30 capsule 0    metFORMIN (GLUCOPHAGE-XR) 500 MG extended release tablet Take 1 tablet by mouth daily (with breakfast) 90 tablet 3    vitamin D (ERGOCALCIFEROL) 1.25 MG (84431 UT) CAPS capsule Take 1 capsule by mouth once a week 12 capsule 1    Calcium Acetate, Phos Binder, 667 MG CAPS       diclofenac sodium (VOLTAREN) 1 % GEL Apply topically 2 times daily 120 g 1    omeprazole (PRILOSEC) 10 MG delayed release capsule       hydrALAZINE (APRESOLINE) 100 MG tablet       NIFEdipine (PROCARDIA XL) 60 MG extended release tablet       doxazosin (CARDURA) 2 MG tablet Take 2 mg by mouth nightly      metoprolol (LOPRESSOR) 100 MG tablet Take 1 tablet by mouth daily TAKE 1 TABLET BY MOUTH 2 TIMES DAILY (Patient taking differently: TAKE 1 TABLET BY MOUTH 2 TIMES DAILY) 30 tablet 6    doxercalciferol (HECTOROL) 4 MCG/2ML injection Infuse 1.25 mLs intravenously Once in dialysis for 1 dose Given at the end of dialysis Lena Dash, Sat 1.25 mL 0    CREON 6000 units delayed release capsule TAKE ONE CAPSULE BY MOUTH 3 TIMES A DAY WITH MEALS  0    glucose monitoring kit (FREESTYLE) monitoring kit 1 kit by Does not apply route daily 1 kit 0    Misc. Devices MISC Check blood sugar one time a day 50 each 5    Insulin Pen Needle 32G X 6 MM MISC 1 Package by Does not apply route daily 100 each 3     No current facility-administered medications for this visit. Allergies   Allergen Reactions    Clonidine Nausea Only     Requested by dr David Carpenter to add to allergy list, pt should not be on catapress. 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 HENT: Negative. Eyes: Negative. Respiratory: Negative. Cardiovascular: Negative. Gastrointestinal: Negative. Endocrine: Negative. Genitourinary: Negative. Musculoskeletal: Positive for gait problem. Skin: Negative. Allergic/Immunologic: Negative. Neurological: Positive for dizziness and numbness. Hematological: Negative. Psychiatric/Behavioral: Negative. Blood pressure supine 162/76 , sitting 152/75 , standing 149/72     Neurological Examination  Constitutional .General exam well groomed   Head/ Ears /Nose/Throat/external ear . Normal exam  Neck and thyroid . Normal size. No bruits  Respiratory . Breathsounds clear bilaterally  Cardiovascular:  Auscultation of heart with regular rate and rhythm   Musculoskeletal. Muscle bulk and tone normal                                                           Muscle strength iliopsoas 4-/5 otherwise full strength  5/5 strength throughout

## 2021-05-26 ENCOUNTER — OFFICE VISIT (OUTPATIENT)
Dept: NEUROLOGY | Age: 56
End: 2021-05-26
Payer: MEDICARE

## 2021-05-26 DIAGNOSIS — R29.898 WEAKNESS OF BOTH LOWER EXTREMITIES: ICD-10-CM

## 2021-05-26 DIAGNOSIS — E08.42 DIABETIC POLYNEUROPATHY ASSOCIATED WITH DIABETES MELLITUS DUE TO UNDERLYING CONDITION (HCC): ICD-10-CM

## 2021-05-26 PROCEDURE — 95886 MUSC TEST DONE W/N TEST COMP: CPT | Performed by: PSYCHIATRY & NEUROLOGY

## 2021-05-26 PROCEDURE — 95909 NRV CNDJ TST 5-6 STUDIES: CPT | Performed by: PSYCHIATRY & NEUROLOGY

## 2021-05-26 PROCEDURE — 95885 MUSC TST DONE W/NERV TST LIM: CPT | Performed by: PSYCHIATRY & NEUROLOGY

## 2021-08-20 ENCOUNTER — APPOINTMENT (OUTPATIENT)
Dept: GENERAL RADIOLOGY | Age: 56
DRG: 291 | End: 2021-08-20
Payer: COMMERCIAL

## 2021-08-20 ENCOUNTER — HOSPITAL ENCOUNTER (INPATIENT)
Age: 56
LOS: 2 days | Discharge: HOME | DRG: 291 | End: 2021-08-22
Attending: EMERGENCY MEDICINE | Admitting: INTERNAL MEDICINE
Payer: COMMERCIAL

## 2021-08-20 DIAGNOSIS — E87.5 HYPERKALEMIA: Primary | ICD-10-CM

## 2021-08-20 LAB
ABSOLUTE EOS #: 0 K/UL (ref 0–0.4)
ABSOLUTE EOS #: 0.1 K/UL (ref 0–0.4)
ABSOLUTE IMMATURE GRANULOCYTE: ABNORMAL K/UL (ref 0–0.3)
ABSOLUTE IMMATURE GRANULOCYTE: ABNORMAL K/UL (ref 0–0.3)
ABSOLUTE LYMPH #: 0.65 K/UL (ref 1–4.8)
ABSOLUTE LYMPH #: 0.7 K/UL (ref 1–4.8)
ABSOLUTE MONO #: 0.26 K/UL (ref 0.1–1.3)
ABSOLUTE MONO #: 0.6 K/UL (ref 0.1–1.3)
ALBUMIN SERPL-MCNC: 4.2 G/DL (ref 3.5–5.2)
ALBUMIN/GLOBULIN RATIO: ABNORMAL (ref 1–2.5)
ALP BLD-CCNC: 81 U/L (ref 40–129)
ALT SERPL-CCNC: 15 U/L (ref 5–41)
AMYLASE: 27 U/L (ref 28–100)
ANION GAP SERPL CALCULATED.3IONS-SCNC: 18 MMOL/L (ref 9–17)
ANION GAP SERPL CALCULATED.3IONS-SCNC: 18 MMOL/L (ref 9–17)
AST SERPL-CCNC: 14 U/L
BASOPHILS # BLD: 2 % (ref 0–2)
BASOPHILS # BLD: 3 % (ref 0–2)
BASOPHILS ABSOLUTE: 0.09 K/UL (ref 0–0.2)
BASOPHILS ABSOLUTE: 0.1 K/UL (ref 0–0.2)
BILIRUB SERPL-MCNC: 0.69 MG/DL (ref 0.3–1.2)
BUN BLDV-MCNC: 59 MG/DL (ref 6–20)
BUN BLDV-MCNC: 62 MG/DL (ref 6–20)
BUN/CREAT BLD: ABNORMAL (ref 9–20)
BUN/CREAT BLD: ABNORMAL (ref 9–20)
CALCIUM SERPL-MCNC: 8.4 MG/DL (ref 8.6–10.4)
CALCIUM SERPL-MCNC: 9.2 MG/DL (ref 8.6–10.4)
CHLORIDE BLD-SCNC: 101 MMOL/L (ref 98–107)
CHLORIDE BLD-SCNC: 96 MMOL/L (ref 98–107)
CO2: 20 MMOL/L (ref 20–31)
CO2: 22 MMOL/L (ref 20–31)
CREAT SERPL-MCNC: 10.06 MG/DL (ref 0.7–1.2)
CREAT SERPL-MCNC: 11.28 MG/DL (ref 0.7–1.2)
DIFFERENTIAL TYPE: ABNORMAL
DIFFERENTIAL TYPE: ABNORMAL
EKG ATRIAL RATE: 57 BPM
EKG P AXIS: 65 DEGREES
EKG P-R INTERVAL: 234 MS
EKG Q-T INTERVAL: 498 MS
EKG QRS DURATION: 108 MS
EKG QTC CALCULATION (BAZETT): 484 MS
EKG R AXIS: -40 DEGREES
EKG T AXIS: -59 DEGREES
EKG VENTRICULAR RATE: 57 BPM
EOSINOPHILS RELATIVE PERCENT: 0 % (ref 0–4)
EOSINOPHILS RELATIVE PERCENT: 1 % (ref 0–4)
GFR AFRICAN AMERICAN: 6 ML/MIN
GFR AFRICAN AMERICAN: 7 ML/MIN
GFR NON-AFRICAN AMERICAN: 5 ML/MIN
GFR NON-AFRICAN AMERICAN: 5 ML/MIN
GFR SERPL CREATININE-BSD FRML MDRD: ABNORMAL ML/MIN/{1.73_M2}
GLUCOSE BLD-MCNC: 126 MG/DL (ref 70–99)
GLUCOSE BLD-MCNC: 172 MG/DL (ref 75–110)
GLUCOSE BLD-MCNC: 175 MG/DL (ref 75–110)
GLUCOSE BLD-MCNC: 196 MG/DL (ref 75–110)
GLUCOSE BLD-MCNC: 72 MG/DL (ref 70–99)
GLUCOSE BLD-MCNC: 72 MG/DL (ref 75–110)
HCT VFR BLD CALC: 32.1 % (ref 41–53)
HCT VFR BLD CALC: 32.7 % (ref 41–53)
HEMOGLOBIN: 10.1 G/DL (ref 13.5–17.5)
HEMOGLOBIN: 10.6 G/DL (ref 13.5–17.5)
IMMATURE GRANULOCYTES: ABNORMAL %
IMMATURE GRANULOCYTES: ABNORMAL %
LIPASE: 17 U/L (ref 13–60)
LIPASE: 20 U/L (ref 13–60)
LYMPHOCYTES # BLD: 14 % (ref 24–44)
LYMPHOCYTES # BLD: 15 % (ref 24–44)
MAGNESIUM: 2.1 MG/DL (ref 1.6–2.6)
MCH RBC QN AUTO: 31.8 PG (ref 26–34)
MCH RBC QN AUTO: 31.9 PG (ref 26–34)
MCHC RBC AUTO-ENTMCNC: 31.5 G/DL (ref 31–37)
MCHC RBC AUTO-ENTMCNC: 32.4 G/DL (ref 31–37)
MCV RBC AUTO: 100.8 FL (ref 80–100)
MCV RBC AUTO: 98.4 FL (ref 80–100)
MONOCYTES # BLD: 12 % (ref 1–7)
MONOCYTES # BLD: 6 % (ref 1–7)
MORPHOLOGY: NORMAL
NRBC AUTOMATED: ABNORMAL PER 100 WBC
NRBC AUTOMATED: ABNORMAL PER 100 WBC
PDW BLD-RTO: 14.5 % (ref 11.5–14.9)
PDW BLD-RTO: 15 % (ref 11.5–14.9)
PHOSPHORUS: 7.8 MG/DL (ref 2.5–4.5)
PLATELET # BLD: 65 K/UL (ref 150–450)
PLATELET # BLD: 76 K/UL (ref 150–450)
PLATELET ESTIMATE: ABNORMAL
PLATELET ESTIMATE: ABNORMAL
PMV BLD AUTO: 8.5 FL (ref 6–12)
PMV BLD AUTO: 8.7 FL (ref 6–12)
POTASSIUM SERPL-SCNC: 4.9 MMOL/L (ref 3.7–5.3)
POTASSIUM SERPL-SCNC: 5.7 MMOL/L (ref 3.7–5.3)
RBC # BLD: 3.19 M/UL (ref 4.5–5.9)
RBC # BLD: 3.33 M/UL (ref 4.5–5.9)
RBC # BLD: ABNORMAL 10*6/UL
RBC # BLD: ABNORMAL 10*6/UL
SARS-COV-2, RAPID: NOT DETECTED
SEG NEUTROPHILS: 70 % (ref 36–66)
SEG NEUTROPHILS: 77 % (ref 36–66)
SEGMENTED NEUTROPHILS ABSOLUTE COUNT: 3.3 K/UL (ref 1.3–9.1)
SEGMENTED NEUTROPHILS ABSOLUTE COUNT: 3.5 K/UL (ref 1.3–9.1)
SODIUM BLD-SCNC: 136 MMOL/L (ref 135–144)
SODIUM BLD-SCNC: 139 MMOL/L (ref 135–144)
SPECIMEN DESCRIPTION: NORMAL
TOTAL PROTEIN: 6.9 G/DL (ref 6.4–8.3)
TRIGL SERPL-MCNC: 73 MG/DL
TROPONIN INTERP: ABNORMAL
TROPONIN INTERP: ABNORMAL
TROPONIN T: ABNORMAL NG/ML
TROPONIN T: ABNORMAL NG/ML
TROPONIN, HIGH SENSITIVITY: 141 NG/L (ref 0–22)
TROPONIN, HIGH SENSITIVITY: 150 NG/L (ref 0–22)
WBC # BLD: 4.3 K/UL (ref 3.5–11)
WBC # BLD: 5 K/UL (ref 3.5–11)
WBC # BLD: ABNORMAL 10*3/UL
WBC # BLD: ABNORMAL 10*3/UL

## 2021-08-20 PROCEDURE — 2060000000 HC ICU INTERMEDIATE R&B

## 2021-08-20 PROCEDURE — 36415 COLL VENOUS BLD VENIPUNCTURE: CPT

## 2021-08-20 PROCEDURE — 94761 N-INVAS EAR/PLS OXIMETRY MLT: CPT

## 2021-08-20 PROCEDURE — 99285 EMERGENCY DEPT VISIT HI MDM: CPT

## 2021-08-20 PROCEDURE — 6370000000 HC RX 637 (ALT 250 FOR IP): Performed by: EMERGENCY MEDICINE

## 2021-08-20 PROCEDURE — 90935 HEMODIALYSIS ONE EVALUATION: CPT

## 2021-08-20 PROCEDURE — 84484 ASSAY OF TROPONIN QUANT: CPT

## 2021-08-20 PROCEDURE — 84100 ASSAY OF PHOSPHORUS: CPT

## 2021-08-20 PROCEDURE — 96375 TX/PRO/DX INJ NEW DRUG ADDON: CPT

## 2021-08-20 PROCEDURE — 83690 ASSAY OF LIPASE: CPT

## 2021-08-20 PROCEDURE — 82150 ASSAY OF AMYLASE: CPT

## 2021-08-20 PROCEDURE — 6360000002 HC RX W HCPCS: Performed by: STUDENT IN AN ORGANIZED HEALTH CARE EDUCATION/TRAINING PROGRAM

## 2021-08-20 PROCEDURE — 6360000002 HC RX W HCPCS: Performed by: EMERGENCY MEDICINE

## 2021-08-20 PROCEDURE — 83735 ASSAY OF MAGNESIUM: CPT

## 2021-08-20 PROCEDURE — 6370000000 HC RX 637 (ALT 250 FOR IP): Performed by: INTERNAL MEDICINE

## 2021-08-20 PROCEDURE — 6370000000 HC RX 637 (ALT 250 FOR IP)

## 2021-08-20 PROCEDURE — 2580000003 HC RX 258: Performed by: INTERNAL MEDICINE

## 2021-08-20 PROCEDURE — 93005 ELECTROCARDIOGRAM TRACING: CPT | Performed by: EMERGENCY MEDICINE

## 2021-08-20 PROCEDURE — 99223 1ST HOSP IP/OBS HIGH 75: CPT | Performed by: INTERNAL MEDICINE

## 2021-08-20 PROCEDURE — 80048 BASIC METABOLIC PNL TOTAL CA: CPT

## 2021-08-20 PROCEDURE — 87635 SARS-COV-2 COVID-19 AMP PRB: CPT

## 2021-08-20 PROCEDURE — 5A1D70Z PERFORMANCE OF URINARY FILTRATION, INTERMITTENT, LESS THAN 6 HOURS PER DAY: ICD-10-PCS | Performed by: INTERNAL MEDICINE

## 2021-08-20 PROCEDURE — 96374 THER/PROPH/DIAG INJ IV PUSH: CPT

## 2021-08-20 PROCEDURE — 85025 COMPLETE CBC W/AUTO DIFF WBC: CPT

## 2021-08-20 PROCEDURE — 2500000003 HC RX 250 WO HCPCS: Performed by: EMERGENCY MEDICINE

## 2021-08-20 PROCEDURE — 6360000002 HC RX W HCPCS: Performed by: INTERNAL MEDICINE

## 2021-08-20 PROCEDURE — 84478 ASSAY OF TRIGLYCERIDES: CPT

## 2021-08-20 PROCEDURE — 80053 COMPREHEN METABOLIC PANEL: CPT

## 2021-08-20 PROCEDURE — 82947 ASSAY GLUCOSE BLOOD QUANT: CPT

## 2021-08-20 PROCEDURE — 2500000003 HC RX 250 WO HCPCS: Performed by: INTERNAL MEDICINE

## 2021-08-20 PROCEDURE — 71045 X-RAY EXAM CHEST 1 VIEW: CPT

## 2021-08-20 RX ORDER — DIPHENHYDRAMINE HYDROCHLORIDE 50 MG/ML
25 INJECTION INTRAMUSCULAR; INTRAVENOUS EVERY 6 HOURS PRN
Status: DISCONTINUED | OUTPATIENT
Start: 2021-08-20 | End: 2021-08-22 | Stop reason: HOSPADM

## 2021-08-20 RX ORDER — ONDANSETRON 2 MG/ML
4 INJECTION INTRAMUSCULAR; INTRAVENOUS EVERY 6 HOURS PRN
Status: DISCONTINUED | OUTPATIENT
Start: 2021-08-20 | End: 2021-08-22 | Stop reason: HOSPADM

## 2021-08-20 RX ORDER — SODIUM CHLORIDE 0.9 % (FLUSH) 0.9 %
5-40 SYRINGE (ML) INJECTION EVERY 12 HOURS SCHEDULED
Status: DISCONTINUED | OUTPATIENT
Start: 2021-08-20 | End: 2021-08-22 | Stop reason: HOSPADM

## 2021-08-20 RX ORDER — ONDANSETRON 4 MG/1
4 TABLET, ORALLY DISINTEGRATING ORAL EVERY 8 HOURS PRN
Status: DISCONTINUED | OUTPATIENT
Start: 2021-08-20 | End: 2021-08-22 | Stop reason: HOSPADM

## 2021-08-20 RX ORDER — NIFEDIPINE 90 MG/1
90 TABLET, FILM COATED, EXTENDED RELEASE ORAL NIGHTLY
Status: DISCONTINUED | OUTPATIENT
Start: 2021-08-20 | End: 2021-08-22 | Stop reason: HOSPADM

## 2021-08-20 RX ORDER — DEXTROSE MONOHYDRATE 50 MG/ML
100 INJECTION, SOLUTION INTRAVENOUS PRN
Status: DISCONTINUED | OUTPATIENT
Start: 2021-08-20 | End: 2021-08-22 | Stop reason: HOSPADM

## 2021-08-20 RX ORDER — DIPHENHYDRAMINE HCL 25 MG
25 TABLET ORAL EVERY 6 HOURS PRN
Status: DISCONTINUED | OUTPATIENT
Start: 2021-08-20 | End: 2021-08-20

## 2021-08-20 RX ORDER — SODIUM CHLORIDE 9 MG/ML
25 INJECTION, SOLUTION INTRAVENOUS PRN
Status: DISCONTINUED | OUTPATIENT
Start: 2021-08-20 | End: 2021-08-22 | Stop reason: HOSPADM

## 2021-08-20 RX ORDER — MORPHINE SULFATE 4 MG/ML
4 INJECTION, SOLUTION INTRAMUSCULAR; INTRAVENOUS ONCE
Status: COMPLETED | OUTPATIENT
Start: 2021-08-20 | End: 2021-08-20

## 2021-08-20 RX ORDER — DEXTROSE MONOHYDRATE 25 G/50ML
25 INJECTION, SOLUTION INTRAVENOUS ONCE
Status: COMPLETED | OUTPATIENT
Start: 2021-08-20 | End: 2021-08-20

## 2021-08-20 RX ORDER — METOPROLOL TARTRATE 100 MG/1
100 TABLET ORAL 2 TIMES DAILY
Status: DISCONTINUED | OUTPATIENT
Start: 2021-08-20 | End: 2021-08-22 | Stop reason: HOSPADM

## 2021-08-20 RX ORDER — POLYETHYLENE GLYCOL 3350 17 G/17G
17 POWDER, FOR SOLUTION ORAL DAILY PRN
Status: DISCONTINUED | OUTPATIENT
Start: 2021-08-20 | End: 2021-08-22 | Stop reason: HOSPADM

## 2021-08-20 RX ORDER — NICOTINE POLACRILEX 4 MG
15 LOZENGE BUCCAL PRN
Status: DISCONTINUED | OUTPATIENT
Start: 2021-08-20 | End: 2021-08-22 | Stop reason: HOSPADM

## 2021-08-20 RX ORDER — HYDRALAZINE HYDROCHLORIDE 50 MG/1
100 TABLET, FILM COATED ORAL 3 TIMES DAILY
Status: DISCONTINUED | OUTPATIENT
Start: 2021-08-20 | End: 2021-08-22 | Stop reason: HOSPADM

## 2021-08-20 RX ORDER — HYDROCODONE BITARTRATE AND ACETAMINOPHEN 5; 325 MG/1; MG/1
1 TABLET ORAL 2 TIMES DAILY PRN
Status: DISCONTINUED | OUTPATIENT
Start: 2021-08-20 | End: 2021-08-22 | Stop reason: HOSPADM

## 2021-08-20 RX ORDER — HYDRALAZINE HYDROCHLORIDE 20 MG/ML
10 INJECTION INTRAMUSCULAR; INTRAVENOUS ONCE
Status: COMPLETED | OUTPATIENT
Start: 2021-08-20 | End: 2021-08-20

## 2021-08-20 RX ORDER — ACETAMINOPHEN 650 MG/1
650 SUPPOSITORY RECTAL EVERY 6 HOURS PRN
Status: DISCONTINUED | OUTPATIENT
Start: 2021-08-20 | End: 2021-08-22 | Stop reason: HOSPADM

## 2021-08-20 RX ORDER — ACETAMINOPHEN 325 MG/1
650 TABLET ORAL EVERY 6 HOURS PRN
Status: DISCONTINUED | OUTPATIENT
Start: 2021-08-20 | End: 2021-08-22 | Stop reason: HOSPADM

## 2021-08-20 RX ORDER — SODIUM CHLORIDE 0.9 % (FLUSH) 0.9 %
5-40 SYRINGE (ML) INJECTION PRN
Status: DISCONTINUED | OUTPATIENT
Start: 2021-08-20 | End: 2021-08-22 | Stop reason: HOSPADM

## 2021-08-20 RX ORDER — DEXTROSE MONOHYDRATE 25 G/50ML
12.5 INJECTION, SOLUTION INTRAVENOUS PRN
Status: DISCONTINUED | OUTPATIENT
Start: 2021-08-20 | End: 2021-08-22 | Stop reason: HOSPADM

## 2021-08-20 RX ADMIN — DIPHENHYDRAMINE HYDROCHLORIDE 25 MG: 50 INJECTION INTRAMUSCULAR; INTRAVENOUS at 23:45

## 2021-08-20 RX ADMIN — HYDRALAZINE HYDROCHLORIDE 100 MG: 50 TABLET, FILM COATED ORAL at 10:50

## 2021-08-20 RX ADMIN — INSULIN HUMAN 10 UNITS: 100 INJECTION, SOLUTION PARENTERAL at 03:38

## 2021-08-20 RX ADMIN — INSULIN LISPRO 1 UNITS: 100 INJECTION, SOLUTION INTRAVENOUS; SUBCUTANEOUS at 16:24

## 2021-08-20 RX ADMIN — NIFEDIPINE 90 MG: 90 TABLET, EXTENDED RELEASE ORAL at 20:17

## 2021-08-20 RX ADMIN — SODIUM CHLORIDE 5 MG/HR: 9 INJECTION, SOLUTION INTRAVENOUS at 17:00

## 2021-08-20 RX ADMIN — DEXTROSE MONOHYDRATE 25 G: 25 INJECTION, SOLUTION INTRAVENOUS at 03:35

## 2021-08-20 RX ADMIN — HYDROCODONE BITARTRATE AND ACETAMINOPHEN 1 TABLET: 5; 325 TABLET ORAL at 10:20

## 2021-08-20 RX ADMIN — ONDANSETRON 4 MG: 2 INJECTION INTRAMUSCULAR; INTRAVENOUS at 06:46

## 2021-08-20 RX ADMIN — HYDROCODONE BITARTRATE AND ACETAMINOPHEN 1 TABLET: 5; 325 TABLET ORAL at 21:54

## 2021-08-20 RX ADMIN — Medication 4 MG: at 03:15

## 2021-08-20 RX ADMIN — METOPROLOL TARTRATE 100 MG: 100 TABLET, FILM COATED ORAL at 20:18

## 2021-08-20 RX ADMIN — SODIUM CHLORIDE 5 MG/HR: 9 INJECTION, SOLUTION INTRAVENOUS at 06:30

## 2021-08-20 RX ADMIN — METOPROLOL TARTRATE 100 MG: 100 TABLET, FILM COATED ORAL at 10:51

## 2021-08-20 RX ADMIN — HYDRALAZINE HYDROCHLORIDE 10 MG: 20 INJECTION INTRAMUSCULAR; INTRAVENOUS at 02:46

## 2021-08-20 RX ADMIN — ONDANSETRON 4 MG: 2 INJECTION INTRAMUSCULAR; INTRAVENOUS at 21:54

## 2021-08-20 RX ADMIN — HYDRALAZINE HYDROCHLORIDE 100 MG: 50 TABLET, FILM COATED ORAL at 20:18

## 2021-08-20 RX ADMIN — HYDRALAZINE HYDROCHLORIDE 100 MG: 50 TABLET, FILM COATED ORAL at 16:24

## 2021-08-20 RX ADMIN — DIPHENHYDRAMINE HYDROCHLORIDE 25 MG: 50 INJECTION INTRAMUSCULAR; INTRAVENOUS at 18:01

## 2021-08-20 RX ADMIN — DIPHENHYDRAMINE HCL 25 MG: 25 TABLET ORAL at 10:19

## 2021-08-20 RX ADMIN — PANCRELIPASE LIPASE, PANCRELIPASE PROTEASE, PANCRELIPASE AMYLASE 5000 UNITS: 5000; 17000; 24000 CAPSULE, DELAYED RELEASE ORAL at 16:29

## 2021-08-20 ASSESSMENT — PAIN DESCRIPTION - DESCRIPTORS
DESCRIPTORS: CONSTANT
DESCRIPTORS: CONSTANT
DESCRIPTORS: ACHING

## 2021-08-20 ASSESSMENT — PAIN SCALES - GENERAL
PAINLEVEL_OUTOF10: 8
PAINLEVEL_OUTOF10: 0
PAINLEVEL_OUTOF10: 6
PAINLEVEL_OUTOF10: 8
PAINLEVEL_OUTOF10: 0
PAINLEVEL_OUTOF10: 0
PAINLEVEL_OUTOF10: 7
PAINLEVEL_OUTOF10: 6

## 2021-08-20 ASSESSMENT — PAIN DESCRIPTION - FREQUENCY
FREQUENCY: CONTINUOUS

## 2021-08-20 ASSESSMENT — ENCOUNTER SYMPTOMS
VOMITING: 1
DIARRHEA: 0
CONSTIPATION: 0
NAUSEA: 1
SHORTNESS OF BREATH: 0
TROUBLE SWALLOWING: 0
ABDOMINAL PAIN: 1
COLOR CHANGE: 0
SORE THROAT: 0
BACK PAIN: 0
WHEEZING: 0
BLOOD IN STOOL: 0
COUGH: 0
CHEST TIGHTNESS: 0
VOMITING: 0

## 2021-08-20 ASSESSMENT — PAIN DESCRIPTION - ORIENTATION
ORIENTATION: LEFT

## 2021-08-20 ASSESSMENT — PAIN DESCRIPTION - LOCATION
LOCATION: ABDOMEN

## 2021-08-20 ASSESSMENT — PAIN DESCRIPTION - PAIN TYPE: TYPE: CHRONIC PAIN

## 2021-08-20 NOTE — PROGRESS NOTES
Pt assisted to bed from chair, placed on portable monitor and taken to dialysis unit for a 2 hour treatment ordered by Dr. Kamla Keyes.

## 2021-08-20 NOTE — ED PROVIDER NOTES
[address]  EMERGENCY DEPARTMENT ENCOUNTER    Pt Name: Sofia Vizcarra  MRN: [id number]  YOB: 1965  Date of evaluation:8/20/21  PCP: Erasmo Covarrubias MD    [address] [place] [address]       Chief Complaint   Patient presents with    Hypertension    Other     Dialysis patient, missed 2 treatments, last tx was on saturday, pt seen tonight at [facility] with elevated blood pressure of 200/90 and Potassium of 6.9       HISTORY OF PRESENT ILLNESS    Sofia Vizcarra is a 49 y.o. male who presents with a chief complaint of nausea and vomiting. Patient states his symptoms started on Monday. He supposed to be dialyzed every Tuesday Thursday Saturday but skipped it because of how he was feeling. Patient was seen at another hospital earlier. He had peaked T waves on his EKG and his potassium was 6.9. He was treated with insulin, dextrose, calcium. He was transferred here to the emergency department because there were no inpatient beds available to accommodate him at the other hospital.  I did speak with the ED attending before he was transferred here. Patient is complaining of right now is abdominal pain. He states pain is diffuse, generalized. Pain does not radiate. Describes as sharp. Nothing makes his symptoms better or worse. He denies any chest pain, difficulty breathing. EMS states that his blood pressure was initially over 200 and he was given labetalol for this and it was about 200 just before they got here. Symptoms are acute. Symptoms are moderate per nothing make symptoms better or worse. Patient has no other complaints at this time. REVIEW OF SYSTEMS       Review of Systems   Constitutional: Negative for chills, fatigue and fever. HENT: Negative for congestion, ear pain, sore throat and trouble swallowing. Eyes: Negative for visual disturbance. Respiratory: Negative for cough and shortness of breath. Cardiovascular: Negative for chest pain, palpitations and leg swelling. Gastrointestinal: Positive for abdominal pain, nausea and vomiting. Negative for blood in stool, constipation and diarrhea. Genitourinary: Negative for dysuria and flank pain. Musculoskeletal: Negative for arthralgias, back pain, myalgias and neck pain. Skin: Negative for color change, rash and wound. Neurological: Negative for dizziness, weakness, light-headedness, numbness and headaches. Psychiatric/Behavioral: Negative for confusion. All other systems reviewed and are negative. Negative in 10 essential Systems except as mentioned above and in the HPI. PAST MEDICAL HISTORY     Past Medical History:   Diagnosis Date    Chronic kidney disease     left renal infarct-on dialysis Tues. , Thurs, Sat.  [facility] ([facility] [address]     Diabetes mellitus ([facility] [address]     Diverticulosis     Hyperlipidemia     Hypertension     Mild malnutrition (HCC)     MRSA (methicillin resistant staph aureus) culture positive 02/26/2019    nares    Pancreatitis     Personal history of colonic polyps 7/14/2020         SURGICAL HISTORY      has a past surgical history that includes hernia repair; Cholecystectomy; Tonsillectomy; Septoplasty; other surgical history; other surgical history (06/07/2017); Catheter Removal (N/A, 6/7/2017); AV fistula creation (Left); Colonoscopy (N/A, 7/13/2020); other surgical history; and Upper gastrointestinal endoscopy (N/A, 10/23/2020). CURRENT MEDICATIONS       Previous Medications    CALCIUM ACETATE, PHOS BINDER, 667 MG CAPS        CLONIDINE (CATAPRES) 0.1 MG TABLET    Take 0.1 mg by mouth daily as needed    CREON 6000 UNITS DELAYED RELEASE CAPSULE    TAKE ONE CAPSULE BY MOUTH 3 TIMES A DAY WITH MEALS    DIAZEPAM (VALIUM) 5 MG TABLET    TAKE 1 TABLET BY MOUTH ONCE AS NEEDED FOR ANXIETY OR SLEEP FOR UP TO 1 DOSE.     DICLOFENAC SODIUM (VOLTAREN) 1 % GEL    Apply topically 2 times daily    DIPHENHYDRAMINE (BENADRYL) 25 MG CAPSULE    Take 25 mg by mouth every 12 hours as needed indicated that his father is . family history includes No Known Problems in his father and mother. SOCIAL HISTORY      reports that he has never smoked. He has never used smokeless tobacco. He reports previous alcohol use. He reports that he does not use drugs. PHYSICAL EXAM     INITIAL VITALS:  height is 5' 10\" (1.778 m) and weight is 180 lb (81.6 kg). His oral temperature is 97.9 °F (36.6 °C). His blood pressure is 214/86 (abnormal) and his pulse is 58. His respiration is 16 and oxygen saturation is 95%. Physical Exam  Vitals and nursing note reviewed. Constitutional:       General: He is not in acute distress. HENT:      Head: Normocephalic and atraumatic. Eyes:      Conjunctiva/sclera: Conjunctivae normal.      Pupils: Pupils are equal, round, and reactive to light. Cardiovascular:      Rate and Rhythm: Normal rate and regular rhythm. Heart sounds: Normal heart sounds. No murmur heard. Pulmonary:      Effort: Pulmonary effort is normal. No respiratory distress. Breath sounds: Normal breath sounds. Abdominal:      General: Bowel sounds are normal. There is no distension. Palpations: Abdomen is soft. Tenderness: There is no abdominal tenderness. Musculoskeletal:         General: No tenderness. Cervical back: Neck supple. Right lower leg: Edema present. Left lower leg: Edema present. Lymphadenopathy:      Cervical: No cervical adenopathy. Skin:     General: Skin is warm and dry. Findings: No rash. Neurological:      Mental Status: He is alert and oriented to person, place, and time. Psychiatric:         Judgment: Judgment normal.           DIFFERENTIAL DIAGNOSIS/MDM:   72-year-old male transferred here from another hospital due to hyperkalemia, noncompliance with dialysis. Currently he is afebrile, nontoxic, hypertensive otherwise vital signs normal.  Not any acute distress. We will repeat EKG, lab work here.   Will contact nephrology, admit patient for dialysis. Suspect he most likely will need emergent dialysis tonight. DIAGNOSTIC RESULTS     EKG: All EKG's are interpreted by the Emergency Department Physician who either signs or Co-signs this chart in the absence of a cardiologist.    EKG Interpretation    Interpreted by me    Rhythm: Sinus bradycardia, first-degree AV block  Rate: 57  Axis: normal  Ectopy: none  Conduction: First-degree AV block, premature atrial contractions  ST Segments: Nonspecific changes  T Waves: Nonspecific changes  Q Waves: none    Clinical Impression: Nonspecific EKG    RADIOLOGY:   I directly visualized the following  images and reviewed the radiologist interpretations:  XR CHEST PORTABLE   Final Result   1. Moderate cardiomegaly. 2. Right basilar mild subsegmental atelectasis versus airspace disease.                  ED BEDSIDE ULTRASOUND:      LABS:  Labs Reviewed   CBC WITH AUTO DIFFERENTIAL - Abnormal; Notable for the following components:       Result Value    RBC 3.33 (*)     Hemoglobin 10.6 (*)     Hematocrit 32.7 (*)     Platelets 65 (*)     Seg Neutrophils 77 (*)     Lymphocytes 15 (*)     Absolute Lymph # 0.65 (*)     All other components within normal limits   COMPREHENSIVE METABOLIC PANEL - Abnormal; Notable for the following components:    Glucose 126 (*)     BUN 62 (*)     CREATININE 11.28 (*)     Potassium 5.7 (*)     Chloride 96 (*)     Anion Gap 18 (*)     GFR Non- 5 (*)     GFR  6 (*)     All other components within normal limits   TROPONIN - Abnormal; Notable for the following components:    Troponin, High Sensitivity 150 (*)     All other components within normal limits   COVID-19, RAPID   LIPASE   TROPONIN         EMERGENCY DEPARTMENT COURSE:   Vitals:    Vitals:    08/20/21 0157 08/20/21 0245 08/20/21 0246 08/20/21 0316   BP: (!) 214/85 (!) 200/84 (!) 200/84 (!) 214/86   Pulse: 56 57  58   Resp: 16 16  16   Temp: 97.9 °F (36.6 °C)      TempSrc: Oral      SpO2: 96% 95%  95%   Weight: 180 lb (81.6 kg)      Height: 5' 10\" (1.778 m)        2:00 AM EDT  EKG shows sinus bradycardia with first-degree AV block. There is no significant T wave changes I can appreciate. No T wave peaking. 3:10 AM EDT  I spoke with Dr. Othelia Goldberg who is listed as the patient's PCP however he states that this is not his patient anymore and he does not see him. He is recommending staff medicine admission. 3:18 AM EDT  Spoke with Dr. Joshua Velasco who accepted patient for admission. 3:24 AM EDT  Spoke with nephrologist Dr. Jakie Bloch discussed patient's case. Discussed that his potassium is now 5.7 after insulin, dextrose, calcium at [facility].  Also with EKG without any T wave peaking. He is okay waiting till morning to dialyze the patient. He did recommend that we give him another dose of insulin D50 here. As far as the questionable pneumonia on his chest x-ray he has no symptoms of pneumonia. He has no fever, no new cough. I think this is probably just secondary to fluid overload. X-ray from [facility] was unremarkable. We will hold off on treating for pneumonia as clinically have a low suspicion for pneumonia. CRITICALCARE:      CONSULTS:  IP CONSULT TO NEPHROLOGY  IP CONSULT TO PRIMARY CARE PROVIDER  IP CONSULT TO INTERNAL MEDICINE      PROCEDURES:      FINAL IMPRESSION      1. Hyperkalemia            DISPOSITION/PLAN   DISPOSITION      Admission    PATIENT REFERRED TO:  No follow-up provider specified.     DISCHARGE MEDICATIONS:  New Prescriptions    No medications on file       (Please note that portions ofthis note were completed with a voice recognition program.  Efforts were made to edit the dictations but occasionally words are mis-transcribed.)    Adelfo Morris,   Attending Emergency Physician          Adelfo Morris, DO  08/20/21 Norman Downs, DO  08/20/21 0340

## 2021-08-20 NOTE — PLAN OF CARE
Nutrition Problem #1: Inadequate oral intake  Intervention: Food and/or Nutrient Delivery: Continue Current Diet  Nutritional Goals: PO intake % of most meals

## 2021-08-20 NOTE — ED NOTES
Admission Dx: Hyperkalemia    Pts Chief Complaints on Arrival: missed dialysis treatments    ADL's - Partial assistance    Pending Diagnostics:  n/a    Residence PTA: single story home    Special Considerations/Circumstances:  n/a    Vitals: Current vital signs:  BP (!) 214/86   Pulse 58   Temp 97.9 °F (36.6 °C) (Oral)   Resp 16   Ht 5' 10\" (1.778 m)   Wt 180 lb (81.6 kg)   SpO2 95%   BMI 25.83 kg/m²                MEWS Score: 98 Amrik Godinez RN  08/20/21 9601

## 2021-08-20 NOTE — PROGRESS NOTES
HEMODIALYSIS PRE-TREATMENT NOTE    Patient Identifiers prior to treatment: birthdate, name and band    Isolation Required: MRSA                     Isolation Type: contact       (please document if patient is being managed as a PUI/COVID-19 patient)        Hepatitis status:                           Date Drawn                             Result  Hepatitis B Surface Antigen  na                Hepatitis B Surface Antibody  na        Hepatitis B Core Antibody  na          How was Hepatitis Status verified: redraw,      Was a copy of the labs you documented provided to facility for the patient's chart: na    Hemodialysis orders verified: yes    Access Within normal limits ( I.e. s/s of infection,...): yes    Pre-Assessment completed:yes    Pre-dialysis report received from: Shirlene Taylor                      Time: 0468

## 2021-08-20 NOTE — PROGRESS NOTES
Comprehensive Nutrition Assessment    Type and Reason for Visit:  Positive Nutrition Screen (Poor intake, nausea, vomiting)    Nutrition Recommendations/Plan: Continue diet as ordered. Nutrition Assessment:  Pt went to ED at [facility] with high blood pressure and K+ level of 6.9. Pt given Ca+, dextrose, & insulin then sent to [facility] since no beds available. Hx of Dialysis on T-Th-Sat but due to nausea, vomiting and abdominal pain missed 2 treatments. Pt no longer having nausea and vomiting and able to eat without difficulty. Dialysis to be done today. Malnutrition Assessment:  Malnutrition Status:  No malnutrition    Context:  Acute Illness     Findings of the 6 clinical characteristics of malnutrition:  Energy Intake:  Mild decrease in energy intake (Comment)  Weight Loss:  No significant weight loss     Body Fat Loss:  No significant body fat loss     Muscle Mass Loss:  No significant muscle mass loss    Fluid Accumulation:  No significant fluid accumulation     Strength:  Not Performed    Estimated Daily Nutrient Needs:  Energy (kcal):  6708-1158 kcal based on [place] with 1.2-1.3 factor using adm wt 81.6 kg; Weight Used for Energy Requirements:  Admission     Protein (g):   gm protein based on 1.2-1.4 gm/kg using IBW; Weight Used for Protein Requirements:  Ideal       Nutrition Related Findings:  Edema: +1 non-pitting BUE's, +2 non-piting BLE's. Hx: DM, CHF, chronic pancreatitis, ESRD on dialysis      Wounds:  None       Current Nutrition Therapies:    ADULT DIET; Regular; Low Sodium (2 gm)    Anthropometric Measures:  · Height: 5' 10\" (177.8 cm)  · Current Body Weight: 180 lb (81.6 kg)   · Admission Body Weight: 180 lb (81.6 kg)    · Usual Body Weight: 185 lb (83.9 kg)     · Ideal Body Weight: 166 lbs; % Ideal Body Weight 108.4 %   · BMI: 25.8  · BMI Categories: Overweight (BMI 25.0-29. 9)       Nutrition Diagnosis:   · Inadequate oral intake related to renal dysfunction, altered GI function as evidenced by poor intake prior to admission, nausea, vomiting, other (comment) (K+- 6.9)      Nutrition Interventions:   Food and/or Nutrient Delivery:  Continue Current Diet  Nutrition Education/Counseling:  No recommendation at this time   Coordination of Nutrition Care:  Continue to monitor while inpatient    Goals:  PO intake % of most meals       Nutrition Monitoring and Evaluation:   Behavioral-Environmental Outcomes:  None Identified   Food/Nutrient Intake Outcomes:  Food and Nutrient Intake  Physical Signs/Symptoms Outcomes:  Biochemical Data, GI Status, Nausea or Vomiting, Fluid Status or Edema, Hemodynamic Status, Nutrition Focused Physical Findings, Skin, Weight     Discharge Planning: Too soon to determine     Some areas of assessment may be incomplete due to COVID-19 precautions. Radha Monae R.D., L.D.   Clinical Dietitian  Office: [phone]

## 2021-08-20 NOTE — H&P
[address]      [address] [facility]     HISTORY AND PHYSICAL EXAMINATION            Date:   8/20/2021  Patient name:  Kristopher Deshpande  Date of admission:  8/20/2021  1:54 AM  MRN:   [id number]  Account:  [de-identified]  YOB: 1965  PCP:    Jonnathan Gonzalez MD  Room:   2013/2013-01  Code Status:    Full Code    Chief Complaint:     Chief Complaint   Patient presents with    Hypertension    Other     Dialysis patient, missed 2 treatments, last tx was on saturday, pt seen tonight at [facility] with elevated blood pressure of 200/90 and Potassium of 6.9       History Obtained From:     patient    History of Present Illness: The patient is a 49 y.o. Non- / non  male who presents with Hypertension and Other (Dialysis patient, missed 2 treatments, last tx was on saturday, pt seen tonight at [facility] with elevated blood pressure of 200/90 and Potassium of 6.9)   and he is admitted to the hospital for the management of  Hypertension and hyperkalemia. Patient has long term history of DM, CKD on Dialysis Tuesday, Thursday and Saturday, HTN and CHF. Reports he started having nausea and vomiting on Monday, and on Thursday he went to [facility] where it was found that the patient had elevated blood pressure and potassium levels about 6.9. He was then given Insulin, Dextrose and Calcium. Due to unavailability of the beds there, patient was transferred to [facility]. Patient says that he had missed his dialysis treatment on Tuesday and Thursday because of his condition. Patient also reported of 1 episode of vomiting yesterday. Patient also reports of left sided abdominal pain, which is due to his hernial repair surgery he had after removal of the mesh. Patient denies shortness of breath, chest pain, change in vision, and decrease in urine is noted.    For Pt going home today in stable condition. Outcomes met. Care Plan DC'ed. elevated BP patient was placed on Cardene Drip  Denies smoking and alcohol history. Patient is admitted in ICU to receive Dialysis treatment. Past Medical History:     Past Medical History:   Diagnosis Date    Chronic kidney disease     left renal infarct-on dialysis Tues. , Thurs, Sat.  [facility]     Diabetes mellitus ([facility]     Diverticulosis     Hyperlipidemia     Hypertension     Mild malnutrition (HCC)     MRSA (methicillin resistant staph aureus) culture positive 02/26/2019    nares    Pancreatitis     Personal history of colonic polyps 7/14/2020        Past SurgicalHistory:     Past Surgical History:   Procedure Laterality Date    AV FISTULA CREATION Left     CATHETER REMOVAL N/A 6/7/2017    CATHETER REMOVAL TUNNEL H-D  performed by Erwin Martin MD at [address] [facility]      with bile duct repair    COLONOSCOPY N/A 7/13/2020    COLONOSCOPY POLYPECTOMY COLD BIOPSY, COLD SNARE performed by Merry Hylton MD at [address] OTHER SURGICAL HISTORY      bile duct surgery    OTHER SURGICAL HISTORY  06/07/2017    removal tunneled dialysis catheter    OTHER SURGICAL HISTORY      hernia mesh removed    SEPTOPLASTY      TONSILLECTOMY      UPPER GASTROINTESTINAL ENDOSCOPY N/A 10/23/2020    EGD BIOPSY AND DILATION performed by Merry Hylton MD at [address] [facility]        Medications Prior to Admission:        Prior to Admission medications    Medication Sig Start Date End Date Taking?  Authorizing Provider   omeprazole (PRILOSEC) 20 MG delayed release capsule TAKE 1 CAPSULE BY MOUTH TWICE A DAY 7/2/21  Yes Merry Hylton MD   diazePAM (VALIUM) 5 MG tablet TAKE 1 TABLET BY MOUTH ONCE AS NEEDED FOR ANXIETY OR SLEEP FOR UP TO 1 DOSE. 3/30/21  Yes Historical Provider, MD   diphenhydrAMINE (BENADRYL) 25 MG capsule Take 25 mg by mouth every 12 hours as needed   Yes Historical Provider, MD   HYDROcodone-acetaminophen (NORCO) 5-325 MG per tablet Take 1 tablet by mouth every 6 hours as needed.  5/11/21  Yes Historical Provider, MD   metoprolol (LOPRESSOR) 100 MG tablet Take 1 tablet by mouth 2 times daily 5/18/21  Yes Nicko Vincent MD   NIFEdipine (ADALAT CC) 90 MG extended release tablet Take 1 tablet by mouth nightly 5/14/21  Yes Nicko Vincent MD   venlafaxine (EFFEXOR XR) 37.5 MG extended release capsule TAKE 1 CAPSULE BY MOUTH EVERY DAY 5/5/21  Yes Polo Stein MD   meclizine (ANTIVERT) 25 MG tablet TAKE 1 TABLET BY MOUTH 3 TIMES A DAY AS NEEDED FOR DIZZINESS  Patient taking differently: Take 50 mg by mouth 3 times daily as needed  3/25/21  Yes Stella Ramirez MD   pregabalin (LYRICA) 75 MG capsule TAKE 1 CAPSULE BY MOUTH EVERY EVENING 3/25/21 8/20/21 Yes Stella Ramirez MD   metFORMIN (GLUCOPHAGE-XR) 500 MG extended release tablet Take 1 tablet by mouth daily (with breakfast) 3/21/21  Yes Polo Stein MD   vitamin D (ERGOCALCIFEROL) 1.25 MG (68836 UT) CAPS capsule Take 1 capsule by mouth once a week 3/21/21  Yes Polo Stein MD   Calcium Acetate, Phos Binder, [address]  2/8/21  Yes Historical Provider, MD   hydrALAZINE (APRESOLINE) 100 MG tablet 100 mg 3 times daily  11/6/20  Yes Historical Provider, MD   doxercalciferol (HECTOROL) 4 MCG/2ML injection Infuse 1.25 mLs intravenously Once in dialysis for 1 dose Given at the end of dialysis Natalie Liao, Sat 3/19/19 8/20/21 Yes Aminata Vick MD   CREON 6000 units delayed release capsule TAKE ONE CAPSULE BY MOUTH 3 TIMES A DAY WITH MEALS 6/5/18  Yes Historical Provider, MD   cloNIDine (CATAPRES) 0.1 MG tablet Take 0.1 mg by mouth daily as needed 5/11/21   Historical Provider, MD   insulin glargine (LANTUS) 100 UNIT/ML injection vial Inject 5 Units into the skin daily 5/11/21   Historical Provider, MD   insulin lispro (HUMALOG) 100 UNIT/ML injection vial nject as per sliding scale:  if 150 - 200 = 2 units;  201 - 250 = 4 units;  251 - 300 = 6 uits;  301 - 350 = 8 units;  351 - 400 = 10 units > 400 call MD,  subcutaneously four times a day related to TYPE 2 DIAB 21   Historical Provider, MD   midodrine (PROAMATINE) 5 MG tablet Take 5 mg by mouth every 12 hours 21   Historical Provider, MD   doxazosin (CARDURA) 2 MG tablet Take 2 mg by mouth nightly 20   Historical Provider, MD   glucose monitoring kit (FREESTYLE) monitoring kit 1 kit by Does not apply route daily 17   [facility]. Devices MISC Check blood sugar one time a day 17   [place] [facility]   Insulin Pen Needle 32G X 6 MM MISC 1 Package by Does not apply route daily 17   [place] [facility]        Allergies:     Clonidine and Heparin    Social History:     Tobacco:    reports that he has never smoked. He has never used smokeless tobacco.  Alcohol:      reports previous alcohol use. Drug Use:  reports no history of drug use. Family History:     Family History   Problem Relation Age of Onset    No Known Problems Mother     No Known Problems Father        Review of Systems:     Positive and Negative as described in HPI. Review of Systems   Constitutional: Negative for activity change, appetite change, fatigue and fever. Eyes: Negative for visual disturbance. Respiratory: Negative for chest tightness, shortness of breath and wheezing. Cardiovascular: Positive for leg swelling (mild edema). Negative for chest pain. Gastrointestinal: Positive for abdominal pain (left sided pain) and nausea. Negative for constipation, diarrhea and vomiting. Musculoskeletal: Negative for back pain and myalgias. Neurological: Negative for dizziness, speech difficulty and weakness. Psychiatric/Behavioral: Negative for confusion, decreased concentration and hallucinations.        Physical Exam:   BP (!) 147/65   Pulse 54   Temp 98.3 °F (36.8 °C) (Oral)   Resp 14   Ht 5' 10\" (1.778 m)   Wt 180 lb (81.6 kg)   SpO2 94%   BMI 25.83 kg/m²   Temp (24hrs), Av.9 °F (36.6 °C), Min:97.6 °F (36.4 °C), Max:98.3 °F (36.8 °C)    No results for input(s): POCGLU in the last 72 hours. No intake or output data in the 24 hours ending 08/20/21 0827    Physical Exam  HENT:      Head: Normocephalic. Right Ear: Tympanic membrane normal.      Nose: Nose normal.      Mouth/Throat:      Mouth: Mucous membranes are moist.   Cardiovascular:      Rate and Rhythm: Normal rate and regular rhythm. Pulses: Normal pulses. Heart sounds: Normal heart sounds. Pulmonary:      Effort: No respiratory distress. Breath sounds: No stridor. Abdominal:      General: There is no distension. Palpations: There is no mass. Hernia: A hernia (Cough impulse positive left lower abdomen) is present. Comments: Surgical scar due to lapartomy    Genitourinary:     Penis: Normal.    Musculoskeletal:         General: No swelling or tenderness. Skin:     General: Skin is warm. Coloration: Skin is not jaundiced or pale. Neurological:      General: No focal deficit present. Mental Status: He is alert and oriented to person, place, and time. Mental status is at baseline. Psychiatric:         Mood and Affect: Mood normal.         Behavior: Behavior normal.         Thought Content:  Thought content normal.         Investigations:     Laboratory Testing:  Recent Results (from the past 24 hour(s))   EKG 12 Lead    Collection Time: 08/20/21  1:55 AM   Result Value Ref Range    Ventricular Rate 57 BPM    Atrial Rate 57 BPM    P-R Interval 234 ms    QRS Duration 108 ms    Q-T Interval 498 ms    QTc Calculation (Bazett) 484 ms    P Axis 65 degrees    R Axis -40 degrees    T Axis -59 degrees   CBC Auto Differential    Collection Time: 08/20/21  2:20 AM   Result Value Ref Range    WBC 4.3 3.5 - 11.0 k/uL    RBC 3.33 (L) 4.5 - 5.9 m/uL    Hemoglobin 10.6 (L) 13.5 - 17.5 g/dL    Hematocrit 32.7 (L) 41 - 53 %    MCV 98.4 80 - 100 fL    MCH 31.9 26 - 34 pg    MCHC 32.4 31 - 37 g/dL    RDW 14.5 11.5 - 14.9 %    Platelets 65 (L) 150 - 450 k/uL    MPV 8.5 6.0 - 12.0 fL    NRBC Automated NOT REPORTED per 100 WBC    Differential Type NOT REPORTED     Immature Granulocytes NOT REPORTED 0 %    Absolute Immature Granulocyte NOT REPORTED 0.00 - 0.30 k/uL    WBC Morphology NOT REPORTED     RBC Morphology NOT REPORTED     Platelet Estimate NOT REPORTED     Seg Neutrophils 77 (H) 36 - 66 %    Lymphocytes 15 (L) 24 - 44 %    Monocytes 6 1 - 7 %    Eosinophils % 0 0 - 4 %    Basophils 2 0 - 2 %    Segs Absolute 3.30 1.3 - 9.1 k/uL    Absolute Lymph # 0.65 (L) 1.0 - 4.8 k/uL    Absolute Mono # 0.26 0.1 - 1.3 k/uL    Absolute Eos # 0.00 0.0 - 0.4 k/uL    Basophils Absolute 0.09 0.0 - 0.2 k/uL    Morphology Normal    Comprehensive Metabolic Panel    Collection Time: 08/20/21  2:20 AM   Result Value Ref Range    Glucose 126 (H) 70 - 99 mg/dL    BUN 62 (H) 6 - 20 mg/dL    CREATININE 11.28 (HH) 0.70 - 1.20 mg/dL    Bun/Cre Ratio NOT REPORTED 9 - 20    Calcium 9.2 8.6 - 10.4 mg/dL    Sodium 136 135 - 144 mmol/L    Potassium 5.7 (H) 3.7 - 5.3 mmol/L    Chloride 96 (L) 98 - 107 mmol/L    CO2 22 20 - 31 mmol/L    Anion Gap 18 (H) 9 - 17 mmol/L    Alkaline Phosphatase 81 40 - 129 U/L    ALT 15 5 - 41 U/L    AST 14 <40 U/L    Total Bilirubin 0.69 0.3 - 1.2 mg/dL    Total Protein 6.9 6.4 - 8.3 g/dL    Albumin 4.2 3.5 - 5.2 g/dL    Albumin/Globulin Ratio NOT REPORTED 1.0 - 2.5    GFR Non-African American 5 (L) >60 mL/min    GFR  6 (L) >60 mL/min    GFR Comment          GFR Staging NOT REPORTED    Lipase    Collection Time: 08/20/21  2:20 AM   Result Value Ref Range    Lipase 20 13 - 60 U/L   Troponin    Collection Time: 08/20/21  2:20 AM   Result Value Ref Range    Troponin, High Sensitivity 150 (HH) 0 - 22 ng/L    Troponin T NOT REPORTED <0.03 ng/mL    Troponin Interp NOT REPORTED    COVID-19, Rapid    Collection Time: 08/20/21  2:27 AM    Specimen: Nasopharyngeal Swab   Result Value Ref Range    Specimen Description . NASOPHARYNGEAL SWAB SARS-CoV-2, Rapid Not Detected Not Detected   Troponin    Collection Time: 08/20/21  4:24 AM   Result Value Ref Range    Troponin, High Sensitivity 141 (HH) 0 - 22 ng/L    Troponin T NOT REPORTED <0.03 ng/mL    Troponin Interp NOT REPORTED    Basic Metabolic Panel w/ Reflex to MG    Collection Time: 08/20/21  7:03 AM   Result Value Ref Range    Glucose 72 70 - 99 mg/dL    BUN 59 (H) 6 - 20 mg/dL    CREATININE 10.06 (HH) 0.70 - 1.20 mg/dL    Bun/Cre Ratio NOT REPORTED 9 - 20    Calcium 8.4 (L) 8.6 - 10.4 mg/dL    Sodium 139 135 - 144 mmol/L    Potassium 4.9 3.7 - 5.3 mmol/L    Chloride 101 98 - 107 mmol/L    CO2 20 20 - 31 mmol/L    Anion Gap 18 (H) 9 - 17 mmol/L    GFR Non-African American 5 (L) >60 mL/min    GFR  7 (L) >60 mL/min    GFR Comment          GFR Staging NOT REPORTED    CBC with DIFF    Collection Time: 08/20/21  7:03 AM   Result Value Ref Range    WBC 5.0 3.5 - 11.0 k/uL    RBC 3.19 (L) 4.5 - 5.9 m/uL    Hemoglobin 10.1 (L) 13.5 - 17.5 g/dL    Hematocrit 32.1 (L) 41 - 53 %    .8 (H) 80 - 100 fL    MCH 31.8 26 - 34 pg    MCHC 31.5 31 - 37 g/dL    RDW 15.0 (H) 11.5 - 14.9 %    Platelets 76 (L) 583 - 450 k/uL    MPV 8.7 6.0 - 12.0 fL    NRBC Automated NOT REPORTED per 100 WBC    Differential Type NOT REPORTED     Immature Granulocytes NOT REPORTED 0 %    Absolute Immature Granulocyte NOT REPORTED 0.00 - 0.30 k/uL    WBC Morphology NOT REPORTED     RBC Morphology NOT REPORTED     Platelet Estimate NOT REPORTED     Seg Neutrophils 70 (H) 36 - 66 %    Lymphocytes 14 (L) 24 - 44 %    Monocytes 12 (H) 1 - 7 %    Eosinophils % 1 0 - 4 %    Basophils 3 (H) 0 - 2 %    Segs Absolute 3.50 1.3 - 9.1 k/uL    Absolute Lymph # 0.70 (L) 1.0 - 4.8 k/uL    Absolute Mono # 0.60 0.1 - 1.3 k/uL    Absolute Eos # 0.10 0.0 - 0.4 k/uL    Basophils Absolute 0.10 0.0 - 0.2 k/uL   Amylase    Collection Time: 08/20/21  7:03 AM   Result Value Ref Range    Amylase 27 (L) 28 - 100 U/L   Lipase    Collection Time: 08/20/21  7:03 AM   Result Value Ref Range    Lipase 17 13 - 60 U/L   Triglyceride    Collection Time: 08/20/21  7:03 AM   Result Value Ref Range    Triglycerides 73 <150 mg/dL       Imaging/Diagnostics:  XR CHEST PORTABLE    Result Date: 8/20/2021  EXAMINATION: ONE XRAY VIEW OF THE CHEST 8/20/2021 2:04 am COMPARISON: Chest two views January 17, 2013. HISTORY: ORDERING SYSTEM PROVIDED HISTORY: nausea, HTN TECHNOLOGIST PROVIDED HISTORY: nausea, HTN Reason for Exam: nausea, HTN, chest pain Acuity: Acute Type of Exam: Initial Additional signs and symptoms: nausea, HTN, chest pain Relevant Medical/Surgical History: nausea, HTN, chest pain FINDINGS: The heart is moderately enlarged with otherwise unremarkable configuration. The mediastinal contours are within normal limits. Mild right basilar subsegmental atelectasis versus airspace disease is noted. Lungs are otherwise well aerated. The pleural surfaces are normal and no evidence of a pleural effusion is seen. Bones and soft tissues are unremarkable. 1. Moderate cardiomegaly. 2. Right basilar mild subsegmental atelectasis versus airspace disease.        Assessment :      Primary Problem  Hyperkalemia    Active Hospital Problems    Diagnosis Date Noted    Hyperkalemia [E87.5] 08/20/2021    Essential hypertension [I10] 03/09/2019    Type 2 diabetes mellitus with chronic kidney disease on chronic dialysis, with long-term current use of insulin ([facility]) [E11.22, N18.6, Z99.2, Z79.4]     ESRD (end stage renal disease) [facility] ([facility] [N18.6, Z99.2] 08/07/2017       Plan:     Patient status Admit as inpatient in the  Medical ICU    Hyperkalemia (Missed Dialysis treatment) 2/2 ESRD  - K 5.7 at the time of admission, on 8/20 4.9  - BUN Cr 62/11 at the time of admission, on 8/20 59/10.06  - EKG : Sinus bradycardia with 1st degree A-V block with Premature atrial complexes  Nonspecific ST and T wave abnormality  Prolonged QT  - Patient received Insulin, Dextrose and Ca at [facility] before [facility]  - CXR : Right Basilar Mild subsegmental atelectasis  - Nephro on board  - Dialysis Tuesday, Thursday and Saturday    HTN  - Hydralazine  - Lopressor 100mg  - Nifedipine 90mg  - Cardene drip 25mg IV    DM  - Low dose sliding Scale  - POCT   - Hypoglycemia protocol    PT/OT Evaluation  Adult Diet: Low Sodium, Regular  DVT : Seq. Compression device       Consultations:   IP CONSULT TO NEPHROLOGY  IP CONSULT TO PRIMARY CARE PROVIDER  IP CONSULT TO INTERNAL MEDICINE     Patient is admitted as inpatient status because of co-morbiditieslisted above, severity of signs and symptoms as outlined, requirement for current medical therapies and most importantly because of direct risk to patient if care not provided in a hospital setting. Frandy Rodriguez MD  8/20/2021  8:27 AM    Copy sent to Dr. Go Bundy MD  Attending Physician Statement    I have discussed the case of Siobhan Lee, including pertinent history and exam findings with the resident. I have seen and examined the patient and the key elements of the encounter have been performed by me. I agree with the assessment, plan, and orders as documented by the resident. The patient acute nausea and vomiting and became weak. He stated that he was unable to go to dialysis unit. He missed dialysis x2 and he was admitted with hyperkalemia and volume depletion. Can be discharged tomorrow if okay with nephrology.   After extensive history taking it is my impression that he did not have a food twice and it could be viral gastroenteritis  Electronically signed by Frank Lorenzana MD on 8/20/2021 at 1:30 PM Self

## 2021-08-20 NOTE — FLOWSHEET NOTE
08/20/21 1941   Encounter Summary   Services provided to: Patient   Referral/Consult From: Rounding   Support System Spouse   Continue Visiting   (08/20/2021)   Complexity of Encounter Low   Length of Encounter 15 minutes   Spiritual Assessment Completed Yes   Routine   Type Initial   Assessment Calm; Approachable;Passive   Intervention Active listening;Sustaining presence/ Ministry of presence   Outcome Expressed gratitude;Comfort

## 2021-08-20 NOTE — CONSULTS
Department of Internal Medicine  Nephrology Chrystal Deras MD   Consult Note    Reason for consultation: Management of dialysis dependent end-stage renal disease. Consulting physician: General Linda MD.    History of present illness: This is a 49 y.o. male with a significant past medical history of chronic pancreatitis, hyperlipidemia, systemic hypertension, type 2 diabetes mellitus and end-stage renal disease secondary to diabetic nephropathy [on routine hemodialysis Tuesday/Thursday/Saturday at [facility] dialysis unit [facility] under the care of Dr. Isidoro Mckeon using left arm AV fistula], who presented to the hospital with nausea vomiting and abdominal pain of 5 days duration. His last hemodialysis was on Saturday, 8/14/2021. Physical examination at presentation was remarkable for blood pressure 214/86 mmHg. Laboratory studies were remarkable for hyperkalemia with serum potassium 5.7 mmol/L. Hyperkalemia was treated medically and serum potassium decreased to 4.7 mmol/L. At the time of this encounter, nausea and vomiting had improved and patient was having breakfast.  He had apparently been tube a pack the previous day and was found to have potassium 6.9 mmol/L with tall T waves which was treated with calcium gluconate, IV insulin and dextrose and patient was transferred to [facility] due to in availability of inpatient beds at the other hospital.  He is currently on Cardene drip and blood pressure is averaging 140/70 mmHg at this time. Clonidine and Heparin    Past Medical History:   Diagnosis Date    Chronic kidney disease     left renal infarct-on dialysis Tues. , Thurs, Sat.     Chronic pancreatitis ([address]     Diabetes mellitus ([address]     Diverticulosis     Hyperlipidemia     Hypertension     Mild malnutrition (HCC)     MRSA (methicillin resistant staph aureus) culture positive 02/26/2019    nares    Pancreatitis     Personal history of colonic polyps 7/14/2020     Scheduled Meds:   sodium chloride flush  5-40 mL Intravenous 2 times per day    insulin lispro  0-6 Units Subcutaneous TID WC    insulin lispro  0-3 Units Subcutaneous Nightly    hydrALAZINE  100 mg Oral TID    metoprolol tartrate  100 mg Oral BID    NIFEdipine  90 mg Oral Nightly     Continuous Infusions:   sodium chloride      niCARdipine 2.5 mg/hr (08/20/21 0731)     PRN Meds:.sodium chloride flush, sodium chloride, ondansetron **OR** ondansetron, polyethylene glycol, acetaminophen **OR** acetaminophen, HYDROcodone 5 mg - acetaminophen, diphenhydrAMINE    Family History   Problem Relation Age of Onset    No Known Problems Mother     No Known Problems Father      Social History     Socioeconomic History    Marital status: Single     Spouse name: Not on file    Number of children: 4    Years of education: 12    Highest education level: Bachelor's degree (e.g., BA, AB, BS)   Occupational History    Not on file   Tobacco Use    Smoking status: Never Smoker    Smokeless tobacco: Never Used   Vaping Use    Vaping Use: Never used   Substance and Sexual Activity    Alcohol use: Not Currently     Alcohol/week: 0.0 standard drinks     Comment: rarely    Drug use: No    Sexual activity: Yes     Partners: Female   Other Topics Concern    Not on file   Social History Narrative    ** Merged History Encounter **          Social Determinants of Health     Financial Resource Strain: Low Risk     Difficulty of Paying Living Expenses: Not hard at all   Food Insecurity: No Food Insecurity    Worried About Running Out of Food in the Last Year: Never true    [place] of Food in the Last Year: Never true   Transportation Needs: No Transportation Needs    Lack of Transportation (Medical): No    Lack of Transportation (Non-Medical):  No   Physical Activity: Sufficiently Active    Days of Exercise per Week: 7 days    Minutes of Exercise per Session: 40 min   Stress: No Stress Concern Present    Feeling of Stress : Not at all Social Connections: Moderately Isolated    Frequency of Communication with Friends and Family: More than three times a week    Frequency of Social Gatherings with Friends and Family: More than three times a week    Attends Taoist Services: Never    Active Member of Clubs or Organizations: Yes    Attends Club or Organization Meetings: 1 to 4 times per year    Marital Status: Never    Intimate Partner Violence:     Fear of Current or Ex-Partner:     Emotionally Abused:     Physically Abused:     Sexually Abused:      Review of systems: CNS - no headache or dizziness; Cardiac - no chest pain; Respiratory - no shortness of breath; Gastrointestinal - +nausea,+vomiting,no diarrhea; Musculoskeletal - general body aches; Skin/Integument - no rashes. Physical Exam:    VITALS:  BP (!) 147/65   Pulse 54   Temp 98.3 °F (36.8 °C) (Oral)   Resp 14   Ht 5' 10\" (1.778 m)   Wt 180 lb (81.6 kg)   SpO2 94%   BMI 25.83 kg/m²   24HR INTAKE/OUTPUT:  No intake or output data in the 24 hours ending 08/20/21 0900    Constitutional: alert, appears stated age and cooperative    Skin: Skin color, texture, turgor normal. No rashes or lesions    Head: Normocephalic, without obvious abnormality, atraumatic     Cardiovascular/Edema: regular rate and rhythm, S1, S2 normal, no murmur, click, rub or gallop    Respiratory: Lungs: rales bibasilar    Abdomen: soft, non-tender; bowel sounds normal; no masses,  no organomegaly    Back: symmetric, no curvature. ROM normal. No CVA tenderness. Extremities: edema +; Left arm AV fistula with good thrill and bruit.     Neuro:  Grossly normal      CBC:   Recent Labs     08/20/21 0220 08/20/21  0703   WBC 4.3 5.0   HGB 10.6* 10.1*   PLT 65* 76*     BMP:    Recent Labs     08/20/21 0220 08/20/21  0703    139   K 5.7* 4.9   CL 96* 101   CO2 22 20   BUN 62* 59*   CREATININE 11.28* 10.06*   GLUCOSE 126* 72       Lab Results   Component Value Date    NITRU NEGATIVE 03/11/2019 COLORU BROWN 03/11/2019    PHUR 7.5 03/11/2019    WBCUA 50  03/11/2019    RBCUA 0 TO 2 03/11/2019    MUCUS NOT REPORTED 03/11/2019    TRICHOMONAS NOT REPORTED 03/11/2019    YEAST NOT REPORTED 03/11/2019    BACTERIA FEW 03/11/2019    SPECGRAV 1.014 03/11/2019    LEUKOCYTESUR LARGE 03/11/2019    UROBILINOGEN Normal 03/11/2019    BILIRUBINUR NEGATIVE 03/11/2019    BILIRUBINUR NEGATIVE 11/12/2011    GLUCOSEU NEGATIVE 03/11/2019    GLUCOSEU NEGATIVE 11/12/2011    KETUA NEGATIVE 03/11/2019    AMORPHOUS NOT REPORTED 03/11/2019     Urine Creatinine:     Lab Results   Component Value Date    LABCREA 53.0 08/16/2017     IMPRESSION/RECOMMENDATIONS:      1.  End-stage renal disease - patient has missed the last 2 dialysis sessions and will be scheduled for acute hemodialysis today. We will subsequently get him back on a TTS hemodialysis schedule tomorrow. Renal diet,i.e 2-gram sodium,2-gram potassium,1500 ml fluid restriction,1-gram phosphorus, 1800 KCal and 1.2 gram protein per day. 2.  Hyperkalemia - due to poor compliance with dialysis. Serum potassium is improved to within normal with medical therapy. Compliance with dialysis as well as low potassium diet was emphasized to the patient. 3.  Uncontrolled hypertension - predominantly due to plasma volume expansion from poor compliance with prescribed dialysis. We will monitor blood pressure response to ultrafiltration today and titrate off Cardene drip. We will start home antihypertensive medications. 4.  Mineral and bone disease profile - will check serum phosphorus level. 5.  Anemia of end-stage renal disease - hemoglobin 10.1 g/dL is within target range. Prognosis is guarded. Thank you very much for the courtesy of this consultation.     Surjit Mitchell MD FACP  Attending Nephrologist  8/20/2021 8:55 AM

## 2021-08-20 NOTE — PROGRESS NOTES
CASE MANAGEMENT NOTE:    Admission Date:  8/20/2021 Kristopher Deshpande is a 49 y.o.  male    Admitted for : Hyperkalemia [E87.5]    Met with:  Patient    PCP:  Jonnathan Gonzalez MD (last seen in 2016-will need new PCP)                      Insurance:  Siva Howard      Is patient alert and oriented at time of discussion:  Yes    Current Residence/ Living Arrangements:  independently at home-living at one of his brother's houses with the brother's parents             Current Services PTA:  No    Does patient go to outpatient dialysis: Yes  If yes, location and chair time: TTS, [facility] [place] @ 630am    Is patient agreeable to VNS: Yes    Freedom of choice provided:  Yes    List of [address] provided: Yes    VNS chosen:  No preference    DME:  straight cane, walker and wheelchair    Home Oxygen: No    Nebulizer: No    CPAP/BIPAP: No    Supplier: N/A    Potential Assistance Needed: No    SNF needed: No    Freedom of choice and list provided: NA    Pharmacy:  [facility]       Does Patient want to use MEDS to BEDS? No    Is patient currently receiving oral anticoagulation therapy? No    Is the Patient an [facility] with Readmission Risk Score greater than 14%? Yes, 23%  If yes, pt needs a follow up appointment made within 7 days. Family Members/Caregivers that pt would like involved in their care:    yes    If yes, list name here: Becky Quiroz    Transportation Provider:  Patient             Discharge Plan:  8/20/2021 [place] [address]  Siva Howard; DME: [employer], wheelchair; TTS [facility] [place] @ 630am; missed 2 treatments due to NV K5.7 ; lives in 1 story house; provides own transportation; wants VNS at discharge; Benjie Woods needs signed/completed; will continue to follow//KR             Electronically signed by: Bo Mar RN on 8/20/2021 at 3:01 PM

## 2021-08-20 NOTE — PROGRESS NOTES
HEMODIALYSIS POST TREATMENT NOTE    Treatment time ordered: 120  Actual treatment time: 120    UltraFiltration Goal: 2500  UltraFiltration Removed:      Pre Treatment weight: 81.6kg  Post Treatment weight: 79.6kg  Estimated Dry Weight: na    Access used:     Central Venous Catheter:          Tunneled or Non-tunneled: na           Site: na          Access Flow: na      Internal Access:       AV Fistula or AV Graft: fistula         Site:left upper arm       Access Flow: good       Sign and symptoms of infection: na       If YES: na    Medications or blood products given: no    Chronic outpatient schedule: [facility]    lyric martinez    Summary of response to treatment: pt did well    Explain if orders NOT met, was physician notified:cuco      ACES flowsheet faxed to patient unit/ placed in patient chart: yes    Post assessment completed: yes    Report given to: Soila Hernandez documented Safety Checks include the followin) Access and face visible at all times. 2) All connections and blood lines are secure with no kinks. 3) NVL alarm engaged. 4) Hemosafe device applied (if applicable). 5) No collapse of Arterial or Venous blood chambers. 6) All blood lines / pump segments in the air detectors.

## 2021-08-20 NOTE — PROGRESS NOTES
Pt admitted to ICU 2013 from ED via stretcher. Pt walked over to bed from stretcher with standby assist. Belongings placed in closet. Pt oriented to room and call light.  Assessment to follow

## 2021-08-20 NOTE — PROGRESS NOTES
Dr Sonja Gu contacted regarding admission orders. Discussed elevated trops, no intervention at this time.  See orders/recheck bmp/cbc

## 2021-08-21 LAB
-: NORMAL
ABSOLUTE EOS #: 0.1 K/UL (ref 0–0.4)
ABSOLUTE IMMATURE GRANULOCYTE: ABNORMAL K/UL (ref 0–0.3)
ABSOLUTE LYMPH #: 0.5 K/UL (ref 1–4.8)
ABSOLUTE MONO #: 0.5 K/UL (ref 0.1–1.3)
ANION GAP SERPL CALCULATED.3IONS-SCNC: 13 MMOL/L (ref 9–17)
ANION GAP SERPL CALCULATED.3IONS-SCNC: 14 MMOL/L (ref 9–17)
BASOPHILS # BLD: 0 % (ref 0–2)
BASOPHILS ABSOLUTE: 0 K/UL (ref 0–0.2)
BUN BLDV-MCNC: 44 MG/DL (ref 6–20)
BUN/CREAT BLD: ABNORMAL (ref 9–20)
CALCIUM SERPL-MCNC: 8.5 MG/DL (ref 8.6–10.4)
CHLORIDE BLD-SCNC: 96 MMOL/L (ref 98–107)
CHLORIDE BLD-SCNC: 98 MMOL/L (ref 98–107)
CO2: 25 MMOL/L (ref 20–31)
CO2: 25 MMOL/L (ref 20–31)
CREAT SERPL-MCNC: 8.39 MG/DL (ref 0.7–1.2)
DIFFERENTIAL TYPE: ABNORMAL
EOSINOPHILS RELATIVE PERCENT: 3 % (ref 0–4)
GFR AFRICAN AMERICAN: 8 ML/MIN
GFR NON-AFRICAN AMERICAN: 7 ML/MIN
GFR SERPL CREATININE-BSD FRML MDRD: ABNORMAL ML/MIN/{1.73_M2}
GFR SERPL CREATININE-BSD FRML MDRD: ABNORMAL ML/MIN/{1.73_M2}
GLUCOSE BLD-MCNC: 133 MG/DL (ref 75–110)
GLUCOSE BLD-MCNC: 133 MG/DL (ref 75–110)
GLUCOSE BLD-MCNC: 148 MG/DL (ref 70–99)
GLUCOSE BLD-MCNC: 170 MG/DL (ref 75–110)
HCT VFR BLD CALC: 31.1 % (ref 41–53)
HEMOGLOBIN: 9.9 G/DL (ref 13.5–17.5)
HEPATITIS B SURFACE ANTIGEN: NONREACTIVE
IMMATURE GRANULOCYTES: ABNORMAL %
LYMPHOCYTES # BLD: 13 % (ref 24–44)
MCH RBC QN AUTO: 31.4 PG (ref 26–34)
MCHC RBC AUTO-ENTMCNC: 32 G/DL (ref 31–37)
MCV RBC AUTO: 98.1 FL (ref 80–100)
MONOCYTES # BLD: 12 % (ref 1–7)
NRBC AUTOMATED: ABNORMAL PER 100 WBC
PDW BLD-RTO: 14.6 % (ref 11.5–14.9)
PLATELET # BLD: 74 K/UL (ref 150–450)
PLATELET ESTIMATE: ABNORMAL
PMV BLD AUTO: 8.1 FL (ref 6–12)
POTASSIUM SERPL-SCNC: 4.4 MMOL/L (ref 3.7–5.3)
POTASSIUM SERPL-SCNC: 5.9 MMOL/L (ref 3.7–5.3)
RBC # BLD: 3.17 M/UL (ref 4.5–5.9)
RBC # BLD: ABNORMAL 10*6/UL
REASON FOR REJECTION: NORMAL
SEG NEUTROPHILS: 72 % (ref 36–66)
SEGMENTED NEUTROPHILS ABSOLUTE COUNT: 2.8 K/UL (ref 1.3–9.1)
SODIUM BLD-SCNC: 134 MMOL/L (ref 135–144)
SODIUM BLD-SCNC: 137 MMOL/L (ref 135–144)
TROPONIN INTERP: ABNORMAL
TROPONIN T: ABNORMAL NG/ML
TROPONIN, HIGH SENSITIVITY: 145 NG/L (ref 0–22)
WBC # BLD: 3.9 K/UL (ref 3.5–11)
WBC # BLD: ABNORMAL 10*3/UL
ZZ NTE CLEAN UP: ORDERED TEST: NORMAL
ZZ NTE WITH NAME CLEAN UP: SPECIMEN SOURCE: NORMAL

## 2021-08-21 PROCEDURE — 6370000000 HC RX 637 (ALT 250 FOR IP): Performed by: INTERNAL MEDICINE

## 2021-08-21 PROCEDURE — 2500000003 HC RX 250 WO HCPCS: Performed by: INTERNAL MEDICINE

## 2021-08-21 PROCEDURE — 90935 HEMODIALYSIS ONE EVALUATION: CPT

## 2021-08-21 PROCEDURE — 99232 SBSQ HOSP IP/OBS MODERATE 35: CPT | Performed by: INTERNAL MEDICINE

## 2021-08-21 PROCEDURE — 6360000002 HC RX W HCPCS: Performed by: STUDENT IN AN ORGANIZED HEALTH CARE EDUCATION/TRAINING PROGRAM

## 2021-08-21 PROCEDURE — 85025 COMPLETE CBC W/AUTO DIFF WBC: CPT

## 2021-08-21 PROCEDURE — 80048 BASIC METABOLIC PNL TOTAL CA: CPT

## 2021-08-21 PROCEDURE — 6370000000 HC RX 637 (ALT 250 FOR IP)

## 2021-08-21 PROCEDURE — 2580000003 HC RX 258: Performed by: INTERNAL MEDICINE

## 2021-08-21 PROCEDURE — 6370000000 HC RX 637 (ALT 250 FOR IP): Performed by: STUDENT IN AN ORGANIZED HEALTH CARE EDUCATION/TRAINING PROGRAM

## 2021-08-21 PROCEDURE — 80051 ELECTROLYTE PANEL: CPT

## 2021-08-21 PROCEDURE — 84484 ASSAY OF TROPONIN QUANT: CPT

## 2021-08-21 PROCEDURE — 36415 COLL VENOUS BLD VENIPUNCTURE: CPT

## 2021-08-21 PROCEDURE — 2060000000 HC ICU INTERMEDIATE R&B

## 2021-08-21 PROCEDURE — 93005 ELECTROCARDIOGRAM TRACING: CPT | Performed by: INTERNAL MEDICINE

## 2021-08-21 PROCEDURE — 87340 HEPATITIS B SURFACE AG IA: CPT

## 2021-08-21 PROCEDURE — 82947 ASSAY GLUCOSE BLOOD QUANT: CPT

## 2021-08-21 RX ORDER — DEXTROSE MONOHYDRATE 25 G/50ML
25 INJECTION, SOLUTION INTRAVENOUS PRN
Status: DISCONTINUED | OUTPATIENT
Start: 2021-08-21 | End: 2021-08-22 | Stop reason: HOSPADM

## 2021-08-21 RX ORDER — CALCIUM ACETATE 667 MG/1
3 TABLET ORAL
Status: DISCONTINUED | OUTPATIENT
Start: 2021-08-21 | End: 2021-08-22 | Stop reason: HOSPADM

## 2021-08-21 RX ORDER — CALCIUM ACETATE 667 MG/1
667 TABLET ORAL DAILY
Status: DISCONTINUED | OUTPATIENT
Start: 2021-08-21 | End: 2021-08-21

## 2021-08-21 RX ORDER — MIDODRINE HYDROCHLORIDE 10 MG/1
10 TABLET ORAL
Status: COMPLETED | OUTPATIENT
Start: 2021-08-21 | End: 2021-08-21

## 2021-08-21 RX ORDER — PANTOPRAZOLE SODIUM 40 MG/1
40 TABLET, DELAYED RELEASE ORAL
Status: DISCONTINUED | OUTPATIENT
Start: 2021-08-21 | End: 2021-08-22 | Stop reason: HOSPADM

## 2021-08-21 RX ADMIN — NIFEDIPINE 90 MG: 90 TABLET, EXTENDED RELEASE ORAL at 20:50

## 2021-08-21 RX ADMIN — Medication 2001 MG: at 17:30

## 2021-08-21 RX ADMIN — SODIUM CHLORIDE, PRESERVATIVE FREE 10 ML: 5 INJECTION INTRAVENOUS at 20:52

## 2021-08-21 RX ADMIN — METOPROLOL TARTRATE 100 MG: 100 TABLET, FILM COATED ORAL at 20:50

## 2021-08-21 RX ADMIN — PANCRELIPASE LIPASE, PANCRELIPASE PROTEASE, PANCRELIPASE AMYLASE 5000 UNITS: 5000; 17000; 24000 CAPSULE, DELAYED RELEASE ORAL at 17:30

## 2021-08-21 RX ADMIN — DIPHENHYDRAMINE HYDROCHLORIDE 25 MG: 50 INJECTION INTRAMUSCULAR; INTRAVENOUS at 18:27

## 2021-08-21 RX ADMIN — DIPHENHYDRAMINE HYDROCHLORIDE 25 MG: 50 INJECTION INTRAMUSCULAR; INTRAVENOUS at 06:00

## 2021-08-21 RX ADMIN — DEXTROSE MONOHYDRATE 25 G: 25 INJECTION, SOLUTION INTRAVENOUS at 07:44

## 2021-08-21 RX ADMIN — Medication 667 MG: at 09:16

## 2021-08-21 RX ADMIN — HYDRALAZINE HYDROCHLORIDE 100 MG: 50 TABLET, FILM COATED ORAL at 20:50

## 2021-08-21 RX ADMIN — INSULIN HUMAN 5 UNITS: 100 INJECTION, SOLUTION PARENTERAL at 07:44

## 2021-08-21 RX ADMIN — MIDODRINE HYDROCHLORIDE 10 MG: 10 TABLET ORAL at 10:10

## 2021-08-21 RX ADMIN — PANCRELIPASE LIPASE, PANCRELIPASE PROTEASE, PANCRELIPASE AMYLASE 5000 UNITS: 5000; 17000; 24000 CAPSULE, DELAYED RELEASE ORAL at 09:16

## 2021-08-21 RX ADMIN — SODIUM CHLORIDE, PRESERVATIVE FREE 10 ML: 5 INJECTION INTRAVENOUS at 09:00

## 2021-08-21 RX ADMIN — PANTOPRAZOLE SODIUM 40 MG: 40 TABLET, DELAYED RELEASE ORAL at 23:49

## 2021-08-21 ASSESSMENT — PAIN SCALES - GENERAL
PAINLEVEL_OUTOF10: 0

## 2021-08-21 ASSESSMENT — ENCOUNTER SYMPTOMS
NAUSEA: 0
DIARRHEA: 0
VOMITING: 0

## 2021-08-21 NOTE — PLAN OF CARE
Problem: Falls - Risk of:  Goal: Will remain free from falls  Description: Will remain free from falls  8/21/2021 0600 by Alexsandra Fine RN  Outcome: Ongoing  Note: No attempt to get oob. Safe environment provided. Bed down and locked. Uses call light appropriately   8/20/2021 1953 by Nadia Morales RN  Outcome: Ongoing     Problem: Pain:  Goal: Pain level will decrease  Description: Pain level will decrease  8/21/2021 0600 by Alexsandra Fine RN  Outcome: Ongoing  Note: Pt c/o abd pain and was medicated times 1. Repositioned for comfort as needed.  Restful environment provided

## 2021-08-21 NOTE — PROGRESS NOTES
HEMODIALYSIS POST TREATMENT NOTE    Treatment time ordered:4hrs   Actual treatment time: 4hrs    UltraFiltration Goal: 0  UltraFiltration Removed: 1kilo      Pre Treatment weight: 79.6  Post Treatment weight: 78.6  Estimated Dry Weight: 81 per outpt paperwork    Access used:     Central Venous Catheter:          Tunneled or Non-tunneled: na           Site: na          Access Flow: na      Internal Access:       AV Fistula or AV Graft: fistula         Site: LUE       Access Flow: good       Sign and symptoms of infection: na       If YES: na    Medications or blood products given: midodrine given pre tx    Chronic outpatient schedule: [facility]    Chronic outpatient unit: [facility]    Summary of response to treatment: tolerated well    Explain if orders NOT met, was physician notified:cuco      ACES flowsheet faxed to patient unit/ placed in patient chart: yes    Post assessment completed: yes    Report given to: evan      * Intra-treatment documented Safety Checks include the followin) Access and face visible at all times. 2) All connections and blood lines are secure with no kinks. 3) NVL alarm engaged. 4) Hemosafe device applied (if applicable). 5) No collapse of Arterial or Venous blood chambers. 6) All blood lines / pump segments in the air detectors.

## 2021-08-21 NOTE — PROGRESS NOTES
Physical Therapy Cancel Note      DATE: 2021    NAME: Mike Dillard  MRN: [id number]   : 1965      Patient not seen this date for Physical Therapy due to:  Patient in dialysis      Electronically signed by Karlee Hill PT on 2021 at 9:51 AM

## 2021-08-21 NOTE — PROGRESS NOTES
Ana RN had called several times to alert on-call dialysis that Dr Brandon Hernandez wants this patient to receive stat dialysis. RN spoke with Dasia Ospina RN, charge nurse of [facility], had notified her that no one had picked up during multiple attempts and that this pt requires stat dialysis. Dasia Ospina RN states that she will call the on-call RN and notify him/her of the need.

## 2021-08-21 NOTE — FLOWSHEET NOTE
08/21/21 1030   Provider Notification   Reason for Communication New orders   Provider Name Dr Sigifredo Noble   Provider Notification Physician   Method of Communication Call   Response See orders   Notification Time [phone]   Notified of K+ 5.9

## 2021-08-21 NOTE — PROGRESS NOTES
Tried Joseluis Wyatt multiple times to have a stat treatment done per order of Dr Julio Haddad and there was no answer.

## 2021-08-21 NOTE — PROGRESS NOTES
Dialysis Safety Checks:    Patient ID Verified (Y/N) y     -Hepatitis Test                   Date      Result  Hepatitis B Surface Antigen         Hepatitis B Surface Antibody       Hepatitis B Core Antibody        -Treatment Initiation  Blood Vol Processed Goal (Liters)  Pump Speed x Treatment Hours x 60 Minutes    Target Fluid Removal 2-3 kilo     * Intra-treatment documented Safety Checks include the followin) Access and face visible at all times. 2) All connections and blood lines are secure with no kinks. 3) NVL alarm engaged. 4) Hemosafe device applied (if applicable). 5) No collapse of Arterial or Venous blood chambers. 6) All blood lines / pump segments in the air detectors.   --------------------------------------------------------------------------------    Report from evan duran

## 2021-08-21 NOTE — PROGRESS NOTES
[id number] [email or web address]    PROGRESS NOTE             8/21/2021    7:27 AM    Name:   Kristopher Deshpande  MRN:     [id number]     Acct:      [de-identified]   Room:   2013/2013-01  IP Day:  1  Admit Date:  8/20/2021  1:54 AM    PCP:  Jonnathan Gonzalez MD  Code Status:  Full Code    Subjective:     C/C:   Chief Complaint   Patient presents with    Hypertension    Other     Dialysis patient, missed 2 treatments, last tx was on saturday, pt seen tonight at [facility] with elevated blood pressure of 200/90 and Potassium of 6.9     Interval History Status: significantly improved. Seen and examined today at bedside. No acute events overnight. Doing much better now. Tolerating oral intake. No complaints. For hemodialysis today (even though patient had hemodialysis yesterday, he is having it today so he is put back on his normal routine of Tuesday Thursday and Saturday)    No SOP. No chest pain. No fever. No abdominal pain. Bowel movements and urine okay. Vitals:  Temp: 36.8 C  Respiratory rate 16 with SPO2 91 on room air  Pulse of 49 with /71 (improving)    Chem:  Sodium 137. Potassium 5.9. BUN/creatinine: 44/8.39 (creatinine improving)  Point of care glucose 133    CBC: Stable    Brief History:     Qi Miranda, 30-year-old male, known case of DM type II, CKD on dialysis Tuesday and Thursday and Saturday, HTN, and CHF. Admitted for management of hyperkalemia, hypertension, dialysis treatment. Presented to the ER with nausea and vomiting. Symptoms up to him missing his dialysis. Last dialysis was on Saturday (08/14). Was seen at outside hospital as well he was found to have an elevated blood pressure of 200/90 and potassium 6.9 with peaked T waves on EKG. Was given insulin, D50, calcium and outside hospital and transferred to [facility] ER due to lack of beds at outside hospital.    08/20: Admitted to ICU. Started on antihypertensives.   Impression of attending physician that symptoms were due to viral gastroenteritis. Seen by nephrology. Received hemodialysis. Review of Systems:     Review of Systems   Constitutional: Negative for fatigue. Gastrointestinal: Negative for diarrhea, nausea and vomiting. All other systems reviewed and are negative. Medications: Allergies: Allergies   Allergen Reactions    Clonidine Nausea Only     Requested by dr Daniel Romeo to add to allergy list, pt should not be on catapress. 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)     Current Meds:   Scheduled Meds:    insulin regular  5 Units Intravenous Once    sodium chloride flush  5-40 mL Intravenous 2 times per day    insulin lispro  0-6 Units Subcutaneous TID WC    insulin lispro  0-3 Units Subcutaneous Nightly    hydrALAZINE  100 mg Oral TID    metoprolol tartrate  100 mg Oral BID    NIFEdipine  90 mg Oral Nightly    lipase-protease-amylase  5,000 Units Oral TID WC     Continuous Infusions:    sodium chloride      niCARdipine Stopped (08/21/21 0014)    dextrose       PRN Meds: dextrose, sodium chloride flush, sodium chloride, ondansetron **OR** ondansetron, polyethylene glycol, acetaminophen **OR** acetaminophen, HYDROcodone 5 mg - acetaminophen, glucose, dextrose, glucagon (rDNA), dextrose, diphenhydrAMINE    Data:     Past Medical History:   has a past medical history of Chronic kidney disease, Chronic pancreatitis ([facility] [address] [address] Diabetes mellitus ([facility] Diverticulosis, Hyperlipidemia, Hypertension, Mild malnutrition ([facility] [address] [facility] MRSA (methicillin resistant staph aureus) culture positive, Pancreatitis, and Personal history of colonic polyps. Social History:   reports that he has never smoked. He has never used smokeless tobacco. He reports previous alcohol use. He reports that he does not use drugs.      Family History:   Family History   Problem Relation Age of Onset    No Known Problems Mother     No Known Problems Father      Vitals:  BP (!) 140/69   Pulse (!) 48   Temp 98.3 °F (36.8 °C) (Oral)   Resp 11   Ht 5' 10\" (1.778 m)   Wt 175 lb 7.8 oz (79.6 kg)   SpO2 91%   BMI 25.18 kg/m²   Temp (24hrs), Av.8 °F (36.6 °C), Min:96.8 °F (36 °C), Max:98.3 °F (36.8 °C)    Recent Labs     21  0831 21  1102 21  1611 21  204   POCGLU 72* 196* 175* 172*     I/O(24Hr): Intake/Output Summary (Last 24 hours) at 2021 0727  Last data filed at 2021 0600  Gross per 24 hour   Intake 656 ml   Output 50 ml   Net 606 ml     Labs:  See interval history above for pertinent findings. Lab Results   Component Value Date/Time    SPECIAL NOT REPORTED 2019 11:15 AM     Lab Results   Component Value Date/Time    CULTURE NO SIGNIFICANT GROWTH 2019 11:15 AM     Radiology:    XR CHEST PORTABLE  Result Date: 2021     1. Moderate cardiomegaly. 2. Right basilar mild subsegmental atelectasis versus airspace disease. Physical Examination:        Physical Exam  Vitals and nursing note reviewed. Constitutional:       General: He is not in acute distress. Appearance: Normal appearance. He is not ill-appearing or toxic-appearing. HENT:      Head: Normocephalic and atraumatic. Mouth/Throat:      Mouth: Mucous membranes are moist.      Pharynx: Oropharynx is clear. Eyes:      Pupils: Pupils are equal, round, and reactive to light. Cardiovascular:      Rate and Rhythm: Normal rate. Heart sounds: Normal heart sounds. Pulmonary:      Effort: No respiratory distress. Breath sounds: No wheezing. Abdominal:      Palpations: Abdomen is soft. Tenderness: There is no guarding or rebound. Skin:     General: Skin is warm and dry. Neurological:      General: No focal deficit present. Mental Status: He is alert and oriented to person, place, and time.    Psychiatric:         Mood and Affect: Mood normal.         Behavior: Behavior normal.       Assessment:        Primary Problem  Hyperkalemia    Active Hospital Problems    Diagnosis Date Noted    Hyperkalemia [E87.5] 08/20/2021    Essential hypertension [I10] 03/09/2019    Type 2 diabetes mellitus with chronic kidney disease on chronic dialysis, with long-term current use of insulin (HCC) [E11.22, N18.6, Z99.2, Z79.4]     ESRD (end stage renal disease) on dialysis ([facility] [address] [address] [N18.6, Z99.2] 08/07/2017     Plan:        Hyperkalemia (Missed Dialysis treatment) 2/2 ESRD  - K 5.7 at the time of admission  - K today on 08/21 is 5.9  - BUN Cr 62/11 at the time of admission, on 8/21 44/8.39  - EKG : Sinus bradycardia with 1st degree A-V block with Premature atrial complexes  Nonspecific ST and T wave abnormality  Prolonged QT  - Patient received Insulin, Dextrose and Ca at [facility] before [facility]  - CXR : Right Basilar Mild subsegmental atelectasis  - Nephro on board  - Dialysis Tuesday, Thursday and Saturday  - For hemodialysis today     HTN  - On Hydralazine 100 mg oral tab TID  - Lopressor oral 100mg BID  - Nifedipine 90mg oral nightly  - Discontinue Cardene drip 25mg IV     DM  - Low dose sliding Scale  - POCT   - Hypoglycemia protocol     PT/OT Evaluation  Adult Diet: Low Sodium, Regular  DVT : Seq. Compression device    Likely discharge today following rounds with attending and after hemodialysis. Estela York MD  8/21/2021  7:27 AM   Attending Physician Statement  I have discussed the care of Kristopher Deshpande and I have examined the patient myselft and taken ros and hpi , including pertinent history and exam findings,  with the resident. I have reviewed the key elements of all parts of the encounter with the resident. I agree with the assessment, plan and orders as documented by the resident.     No cp  Chronically elevated trop  Cardio input  Seen in HD    Electronically signed by Abbie Rogers MD

## 2021-08-21 NOTE — PROGRESS NOTES
Department of Internal Medicine  Nephrology Pancho Avila MD   Consult Note    Reason for consultation: Management of dialysis dependent end-stage renal disease. Consulting physician: Janusz Almonte MD.    Interval history: Patient was seen and examined this morning and he is asymptomatic. However he subsequently went for hemodialysis and was noted to be bradycardic and hypotensive with systolic blood pressure in the 80s. Laboratory status studies were remarkable for hyperkalemia with serum potassium 5.9 mmol/L. History of present illness: This is a 49 y.o. male with a significant past medical history of chronic pancreatitis, hyperlipidemia, systemic hypertension, type 2 diabetes mellitus and end-stage renal disease secondary to diabetic nephropathy [on routine hemodialysis Tuesday/Thursday/Saturday at [facility] dialysis unit [facility] under the care of Dr. Creta Canavan using left arm AV fistula], who presented to the hospital with nausea vomiting and abdominal pain of 5 days duration. His last hemodialysis was on Saturday, 8/14/2021. Physical examination at presentation was remarkable for blood pressure 214/86 mmHg. Laboratory studies were remarkable for hyperkalemia with serum potassium 5.7 mmol/L. Hyperkalemia was treated medically and serum potassium decreased to 4.7 mmol/L. At the time of this encounter, nausea and vomiting had improved and patient was having breakfast.  He had apparently been tube a pack the previous day and was found to have potassium 6.9 mmol/L with tall T waves which was treated with calcium gluconate, IV insulin and dextrose and patient was transferred to [facility] due to in availability of inpatient beds at the other hospital.  He is currently on Cardene drip and blood pressure is averaging 140/70 mmHg at this time.     Scheduled Meds:   calcium acetate  667 mg Oral Daily    sodium chloride flush  5-40 mL Intravenous 2 times per day    insulin lispro  0-6 Units Subcutaneous TID     insulin lispro  0-3 Units Subcutaneous Nightly    hydrALAZINE  100 mg Oral TID    metoprolol tartrate  100 mg Oral BID    NIFEdipine  90 mg Oral Nightly    lipase-protease-amylase  5,000 Units Oral TID      Continuous Infusions:   sodium chloride      niCARdipine Stopped (08/21/21 0014)    dextrose       PRN Meds:.dextrose, sodium chloride flush, sodium chloride, ondansetron **OR** ondansetron, polyethylene glycol, acetaminophen **OR** acetaminophen, HYDROcodone 5 mg - acetaminophen, glucose, dextrose, glucagon (rDNA), dextrose, diphenhydrAMINE    Physical Exam:    VITALS:  BP (!) 100/57   Pulse (!) 43   Temp 98.3 °F (36.8 °C) (Oral)   Resp 11   Ht 5' 10\" (1.778 m)   Wt 175 lb 7.8 oz (79.6 kg)   SpO2 91%   BMI 25.18 kg/m²   24HR INTAKE/OUTPUT:      Intake/Output Summary (Last 24 hours) at 8/21/2021 1137  Last data filed at 8/21/2021 0600  Gross per 24 hour   Intake 656 ml   Output 50 ml   Net 606 ml       Constitutional: alert, appears stated age and cooperative    Skin: Skin color, texture, turgor normal. No rashes or lesions    Head: Normocephalic, without obvious abnormality, atraumatic    Cardiovascular/Edema: regular rate and rhythm, S1, S2 normal, no murmur, click, rub or gallop    Respiratory: Lungs: rales bibasilar    Abdomen: soft, non-tender; bowel sounds normal; no masses,  no organomegaly    Back: symmetric, no curvature. ROM normal. No CVA tenderness. Extremities: edema +; Left arm AV fistula with good thrill and bruit.     Neuro:  Grossly normal      CBC:   Recent Labs     08/20/21 0220 08/20/21  0703 08/21/21  0500   WBC 4.3 5.0 3.9   HGB 10.6* 10.1* 9.9*   PLT 65* 76* 74*     BMP:    Recent Labs     08/20/21 0220 08/20/21  0703 08/21/21  0500    139 137   K 5.7* 4.9 5.9*   CL 96* 101 98   CO2 22 20 25   BUN 62* 59* 44*   CREATININE 11.28* 10.06* 8.39*   GLUCOSE 126* 72 148*       Lab Results   Component Value Date    NITRU NEGATIVE 03/11/2019 COLORU BROWN 03/11/2019    PHUR 7.5 03/11/2019    WBCUA 50  03/11/2019    RBCUA 0 TO 2 03/11/2019    MUCUS NOT REPORTED 03/11/2019    TRICHOMONAS NOT REPORTED 03/11/2019    YEAST NOT REPORTED 03/11/2019    BACTERIA FEW 03/11/2019    SPECGRAV 1.014 03/11/2019    LEUKOCYTESUR LARGE 03/11/2019    UROBILINOGEN Normal 03/11/2019    BILIRUBINUR NEGATIVE 03/11/2019    BILIRUBINUR NEGATIVE 11/12/2011    GLUCOSEU NEGATIVE 03/11/2019    GLUCOSEU NEGATIVE 11/12/2011    KETUA NEGATIVE 03/11/2019    AMORPHOUS NOT REPORTED 03/11/2019     Urine Creatinine:     Lab Results   Component Value Date    LABCREA 53.0 08/16/2017     IMPRESSION/RECOMMENDATIONS:      1.  End-stage renal disease - Secondary to diabetic glomerulosclerosis. We will maintain TTS hemodialysis schedule. Renal diet,i.e 2-gram sodium,2-gram potassium,1500 ml fluid restriction,1-gram phosphorus, 1800 KCal and 1.2 gram protein per day. 2.  Hyperkalemia - due to poor compliance with dialysis. Serum potassium is improved to within normal with medical therapy. Compliance with dialysis as well as low potassium diet was emphasized to the patient. 3.  Hypotension and bradycardia may be secondary to hyperkalemia. Will give midodrine and continue with dialysis for correction of hyperkalemia. 4.  Mineral and bone disease profile - Serum phosphorus 7.8 mg/dL [8/20/2021] is not within target range. Will increase calcium acetate to 2001 mg p.o. 3 times daily with meals. 5.  Anemia of end-stage renal disease - hemoglobin 9.9 g/dL today. Prognosis is guarded.     Eusebio Milan MD FACP  Attending Nephrologist  8/21/2021 11:37 AM

## 2021-08-21 NOTE — PLAN OF CARE
Problem: Falls - Risk of:  Goal: Will remain free from falls  Description: Will remain free from falls  8/21/2021 1548 by Katharine Kohli RN  Outcome: Ongoing  Note: Patient has remained free from falls at this time   8/21/2021 0600 by Yessi Patton RN  Outcome: Ongoing  Note: No attempt to get oob. Safe environment provided. Bed down and locked. Uses call light appropriately   Goal: Absence of physical injury  Description: Absence of physical injury  Outcome: Ongoing  Note: Patient has remained free from any physical injury at this time      Problem: Discharge Planning:  Goal: Participates in care planning  Description: Participates in care planning  Outcome: Ongoing  Goal: Discharged to appropriate level of care  Description: Discharged to appropriate level of care  Outcome: Ongoing     Problem: Fluid Volume - Imbalance:  Goal: Absence of imbalanced fluid volume signs and symptoms  Description: Absence of imbalanced fluid volume signs and symptoms  Outcome: Ongoing     Problem: Pain:  Goal: Pain level will decrease  Description: Pain level will decrease  8/21/2021 1548 by Katharine Kohli RN  Outcome: Ongoing  8/21/2021 0600 by Yessi Patton RN  Outcome: Ongoing  Note: Pt c/o abd pain and was medicated times 1. Repositioned for comfort as needed.  Restful environment provided   Goal: Control of acute pain  Description: Control of acute pain  Outcome: Ongoing  Goal: Control of chronic pain  Description: Control of chronic pain  Outcome: Ongoing     Problem: Serum Glucose Level - Abnormal:  Goal: Ability to maintain appropriate glucose levels will improve to within specified parameters  Description: Ability to maintain appropriate glucose levels will improve to within specified parameters  Outcome: Ongoing     Problem: Fluid Volume:  Goal: Will show no signs or symptoms of fluid imbalance  Description: Will show no signs or symptoms of fluid imbalance  Outcome: Ongoing     Problem: Cardiac:  Goal: Ability to maintain vital signs within normal range will improve  Description: Ability to maintain vital signs within normal range will improve  Outcome: Ongoing     Problem: Nutrition  Goal: Optimal nutrition therapy  Outcome: Ongoing

## 2021-08-22 VITALS
RESPIRATION RATE: 11 BRPM | HEIGHT: 70 IN | HEART RATE: 56 BPM | DIASTOLIC BLOOD PRESSURE: 63 MMHG | WEIGHT: 175.49 LBS | OXYGEN SATURATION: 88 % | SYSTOLIC BLOOD PRESSURE: 139 MMHG | BODY MASS INDEX: 25.12 KG/M2 | TEMPERATURE: 97.9 F

## 2021-08-22 LAB
ABSOLUTE EOS #: 0.09 K/UL (ref 0–0.4)
ABSOLUTE IMMATURE GRANULOCYTE: ABNORMAL K/UL (ref 0–0.3)
ABSOLUTE LYMPH #: 0.57 K/UL (ref 1–4.8)
ABSOLUTE MONO #: 0.44 K/UL (ref 0.1–1.3)
ANION GAP SERPL CALCULATED.3IONS-SCNC: 11 MMOL/L (ref 9–17)
BASOPHILS # BLD: 2 % (ref 0–2)
BASOPHILS ABSOLUTE: 0.09 K/UL (ref 0–0.2)
BNP INTERPRETATION: ABNORMAL
BUN BLDV-MCNC: 19 MG/DL (ref 6–20)
BUN/CREAT BLD: ABNORMAL (ref 9–20)
CALCIUM SERPL-MCNC: 8.9 MG/DL (ref 8.6–10.4)
CHLORIDE BLD-SCNC: 96 MMOL/L (ref 98–107)
CO2: 27 MMOL/L (ref 20–31)
CREAT SERPL-MCNC: 4.92 MG/DL (ref 0.7–1.2)
DIFFERENTIAL TYPE: ABNORMAL
EOSINOPHILS RELATIVE PERCENT: 2 % (ref 0–4)
GFR AFRICAN AMERICAN: 15 ML/MIN
GFR NON-AFRICAN AMERICAN: 12 ML/MIN
GFR SERPL CREATININE-BSD FRML MDRD: ABNORMAL ML/MIN/{1.73_M2}
GFR SERPL CREATININE-BSD FRML MDRD: ABNORMAL ML/MIN/{1.73_M2}
GLUCOSE BLD-MCNC: 106 MG/DL (ref 70–99)
GLUCOSE BLD-MCNC: 122 MG/DL (ref 75–110)
GLUCOSE BLD-MCNC: 202 MG/DL (ref 75–110)
HCT VFR BLD CALC: 32.7 % (ref 41–53)
HEMOGLOBIN: 10.8 G/DL (ref 13.5–17.5)
IMMATURE GRANULOCYTES: ABNORMAL %
LYMPHOCYTES # BLD: 13 % (ref 24–44)
MCH RBC QN AUTO: 31.8 PG (ref 26–34)
MCHC RBC AUTO-ENTMCNC: 32.9 G/DL (ref 31–37)
MCV RBC AUTO: 96.6 FL (ref 80–100)
MONOCYTES # BLD: 10 % (ref 1–7)
MORPHOLOGY: NORMAL
NRBC AUTOMATED: ABNORMAL PER 100 WBC
PDW BLD-RTO: 14.3 % (ref 11.5–14.9)
PLATELET # BLD: 87 K/UL (ref 150–450)
PLATELET ESTIMATE: ABNORMAL
PMV BLD AUTO: 7.5 FL (ref 6–12)
POTASSIUM SERPL-SCNC: 4.8 MMOL/L (ref 3.7–5.3)
PRO-BNP: ABNORMAL PG/ML
RBC # BLD: 3.39 M/UL (ref 4.5–5.9)
RBC # BLD: ABNORMAL 10*6/UL
SEG NEUTROPHILS: 73 % (ref 36–66)
SEGMENTED NEUTROPHILS ABSOLUTE COUNT: 3.21 K/UL (ref 1.3–9.1)
SODIUM BLD-SCNC: 134 MMOL/L (ref 135–144)
WBC # BLD: 4.4 K/UL (ref 3.5–11)
WBC # BLD: ABNORMAL 10*3/UL

## 2021-08-22 PROCEDURE — 6370000000 HC RX 637 (ALT 250 FOR IP): Performed by: INTERNAL MEDICINE

## 2021-08-22 PROCEDURE — 97161 PT EVAL LOW COMPLEX 20 MIN: CPT

## 2021-08-22 PROCEDURE — 83880 ASSAY OF NATRIURETIC PEPTIDE: CPT

## 2021-08-22 PROCEDURE — 36415 COLL VENOUS BLD VENIPUNCTURE: CPT

## 2021-08-22 PROCEDURE — 6360000002 HC RX W HCPCS: Performed by: STUDENT IN AN ORGANIZED HEALTH CARE EDUCATION/TRAINING PROGRAM

## 2021-08-22 PROCEDURE — 6370000000 HC RX 637 (ALT 250 FOR IP)

## 2021-08-22 PROCEDURE — 99239 HOSP IP/OBS DSCHRG MGMT >30: CPT | Performed by: INTERNAL MEDICINE

## 2021-08-22 PROCEDURE — 2580000003 HC RX 258: Performed by: INTERNAL MEDICINE

## 2021-08-22 PROCEDURE — 80048 BASIC METABOLIC PNL TOTAL CA: CPT

## 2021-08-22 PROCEDURE — 85025 COMPLETE CBC W/AUTO DIFF WBC: CPT

## 2021-08-22 PROCEDURE — 82947 ASSAY GLUCOSE BLOOD QUANT: CPT

## 2021-08-22 RX ORDER — ASPIRIN 81 MG/1
81 TABLET, CHEWABLE ORAL DAILY
Qty: 30 TABLET | Refills: 3 | Status: SHIPPED | OUTPATIENT
Start: 2021-08-23 | End: 2022-03-01

## 2021-08-22 RX ORDER — ASPIRIN 81 MG/1
81 TABLET, CHEWABLE ORAL DAILY
Status: DISCONTINUED | OUTPATIENT
Start: 2021-08-22 | End: 2021-08-22 | Stop reason: HOSPADM

## 2021-08-22 RX ADMIN — Medication 2001 MG: at 08:08

## 2021-08-22 RX ADMIN — SODIUM CHLORIDE, PRESERVATIVE FREE 10 ML: 5 INJECTION INTRAVENOUS at 08:22

## 2021-08-22 RX ADMIN — INSULIN LISPRO 2 UNITS: 100 INJECTION, SOLUTION INTRAVENOUS; SUBCUTANEOUS at 12:11

## 2021-08-22 RX ADMIN — DIPHENHYDRAMINE HYDROCHLORIDE 25 MG: 50 INJECTION INTRAMUSCULAR; INTRAVENOUS at 00:16

## 2021-08-22 RX ADMIN — METOPROLOL TARTRATE 100 MG: 100 TABLET, FILM COATED ORAL at 08:08

## 2021-08-22 RX ADMIN — DIPHENHYDRAMINE HYDROCHLORIDE 25 MG: 50 INJECTION INTRAMUSCULAR; INTRAVENOUS at 06:22

## 2021-08-22 RX ADMIN — Medication 2001 MG: at 12:11

## 2021-08-22 RX ADMIN — ASPIRIN 81 MG: 81 TABLET, CHEWABLE ORAL at 08:08

## 2021-08-22 RX ADMIN — PANCRELIPASE LIPASE, PANCRELIPASE PROTEASE, PANCRELIPASE AMYLASE 5000 UNITS: 5000; 17000; 24000 CAPSULE, DELAYED RELEASE ORAL at 08:08

## 2021-08-22 RX ADMIN — PANTOPRAZOLE SODIUM 40 MG: 40 TABLET, DELAYED RELEASE ORAL at 08:21

## 2021-08-22 RX ADMIN — HYDRALAZINE HYDROCHLORIDE 100 MG: 50 TABLET, FILM COATED ORAL at 08:08

## 2021-08-22 RX ADMIN — HYDROCODONE BITARTRATE AND ACETAMINOPHEN 1 TABLET: 5; 325 TABLET ORAL at 10:22

## 2021-08-22 ASSESSMENT — PAIN DESCRIPTION - DESCRIPTORS: DESCRIPTORS: ACHING

## 2021-08-22 ASSESSMENT — ENCOUNTER SYMPTOMS
DIARRHEA: 0
SHORTNESS OF BREATH: 0
VOMITING: 0
NAUSEA: 0

## 2021-08-22 ASSESSMENT — PAIN DESCRIPTION - LOCATION: LOCATION: ABDOMEN

## 2021-08-22 ASSESSMENT — PAIN DESCRIPTION - FREQUENCY: FREQUENCY: CONTINUOUS

## 2021-08-22 ASSESSMENT — PAIN DESCRIPTION - ONSET: ONSET: ON-GOING

## 2021-08-22 ASSESSMENT — PAIN SCALES - GENERAL
PAINLEVEL_OUTOF10: 0
PAINLEVEL_OUTOF10: 7
PAINLEVEL_OUTOF10: 0

## 2021-08-22 ASSESSMENT — PAIN DESCRIPTION - ORIENTATION: ORIENTATION: LEFT

## 2021-08-22 ASSESSMENT — PAIN DESCRIPTION - PROGRESSION: CLINICAL_PROGRESSION: GRADUALLY WORSENING

## 2021-08-22 ASSESSMENT — PAIN DESCRIPTION - PAIN TYPE: TYPE: CHRONIC PAIN

## 2021-08-22 ASSESSMENT — PAIN - FUNCTIONAL ASSESSMENT: PAIN_FUNCTIONAL_ASSESSMENT: PREVENTS OR INTERFERES SOME ACTIVE ACTIVITIES AND ADLS

## 2021-08-22 NOTE — PROGRESS NOTES
Dr Bentley Mt in to see patient at bedside. Dr Sheree Aldana states patient is good to go from his standpoint. This communicated to resident team at this time. Will wait for orders.

## 2021-08-22 NOTE — PLAN OF CARE
Problem: Falls - Risk of:  Goal: Will remain free from falls  Description: Will remain free from falls  Outcome: Ongoing  Note: No attempt to get oob. Safe environment provided. Bed down and locked. Uses call light appropriately      Problem: Skin Integrity:  Goal: Will show no infection signs and symptoms  Description: Will show no infection signs and symptoms  Outcome: Ongoing  Note: No  area of skin breakdown noted.  Reposition patient frequently to prevent skin breakdown

## 2021-08-22 NOTE — PROGRESS NOTES
[id number]    PROGRESS NOTE             8/22/2021    7:27 AM    Name:   Siobhan Lee  MRN:     [id number]     Acct:      [de-identified]   Room:   2013/2013-01  IP Day:  2  Admit Date:  8/20/2021  1:54 AM    PCP:  Go Bundy MD  Code Status:  Full Code    Subjective:     C/C:   Chief Complaint   Patient presents with    Hypertension    Other     Dialysis patient, missed 2 treatments, last tx was on saturday, pt seen tonight at [facility] with elevated blood pressure of 200/90 and Potassium of 6.9     Interval History Status: improved. Patient seen and examined at the bedside this morning. Patient is alert and oriented. Patient feels fine and has normal appetite and bowel habits. Patient to be assessed by Cardio for EKG changes  Patient started on aspirin 81mg  On EKG: A fib with slow ventricular response, junctional pacemaker  ST and T wave abnormality, consider inferior ischemia  ProBNP : 181,640      Brief History:      The patient is a 49 y.o. Non- / non  male who presents with Hypertension and Other (Dialysis patient, missed 2 treatments, last tx was on saturday, pt seen tonight at [facility] with elevated blood pressure of 200/90 and Potassium of 6.9)   and he is admitted to the hospital for the management of  Hypertension and hyperkalemia. Patient has long term history of DM, CKD on Dialysis Tuesday, Thursday and Saturday, HTN and CHF. Reports he started having nausea and vomiting on Monday, and on Thursday he went to [facility] where it was found that the patient had elevated blood pressure and potassium levels about 6.9. He was then given Insulin, Dextrose and Calcium. Due to unavailability of the beds there, patient was transferred to [facility]. Patient says that he had missed his dialysis treatment on Tuesday and Thursday because of his condition.  Patient also reported of 1 episode of vomiting lipase-protease-amylase  5,000 Units Oral TID WC     Continuous Infusions:    sodium chloride      niCARdipine Stopped (21 0014)    dextrose       PRN Meds: dextrose, sodium chloride flush, sodium chloride, ondansetron **OR** ondansetron, polyethylene glycol, acetaminophen **OR** acetaminophen, HYDROcodone 5 mg - acetaminophen, glucose, dextrose, glucagon (rDNA), dextrose, diphenhydrAMINE    Data:     Past Medical History:   has a past medical history of Chronic kidney disease, Chronic pancreatitis ([facility] [address] [facility] Diverticulosis, Hyperlipidemia, Hypertension, Mild malnutrition [facility] MRSA (methicillin resistant staph aureus) culture positive, Pancreatitis, and Personal history of colonic polyps. Social History:   reports that he has never smoked. He has never used smokeless tobacco. He reports previous alcohol use. He reports that he does not use drugs. Family History:   Family History   Problem Relation Age of Onset    No Known Problems Mother     No Known Problems Father        Vitals:  /66   Pulse 55   Temp 97.9 °F (36.6 °C) (Oral)   Resp 10   Ht 5' 10\" (1.778 m)   Wt 175 lb 7.8 oz (79.6 kg)   SpO2 93%   BMI 25.18 kg/m²   Temp (24hrs), Av.4 °F (36.9 °C), Min:97.9 °F (36.6 °C), Max:99 °F (37.2 °C)    Recent Labs     21  2041 21  0729 21  1713 21  1946   POCGLU 172* 133* 133* 170*       I/O(24Hr):     Intake/Output Summary (Last 24 hours) at 2021 0727  Last data filed at 2021 0600  Gross per 24 hour   Intake 200 ml   Output --   Net 200 ml       Labs:    CBC:   Lab Results   Component Value Date    WBC 4.4 2021    RBC 3.39 2021    RBC 4.13 2011    HGB 10.8 2021    HCT 32.7 2021    MCV 96.6 2021    MCH 31.8 2021    MCHC 32.9 2021    RDW 14.3 2021    PLT 87 2021     2011    MPV 7.5 2021     WBC:    Lab Results   Component Value Date    WBC 4.4 2021 Lab Results   Component Value Date/Time    SPECIAL NOT REPORTED 03/11/2019 11:15 AM     Lab Results   Component Value Date/Time    CULTURE NO SIGNIFICANT GROWTH 03/11/2019 11:15 AM         Radiology:    XR CHEST PORTABLE    Result Date: 8/20/2021  EXAMINATION: ONE XRAY VIEW OF THE CHEST 8/20/2021 2:04 am COMPARISON: Chest two views January 17, 2013. HISTORY: ORDERING SYSTEM PROVIDED HISTORY: nausea, HTN TECHNOLOGIST PROVIDED HISTORY: nausea, HTN Reason for Exam: nausea, HTN, chest pain Acuity: Acute Type of Exam: Initial Additional signs and symptoms: nausea, HTN, chest pain Relevant Medical/Surgical History: nausea, HTN, chest pain FINDINGS: The heart is moderately enlarged with otherwise unremarkable configuration. The mediastinal contours are within normal limits. Mild right basilar subsegmental atelectasis versus airspace disease is noted. Lungs are otherwise well aerated. The pleural surfaces are normal and no evidence of a pleural effusion is seen. Bones and soft tissues are unremarkable. 1. Moderate cardiomegaly. 2. Right basilar mild subsegmental atelectasis versus airspace disease. Physical Examination:        Physical Exam  Vitals and nursing note reviewed. Constitutional:       General: He is not in acute distress. Appearance: Normal appearance. He is not ill-appearing or toxic-appearing. HENT:      Head: Normocephalic and atraumatic. Mouth/Throat:      Mouth: Mucous membranes are moist.      Pharynx: Oropharynx is clear. Eyes:      Pupils: Pupils are equal, round, and reactive to light. Cardiovascular:      Rate and Rhythm: Normal rate. Heart sounds: Normal heart sounds. Pulmonary:      Effort: No respiratory distress. Breath sounds: No wheezing. Abdominal:      Palpations: Abdomen is soft. Tenderness: There is no guarding or rebound. Skin:     General: Skin is warm and dry. Neurological:      General: No focal deficit present.       Mental Status: He is alert and oriented to person, place, and time. Psychiatric:         Mood and Affect: Mood normal.         Behavior: Behavior normal.           Assessment:        Primary Problem  Hyperkalemia    Active Hospital Problems    Diagnosis Date Noted    Hyperkalemia [E87.5] 08/20/2021    Essential hypertension [I10] 03/09/2019    Type 2 diabetes mellitus with chronic kidney disease on chronic dialysis, with long-term current use of insulin ([facility]) [E11.22, N18.6, Z99.2, Z79.4]     ESRD (end stage renal disease) on dialysis ([facility] [address] [N18.6, Z99.2] 08/07/2017       Plan:        Hyperkalemia (Missed Dialysis treatment) 2/2 ESRD  - K 5.7 at the time of admission  - K today on 08/21 is 5.9  - BUN Cr 62/11 at the time of admission, on 8/21 44/8.39  - EKG : A fib with slow ventricular response, junctional pacemaker  ST and T wave abnormality, consider inferior ischemia  Cardio consulted  - Patient received Insulin, Dextrose and Ca at [facility] before transferring to [facility]  - CXR : Right Basilar Mild subsegmental atelectasis  - Nephro on board  - Dialysis Tuesday, Thursday and Saturday     HTN  - On Hydralazine 100 mg oral tab TID  - Nifedipine 90mg oral nightly     S/p Pancreatectomy   - Zenpep 5000 units    DM  - Low dose sliding Scale  - POCT   - Hypoglycemia protocol     PT/OT Evaluation  Adult Diet: Low Sodium, Regular  DVT : Seq. Compression device       Marco Mcgraw MD  8/22/2021  7:27 AM   Attending Physician Statement  I have discussed the care of Devyn Stevenson and I have examined the patient myselft and taken ros and hpi , including pertinent history and exam findings,  with the resident. I have reviewed the key elements of all parts of the encounter with the resident. I agree with the assessment, plan and orders as documented by the resident.       Electronically signed by Samanta Regalado MD

## 2021-08-22 NOTE — PROGRESS NOTES
Physical Therapy    Facility/Department: [facility] ICU  Initial Assessment    NAME: Gregory Kiser  : 1965  MRN: [id number]    Date of Service: 2021    Discharge Recommendations:  Continue to assess pending progress, Patient would benefit from continued therapy after discharge      Assessment   Body structures, Functions, Activity limitations: Decreased functional mobility   Treatment Diagnosis: Dec function  Prognosis: Good  Decision Making: Low Complexity  REQUIRES PT FOLLOW UP: Yes  Activity Tolerance  Activity Tolerance: Patient Tolerated treatment well       Patient Diagnosis(es): The encounter diagnosis was Hyperkalemia. has a past medical history of Chronic kidney disease, Chronic pancreatitis ([facility] [facility] Diabetes mellitus ([facility] [address] Diverticulosis, Hyperlipidemia, Hypertension, Mild malnutrition ([facility] [address] MRSA (methicillin resistant staph aureus) culture positive, Pancreatitis, and Personal history of colonic polyps. has a past surgical history that includes hernia repair; Cholecystectomy; Tonsillectomy; Septoplasty; other surgical history; other surgical history (2017); Catheter Removal (N/A, 2017); AV fistula creation (Left); Colonoscopy (N/A, 2020); other surgical history; and Upper gastrointestinal endoscopy (N/A, 10/23/2020).     Vision/Hearing  Vision: Impaired  Vision Exceptions: Wears glasses for reading  Hearing: Within functional limits     Subjective  Pain Screening  Patient Currently in Pain: Denies  Vital Signs  Patient Currently in Pain: Denies     Orientation  Orientation  Overall Orientation Status: Within Normal Limits  Social/Functional History  Social/Functional History  Lives With: Alone  Type of Home: House  Home Layout: One level  Home Access: Stairs to enter with rails  Entrance Stairs - Number of Steps: 3  Entrance Stairs - Rails: Left  Bathroom Shower/Tub: Tub/Shower unit, Curtain  Bathroom Toilet: Handicap height  Home Equipment: Cane, Rolling walker  Receives Help From: Friend(s), Family  ADL Assistance: Independent  Homemaking Assistance: Independent  Homemaking Responsibilities: No  Ambulation Assistance: Independent  Transfer Assistance: Independent  Mode of Transportation: Truck  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 [address]      Objective    AROM RLE (degrees)  RLE AROM: WFL  AROM LLE (degrees)  LLE AROM : WFL  AROM RUE (degrees)  RUE AROM : WFL  AROM LUE (degrees)  LUE AROM : WFL     Bed mobility  Rolling to Left: Independent  Rolling to Right: Independent  Supine to Sit: Independent  Sit to Supine: Independent     Transfers  Sit to Stand: Stand by assistance  Stand to sit: Stand by assistance  Lateral Transfers: Stand by assistance  Comment: patient unsteady with SPC     Ambulation  Ambulation?: Yes  Ambulation 1  Surface: level tile  Device: Single point cane  Assistance: Contact guard assistance  Quality of Gait: patient is unsteady and c/o dizziness and feeling lightheaded  Distance: 5 steps  Stairs/Curb  Stairs?: No     Balance  Sitting - Static: Good  Sitting - Dynamic: Good  Standing - Static: Fair  Standing - Dynamic: Fair  Comments: Patient unsteady and has episodes of LOB     Exercises  Comments: sitting bilateral LE exercises x10 reps     Plan   Plan  Times per week: 5-6  Times per day: Daily  Current Treatment Recommendations: Strengthening, Transfer Training, Balance Training, Gait Training, Functional Mobility Training  Safety Devices  Type of devices: Gait belt, Patient at risk for falls, Call light within reach, Left in bed, Nurse notified    Goals  Short term goals  Time Frame for Short term goals: 7 visits  Short term goal 1: Patient to demonstrate GOOD static and dynamic standing balance  Short term goal 2: Patient to ambulate 50ft SBA  Short term goal 3: Patient to demonstrate IND with transfers     Therapy Time   Individual Concurrent Group Co-treatment   Time In 0928         Time Out 1002         Minutes 50 Mando Diez Nw, PT

## 2021-08-22 NOTE — PLAN OF CARE
Problem: Falls - Risk of:  Goal: Will remain free from falls  Description: Will remain free from falls  8/22/2021 1148 by JOSE F Travis  Outcome: Completed  8/22/2021 0605 by Latoya Dahl RN  Outcome: Ongoing  Note: No attempt to get oob. Safe environment provided. Bed down and locked.  Uses call light appropriately   Goal: Absence of physical injury  Description: Absence of physical injury  Outcome: Completed     Problem: Discharge Planning:  Goal: Participates in care planning  Description: Participates in care planning  Outcome: Completed  Goal: Discharged to appropriate level of care  Description: Discharged to appropriate level of care  Outcome: Completed     Problem: Fluid Volume - Imbalance:  Goal: Absence of imbalanced fluid volume signs and symptoms  Description: Absence of imbalanced fluid volume signs and symptoms  Outcome: Completed     Problem: Pain:  Goal: Pain level will decrease  Description: Pain level will decrease  Outcome: Completed  Goal: Control of acute pain  Description: Control of acute pain  Outcome: Completed  Goal: Control of chronic pain  Description: Control of chronic pain  Outcome: Completed     Problem: Serum Glucose Level - Abnormal:  Goal: Ability to maintain appropriate glucose levels will improve to within specified parameters  Description: Ability to maintain appropriate glucose levels will improve to within specified parameters  Outcome: Completed     Problem: Fluid Volume:  Goal: Will show no signs or symptoms of fluid imbalance  Description: Will show no signs or symptoms of fluid imbalance  Outcome: Completed     Problem: Cardiac:  Goal: Ability to maintain vital signs within normal range will improve  Description: Ability to maintain vital signs within normal range will improve  Outcome: Completed     Problem: Nutrition  Goal: Optimal nutrition therapy  Outcome: Completed     Problem: Skin Integrity:  Goal: Will show no infection signs and symptoms  Description:

## 2021-08-22 NOTE — CONSULTS
[facility]   Consult Note         Today's Date: 8/22/2021  Patient Name: Skyler Castelan  Date of admission: 8/20/2021  1:54 AM  Patient's age: 49 y.o., 1965  Admission Dx: Hyperkalemia [E87.5]    Reason for Consult:  Cardiac evaluation    Requesting Physician: Rosemary Salinas MD    REASON FOR CONSULT:  Bradycardia, troponin elevation, volume overload    History Obtained From:  Patient, chart, staff, records    HISTORY OF PRESENT ILLNESS:      The patient is a 49 y.o. male who is admitted to the hospital for intractable nausea and vomiting. Missed dialysis multiple times with hyperkalemia. Was given zofran IV on arrival. Was bradycardic and had elevated troponin. Past Medical History:   has a past medical history of Chronic kidney disease, Chronic pancreatitis ([facility] Diabetes mellitus ([facility] Diverticulosis, Hyperlipidemia, Hypertension, Mild malnutrition ([facility] MRSA (methicillin resistant staph aureus) culture positive, Pancreatitis, and Personal history of colonic polyps. Past Surgical History:   has a past surgical history that includes hernia repair; Cholecystectomy; Tonsillectomy; Septoplasty; other surgical history; other surgical history (06/07/2017); Catheter Removal (N/A, 6/7/2017); AV fistula creation (Left); Colonoscopy (N/A, 7/13/2020); other surgical history; and Upper gastrointestinal endoscopy (N/A, 10/23/2020). Home Medications:    Prior to Admission medications    Medication Sig Start Date End Date Taking?  Authorizing Provider   aspirin 81 MG chewable tablet Take 1 tablet by mouth daily 8/23/21  Yes Daniela Mobley MD   omeprazole (PRILOSEC) 20 MG delayed release capsule TAKE 1 CAPSULE BY MOUTH TWICE A DAY 7/2/21  Yes Natty Yusuf MD   diazePAM (VALIUM) 5 MG tablet TAKE 1 TABLET BY MOUTH ONCE AS NEEDED FOR ANXIETY OR SLEEP FOR UP TO 1 DOSE. 3/30/21  Yes Historical Provider, MD   diphenhydrAMINE (BENADRYL) 25 MG capsule Take 25 mg by mouth every 12 scale:  if 150 - 200 = 2 units;  201 - 250 = 4 units;  251 - 300 = 6 uits;  301 - 350 = 8 units;  351 - 400 = 10 units > 400 call MD,  subcutaneously four times a day related to TYPE 2 DIAB 5/11/21   Historical Provider, MD   midodrine (PROAMATINE) 5 MG tablet Take 5 mg by mouth every 12 hours  Patient not taking: Reported on 8/20/2021 5/11/21   Historical Provider, MD   doxazosin (CARDURA) 2 MG tablet Take 2 mg by mouth nightly 9/1/20   Historical Provider, MD   glucose monitoring kit (FREESTYLE) monitoring kit 1 kit by Does not apply route daily 8/7/17   [facility]. Devices MISC Check blood sugar one time a day 8/7/17   [place]   Insulin Pen Needle 32G X 6 MM MISC 1 Package by Does not apply route daily 6/23/17   [place]       aspirin, 81 mg, Oral, Daily    calcium acetate, 3 tablet, Oral, TID WC    pantoprazole, 40 mg, Oral, QAM AC    sodium chloride flush, 5-40 mL, Intravenous, 2 times per day    insulin lispro, 0-6 Units, Subcutaneous, TID WC    insulin lispro, 0-3 Units, Subcutaneous, Nightly    hydrALAZINE, 100 mg, Oral, TID    [Held by provider] metoprolol tartrate, 100 mg, Oral, BID    NIFEdipine, 90 mg, Oral, Nightly    lipase-protease-amylase, 5,000 Units, Oral, TID WC      Allergies:  Clonidine and Heparin    Social History:   reports that he has never smoked. He has never used smokeless tobacco. He reports previous alcohol use. He reports that he does not use drugs. Family History: family history includes No Known Problems in his father and mother. No h/o sudden cardiac death. REVIEW OF SYSTEMS:    · Constitutional: there has been no unanticipated weight loss. There's been No change in energy level, No change in activity level. · Eyes: No visual changes or diplopia. No scleral icterus. · ENT: No Headaches, hearing loss or vertigo. No mouth sores or sore throat.   · Cardiovascular: per HPI  · Respiratory: per HPI  · Gastrointestinal: No abdominal pain, appetite loss, blood in stools. No change in bowel or bladder habits. · Genitourinary: No dysuria, trouble voiding, or hematuria. · Musculoskeletal:  No gait disturbance, No weakness or joint complaints. · Integumentary: No rash or pruritis. · Neurological: No headache, diplopia, change in muscle strength, numbness or tingling. No change in gait, balance, coordination, mood, affect, memory, mentation, behavior. · Psychiatric: No anxiety, or depression. · Endocrine: No temperature intolerance. No excessive thirst, fluid intake, or urination. No tremor. · Hematologic/Lymphatic: No abnormal bruising or bleeding, blood clots or swollen lymph nodes. · Allergic/Immunologic: No nasal congestion or hives. PHYSICAL EXAM:      /63   Pulse 56   Temp 97.9 °F (36.6 °C) (Axillary)   Resp 11   Ht 5' 10\" (1.778 m)   Wt 175 lb 7.8 oz (79.6 kg)   SpO2 (!) 88%   BMI 25.18 kg/m²    Constitutional and General Appearance: alert, cooperative, no distress and appears stated age  HEENT: PERRL, no cervical lymphadenopathy. No masses palpable. Normal oral mucosa  Respiratory:  · Normal excursion and expansion without use of accessory muscles  · Resp Auscultation: Good respiratory effort. No for increased work of breathing. On auscultation: clear to auscultation bilaterally  Cardiovascular:  · The apical impulse is not displaced  · Heart tones are crisp and normal. regular S1 and S2.  · Jugular venous pulsation Normal  · The carotid upstroke is normal in amplitude and contour without delay or bruit  · Peripheral pulses are symmetrical and full   Abdomen:   · No masses or tenderness  · Bowel sounds present  Extremities:  ·  No Cyanosis or Clubbing  ·  Lower extremity edema: No  ·  Skin: Warm and dry  Neurological:  · Alert and oriented.   · Moves all extremities well  · No abnormalities of mood, affect, memory, mentation, or behavior are noted        EKG:    Date: 08/22/21  Reading: No acute ischemia      LAST ECHO:  Date:  Findings Summary:      LAST Stress Test:   Date of last ST:  Major Findings:    LAST Cardiac Angiography:.  Date:  Findings:      Labs:     CBC:   Recent Labs     08/21/21  0500 08/22/21  0445   WBC 3.9 4.4   HGB 9.9* 10.8*   HCT 31.1* 32.7*   PLT 74* 87*     BMP:   Recent Labs     08/21/21  0500 08/21/21  0500 08/21/21  1833 08/22/21  0445      < > 134* 134*   K 5.9*   < > 4.4 4.8   CO2 25   < > 25 27   BUN 44*  --   --  19   CREATININE 8.39*  --   --  4.92*   LABGLOM 7*  --   --  12*   GLUCOSE 148*  --   --  106*    < > = values in this interval not displayed. BNP: No results for input(s): BNP in the last 72 hours. PT/INR: No results for input(s): PROTIME, INR in the last 72 hours. APTT:No results for input(s): APTT in the last 72 hours. CARDIAC ENZYMES:No results for input(s): CKTOTAL, CKMB, CKMBINDEX, TROPONINI in the last 72 hours.   FASTING LIPID PANEL:  Lab Results   Component Value Date    HDL 48 05/10/2019    TRIG 73 08/20/2021     LIVER PROFILE:  Recent Labs     08/20/21  0220   AST 14   ALT 15   LABALBU 4.2     Troponins: Invalid input(s): TROPONIN     Other Current Problems  Patient Active Problem List   Diagnosis    Hypertensive heart and renal disease with CHF and ESRD ([facility]    ESRD (end stage renal disease) on dialysis ([facility]    Hypertensive encephalopathy    Type 2 diabetes mellitus with chronic kidney disease on chronic dialysis, with long-term current use of insulin ([facility]    Essential hypertension    Polyp of ascending colon    Personal history of colonic polyps    Duodenal mass    Incisional hernia    Left inguinal hernia    MRSA (methicillin resistant Staphylococcus aureus)    Noncompliance with medications    Obstructive sleep apnea syndrome    Thrombocytopenia (HCC)    Mixed hyperlipidemia    Vitamin D deficiency    Chronic fatigue    Stage 5 chronic kidney disease on chronic dialysis ([facility]    Ileus ([facility]    Gastroesophageal reflux disease without

## 2021-08-22 NOTE — DISCHARGE SUMMARY
[address]    Discharge Summary     Patient ID: Mary Ann Celis  :  1965   MRN: [id number]     ACCOUNT:  [de-identified]   Patient's PCP: Amy Dillard MD  Admit Date: 2021   Discharge Date: 2021   Length of Stay: 2  Code Status:  Full Code  Admitting Physician: Apple Samuels MD  Discharge Physician: Marilee Sims MD     Active Discharge Diagnoses:       Primary Problem  Hyperkalemia      [place] Problems    Diagnosis Date Noted    Hyperkalemia [E87.5] 2021    Essential hypertension [I10] 2019    Type 2 diabetes mellitus with chronic kidney disease on chronic dialysis, with long-term current use of insulin ([facility] [E11.22, N18.6, Z99.2, Z79.4]     ESRD (end stage renal disease) on dialysis ([facility] [N18.6, Z99.2] 2017       Admission Condition:  poor     Discharged Condition: good    Hospital Stay:       Hospital Course:  Mary Ann Celis is a 49 y.o. male who was admitted for the management of   Hyperkalemia , presented to ER with Hypertension and Other (Dialysis patient, missed 2 treatments, last tx was on saturday, pt seen tonight at [facility] with elevated blood pressure of 200/90 and Potassium of 6.9)  Patient was having nausea and vomiting on Monday, and on Thursday he went to [facility] where it was found that the patient had elevated blood pressure and potassium levels about 6.9. He was then given Insulin, Dextrose and Calcium. Due to unavailability of the beds there, patient was transferred to [facility]. Patient missed Tuesday and Thursday Dialysis  His BUN/Cr at admission was 62/11.28  BP was 214/85  Patient was placed on Cardene drip and received two sessions of Dialysis  Patient was admitted in ICU to receive Dialysis treatment.   On  Patient had abnormal EKG : EKG: A fib with slow ventricular response, junctional pacemaker  ST and T wave abnormality, consider inferior ischemia  Cardio was consulted and said that patient is stable and can be discharged  His BP on 8/22 133/59 and K+ 4.8    All the physicians involved in the care of the patient agree on his discharge    Significant therapeutic interventions: dialysis on Friday and Saturday    Significant Diagnostic Studies:   Labs / Micro:  CBC:   Lab Results   Component Value Date    WBC 4.4 08/22/2021    RBC 3.39 08/22/2021    RBC 4.13 11/12/2011    HGB 10.8 08/22/2021    HCT 32.7 08/22/2021    MCV 96.6 08/22/2021    MCH 31.8 08/22/2021    MCHC 32.9 08/22/2021    RDW 14.3 08/22/2021    PLT 87 08/22/2021     11/12/2011          Radiology:    XR CHEST PORTABLE    Result Date: 8/20/2021  EXAMINATION: ONE XRAY VIEW OF THE CHEST 8/20/2021 2:04 am COMPARISON: Chest two views January 17, 2013. HISTORY: ORDERING SYSTEM PROVIDED HISTORY: nausea, HTN TECHNOLOGIST PROVIDED HISTORY: nausea, HTN Reason for Exam: nausea, HTN, chest pain Acuity: Acute Type of Exam: Initial Additional signs and symptoms: nausea, HTN, chest pain Relevant Medical/Surgical History: nausea, HTN, chest pain FINDINGS: The heart is moderately enlarged with otherwise unremarkable configuration. The mediastinal contours are within normal limits. Mild right basilar subsegmental atelectasis versus airspace disease is noted. Lungs are otherwise well aerated. The pleural surfaces are normal and no evidence of a pleural effusion is seen. Bones and soft tissues are unremarkable. 1. Moderate cardiomegaly. 2. Right basilar mild subsegmental atelectasis versus airspace disease.          Consultations:    Consults:     Final Specialist Recommendations/Findings:   IP CONSULT TO NEPHROLOGY  IP CONSULT TO PRIMARY CARE PROVIDER  IP CONSULT TO INTERNAL MEDICINE  IP CONSULT TO CARDIOLOGY  IP CONSULT TO CARDIOLOGY      The patient was seen and examined on day of discharge and this discharge summary is in conjunction with any daily progress note from day of discharge. Discharge plan:       Disposition: Home    Physician Follow Up:     No follow-up provider specified. Diet: renal diet 2g Sodium, 2g Potassium 1500ml Fluid restriction, 1 g phosphorus, 1800 KCal and 1.2 g protein per day. Activity: As tolerated    Instructions to Patient: Follow up with PCP in 1 week    Discharge Medications:      Medication List      START taking these medications    aspirin 81 MG chewable tablet  Take 1 tablet by mouth daily  Start taking on: August 23, 2021        CHANGE how you take these medications    meclizine 25 MG tablet  Commonly known as: ANTIVERT  TAKE 1 TABLET BY MOUTH 3 TIMES A DAY AS NEEDED FOR DIZZINESS  What changed: See the new instructions.         CONTINUE taking these medications    Calcium Acetate (Phos Binder) 667 MG Caps     cloNIDine 0.1 MG tablet  Commonly known as: CATAPRES     Creon 6000-60658 units delayed release capsule  Generic drug: lipase-protease-amylase     diazePAM 5 MG tablet  Commonly known as: VALIUM     diphenhydrAMINE 25 MG capsule  Commonly known as: BENADRYL     doxazosin 2 MG tablet  Commonly known as: CARDURA     doxercalciferol 4 MCG/2ML injection  Commonly known as: HECTOROL  Infuse 1.25 mLs intravenously Once in dialysis for 1 dose Given at the end of dialysis Tues, Thurs, Sat     glucose monitoring kit  1 kit by Does not apply route daily     hydrALAZINE 100 MG tablet  Commonly known as: APRESOLINE     HYDROcodone-acetaminophen 5-325 MG per tablet  Commonly known as: NORCO     insulin lispro 100 UNIT/ML injection vial  Commonly known as: HUMALOG     Insulin Pen Needle 32G X 6 MM Misc  1 Package by Does not apply route daily     Lantus 100 UNIT/ML injection vial  Generic drug: insulin glargine     metFORMIN 500 MG extended release tablet  Commonly known as: GLUCOPHAGE-XR  Take 1 tablet by mouth daily (with breakfast)     metoprolol 100 MG tablet  Commonly known as: LOPRESSOR  Take 1 tablet by mouth 2 times daily     midodrine 5 MG tablet  Commonly known as: PROAMATINE     Misc. Devices Misc  Check blood sugar one time a day     NIFEdipine 90 MG extended release tablet  Commonly known as: ADALAT CC  Take 1 tablet by mouth nightly     omeprazole 20 MG delayed release capsule  Commonly known as: PRILOSEC  TAKE 1 CAPSULE BY MOUTH TWICE A DAY     pregabalin 75 MG capsule  Commonly known as: LYRICA  TAKE 1 CAPSULE BY MOUTH EVERY EVENING     venlafaxine 37.5 MG extended release capsule  Commonly known as: EFFEXOR XR  TAKE 1 CAPSULE BY MOUTH EVERY DAY     vitamin D 1.25 MG (11706 UT) Caps capsule  Commonly known as: ERGOCALCIFEROL  Take 1 capsule by mouth once a week           Where to Get Your Medications      These medications were sent to [facility]pharmacy #[id number] [address]    Phone: [phone]   · aspirin 81 MG chewable tablet         Time Spent on discharge is  31 mins in patient examination, evaluation, counseling as well as medication reconciliation, prescriptions for required medications, discharge plan and follow up. Electronically signed by   Donna Hughes MD  8/22/2021  11:13 AM      Thank you Dr. Joshua Thacker MD for the opportunity to be involved in this patient's care. Attending Physician Statement  I have discussed the care of Linda Currie and I have examined the patient myselft and taken ros and hpi , including pertinent history and exam findings,  with the resident. I have reviewed the key elements of all parts of the encounter with the resident. I agree with the assessment, plan and orders as documented by the resident. I spent over 35 mins in direct patient care as above and reviewing medications and counseling for discharge .         Electronically signed by Wallace Anderson MD

## 2021-08-22 NOTE — PROGRESS NOTES
Department of Internal Medicine  Nephrology Jason Antonio MD   Consult Note    Reason for consultation: Management of dialysis dependent end-stage renal disease. Consulting physician: Birdie King MD.    Interval history: Patient feels well today. He had recurrent hyperkalemia yesterday associated with hypotension and bradycardia. He received midodrine and dialysis for correction of hyperkalemia. He feels well today and blood pressure is well controlled. He does not have shortness of breath. Nausea and vomiting which was presenting complaint, I have completely resolved. History of present illness: This is a 49 y.o. male with a significant past medical history of chronic pancreatitis, hyperlipidemia, systemic hypertension, type 2 diabetes mellitus and end-stage renal disease secondary to diabetic nephropathy [on routine hemodialysis Tuesday/Thursday/Saturday at [facility] dialysis [facility] under the care of Dr. Ellen Dow using left arm AV fistula], who presented to the hospital with nausea vomiting and abdominal pain of 5 days duration. His last hemodialysis was on Saturday, 8/14/2021. Physical examination at presentation was remarkable for blood pressure 214/86 mmHg. Laboratory studies were remarkable for hyperkalemia with serum potassium 5.7 mmol/L. Hyperkalemia was treated medically and serum potassium decreased to 4.7 mmol/L. At the time of this encounter, nausea and vomiting had improved and patient was having breakfast.  He had apparently been tube a pack the previous day and was found to have potassium 6.9 mmol/L with tall T waves which was treated with calcium gluconate, IV insulin and dextrose and patient was transferred to [facility] due to in availability of inpatient beds at the other hospital.  He is currently on Cardene drip and blood pressure is averaging 140/70 mmHg at this time.     Scheduled Meds:   aspirin  81 mg Oral Daily    calcium acetate  3 tablet Oral TID WC    96* 96*   CO2 20   < > 25 25 27   BUN 59*  --  44*  --  19   CREATININE 10.06*  --  8.39*  --  4.92*   GLUCOSE 72  --  148*  --  106*    < > = values in this interval not displayed. Lab Results   Component Value Date    NITRU NEGATIVE 03/11/2019    COLORU BROWN 03/11/2019    PHUR 7.5 03/11/2019    WBCUA 50  03/11/2019    RBCUA 0 TO 2 03/11/2019    MUCUS NOT REPORTED 03/11/2019    TRICHOMONAS NOT REPORTED 03/11/2019    YEAST NOT REPORTED 03/11/2019    BACTERIA FEW 03/11/2019    SPECGRAV 1.014 03/11/2019    LEUKOCYTESUR LARGE 03/11/2019    UROBILINOGEN Normal 03/11/2019    BILIRUBINUR NEGATIVE 03/11/2019    BILIRUBINUR NEGATIVE 11/12/2011    GLUCOSEU NEGATIVE 03/11/2019    GLUCOSEU NEGATIVE 11/12/2011    KETUA NEGATIVE 03/11/2019    AMORPHOUS NOT REPORTED 03/11/2019     Urine Creatinine:     Lab Results   Component Value Date    LABCREA 53.0 08/16/2017     IMPRESSION/RECOMMENDATIONS:      1.  End-stage renal disease - Secondary to diabetic glomerulosclerosis. We will maintain TTS hemodialysis schedule. Renal diet,i.e 2-gram sodium,2-gram potassium,1500 ml fluid restriction,1-gram phosphorus, 1800 KCal and 1.2 gram protein per day. 2.  Hyperkalemia - Resolved. 3.  Systemic hypertension - blood pressure is adequately controlled at this time. 4.  Mineral and bone disease profile - Serum phosphorus 7.8 mg/dL [8/20/2021] is not within target range. Calcium acetate was increased to 2001 mg p.o. 3 times daily with meals on 8/21/2021. We will recheck serum phosphorus in 1 week. 5.  Anemia of end-stage renal disease - hemoglobin 10.8 g/dL today is within target range. Prognosis is guarded. No renal objection to discharge.     Juanjose Hutchins MD FACP  Attending Nephrologist  8/22/2021 10:17 AM

## 2021-08-22 NOTE — FLOWSHEET NOTE
08/21/21 2226   Provider Notification   Reason for Communication New orders   Provider Name Dr Nicolasa Barton   Provider Notification Physician   Method of Communication Call   Response See orders   Notification Time [phone]   Notified of patient's request for prilosec

## 2021-08-23 ENCOUNTER — CARE COORDINATION (OUTPATIENT)
Dept: CASE MANAGEMENT | Age: 56
End: 2021-08-23

## 2021-08-23 LAB
EKG ATRIAL RATE: 65 BPM
EKG Q-T INTERVAL: 508 MS
EKG QRS DURATION: 102 MS
EKG QTC CALCULATION (BAZETT): 434 MS
EKG R AXIS: 159 DEGREES
EKG T AXIS: -83 DEGREES
EKG VENTRICULAR RATE: 44 BPM

## 2021-08-23 PROCEDURE — 93010 ELECTROCARDIOGRAM REPORT: CPT | Performed by: INTERNAL MEDICINE

## 2021-08-23 NOTE — CARE COORDINATION
[facility] Transitions Initial Follow Up Call    Call within 2 business days of discharge: Yes    Patient: Yamila Lopez Patient : 1965   MRN: [id number]  Reason for Admission: Hyperkalemia  Discharge Date: 21 RARS: Readmission Risk Score: 19      Last Discharge [address]       Complaint Diagnosis Description Type Department Provider    21 Hypertension; Other Hyperkalemia ED to Hosp-Admission (Discharged) (ADMITTED) ST ICU Gilford Meth, MD; Shivam Fuller. .. Spoke with: [address] [facility] [facility] [address]: [address] [facility]    1st attempted to contact Rickey Ragland, but no answer. Voice mail and with contact number given. [facility] [employer] and to attempt to contact him at a later time. Care Transitions 24 Hour Call    Do you have all of your prescriptions and are they filled?: Yes (Comment: hasn't picked up all meds from pharmacy)  Do you have support at home?: Alone  Are you an active caregiver in your home?: No  Care Transitions Interventions         Follow Up  No future appointments.     Дмитрий Tavarez RN

## 2021-08-24 ENCOUNTER — CARE COORDINATION (OUTPATIENT)
Dept: CASE MANAGEMENT | Age: 56
End: 2021-08-24

## 2021-08-24 NOTE — CARE COORDINATION
[facility] Transitions Initial Follow Up Call    Call within 2 business days of discharge: Yes    Patient: Alesia Moreno Patient : 1965   MRN: [id number]  Reason for Admission: Hyperkalemia  Discharge Date: 21 RARS: Readmission Risk Score: 19      Last Discharge [facility]       Complaint Diagnosis Description Type Department Provider    21 Hypertension; Other Hyperkalemia ED to Hosp-Admission (Discharged) (ADMITTED) [facility] ICU Chalino Haddad MD; Annemarie Barragan. .. Final attempt to reach patient for care transitions. LM requesting return call. Contact information provided. Episode resolved at this time. Facility: [facility]    Care Transitions 24 Hour Call    Do you have all of your prescriptions and are they filled?:  (Comment: hasn't picked up all meds from pharmacy)  Do you have support at home?: Alone  Are you an active caregiver in your home?: No  Care Transitions Interventions         Follow Up  No future appointments.     Dragan Murray RN

## 2022-02-07 ENCOUNTER — HOSPITAL ENCOUNTER (OUTPATIENT)
Age: 57
Setting detail: SPECIMEN
Discharge: HOME | End: 2022-02-07

## 2022-02-07 LAB
ALBUMIN SERPL-MCNC: 4.2 G/DL (ref 3.5–5.2)
ALBUMIN/GLOBULIN RATIO: 1.5 (ref 1–2.5)
ALP BLD-CCNC: 167 U/L (ref 40–129)
ALT SERPL-CCNC: 29 U/L (ref 5–41)
ANION GAP SERPL CALCULATED.3IONS-SCNC: 17 MMOL/L (ref 9–17)
AST SERPL-CCNC: 36 U/L
BILIRUB SERPL-MCNC: 0.37 MG/DL (ref 0.3–1.2)
BUN BLDV-MCNC: 65 MG/DL (ref 6–20)
BUN/CREAT BLD: ABNORMAL (ref 9–20)
CALCIUM SERPL-MCNC: 9.1 MG/DL (ref 8.6–10.4)
CHLORIDE BLD-SCNC: 91 MMOL/L (ref 98–107)
CO2: 23 MMOL/L (ref 20–31)
CREAT SERPL-MCNC: 6.73 MG/DL (ref 0.7–1.2)
GFR AFRICAN AMERICAN: 10 ML/MIN
GFR NON-AFRICAN AMERICAN: 9 ML/MIN
GFR SERPL CREATININE-BSD FRML MDRD: ABNORMAL ML/MIN/{1.73_M2}
GFR SERPL CREATININE-BSD FRML MDRD: ABNORMAL ML/MIN/{1.73_M2}
GLUCOSE BLD-MCNC: 196 MG/DL (ref 70–99)
HCT VFR BLD CALC: 29.4 % (ref 40.7–50.3)
HEMOGLOBIN: 9.1 G/DL (ref 13–17)
MCH RBC QN AUTO: 32.3 PG (ref 25.2–33.5)
MCHC RBC AUTO-ENTMCNC: 31 G/DL (ref 28.4–34.8)
MCV RBC AUTO: 104.3 FL (ref 82.6–102.9)
NRBC AUTOMATED: 0 PER 100 WBC
PDW BLD-RTO: 13.8 % (ref 11.8–14.4)
PLATELET # BLD: 132 K/UL (ref 138–453)
PMV BLD AUTO: 9.6 FL (ref 8.1–13.5)
POTASSIUM SERPL-SCNC: 5.4 MMOL/L (ref 3.7–5.3)
RBC # BLD: 2.82 M/UL (ref 4.21–5.77)
SODIUM BLD-SCNC: 131 MMOL/L (ref 135–144)
TOTAL PROTEIN: 7 G/DL (ref 6.4–8.3)
TSH SERPL DL<=0.05 MIU/L-ACNC: 4.01 MIU/L (ref 0.3–5)
WBC # BLD: 7.4 K/UL (ref 3.5–11.3)

## 2022-02-07 PROCEDURE — P9603 ONE-WAY ALLOW PRORATED MILES: HCPCS

## 2022-02-07 PROCEDURE — 84443 ASSAY THYROID STIM HORMONE: CPT

## 2022-02-07 PROCEDURE — 85027 COMPLETE CBC AUTOMATED: CPT

## 2022-02-07 PROCEDURE — 36415 COLL VENOUS BLD VENIPUNCTURE: CPT

## 2022-02-07 PROCEDURE — 80053 COMPREHEN METABOLIC PANEL: CPT

## 2022-02-10 ENCOUNTER — TELEPHONE (OUTPATIENT)
Dept: FAMILY MEDICINE CLINIC | Age: 57
End: 2022-02-10

## 2022-02-10 DIAGNOSIS — I67.4 HYPERTENSIVE ENCEPHALOPATHY: Primary | ICD-10-CM

## 2022-02-10 DIAGNOSIS — G62.9 POLYNEUROPATHY: ICD-10-CM

## 2022-02-10 DIAGNOSIS — R26.89 BALANCE PROBLEM: ICD-10-CM

## 2022-02-10 RX ORDER — WALKER
EACH MISCELLANEOUS
Qty: 1 EACH | Refills: 0 | Status: SHIPPED | OUTPATIENT
Start: 2022-02-10

## 2022-02-10 NOTE — TELEPHONE ENCOUNTER
Alex Win from [employer] called requesting  a order for  5\" walker and being released from [facility] today. [phone] [address]obby     [facility] integrated home care service only DME covered by his insurance.     Fax [phone]

## 2022-02-11 NOTE — TELEPHONE ENCOUNTER
wheelchair script was signed. Placed into basket to be faxed to [facility] home [facility]. Was faxed to #[phone] Along with script and telephone encounter message. Once confirmation is received. Will place where (fax confirmation pile) are located up front.

## 2022-02-15 ENCOUNTER — TELEPHONE (OUTPATIENT)
Dept: FAMILY MEDICINE CLINIC | Age: 57
End: 2022-02-15

## 2022-02-15 DIAGNOSIS — F41.9 ANXIETY: ICD-10-CM

## 2022-02-15 RX ORDER — VENLAFAXINE HYDROCHLORIDE 37.5 MG/1
CAPSULE, EXTENDED RELEASE ORAL
Qty: 90 CAPSULE | Refills: 1 | Status: SHIPPED | OUTPATIENT
Start: 2022-02-15

## 2022-02-15 RX ORDER — PANCRELIPASE 30000; 6000; 19000 [USP'U]/1; [USP'U]/1; [USP'U]/1
CAPSULE, DELAYED RELEASE PELLETS ORAL
Qty: 450 CAPSULE | Refills: 0 | Status: SHIPPED | OUTPATIENT
Start: 2022-02-15

## 2022-02-15 NOTE — TELEPHONE ENCOUNTER
----- Message from [facility] sent at 2/14/2022  5:10 PM EST -----  Subject: Message to Provider    QUESTIONS  Information for Provider? Migdalia Newell from [facility] home care services. She   said she needs order with clinical notes, diagnosis, demos, and a letter   of medical necessity. The letter must say its medically necessary for pt   to use (name of walker & make and model #). she said she will be closing   current order because it is incomplete. Phone #? [phone]   ---------------------------------------------------------------------------  --------------  Tran Spann INFO  What is the best way for the office to contact you? Do not leave any   message, patient will call back for answer  Preferred Call Back Phone Number? [phone]  ---------------------------------------------------------------------------  --------------  SCRIPT ANSWERS  Relationship to Patient? Third Party  Representative Name?  Migdalia Newell

## 2022-02-15 NOTE — TELEPHONE ENCOUNTER
Please Approve or Refuse.   Send to Pharmacy per Pt's Request:      Next Visit Date:  3/28/2022   Last Visit Date: 1/13/2021    Hemoglobin A1C (%)   Date Value   03/19/2021 6.2 (H)   12/02/2020 5.0   07/30/2020 7.1             ( goal A1C is < 7)   BP Readings from Last 3 Encounters:   08/22/21 139/63   05/11/21 (!) 149/72   03/30/21 (!) 196/77          (goal 120/80)  BUN   Date Value Ref Range Status   02/07/2022 65 (H) 6 - 20 mg/dL Final     CREATININE   Date Value Ref Range Status   02/07/2022 6.73 (HH) 0.70 - 1.20 mg/dL Final     Potassium   Date Value Ref Range Status   02/07/2022 5.4 (H) 3.7 - 5.3 mmol/L Final

## 2022-02-16 ENCOUNTER — TELEPHONE (OUTPATIENT)
Dept: FAMILY MEDICINE CLINIC | Age: 57
End: 2022-02-16

## 2022-02-16 DIAGNOSIS — Z99.2 ESRD (END STAGE RENAL DISEASE) ON DIALYSIS (HCC): Primary | ICD-10-CM

## 2022-02-16 DIAGNOSIS — N18.6 ESRD (END STAGE RENAL DISEASE) ON DIALYSIS (HCC): Primary | ICD-10-CM

## 2022-02-16 DIAGNOSIS — G62.9 POLYNEUROPATHY: ICD-10-CM

## 2022-02-16 NOTE — TELEPHONE ENCOUNTER
First message says , nephrologist     2.  Polyneuropathy    - Yun Loyd CNP, Pediatric Neurology, [facility]

## 2022-02-16 NOTE — TELEPHONE ENCOUNTER
Patient nurse Cranford Hodgkins called from [facility], in regards to patient requesting a new nephrologist referral, to be placed states his current referral to be referred elsewhere he is denying. Would like to go to a new location. Please advise. Also it is fine to contact him and make him aware once this new referral is placed.

## 2022-02-18 ENCOUNTER — TELEPHONE (OUTPATIENT)
Dept: FAMILY MEDICINE CLINIC | Age: 57
End: 2022-02-18

## 2022-02-18 NOTE — TELEPHONE ENCOUNTER
Patient nurse from [facility] called and stated patient would like to have his f/u appt to be sooner to discuss walker rx, and also if  would like to sign off on home health order. Left detail message.

## 2022-02-22 ENCOUNTER — TELEPHONE (OUTPATIENT)
Dept: FAMILY MEDICINE CLINIC | Age: 57
End: 2022-02-22

## 2022-02-22 NOTE — TELEPHONE ENCOUNTER
Called patient to make aware that an appt will need to be scheduled. Left detail message call office back.

## 2022-03-01 ENCOUNTER — TELEPHONE (OUTPATIENT)
Dept: INTERVENTIONAL RADIOLOGY/VASCULAR | Age: 57
End: 2022-03-01

## 2022-04-06 DIAGNOSIS — K21.9 GASTROESOPHAGEAL REFLUX DISEASE WITHOUT ESOPHAGITIS: ICD-10-CM

## 2022-04-06 RX ORDER — OMEPRAZOLE 20 MG/1
CAPSULE, DELAYED RELEASE ORAL
Qty: 180 CAPSULE | Refills: 1 | OUTPATIENT
Start: 2022-04-06

## 2022-10-25 ENCOUNTER — TELEPHONE (OUTPATIENT)
Dept: FAMILY MEDICINE CLINIC | Age: 57
End: 2022-10-25

## 2022-10-25 NOTE — TELEPHONE ENCOUNTER
Patient not seen in the office since last 2 years. It looks patient follows with [facility] internal medicine as PCP. Please confirm with patient if he still follows with us then we can try to make an appointment in the office.

## 2022-10-25 NOTE — TELEPHONE ENCOUNTER
Incoming call came from home health care facilty [facility]. Nurse Lilibeth Vinsonch is calling to let provider  know that he did a home visit with patient today. Blood pressure reading was 189/81, retaken and was 200/84. Also he stated patient goes to dialysis on Tuesday's and takes his medication's after dialysis. So took medication's shortly before his home visit. Please give a call back at [phone] ([facility]). He did give permission to leave a detail message with a verbal answer if provider will like to make adjustments to blood pressure medications. Please advise, Thank you!

## 2023-01-17 ENCOUNTER — TELEPHONE (OUTPATIENT)
Dept: FAMILY MEDICINE CLINIC | Age: 58
End: 2023-01-17

## 2023-01-17 NOTE — TELEPHONE ENCOUNTER
Fax received for medical clearance for dental procedure. Pt has not been seen in office since 1/13/2021. Called pt and LVM for pt to return call. Pts internal medicine provider is currently listed as his PCP. Faxed paperwork back to dentist to adv last time pt was seen.

## 2023-01-17 NOTE — TELEPHONE ENCOUNTER
Pt returned call and is scheduled for appt on 1/17/23 for medical clearance. Pt confirmed that Dr. Jessica Finnegan is still his PCP he has not changed providers.

## 2023-01-18 ENCOUNTER — OFFICE VISIT (OUTPATIENT)
Dept: FAMILY MEDICINE CLINIC | Age: 58
End: 2023-01-18

## 2023-01-18 VITALS
DIASTOLIC BLOOD PRESSURE: 80 MMHG | HEART RATE: 84 BPM | SYSTOLIC BLOOD PRESSURE: 138 MMHG | BODY MASS INDEX: 27.77 KG/M2 | HEIGHT: 70 IN | OXYGEN SATURATION: 97 % | WEIGHT: 194 LBS | TEMPERATURE: 97.8 F

## 2023-01-18 DIAGNOSIS — Z23 ENCOUNTER FOR IMMUNIZATION: ICD-10-CM

## 2023-01-18 DIAGNOSIS — N25.81 SECONDARY HYPERPARATHYROIDISM OF RENAL ORIGIN (HCC): ICD-10-CM

## 2023-01-18 DIAGNOSIS — G47.33 OBSTRUCTIVE SLEEP APNEA SYNDROME: ICD-10-CM

## 2023-01-18 DIAGNOSIS — Z99.2 ANEMIA DUE TO CHRONIC KIDNEY DISEASE, ON CHRONIC DIALYSIS (HCC): ICD-10-CM

## 2023-01-18 DIAGNOSIS — G62.9 POLYNEUROPATHY: ICD-10-CM

## 2023-01-18 DIAGNOSIS — K21.9 GASTROESOPHAGEAL REFLUX DISEASE WITHOUT ESOPHAGITIS: ICD-10-CM

## 2023-01-18 DIAGNOSIS — E11.22 TYPE 2 DIABETES MELLITUS WITH CHRONIC KIDNEY DISEASE ON CHRONIC DIALYSIS, WITH LONG-TERM CURRENT USE OF INSULIN (HCC): Primary | ICD-10-CM

## 2023-01-18 DIAGNOSIS — I50.32 CHRONIC DIASTOLIC CONGESTIVE HEART FAILURE (HCC): ICD-10-CM

## 2023-01-18 DIAGNOSIS — Z01.818 PRE-OPERATIVE CLEARANCE: ICD-10-CM

## 2023-01-18 DIAGNOSIS — Z12.5 ENCOUNTER FOR PROSTATE CANCER SCREENING: ICD-10-CM

## 2023-01-18 DIAGNOSIS — N18.6 ANEMIA DUE TO CHRONIC KIDNEY DISEASE, ON CHRONIC DIALYSIS (HCC): ICD-10-CM

## 2023-01-18 DIAGNOSIS — I48.11 LONGSTANDING PERSISTENT ATRIAL FIBRILLATION (HCC): ICD-10-CM

## 2023-01-18 DIAGNOSIS — D63.1 ANEMIA DUE TO CHRONIC KIDNEY DISEASE, ON CHRONIC DIALYSIS (HCC): ICD-10-CM

## 2023-01-18 DIAGNOSIS — Z79.4 TYPE 2 DIABETES MELLITUS WITH CHRONIC KIDNEY DISEASE ON CHRONIC DIALYSIS, WITH LONG-TERM CURRENT USE OF INSULIN (HCC): Primary | ICD-10-CM

## 2023-01-18 DIAGNOSIS — N18.6 ESRD (END STAGE RENAL DISEASE) ON DIALYSIS (HCC): ICD-10-CM

## 2023-01-18 DIAGNOSIS — N18.6 TYPE 2 DIABETES MELLITUS WITH CHRONIC KIDNEY DISEASE ON CHRONIC DIALYSIS, WITH LONG-TERM CURRENT USE OF INSULIN (HCC): Primary | ICD-10-CM

## 2023-01-18 DIAGNOSIS — Z99.2 ESRD (END STAGE RENAL DISEASE) ON DIALYSIS (HCC): ICD-10-CM

## 2023-01-18 DIAGNOSIS — Z12.11 COLON CANCER SCREENING: ICD-10-CM

## 2023-01-18 DIAGNOSIS — Z99.2 TYPE 2 DIABETES MELLITUS WITH CHRONIC KIDNEY DISEASE ON CHRONIC DIALYSIS, WITH LONG-TERM CURRENT USE OF INSULIN (HCC): Primary | ICD-10-CM

## 2023-01-18 DIAGNOSIS — E78.2 MIXED HYPERLIPIDEMIA: ICD-10-CM

## 2023-01-18 PROBLEM — D69.6 THROMBOCYTOPENIA (HCC): Status: RESOLVED | Noted: 2019-11-14 | Resolved: 2023-01-18

## 2023-01-18 LAB
AVERAGE GLUCOSE: ABNORMAL
HBA1C MFR BLD: 6.6 %

## 2023-01-18 RX ORDER — APIXABAN 2.5 MG/1
2.5 TABLET, FILM COATED ORAL 2 TIMES DAILY
Qty: 180 TABLET | Refills: 1 | Status: SHIPPED | OUTPATIENT
Start: 2023-01-18

## 2023-01-18 RX ORDER — PREGABALIN 75 MG/1
CAPSULE ORAL
Qty: 30 CAPSULE | Refills: 0 | Status: SHIPPED | OUTPATIENT
Start: 2023-01-18 | End: 2023-02-18

## 2023-01-18 RX ORDER — OMEPRAZOLE 20 MG/1
CAPSULE, DELAYED RELEASE ORAL
Qty: 180 CAPSULE | Refills: 1 | Status: SHIPPED | OUTPATIENT
Start: 2023-01-18

## 2023-01-18 ASSESSMENT — PATIENT HEALTH QUESTIONNAIRE - PHQ9
1. LITTLE INTEREST OR PLEASURE IN DOING THINGS: 0
SUM OF ALL RESPONSES TO PHQ QUESTIONS 1-9: 0
2. FEELING DOWN, DEPRESSED OR HOPELESS: 0
SUM OF ALL RESPONSES TO PHQ QUESTIONS 1-9: 0
SUM OF ALL RESPONSES TO PHQ9 QUESTIONS 1 & 2: 0

## 2023-01-18 ASSESSMENT — ENCOUNTER SYMPTOMS
EYE REDNESS: 0
VOMITING: 0
RHINORRHEA: 0
COLOR CHANGE: 0
SHORTNESS OF BREATH: 0
SINUS PRESSURE: 0
DIARRHEA: 0
BLOOD IN STOOL: 0
STRIDOR: 0
ABDOMINAL DISTENTION: 0
SORE THROAT: 0
CONSTIPATION: 0
TROUBLE SWALLOWING: 0
RECTAL PAIN: 0
ABDOMINAL PAIN: 0
WHEEZING: 0
BACK PAIN: 1
NAUSEA: 0
COUGH: 0
CHEST TIGHTNESS: 0

## 2023-01-18 NOTE — PATIENT INSTRUCTIONS
New Updates for [facility] MyChart/ [facility] ([facility]) PAIGE    Thank you for choosing [facility] to give you the best care! [facility] ([facility]) is always trying to think of new ways to help their patients. We are asking all patients to try out the new digital registration that is now available through your [facility] account or the new PAIGE, [employer] ([facility]). Via the paige you're now able to update your personal and registration information prior to your upcoming appointment. This will save you time once you arrive at the office to check-in, not to mention your information remains safe!! Many other perks come from signing up for an account, such as:  Requesting refills  Scheduling an appointment  Completing an E-Visit  Sending a message to the office/provider  Having access to your medication list  Paying your bill/copay prior to your appointment  Scheduling your yearly mammogram  Review your test results    If you are not familiar with [facility] or the [employer] ([facility]) PAIGE, please ask one of us and we will be happy to answer any questions or help you set-up your account.       Your [facility] office,  [place]

## 2023-01-18 NOTE — PROGRESS NOTES
Chief Complaint   Patient presents with    Diabetes     I will call [facility] for flu vaccine info and a1c     Other     Needs medica clearance for dental work          Sofia Vizcarra  here today for follow up on chronic medical problems, go over labs and/or diagnostic studies, and medication refills. Diabetes (I will call [facility] for flu vaccine info and a1c ) and Other (Needs medica clearance for dental work )      HPI: Patient is scheduled for follow-up on chronic medical conditions not seen since last 2 years. Patient reports he was following with dialysis center 3 times in a week has not seen any PCP in last 2 years. She has history of diabetes controlled is off of both short and long-acting insulin. Reports his previous A1c was 5.6 and he was getting hypoglycemic episodes. His insulin was discontinued and he is only monitored. Patient is also start metformin. Patient denies any hypoglycemic episodes at home. Patient has seen ophthalmologist 3 months before. Patient has history of longstanding persistent atrial fibrillation was taking Eliquis and stopped 1 year before. Patient is currently on aspirin reports he was getting excessive bleeding. Patient has not seen cardiologist in the past 6 months. He is to follow-up with Dr. Tacho Andrea. Congestive heart failure stable on current treatment. Patient is currently on dialysis and also on metoprolol and nifedipine and hydralazine. Denies any recent leg swelling. Hypertension controlled on current treatment. Patient is also on midodrine during dialysis. Patient needs clearance for dental extraction. Reports they have not scheduled until he will be cleared. History of polyneuropathy due to diabetes was taking Lyrica, has not refilled since last 1 year. Patient reports he needs refill gets pains and numbness intermittently. He denies any illicit drug use. He is willing to sign contract.       Secondary hyperparathyroidism due to renal disease stable on recent blood work. Obstructive sleep apnea using CPAP reports compliance. Anemia due to chronic kidney disease, received treatment during dialysis. Hemoglobin is fairly stable. Patient is due for health screening including colon cancer screening. /80   Pulse 84   Temp 97.8 °F (36.6 °C)   Ht 5' 10\" (1.778 m)   Wt 194 lb (88 kg)   SpO2 97%   BMI 27.84 kg/m²    Body mass index is 27.84 kg/m². Wt Readings from Last 3 Encounters:   01/18/23 194 lb (88 kg)   08/20/21 175 lb 7.8 oz (79.6 kg)   05/11/21 182 lb (82.6 kg)        [x]Negative depression screening. PHQ Scores 1/18/2023 3/1/2022 1/13/2021 12/2/2020 4/22/2019 11/19/2018 10/22/2018   PHQ2 Score 0 0 0 0 0 0 0   PHQ9 Score 0 0 0 0 0 0 0      []1-4 = Minimal depression   []5-9 = Milddepression   []10-14 = Moderate depression   []15-19 = Moderately severe depression   []20-27 = Severe depression    Discussed testing with the patient and all questions fully answered.     Hospital Outpatient Visit on 02/07/2022   Component Date Value Ref Range Status    WBC 02/07/2022 7.4  3.5 - 11.3 k/uL Final    RBC 02/07/2022 2.82 (A)  4.21 - 5.77 m/uL Final    Hemoglobin 02/07/2022 9.1 (A)  13.0 - 17.0 g/dL Final    Hematocrit 02/07/2022 29.4 (A)  40.7 - 50.3 % Final    MCV 02/07/2022 104.3 (A)  82.6 - 102.9 fL Final    MCH 02/07/2022 32.3  25.2 - 33.5 pg Final    MCHC 02/07/2022 31.0  28.4 - 34.8 g/dL Final    RDW 02/07/2022 13.8  11.8 - 14.4 % Final    Platelets 62/70/7141 132 (A)  138 - 453 k/uL Final    MPV 02/07/2022 9.6  8.1 - 13.5 fL Final    NRBC Automated 02/07/2022 0.0  0.0 per 100 WBC Final    Glucose 02/07/2022 196 (A)  70 - 99 mg/dL Final    BUN 02/07/2022 65 (A)  6 - 20 mg/dL Final    Creatinine 02/07/2022 6.73 (A)  0.70 - 1.20 mg/dL Final    Bun/Cre Ratio 02/07/2022 NOT REPORTED  9 - 20 Final    Calcium 02/07/2022 9.1  8.6 - 10.4 mg/dL Final    Sodium 02/07/2022 131 (A)  135 - 144 mmol/L Final    Potassium 02/07/2022 5.4 (A) 3.7 - 5.3 mmol/L Final    Chloride 02/07/2022 91 (A)  98 - 107 mmol/L Final    CO2 02/07/2022 23  20 - 31 mmol/L Final    Anion Gap 02/07/2022 17  9 - 17 mmol/L Final    Alkaline Phosphatase 02/07/2022 167 (A)  40 - 129 U/L Final    ALT 02/07/2022 29  5 - 41 U/L Final    AST 02/07/2022 36  <40 U/L Final    Total Bilirubin 02/07/2022 0.37  0.3 - 1.2 mg/dL Final    Total Protein 02/07/2022 7.0  6.4 - 8.3 g/dL Final    Albumin 02/07/2022 4.2  3.5 - 5.2 g/dL Final    Albumin/Globulin Ratio 02/07/2022 1.5  1.0 - 2.5 Final    GFR Non- 02/07/2022 9 (A)  >60 mL/min Final    GFR  02/07/2022 10 (A)  >60 mL/min Final    GFR Comment 02/07/2022        Final    Comment: Average GFR for 52-63 years old:   80 mL/min/1.73sq m  Chronic Kidney Disease:   <60 mL/min/1.73sq m  Kidney failure:   <15 mL/min/1.73sq m              eGFR calculated using average adult body mass.  Additional eGFR calculator available at:        [email or web address]            GFR Staging 02/07/2022 NOT REPORTED   Final    TSH 02/07/2022 4.01  0.30 - 5.00 mIU/L Final         Most recent labs reviewed:     Lab Results   Component Value Date    WBC 7.4 02/07/2022    HGB 9.1 (L) 02/07/2022    HCT 29.4 (L) 02/07/2022    .3 (H) 02/07/2022     (L) 02/07/2022       @BRIEFLAB(NA,K,CL,CO2,BUN,CREATININE,GLUCOSE,CALCIUM)@     Lab Results   Component Value Date    ALT 29 02/07/2022    AST 36 02/07/2022    ALKPHOS 167 (H) 02/07/2022    BILITOT 0.37 02/07/2022       Lab Results   Component Value Date    TSH 4.01 02/07/2022       Lab Results   Component Value Date    CHOL 149 05/10/2019     Lab Results   Component Value Date    TRIG 73 08/20/2021    TRIG 99 05/10/2019     Lab Results   Component Value Date    HDL 48 05/10/2019     Lab Results   Component Value Date    LDLCHOLESTEROL 81 05/10/2019     Lab Results   Component Value Date    VLDL NOT REPORTED 05/10/2019     Lab Results   Component Value Date CHOLHDLRATIO 3.1 05/10/2019       Lab Results   Component Value Date    LABA1C 6.2 (H) 03/19/2021       Lab Results   Component Value Date    TQXPAHVC02 501 03/19/2021       Lab Results   Component Value Date    FOLATE 5.7 03/19/2021       Lab Results   Component Value Date    IRON 36 (L) 03/07/2019    TIBC 164 (L) 03/07/2019    FERRITIN 1,960 (H) 03/07/2019       Lab Results   Component Value Date    VITD25 18.5 (L) 03/19/2021             Current Outpatient Medications   Medication Sig Dispense Refill    pregabalin (LYRICA) 75 MG capsule TAKE 1 CAPSULE BY MOUTH EVERY EVENING 30 capsule 0    omeprazole (PRILOSEC) 20 MG delayed release capsule TAKE 1 CAPSULE BY MOUTH TWICE A  capsule 1    ELIQUIS 2.5 MG TABS tablet Take 1 tablet by mouth 2 times daily 180 tablet 1    metoprolol (LOPRESSOR) 100 MG tablet TAKE 1 TABLET BY MOUTH TWICE A  tablet 2    NIFEdipine (ADALAT CC) 90 MG extended release tablet TAKE 1 TABLET BY MOUTH EVERY DAY AT NIGHT 90 tablet 3    CREON 6000-93960 units delayed release capsule TAKE ONE CAPSULE BY MOUTH 3 TIMES A DAY WITH MEALS 450 capsule 0    Misc. Devices (STEP N REST II WALKER) MISC Use daily for ADL'S 1 each 0    hydrALAZINE (APRESOLINE) 100 MG tablet Take 1 tablet by mouth 3 times daily 270 tablet 3    diphenhydrAMINE (BENADRYL) 25 MG capsule Take 25 mg by mouth every 12 hours as needed      vitamin D (ERGOCALCIFEROL) 1.25 MG (06669 UT) CAPS capsule Take 1 capsule by mouth once a week 12 capsule 1    Calcium Acetate, Phos Binder, 667 MG CAPS       Misc.  Devices MISC Check blood sugar one time a day 50 each 5    midodrine (PROAMATINE) 5 MG tablet Take 5 mg by mouth every 12 hours (Patient not taking: No sig reported)      doxercalciferol (HECTOROL) 4 MCG/2ML injection Infuse 1.25 mLs intravenously Once in dialysis for 1 dose Given at the end of dialysis Tues, Thurs, Sat 1.25 mL 0    glucose monitoring kit (FREESTYLE) monitoring kit 1 kit by Does not apply route daily 1 kit 0    Insulin Pen Needle 32G X 6 MM MISC 1 Package by Does not apply route daily (Patient not taking: No sig reported) 100 each 3     No current facility-administered medications for this visit. Social History     Socioeconomic History    Marital status: Single     Spouse name: Not on file    Number of children: 4    Years of education: 16    Highest education level: Bachelor's degree (e.g., BA, AB, BS)   Occupational History    Not on file   Tobacco Use    Smoking status: Never    Smokeless tobacco: Never   Vaping Use    Vaping Use: Never used   Substance and Sexual Activity    Alcohol use: Not Currently     Alcohol/week: 0.0 standard drinks     Comment: rarely    Drug use: No    Sexual activity: Yes     Partners: Female   Other Topics Concern    Not on file   Social History Narrative    ** Merged History Encounter **          Social Determinants of Health     Financial Resource Strain: Low Risk     Difficulty of Paying Living Expenses: Not hard at all   Food Insecurity: No Food Insecurity    Worried About Running Out of Food in the Last Year: Never true    Ran Out of Food in the Last Year: Never true   Transportation Needs: Not on file   Physical Activity: Not on file   Stress: Not on file   Social Connections: Not on file   Intimate Partner Violence: Not on file   Housing Stability: Not on file     Counseling given: Not Answered        Family History   Problem Relation Age of Onset    No Known Problems Mother     No Known Problems Father              -rest of complaints with corresponding details per ROS    The patient's past medical, surgical, social, and family history as well as his current medications and allergies were reviewed as documented intoday's encounter. Review of Systems   Constitutional:  Positive for fatigue. Negative for activity change, appetite change, fever and unexpected weight change. HENT:  Positive for dental problem.  Negative for congestion, ear pain, postnasal drip, rhinorrhea, sinus pressure, sore throat and trouble swallowing. Eyes:  Positive for visual disturbance. Negative for redness. Respiratory:  Negative for cough, chest tightness, shortness of breath, wheezing and stridor. Cardiovascular:  Negative for chest pain, palpitations and leg swelling. Gastrointestinal:  Negative for abdominal distention, abdominal pain, blood in stool, constipation, diarrhea, nausea, rectal pain and vomiting. Endocrine: Negative for polydipsia, polyphagia and polyuria. Genitourinary:  Negative for difficulty urinating, flank pain, frequency and urgency. Musculoskeletal:  Positive for arthralgias, back pain and gait problem. Negative for myalgias and neck pain. Skin:  Negative for color change, rash and wound. Allergic/Immunologic: Positive for immunocompromised state. Negative for food allergies. Neurological:  Positive for weakness and numbness. Negative for dizziness, speech difficulty, light-headedness and headaches. Psychiatric/Behavioral:  Negative for agitation, behavioral problems, decreased concentration, dysphoric mood, hallucinations, sleep disturbance and suicidal ideas. The patient is nervous/anxious. Physical Exam  Vitals and nursing note reviewed. Constitutional:       General: He is not in acute distress. Appearance: Normal appearance. He is well-developed. He is obese. He is not diaphoretic. HENT:      Head: Normocephalic and atraumatic. Nose: Nose normal.   Eyes:      General:         Right eye: No discharge. Left eye: No discharge. Extraocular Movements: Extraocular movements intact. Conjunctiva/sclera: Conjunctivae normal.      Pupils: Pupils are equal, round, and reactive to light. Neck:      Thyroid: No thyromegaly. Cardiovascular:      Rate and Rhythm: Normal rate and regular rhythm. Heart sounds: Normal heart sounds. No murmur heard.   Pulmonary:      Effort: Pulmonary effort is normal. No respiratory distress. Breath sounds: Decreased breath sounds and wheezing present. No rhonchi. Abdominal:      General: Bowel sounds are normal. There is no distension. Palpations: Abdomen is soft. There is no mass. Tenderness: There is no abdominal tenderness. There is no right CVA tenderness, left CVA tenderness, guarding or rebound. Musculoskeletal:      Cervical back: Normal range of motion and neck supple. Spasms present. No rigidity. Normal range of motion. Thoracic back: Spasms present. Normal range of motion. Lumbar back: Spasms and tenderness present. No deformity. Decreased range of motion. Negative right straight leg raise test and negative left straight leg raise test.   Lymphadenopathy:      Cervical: No cervical adenopathy. Skin:     Coloration: Skin is not jaundiced or pale. Findings: No bruising, erythema or rash. Neurological:      General: No focal deficit present. Mental Status: He is alert and oriented to person, place, and time. Cranial Nerves: No cranial nerve deficit. Sensory: No sensory deficit. Motor: No weakness or tremor. Coordination: Coordination normal.      Gait: Gait abnormal. Tandem walk normal.      Deep Tendon Reflexes: Reflexes are normal and symmetric. Psychiatric:         Attention and Perception: Attention and perception normal. He is attentive. Mood and Affect: Mood is anxious. Mood is not depressed. Affect is not tearful. Speech: He is communicative. Speech is not rapid and pressured, delayed or slurred. Behavior: Behavior normal. Behavior is not agitated or slowed. Thought Content: Thought content normal. Thought content is not paranoid. Cognition and Memory: Cognition is not impaired. Judgment: Judgment normal.           ASSESSMENT AND PLAN      1.  Type 2 diabetes mellitus with chronic kidney disease on chronic dialysis, with long-term current use of insulin ([facility]  Controlled not on any medications, will get A1c results from [facility]. Restart on Lyrica. Monitor blood sugars. - pregabalin (LYRICA) 75 MG capsule; TAKE 1 CAPSULE BY MOUTH EVERY EVENING  Dispense: 30 capsule; Refill: 0  - Ambulatory Referral to Care Management with Device (Remote Patient Monitoring)    2. Pre-operative clearance  Patient will be cleared for surgery    3. Longstanding persistent atrial fibrillation (HCC)  Chronic problem restart on Eliquis discussed if you develop any bleeding call back. Heart rate is under control continue metoprolol nifedipine  - Ambulatory Referral to Care Management with Device (Remote Patient Monitoring)  - ELIQUIS 2.5 MG TABS tablet; Take 1 tablet by mouth 2 times daily  Dispense: 180 tablet; Refill: 1    4. Chronic diastolic congestive heart failure (HCC)  Stable continue dialysis  Continue beta-blockers  - Ambulatory Referral to Care Management with Device (Remote Patient Monitoring)    5. Mixed hyperlipidemia  Recheck lipid panel not on any statins  - Lipid, Fasting; Future    6. Anemia due to chronic kidney disease, on chronic dialysis (HCC)  Chronic problem continue iron infusions during dialysis  - Ambulatory Referral to Care Management with Device (Remote Patient Monitoring)    7. ESRD (end stage renal disease) on dialysis ([facility]  Stable continue dialysis 3 times a week  Check labs according to nephrologist    8. Gastroesophageal reflux disease without esophagitis  Stable continue Prilosec  - omeprazole (PRILOSEC) 20 MG delayed release capsule; TAKE 1 CAPSULE BY MOUTH TWICE A DAY  Dispense: 180 capsule; Refill: 1    9. Obstructive sleep apnea syndrome  Continue using CPAP  Avoid triggers    10. Secondary hyperparathyroidism of renal origin ([facility]  Stable on recent blood work  Continue to check blood work  Continue vitamin D supplements    11.  Polyneuropathy  Restart on Lyrica medication contract updated  - pregabalin (LYRICA) 75 MG capsule; TAKE 1 CAPSULE BY MOUTH EVERY EVENING  Dispense: 30 capsule; Refill: 0    12. Encounter for immunization  Patient has received immunizations at pharmacy    13. Colon cancer screening    - Fecal DNA Colorectal cancer screening (Cologuard)    14. Encounter for prostate cancer screening    - PSA Screening; Future      Orders Placed This Encounter   Procedures    Fecal DNA Colorectal cancer screening (Cologuard)    Lipid, Fasting     Standing Status:   Future     Standing Expiration Date:   1/18/2024    PSA Screening     Standing Status:   Future     Standing Expiration Date:   1/18/2024    Ambulatory Referral to Care Management with Device (Remote Patient Monitoring)     Referral Priority:   Routine     Referral Type:   Consult for Advice and Opinion     Number of Visits Requested:   1         Medications Discontinued During This Encounter   Medication Reason    diazePAM (VALIUM) 5 MG tablet Therapy completed    cloNIDine (CATAPRES) 0.1 MG tablet Therapy completed    venlafaxine (EFFEXOR XR) 37.5 MG extended release capsule Therapy completed    insulin lispro (HUMALOG) 100 UNIT/ML injection vial Therapy completed    metFORMIN (GLUCOPHAGE-XR) 500 MG extended release tablet Therapy completed    pregabalin (LYRICA) 75 MG capsule REORDER    omeprazole (PRILOSEC) 20 MG delayed release capsule REORDER    doxazosin (CARDURA) 2 MG tablet Therapy completed    ELIQUIS 2.5 MG TABS tablet REORDER    HYDROcodone-acetaminophen (NORCO) 5-325 MG per tablet Therapy completed    meclizine (ANTIVERT) 25 MG tablet LIST CLEANUP    insulin glargine (LANTUS) 100 UNIT/ML injection vial Therapy completed       Aníbal Kauffman received counseling on the following healthy behaviors: nutrition, exercise, and medication adherence  Reviewed prior labs and health maintenance  Continue current medications, diet and exercise. Discussed use, benefit, and side effects of prescribed medications. Barriers to medication compliance addressed.    Patient given educational materials - see patient instructions  Was a self-tracking handout given in paper form or via [employer]? Yes    Requested Prescriptions     Signed Prescriptions Disp Refills    pregabalin (LYRICA) 75 MG capsule 30 capsule 0     Sig: TAKE 1 CAPSULE BY MOUTH EVERY EVENING    omeprazole (PRILOSEC) 20 MG delayed release capsule 180 capsule 1     Sig: TAKE 1 CAPSULE BY MOUTH TWICE A DAY    ELIQUIS 2.5 MG TABS tablet 180 tablet 1     Sig: Take 1 tablet by mouth 2 times daily       All patient questions answered. Patient voiced understanding. Quality Measures    Body mass index is 27.84 kg/m². Elevated. Weight control planned discussed daily exercise regimen and Healthy diet and regular exercise. BP: 138/80 Blood pressure is Normal. Treatment plan consists of Weight Reduction, DASH Eating Plan, Dietary Sodium Restriction, Increased Physical Activity, and No treatment change needed. Lab Results   Component Value Date    LDLCHOLESTEROL 81 05/10/2019    (goal LDL reduction with dx if diabetes is 50% LDL reduction)      PHQ Scores 1/18/2023 3/1/2022 1/13/2021 12/2/2020 4/22/2019 11/19/2018 10/22/2018   PHQ2 Score 0 0 0 0 0 0 0   PHQ9 Score 0 0 0 0 0 0 0     Interpretation of Total Score Depression Severity: 1-4 = Minimal depression, 5-9 = Mild depression, 10-14 = Moderate depression, 15-19 = Moderately severe depression, 20-27 = Severe depression    The patient'spast medical, surgical, social, and family history as well as his   current medications and allergies were reviewed as documented in today's encounter. Medications, labs, diagnostic studies, consultations andfollow-up as documented in this encounter. Return in about 3 months (around 4/18/2023) for 30min,always chronic conditons. Patient wasseen with total face to face time of 35 minutes. More than 50% of this visit was counseling and education.        Future Appointments   Date Time Provider Britney Quigley   4/18/2023 11:00 AM [facility] This note was completed by using the assistance of a speech-recognition program. However, inadvertent computerized transcription errors may be present. Althoughevery effort was made to ensure accuracy, no guarantees can be provided that every mistake has been identified and corrected by editing.   Electronically signed by Nena Mathew MD on 1/18/2023  10:38 AM

## 2023-01-18 NOTE — PROGRESS NOTES
Visit Information    Have you changed or started any medications since your last visit including any over-the-counter medicines, vitamins, or herbal medicines? no   Are you having any side effects from any of your medications? -  no  Have you stopped taking any of your medications? Is so, why? -  no    Have you seen any other physician or provider since your last visit? No  Have you had any other diagnostic tests since your last visit? No  Have you been seen in the emergency room and/or had an admission to a hospital since we last saw you? No  Have you had your routine dental cleaning in the past 6 months? no    Have you activated your [employer] account? If not, what are your barriers?  Yes     Patient Care Team:  Nita Curran MD as PCP - General (Family Medicine)  Nita Curran MD as PCP - [facility]  HEATHER Gonzalez - FRED Hylton MD as Consulting Physician (Gastroenterology)    Medical History Review  Past Medical, Family, and Social History reviewed and does contribute to the patient presenting condition    Health Maintenance   Topic Date Due    HIV screen  Never done    Shingles vaccine (1 of 2) Never done    Diabetic retinal exam  07/10/2019    Diabetic foot exam  04/22/2020    Lipids  05/10/2020    Colorectal Cancer Screen  07/13/2021    A1C test (Diabetic or Prediabetic)  03/19/2022    Flu vaccine (1) 08/01/2022    Hepatitis B vaccine (5 of 5 - Risk Dialysis 4-dose series) 02/12/2023    Pneumococcal 0-64 years Vaccine (3 - PPSV23 if available, else PCV20) 02/15/2023    Depression Screen  03/01/2023    DTaP/Tdap/Td vaccine (2 - Td or Tdap) 07/20/2028    COVID-19 Vaccine  Completed    Hepatitis C screen  Completed    Hepatitis A vaccine  Aged Out    Hib vaccine  Aged Out    Meningococcal (ACWY) vaccine  Aged Out

## 2023-01-20 ENCOUNTER — CARE COORDINATION (OUTPATIENT)
Dept: CARE COORDINATION | Age: 58
End: 2023-01-20

## 2023-01-26 NOTE — CARE COORDINATION
Attempted to reach patient to discuss ACM program and enrollment, as well as RPM. Left a message on his voicemail with call back number. Will try to reach him tomorrow.

## 2023-01-27 NOTE — CARE COORDINATION
Second attempt to reach the patient for ACM and RPM. Left a message on his voicemail with call back number. If no return call, will try to reach him next week.

## 2023-02-07 ENCOUNTER — CARE COORDINATION (OUTPATIENT)
Dept: CARE COORDINATION | Age: 58
End: 2023-02-07

## 2023-02-07 NOTE — CARE COORDINATION
Third attempt to reach Telma Lorenzo for ACM enrollment and possible RPM. Left a message on his voicemail with call back number. Will attempt one more time on Friday.

## 2023-02-24 ENCOUNTER — CARE COORDINATION (OUTPATIENT)
Dept: CARE COORDINATION | Age: 58
End: 2023-02-24

## 2023-02-24 NOTE — CARE COORDINATION
Fourth attempt to reach patient for ACM enrollment. Left a message on his voicemail with call back number.

## 2023-05-04 DIAGNOSIS — K21.9 GASTROESOPHAGEAL REFLUX DISEASE WITHOUT ESOPHAGITIS: ICD-10-CM

## 2023-05-04 RX ORDER — OMEPRAZOLE 20 MG/1
CAPSULE, DELAYED RELEASE ORAL
Qty: 180 CAPSULE | Refills: 1 | Status: SHIPPED | OUTPATIENT
Start: 2023-05-04

## 2023-05-05 DIAGNOSIS — G62.9 POLYNEUROPATHY: ICD-10-CM

## 2023-05-05 DIAGNOSIS — Z79.4 TYPE 2 DIABETES MELLITUS WITH CHRONIC KIDNEY DISEASE ON CHRONIC DIALYSIS, WITH LONG-TERM CURRENT USE OF INSULIN (HCC): ICD-10-CM

## 2023-05-05 DIAGNOSIS — Z99.2 TYPE 2 DIABETES MELLITUS WITH CHRONIC KIDNEY DISEASE ON CHRONIC DIALYSIS, WITH LONG-TERM CURRENT USE OF INSULIN (HCC): ICD-10-CM

## 2023-05-05 DIAGNOSIS — E11.22 TYPE 2 DIABETES MELLITUS WITH CHRONIC KIDNEY DISEASE ON CHRONIC DIALYSIS, WITH LONG-TERM CURRENT USE OF INSULIN (HCC): ICD-10-CM

## 2023-05-05 DIAGNOSIS — N18.6 TYPE 2 DIABETES MELLITUS WITH CHRONIC KIDNEY DISEASE ON CHRONIC DIALYSIS, WITH LONG-TERM CURRENT USE OF INSULIN (HCC): ICD-10-CM

## 2023-05-05 RX ORDER — HYDRALAZINE HYDROCHLORIDE 100 MG/1
100 TABLET, FILM COATED ORAL 3 TIMES DAILY
Qty: 270 TABLET | Refills: 3 | Status: CANCELLED | OUTPATIENT
Start: 2023-05-05

## 2023-05-05 NOTE — TELEPHONE ENCOUNTER
Please Approve or Refuse.   Send to Pharmacy per Pt's Request:      Next Visit Date:  Visit date not found   Last Visit Date: 1/18/2023    Hemoglobin A1C (%)   Date Value   01/18/2023 6.6   03/19/2021 6.2 (H)   12/02/2020 5.0             ( goal A1C is < 7)   BP Readings from Last 3 Encounters:   01/18/23 138/80   08/22/21 139/63   05/11/21 (!) 149/72          (goal 120/80)  BUN   Date Value Ref Range Status   02/07/2022 65 (H) 6 - 20 mg/dL Final     Creatinine   Date Value Ref Range Status   02/07/2022 6.73 (HH) 0.70 - 1.20 mg/dL Final     Potassium   Date Value Ref Range Status   02/07/2022 5.4 (H) 3.7 - 5.3 mmol/L Final

## 2023-05-06 RX ORDER — CALCIUM ACETATE 667 MG/1
1 CAPSULE ORAL DAILY
Qty: 180 CAPSULE | Refills: 1 | Status: SHIPPED | OUTPATIENT
Start: 2023-05-06

## 2023-05-06 RX ORDER — PREGABALIN 75 MG/1
CAPSULE ORAL
Qty: 30 CAPSULE | Refills: 0 | Status: SHIPPED | OUTPATIENT
Start: 2023-05-06 | End: 2023-06-05

## 2023-05-12 DIAGNOSIS — K21.9 GASTROESOPHAGEAL REFLUX DISEASE WITHOUT ESOPHAGITIS: ICD-10-CM

## 2023-05-12 RX ORDER — NIFEDIPINE 90 MG/1
90 TABLET, FILM COATED, EXTENDED RELEASE ORAL NIGHTLY
Qty: 90 TABLET | Refills: 3 | Status: SHIPPED | OUTPATIENT
Start: 2023-05-12

## 2023-05-12 RX ORDER — HYDRALAZINE HYDROCHLORIDE 100 MG/1
100 TABLET, FILM COATED ORAL 3 TIMES DAILY
Qty: 270 TABLET | Refills: 3 | Status: SHIPPED | OUTPATIENT
Start: 2023-05-12

## 2023-05-12 RX ORDER — OMEPRAZOLE 20 MG/1
20 CAPSULE, DELAYED RELEASE ORAL 2 TIMES DAILY
Qty: 180 CAPSULE | Refills: 1 | Status: SHIPPED | OUTPATIENT
Start: 2023-05-12

## 2023-09-01 DIAGNOSIS — G62.9 POLYNEUROPATHY: ICD-10-CM

## 2023-09-01 DIAGNOSIS — Z79.4 TYPE 2 DIABETES MELLITUS WITH CHRONIC KIDNEY DISEASE ON CHRONIC DIALYSIS, WITH LONG-TERM CURRENT USE OF INSULIN (HCC): ICD-10-CM

## 2023-09-01 DIAGNOSIS — E11.22 TYPE 2 DIABETES MELLITUS WITH CHRONIC KIDNEY DISEASE ON CHRONIC DIALYSIS, WITH LONG-TERM CURRENT USE OF INSULIN (HCC): ICD-10-CM

## 2023-09-01 DIAGNOSIS — Z99.2 TYPE 2 DIABETES MELLITUS WITH CHRONIC KIDNEY DISEASE ON CHRONIC DIALYSIS, WITH LONG-TERM CURRENT USE OF INSULIN (HCC): ICD-10-CM

## 2023-09-01 DIAGNOSIS — N18.6 TYPE 2 DIABETES MELLITUS WITH CHRONIC KIDNEY DISEASE ON CHRONIC DIALYSIS, WITH LONG-TERM CURRENT USE OF INSULIN (HCC): ICD-10-CM

## 2023-09-01 RX ORDER — PREGABALIN 75 MG/1
CAPSULE ORAL
Qty: 30 CAPSULE | OUTPATIENT
Start: 2023-09-01

## 2023-09-01 NOTE — TELEPHONE ENCOUNTER
Please Approve or Refuse. Send to Pharmacy per Pt's Request: [facility]     Next Visit Date:  called pt l\vm.   Last Visit Date: 1/18/2023    Hemoglobin A1C (%)   Date Value   01/18/2023 6.6   03/19/2021 6.2 (H)   12/02/2020 5.0             ( goal A1C is < 7)   BP Readings from Last 3 Encounters:   01/18/23 138/80   08/22/21 139/63   05/11/21 (!) 149/72          (goal 120/80)  BUN   Date Value Ref Range Status   02/07/2022 65 (H) 6 - 20 mg/dL Final     Creatinine   Date Value Ref Range Status   02/07/2022 6.73 (HH) 0.70 - 1.20 mg/dL Final     Potassium   Date Value Ref Range Status   02/07/2022 5.4 (H) 3.7 - 5.3 mmol/L Final

## 2023-10-29 DIAGNOSIS — K21.9 GASTROESOPHAGEAL REFLUX DISEASE WITHOUT ESOPHAGITIS: ICD-10-CM

## 2023-10-30 RX ORDER — OMEPRAZOLE 20 MG/1
20 CAPSULE, DELAYED RELEASE ORAL 2 TIMES DAILY
Qty: 200 CAPSULE | Refills: 2 | OUTPATIENT
Start: 2023-10-30

## 2023-12-27 DIAGNOSIS — K21.9 GASTROESOPHAGEAL REFLUX DISEASE WITHOUT ESOPHAGITIS: ICD-10-CM

## 2023-12-28 RX ORDER — OMEPRAZOLE 20 MG/1
20 CAPSULE, DELAYED RELEASE ORAL 2 TIMES DAILY
Qty: 160 CAPSULE | Refills: 3 | Status: SHIPPED | OUTPATIENT
Start: 2023-12-28

## 2024-01-11 RX ORDER — NIFEDIPINE 90 MG/1
90 TABLET, FILM COATED, EXTENDED RELEASE ORAL NIGHTLY
Qty: 100 TABLET | Refills: 2 | OUTPATIENT
Start: 2024-01-11

## 2024-02-12 RX ORDER — HYDRALAZINE HYDROCHLORIDE 100 MG/1
100 TABLET, FILM COATED ORAL 3 TIMES DAILY
Qty: 180 TABLET | Refills: 5 | Status: SHIPPED | OUTPATIENT
Start: 2024-02-12

## 2024-02-12 NOTE — TELEPHONE ENCOUNTER
Please Approve or Refuse.  Send to Pharmacy per Pt's Request: [facility]     Next Visit Date:  called lvm  Last Visit Date: 1/18/2023    Hemoglobin A1C (%)   Date Value   01/18/2023 6.6   03/19/2021 6.2 (H)   12/02/2020 5.0             ( goal A1C is < 7)   BP Readings from Last 3 Encounters:   01/18/23 138/80   08/22/21 139/63   05/11/21 (!) 149/72          (goal 120/80)  BUN   Date Value Ref Range Status   02/07/2022 65 (H) 6 - 20 mg/dL Final     Creatinine   Date Value Ref Range Status   02/07/2022 6.73 (HH) 0.70 - 1.20 mg/dL Final     Potassium   Date Value Ref Range Status   02/07/2022 5.4 (H) 3.7 - 5.3 mmol/L Final

## 2024-03-08 RX ORDER — NIFEDIPINE 90 MG/1
90 TABLET, FILM COATED, EXTENDED RELEASE ORAL NIGHTLY
Qty: 60 TABLET | Refills: 5 | Status: SHIPPED | OUTPATIENT
Start: 2024-03-08

## 2024-03-08 NOTE — TELEPHONE ENCOUNTER
Please Approve or Refuse.  Send to Pharmacy per Pt's Request:      Next Visit Date:  3/27/2024   Last Visit Date: 1/18/2023    Hemoglobin A1C (%)   Date Value   01/18/2023 6.6   03/19/2021 6.2 (H)   12/02/2020 5.0             ( goal A1C is < 7)   BP Readings from Last 3 Encounters:   01/18/23 138/80   08/22/21 139/63   05/11/21 (!) 149/72          (goal 120/80)  BUN   Date Value Ref Range Status   02/07/2022 65 (H) 6 - 20 mg/dL Final     Creatinine   Date Value Ref Range Status   02/07/2022 6.73 (HH) 0.70 - 1.20 mg/dL Final     Potassium   Date Value Ref Range Status   02/07/2022 5.4 (H) 3.7 - 5.3 mmol/L Final

## 2024-03-20 LAB — HBA1C MFR BLD HPLC: 6 %

## 2024-03-26 PROBLEM — J18.9 PNEUMONIA: Status: ACTIVE | Noted: 2022-02-27

## 2024-03-26 PROBLEM — E11.40 TYPE 2 DIABETES MELLITUS WITH DIABETIC NEUROPATHY, UNSPECIFIED (HCC): Status: ACTIVE | Noted: 2022-02-01

## 2024-03-26 PROBLEM — R91.1 LUNG NODULE: Status: ACTIVE | Noted: 2021-12-25

## 2024-03-26 PROBLEM — I77.0 ARTERIOVENOUS FISTULA OF LEFT UPPER EXTREMITY (HCC): Status: ACTIVE | Noted: 2022-05-05

## 2024-03-26 PROBLEM — I51.7 CARDIOMEGALY: Status: ACTIVE | Noted: 2022-02-01

## 2024-03-26 PROBLEM — M79.2 NEUROPATHIC PAIN: Status: ACTIVE | Noted: 2021-12-29

## 2024-03-26 PROBLEM — U07.1 SARS-COV-2 POSITIVE: Status: ACTIVE | Noted: 2021-12-27

## 2024-03-26 PROBLEM — R11.10 VOMITING, UNSPECIFIED: Status: ACTIVE | Noted: 2022-02-01

## 2024-03-26 PROBLEM — M54.12 CERVICAL RADICULOPATHY: Status: ACTIVE | Noted: 2021-05-17

## 2024-03-26 PROBLEM — R29.898 OTHER SYMPTOMS AND SIGNS INVOLVING THE MUSCULOSKELETAL SYSTEM: Status: ACTIVE | Noted: 2021-04-23

## 2024-03-26 PROBLEM — G89.4 CHRONIC PAIN DISORDER: Status: ACTIVE | Noted: 2021-05-17

## 2024-03-26 PROBLEM — R06.02 SOB (SHORTNESS OF BREATH): Status: ACTIVE | Noted: 2022-10-17

## 2024-03-26 PROBLEM — R79.89 OTHER SPECIFIED ABNORMAL FINDINGS OF BLOOD CHEMISTRY: Status: ACTIVE | Noted: 2022-02-01

## 2024-03-26 PROBLEM — J96.11 CHRONIC RESPIRATORY FAILURE WITH HYPOXIA (HCC): Status: ACTIVE | Noted: 2022-02-01

## 2024-03-26 PROBLEM — I71.20 THORACIC AORTIC ANEURYSM WITHOUT RUPTURE (HCC): Status: ACTIVE | Noted: 2022-02-01

## 2024-03-26 PROBLEM — R60.0 LOWER EXTREMITY EDEMA: Status: ACTIVE | Noted: 2024-03-26

## 2024-03-26 PROBLEM — I99.8 VASCULAR INSUFFICIENCY: Status: ACTIVE | Noted: 2022-05-03

## 2024-03-26 PROBLEM — R11.2 NAUSEA & VOMITING: Status: ACTIVE | Noted: 2019-12-24

## 2024-03-26 PROBLEM — I13.2 HYPERTENSIVE HEART AND CHRONIC KIDNEY DISEASE WITH HEART FAILURE AND WITH STAGE 5 CHRONIC KIDNEY DISEASE, OR END STAGE RENAL DISEASE (HCC): Status: ACTIVE | Noted: 2022-02-01

## 2024-03-26 PROBLEM — I16.0 HYPERTENSIVE URGENCY: Status: ACTIVE | Noted: 2022-02-01

## 2024-03-26 PROBLEM — N18.6 STAGE 5 CHRONIC KIDNEY DISEASE ON CHRONIC DIALYSIS (HCC): Status: ACTIVE | Noted: 2020-11-05

## 2024-03-26 PROBLEM — Z99.2 DEPENDENCE ON RENAL DIALYSIS (HCC): Status: ACTIVE | Noted: 2020-11-09

## 2024-03-26 PROBLEM — Z99.2 STAGE 5 CHRONIC KIDNEY DISEASE ON CHRONIC DIALYSIS (HCC): Status: ACTIVE | Noted: 2020-11-05

## 2024-03-26 PROBLEM — Z86.16 PERSONAL HISTORY OF COVID-19: Status: ACTIVE | Noted: 2022-02-01

## 2024-04-24 ENCOUNTER — TELEPHONE (OUTPATIENT)
Dept: FAMILY MEDICINE CLINIC | Age: 59
End: 2024-04-24

## 2024-10-07 ENCOUNTER — HOSPITAL ENCOUNTER (INPATIENT)
Age: 59
LOS: 1 days | Discharge: HOME | DRG: 391 | End: 2024-10-08
Attending: STUDENT IN AN ORGANIZED HEALTH CARE EDUCATION/TRAINING PROGRAM | Admitting: INTERNAL MEDICINE
Payer: MEDICARE

## 2024-10-07 ENCOUNTER — APPOINTMENT (OUTPATIENT)
Dept: CT IMAGING | Age: 59
DRG: 391 | End: 2024-10-07
Payer: MEDICARE

## 2024-10-07 ENCOUNTER — APPOINTMENT (OUTPATIENT)
Dept: GENERAL RADIOLOGY | Age: 59
DRG: 391 | End: 2024-10-07
Payer: MEDICARE

## 2024-10-07 DIAGNOSIS — K52.9 COLITIS: ICD-10-CM

## 2024-10-07 DIAGNOSIS — R07.9 CHEST PAIN, UNSPECIFIED TYPE: Primary | ICD-10-CM

## 2024-10-07 PROBLEM — F41.1 GAD (GENERALIZED ANXIETY DISORDER): Status: ACTIVE | Noted: 2024-10-07

## 2024-10-07 PROBLEM — D64.9 ANEMIA: Status: ACTIVE | Noted: 2020-08-23

## 2024-10-07 PROBLEM — R93.3 GASTROINTESTINAL TRACT IMAGING ABNORMALITY: Status: ACTIVE | Noted: 2024-10-07

## 2024-10-07 PROBLEM — F41.9 ANXIETY DISORDER, UNSPECIFIED: Status: ACTIVE | Noted: 2024-10-07

## 2024-10-07 LAB
ALBUMIN SERPL-MCNC: 4.4 G/DL (ref 3.5–5.2)
ALP SERPL-CCNC: 96 U/L (ref 40–129)
ALT SERPL-CCNC: 12 U/L (ref 5–41)
ANION GAP SERPL CALCULATED.3IONS-SCNC: 23 MMOL/L (ref 9–17)
AST SERPL-CCNC: 17 U/L
BASOPHILS # BLD: 0.04 K/UL (ref 0–0.2)
BASOPHILS NFR BLD: 1 % (ref 0–2)
BILIRUB SERPL-MCNC: 0.6 MG/DL (ref 0.3–1.2)
BNP SERPL-MCNC: ABNORMAL PG/ML
BUN SERPL-MCNC: 77 MG/DL (ref 6–20)
CALCIUM SERPL-MCNC: 9.7 MG/DL (ref 8.6–10.4)
CHLORIDE SERPL-SCNC: 92 MMOL/L (ref 98–107)
CO2 SERPL-SCNC: 20 MMOL/L (ref 20–31)
COHGB MFR BLD: 3.1 % (ref 0–5)
CREAT SERPL-MCNC: 12.7 MG/DL (ref 0.7–1.2)
EOSINOPHIL # BLD: 0.04 K/UL (ref 0–0.4)
EOSINOPHILS RELATIVE PERCENT: 1 % (ref 0–4)
ERYTHROCYTE [DISTWIDTH] IN BLOOD BY AUTOMATED COUNT: 15.8 % (ref 11.5–14.9)
FLUAV RNA RESP QL NAA+PROBE: NOT DETECTED
FLUBV RNA RESP QL NAA+PROBE: NOT DETECTED
GFR, ESTIMATED: 4 ML/MIN/1.73M2
GLUCOSE SERPL-MCNC: 180 MG/DL (ref 70–99)
HCO3 VENOUS: 22.1 MMOL/L (ref 24–30)
HCT VFR BLD AUTO: 32.9 % (ref 41–53)
HGB BLD-MCNC: 11.2 G/DL (ref 13.5–17.5)
INR PPP: 1.3
LYMPHOCYTES NFR BLD: 0.22 K/UL (ref 1–4.8)
LYMPHOCYTES RELATIVE PERCENT: 5 % (ref 24–44)
MAGNESIUM SERPL-MCNC: 2.1 MG/DL (ref 1.6–2.6)
MCH RBC QN AUTO: 31.9 PG (ref 26–34)
MCHC RBC AUTO-ENTMCNC: 33.9 G/DL (ref 31–37)
MCV RBC AUTO: 93.9 FL (ref 80–100)
METHEMOGLOBIN: 0.7 % (ref 0–1.9)
MONOCYTES NFR BLD: 0.44 K/UL (ref 0.1–1.3)
MONOCYTES NFR BLD: 10 % (ref 1–7)
MORPHOLOGY: ABNORMAL
MORPHOLOGY: ABNORMAL
NEGATIVE BASE EXCESS, VEN: 2.9 MMOL/L (ref 0–2)
NEUTROPHILS NFR BLD: 83 % (ref 36–66)
NEUTS SEG NFR BLD: 3.66 K/UL (ref 1.3–9.1)
O2 SAT, VEN: 89.4 % (ref 60–85)
PCO2 VENOUS: 36.4 MM HG (ref 39–55)
PH VENOUS: 7.39 (ref 7.32–7.42)
PHOSPHATE SERPL-MCNC: 6.9 MG/DL (ref 2.5–4.5)
PLATELET # BLD AUTO: 91 K/UL (ref 150–450)
PMV BLD AUTO: 7.5 FL (ref 6–12)
PO2 VENOUS: 66.2 MM HG (ref 30–50)
POTASSIUM SERPL-SCNC: 5.6 MMOL/L (ref 3.7–5.3)
PROT SERPL-MCNC: 7.4 G/DL (ref 6.4–8.3)
PROTHROMBIN TIME: 16.2 SEC (ref 11.8–14.6)
RBC # BLD AUTO: 3.51 M/UL (ref 4.5–5.9)
SARS-COV-2 RNA RESP QL NAA+PROBE: NOT DETECTED
SODIUM SERPL-SCNC: 135 MMOL/L (ref 135–144)
SOURCE: NORMAL
SPECIMEN DESCRIPTION: NORMAL
TEXT FOR RESPIRATORY: ABNORMAL
TROPONIN I SERPL HS-MCNC: 189 NG/L (ref 0–22)
TROPONIN I SERPL HS-MCNC: 216 NG/L (ref 0–22)
WBC OTHER # BLD: 4.4 K/UL (ref 3.5–11)

## 2024-10-07 PROCEDURE — 6370000000 HC RX 637 (ALT 250 FOR IP): Performed by: STUDENT IN AN ORGANIZED HEALTH CARE EDUCATION/TRAINING PROGRAM

## 2024-10-07 PROCEDURE — 96375 TX/PRO/DX INJ NEW DRUG ADDON: CPT

## 2024-10-07 PROCEDURE — 82800 BLOOD PH: CPT

## 2024-10-07 PROCEDURE — 99222 1ST HOSP IP/OBS MODERATE 55: CPT | Performed by: PSYCHIATRY & NEUROLOGY

## 2024-10-07 PROCEDURE — 85610 PROTHROMBIN TIME: CPT

## 2024-10-07 PROCEDURE — 6360000002 HC RX W HCPCS: Performed by: NURSE PRACTITIONER

## 2024-10-07 PROCEDURE — 2060000000 HC ICU INTERMEDIATE R&B

## 2024-10-07 PROCEDURE — 80053 COMPREHEN METABOLIC PANEL: CPT

## 2024-10-07 PROCEDURE — 5A1D70Z PERFORMANCE OF URINARY FILTRATION, INTERMITTENT, LESS THAN 6 HOURS PER DAY: ICD-10-PCS | Performed by: INTERNAL MEDICINE

## 2024-10-07 PROCEDURE — 2580000003 HC RX 258: Performed by: STUDENT IN AN ORGANIZED HEALTH CARE EDUCATION/TRAINING PROGRAM

## 2024-10-07 PROCEDURE — 96365 THER/PROPH/DIAG IV INF INIT: CPT

## 2024-10-07 PROCEDURE — 84484 ASSAY OF TROPONIN QUANT: CPT

## 2024-10-07 PROCEDURE — G0378 HOSPITAL OBSERVATION PER HR: HCPCS

## 2024-10-07 PROCEDURE — 74176 CT ABD & PELVIS W/O CONTRAST: CPT

## 2024-10-07 PROCEDURE — APPSS60 APP SPLIT SHARED TIME 46-60 MINUTES: Performed by: NURSE PRACTITIONER

## 2024-10-07 PROCEDURE — 87636 SARSCOV2 & INF A&B AMP PRB: CPT

## 2024-10-07 PROCEDURE — 96374 THER/PROPH/DIAG INJ IV PUSH: CPT

## 2024-10-07 PROCEDURE — 96366 THER/PROPH/DIAG IV INF ADDON: CPT

## 2024-10-07 PROCEDURE — 6370000000 HC RX 637 (ALT 250 FOR IP): Performed by: INTERNAL MEDICINE

## 2024-10-07 PROCEDURE — 99223 1ST HOSP IP/OBS HIGH 75: CPT | Performed by: INTERNAL MEDICINE

## 2024-10-07 PROCEDURE — 2580000003 HC RX 258: Performed by: NURSE PRACTITIONER

## 2024-10-07 PROCEDURE — 99285 EMERGENCY DEPT VISIT HI MDM: CPT

## 2024-10-07 PROCEDURE — 83735 ASSAY OF MAGNESIUM: CPT

## 2024-10-07 PROCEDURE — 71045 X-RAY EXAM CHEST 1 VIEW: CPT

## 2024-10-07 PROCEDURE — 6360000002 HC RX W HCPCS: Performed by: STUDENT IN AN ORGANIZED HEALTH CARE EDUCATION/TRAINING PROGRAM

## 2024-10-07 PROCEDURE — APPNB30 APP NON BILLABLE TIME 0-30 MINS: Performed by: NURSE PRACTITIONER

## 2024-10-07 PROCEDURE — 85025 COMPLETE CBC W/AUTO DIFF WBC: CPT

## 2024-10-07 PROCEDURE — 6360000002 HC RX W HCPCS: Performed by: INTERNAL MEDICINE

## 2024-10-07 PROCEDURE — 84100 ASSAY OF PHOSPHORUS: CPT

## 2024-10-07 PROCEDURE — 82805 BLOOD GASES W/O2 SATURATION: CPT

## 2024-10-07 PROCEDURE — 96376 TX/PRO/DX INJ SAME DRUG ADON: CPT

## 2024-10-07 PROCEDURE — 36415 COLL VENOUS BLD VENIPUNCTURE: CPT

## 2024-10-07 PROCEDURE — 93005 ELECTROCARDIOGRAM TRACING: CPT | Performed by: STUDENT IN AN ORGANIZED HEALTH CARE EDUCATION/TRAINING PROGRAM

## 2024-10-07 PROCEDURE — 6370000000 HC RX 637 (ALT 250 FOR IP): Performed by: PSYCHIATRY & NEUROLOGY

## 2024-10-07 PROCEDURE — 90935 HEMODIALYSIS ONE EVALUATION: CPT

## 2024-10-07 PROCEDURE — 83880 ASSAY OF NATRIURETIC PEPTIDE: CPT

## 2024-10-07 PROCEDURE — 6370000000 HC RX 637 (ALT 250 FOR IP): Performed by: NURSE PRACTITIONER

## 2024-10-07 PROCEDURE — 99222 1ST HOSP IP/OBS MODERATE 55: CPT | Performed by: STUDENT IN AN ORGANIZED HEALTH CARE EDUCATION/TRAINING PROGRAM

## 2024-10-07 RX ORDER — SODIUM CHLORIDE 9 MG/ML
INJECTION, SOLUTION INTRAVENOUS PRN
Status: DISCONTINUED | OUTPATIENT
Start: 2024-10-07 | End: 2024-10-08 | Stop reason: HOSPADM

## 2024-10-07 RX ORDER — DIPHENHYDRAMINE HYDROCHLORIDE 50 MG/ML
25 INJECTION INTRAMUSCULAR; INTRAVENOUS EVERY 6 HOURS PRN
Status: DISCONTINUED | OUTPATIENT
Start: 2024-10-07 | End: 2024-10-08 | Stop reason: HOSPADM

## 2024-10-07 RX ORDER — MORPHINE SULFATE 4 MG/ML
4 INJECTION, SOLUTION INTRAMUSCULAR; INTRAVENOUS
Status: COMPLETED | OUTPATIENT
Start: 2024-10-07 | End: 2024-10-07

## 2024-10-07 RX ORDER — ONDANSETRON 2 MG/ML
4 INJECTION INTRAMUSCULAR; INTRAVENOUS EVERY 6 HOURS PRN
Status: DISCONTINUED | OUTPATIENT
Start: 2024-10-07 | End: 2024-10-08 | Stop reason: HOSPADM

## 2024-10-07 RX ORDER — HYDRALAZINE HYDROCHLORIDE 50 MG/1
100 TABLET, FILM COATED ORAL 2 TIMES DAILY
Status: DISCONTINUED | OUTPATIENT
Start: 2024-10-07 | End: 2024-10-08

## 2024-10-07 RX ORDER — LORAZEPAM 0.5 MG/1
0.5 TABLET ORAL EVERY 6 HOURS PRN
Status: DISCONTINUED | OUTPATIENT
Start: 2024-10-07 | End: 2024-10-08 | Stop reason: HOSPADM

## 2024-10-07 RX ORDER — MAGNESIUM SULFATE HEPTAHYDRATE 40 MG/ML
2000 INJECTION, SOLUTION INTRAVENOUS PRN
Status: DISCONTINUED | OUTPATIENT
Start: 2024-10-07 | End: 2024-10-07

## 2024-10-07 RX ORDER — POLYETHYLENE GLYCOL 3350 17 G/17G
17 POWDER, FOR SOLUTION ORAL DAILY PRN
Status: DISCONTINUED | OUTPATIENT
Start: 2024-10-07 | End: 2024-10-08 | Stop reason: HOSPADM

## 2024-10-07 RX ORDER — SODIUM CHLORIDE 0.9 % (FLUSH) 0.9 %
10 SYRINGE (ML) INJECTION PRN
Status: DISCONTINUED | OUTPATIENT
Start: 2024-10-07 | End: 2024-10-08 | Stop reason: HOSPADM

## 2024-10-07 RX ORDER — PANTOPRAZOLE SODIUM 40 MG/1
40 TABLET, DELAYED RELEASE ORAL
Status: DISCONTINUED | OUTPATIENT
Start: 2024-10-07 | End: 2024-10-08 | Stop reason: HOSPADM

## 2024-10-07 RX ORDER — NIFEDIPINE 90 MG/1
90 TABLET, FILM COATED, EXTENDED RELEASE ORAL NIGHTLY
Status: DISCONTINUED | OUTPATIENT
Start: 2024-10-07 | End: 2024-10-08 | Stop reason: HOSPADM

## 2024-10-07 RX ORDER — LORAZEPAM 2 MG/ML
1 INJECTION INTRAMUSCULAR ONCE
Status: COMPLETED | OUTPATIENT
Start: 2024-10-07 | End: 2024-10-07

## 2024-10-07 RX ORDER — HYDROXYZINE HYDROCHLORIDE 10 MG/1
10 TABLET, FILM COATED ORAL 3 TIMES DAILY PRN
Status: DISCONTINUED | OUTPATIENT
Start: 2024-10-07 | End: 2024-10-08 | Stop reason: HOSPADM

## 2024-10-07 RX ORDER — HYDRALAZINE HYDROCHLORIDE 20 MG/ML
10 INJECTION INTRAMUSCULAR; INTRAVENOUS EVERY 6 HOURS PRN
Status: DISCONTINUED | OUTPATIENT
Start: 2024-10-07 | End: 2024-10-08 | Stop reason: HOSPADM

## 2024-10-07 RX ORDER — ONDANSETRON 2 MG/ML
4 INJECTION INTRAMUSCULAR; INTRAVENOUS ONCE
Status: COMPLETED | OUTPATIENT
Start: 2024-10-07 | End: 2024-10-07

## 2024-10-07 RX ORDER — ACETAMINOPHEN 650 MG/1
650 SUPPOSITORY RECTAL EVERY 6 HOURS PRN
Status: DISCONTINUED | OUTPATIENT
Start: 2024-10-07 | End: 2024-10-08 | Stop reason: HOSPADM

## 2024-10-07 RX ORDER — MORPHINE SULFATE 2 MG/ML
1 INJECTION, SOLUTION INTRAMUSCULAR; INTRAVENOUS EVERY 4 HOURS PRN
Status: DISCONTINUED | OUTPATIENT
Start: 2024-10-07 | End: 2024-10-08 | Stop reason: HOSPADM

## 2024-10-07 RX ORDER — ACETAMINOPHEN 325 MG/1
650 TABLET ORAL EVERY 6 HOURS PRN
Status: DISCONTINUED | OUTPATIENT
Start: 2024-10-07 | End: 2024-10-08 | Stop reason: HOSPADM

## 2024-10-07 RX ORDER — ONDANSETRON 4 MG/1
4 TABLET, ORALLY DISINTEGRATING ORAL EVERY 8 HOURS PRN
Status: DISCONTINUED | OUTPATIENT
Start: 2024-10-07 | End: 2024-10-08 | Stop reason: HOSPADM

## 2024-10-07 RX ORDER — DIPHENHYDRAMINE HCL 25 MG
25 TABLET ORAL ONCE
Status: COMPLETED | OUTPATIENT
Start: 2024-10-07 | End: 2024-10-07

## 2024-10-07 RX ORDER — BISACODYL 10 MG
10 SUPPOSITORY, RECTAL RECTAL DAILY PRN
Status: DISCONTINUED | OUTPATIENT
Start: 2024-10-07 | End: 2024-10-08 | Stop reason: HOSPADM

## 2024-10-07 RX ORDER — SODIUM CHLORIDE 0.9 % (FLUSH) 0.9 %
5-40 SYRINGE (ML) INJECTION EVERY 12 HOURS SCHEDULED
Status: DISCONTINUED | OUTPATIENT
Start: 2024-10-07 | End: 2024-10-08 | Stop reason: HOSPADM

## 2024-10-07 RX ORDER — POTASSIUM CHLORIDE 7.45 MG/ML
10 INJECTION INTRAVENOUS PRN
Status: DISCONTINUED | OUTPATIENT
Start: 2024-10-07 | End: 2024-10-07

## 2024-10-07 RX ORDER — POTASSIUM CHLORIDE 1500 MG/1
40 TABLET, EXTENDED RELEASE ORAL PRN
Status: DISCONTINUED | OUTPATIENT
Start: 2024-10-07 | End: 2024-10-07

## 2024-10-07 RX ORDER — LANOLIN ALCOHOL/MO/W.PET/CERES
3 CREAM (GRAM) TOPICAL NIGHTLY PRN
Status: CANCELLED | OUTPATIENT
Start: 2024-10-07

## 2024-10-07 RX ADMIN — MORPHINE SULFATE 1 MG: 2 INJECTION, SOLUTION INTRAMUSCULAR; INTRAVENOUS at 15:40

## 2024-10-07 RX ADMIN — MORPHINE SULFATE 1 MG: 2 INJECTION, SOLUTION INTRAMUSCULAR; INTRAVENOUS at 20:17

## 2024-10-07 RX ADMIN — HYDROXYZINE HYDROCHLORIDE 10 MG: 10 TABLET ORAL at 16:38

## 2024-10-07 RX ADMIN — PIPERACILLIN AND TAZOBACTAM 4500 MG: 4; .5 INJECTION, POWDER, LYOPHILIZED, FOR SOLUTION INTRAVENOUS at 05:50

## 2024-10-07 RX ADMIN — PIPERACILLIN AND TAZOBACTAM 3375 MG: 3; .375 INJECTION, POWDER, LYOPHILIZED, FOR SOLUTION INTRAVENOUS at 16:47

## 2024-10-07 RX ADMIN — SODIUM CHLORIDE, PRESERVATIVE FREE 10 ML: 5 INJECTION INTRAVENOUS at 15:40

## 2024-10-07 RX ADMIN — LORAZEPAM 1 MG: 2 INJECTION INTRAMUSCULAR; INTRAVENOUS at 20:04

## 2024-10-07 RX ADMIN — SODIUM CHLORIDE, PRESERVATIVE FREE 10 ML: 5 INJECTION INTRAVENOUS at 17:39

## 2024-10-07 RX ADMIN — HYDRALAZINE HYDROCHLORIDE 10 MG: 20 INJECTION INTRAMUSCULAR; INTRAVENOUS at 21:24

## 2024-10-07 RX ADMIN — DIPHENHYDRAMINE HYDROCHLORIDE 25 MG: 50 INJECTION INTRAMUSCULAR; INTRAVENOUS at 06:21

## 2024-10-07 RX ADMIN — DIPHENHYDRAMINE HYDROCHLORIDE 25 MG: 50 INJECTION INTRAMUSCULAR; INTRAVENOUS at 23:45

## 2024-10-07 RX ADMIN — HYDRALAZINE HYDROCHLORIDE 100 MG: 50 TABLET ORAL at 20:05

## 2024-10-07 RX ADMIN — SODIUM CHLORIDE, PRESERVATIVE FREE 10 ML: 5 INJECTION INTRAVENOUS at 16:38

## 2024-10-07 RX ADMIN — HYDRALAZINE HYDROCHLORIDE 100 MG: 50 TABLET ORAL at 09:02

## 2024-10-07 RX ADMIN — SODIUM CHLORIDE, PRESERVATIVE FREE 10 ML: 5 INJECTION INTRAVENOUS at 20:05

## 2024-10-07 RX ADMIN — MORPHINE SULFATE 4 MG: 4 INJECTION, SOLUTION INTRAMUSCULAR; INTRAVENOUS at 04:48

## 2024-10-07 RX ADMIN — PANCRELIPASE LIPASE, PANCRELIPASE PROTEASE, PANCRELIPASE AMYLASE 10000 UNITS: 5000; 17000; 24000 CAPSULE, DELAYED RELEASE ORAL at 09:03

## 2024-10-07 RX ADMIN — SERTRALINE 25 MG: 50 TABLET, FILM COATED ORAL at 15:40

## 2024-10-07 RX ADMIN — DIPHENHYDRAMINE HYDROCHLORIDE 25 MG: 50 INJECTION INTRAMUSCULAR; INTRAVENOUS at 11:23

## 2024-10-07 RX ADMIN — ONDANSETRON 4 MG: 2 INJECTION, SOLUTION INTRAMUSCULAR; INTRAVENOUS at 04:48

## 2024-10-07 RX ADMIN — DIPHENHYDRAMINE HYDROCHLORIDE 25 MG: 50 INJECTION INTRAMUSCULAR; INTRAVENOUS at 17:39

## 2024-10-07 RX ADMIN — PANTOPRAZOLE SODIUM 40 MG: 40 TABLET, DELAYED RELEASE ORAL at 09:02

## 2024-10-07 RX ADMIN — NIFEDIPINE 90 MG: 90 TABLET, EXTENDED RELEASE ORAL at 20:05

## 2024-10-07 RX ADMIN — SODIUM CHLORIDE, PRESERVATIVE FREE 10 ML: 5 INJECTION INTRAVENOUS at 09:02

## 2024-10-07 RX ADMIN — DIPHENHYDRAMINE HYDROCHLORIDE 25 MG: 25 TABLET ORAL at 03:28

## 2024-10-07 RX ADMIN — LORAZEPAM 1 MG: 2 INJECTION INTRAMUSCULAR; INTRAVENOUS at 02:35

## 2024-10-07 RX ADMIN — HYDRALAZINE HYDROCHLORIDE 10 MG: 20 INJECTION INTRAMUSCULAR; INTRAVENOUS at 06:28

## 2024-10-07 RX ADMIN — HYDROXYZINE HYDROCHLORIDE 10 MG: 10 TABLET ORAL at 09:22

## 2024-10-07 RX ADMIN — MORPHINE SULFATE 1 MG: 2 INJECTION, SOLUTION INTRAMUSCULAR; INTRAVENOUS at 09:22

## 2024-10-07 RX ADMIN — SODIUM CHLORIDE, PRESERVATIVE FREE 10 ML: 5 INJECTION INTRAVENOUS at 11:23

## 2024-10-07 RX ADMIN — PANCRELIPASE LIPASE, PANCRELIPASE PROTEASE, PANCRELIPASE AMYLASE 10000 UNITS: 5000; 17000; 24000 CAPSULE, DELAYED RELEASE ORAL at 15:40

## 2024-10-07 ASSESSMENT — PAIN DESCRIPTION - DESCRIPTORS
DESCRIPTORS: STABBING
DESCRIPTORS: ACHING
DESCRIPTORS: STABBING
DESCRIPTORS: SHARP
DESCRIPTORS: STABBING

## 2024-10-07 ASSESSMENT — ENCOUNTER SYMPTOMS
SORE THROAT: 0
DIARRHEA: 0
SHORTNESS OF BREATH: 1
RHINORRHEA: 0
NAUSEA: 0
CHEST TIGHTNESS: 1
EYE REDNESS: 0
EYE DISCHARGE: 0
ABDOMINAL PAIN: 0
VOMITING: 0

## 2024-10-07 ASSESSMENT — PAIN SCALES - GENERAL
PAINLEVEL_OUTOF10: 8
PAINLEVEL_OUTOF10: 7
PAINLEVEL_OUTOF10: 8
PAINLEVEL_OUTOF10: 5
PAINLEVEL_OUTOF10: 8
PAINLEVEL_OUTOF10: 8
PAINLEVEL_OUTOF10: 7
PAINLEVEL_OUTOF10: 8
PAINLEVEL_OUTOF10: 6
PAINLEVEL_OUTOF10: 5
PAINLEVEL_OUTOF10: 7
PAINLEVEL_OUTOF10: 8
PAINLEVEL_OUTOF10: 5

## 2024-10-07 ASSESSMENT — PAIN DESCRIPTION - LOCATION
LOCATION: ABDOMEN

## 2024-10-07 ASSESSMENT — PAIN - FUNCTIONAL ASSESSMENT
PAIN_FUNCTIONAL_ASSESSMENT: PREVENTS OR INTERFERES WITH MANY ACTIVE NOT PASSIVE ACTIVITIES
PAIN_FUNCTIONAL_ASSESSMENT: 0-10

## 2024-10-07 ASSESSMENT — PAIN DESCRIPTION - PAIN TYPE
TYPE: ACUTE PAIN
TYPE: CHRONIC PAIN
TYPE: ACUTE PAIN
TYPE: ACUTE PAIN;CHRONIC PAIN
TYPE: ACUTE PAIN

## 2024-10-07 ASSESSMENT — PAIN DESCRIPTION - ORIENTATION
ORIENTATION: LEFT;MID
ORIENTATION: LEFT

## 2024-10-07 ASSESSMENT — PAIN SCALES - WONG BAKER: WONGBAKER_NUMERICALRESPONSE: NO HURT

## 2024-10-07 NOTE — PROGRESS NOTES
[facility] Internal Medicine  Lenard Jonas MD; Steffen Herman MD; Maninder Ramirez MD; MD Karolina Naik MD; Karley Cartwright MD  [facility] Internal Medicine   IN-PATIENT SERVICE  [facility]                 Date:   10/7/2024  Patientname:  Isai Gonzalez  Date of admission:  10/7/2024  1:44 AM  MRN:   [id number]  Account:  [id number]  YOB: 1965  PCP:    Jose David Grover MD  Room:   2087/2087-01  Code Status:    Full Code      Chief Complaint:     Chief Complaint   Patient presents with    Shortness of Breath    Anxiety     *  Abdominal Pain    History of Present Illness:     Isai Gonzalez is a 59 y.o. Non- / non  male who presents with Shortness of Breath and Anxiety   and is admitted to the hospital for the management of Colitis.    Patient's medical history significant for anxiety, cardiomegaly, chronic diastolic CHF, ESRD on hemodialysis, HTN, mixed hyperlipidemia and DM type 2.    According to patient, he has been struggling with anxiety and severe panic attacks.  Today is his fourth ED visit in less than 2 weeks time.  States that the ED provider sometimes gives him a couple pills to get him through; however, once those pills are gone he again feels overwhelmed with anxiety.  Additionally, patient reports having left lower quadrant abdominal pain that has been ongoing for nearly 3 years, since he had to have his mesh removed.  Patient is noted to have a large left-sided abdominal hernia.    CT abdomen pelvis obtained in ED shows:  1. Prominent gastric fold thickening and wall thickening, without discrete   focal mass or ulceration.  This could be seen with gastritis.   2. Portions of the colon demonstrate prominent wall thickness with some   adjacent stranding which can be seen with inflammatory or infectious colitis.   3. Colonic diverticulosis.   4. Bladder wall thickening, favored to be secondary to poor distension.   5. Nonacute

## 2024-10-07 NOTE — PROGRESS NOTES
HEMODIALYSIS POST TREATMENT NOTE    Treatment time ordered: 3.5 hours    Actual treatment time: 3.5 hours    UltraFiltration Goal: 3500ml  UltraFiltration Removed: 3500ml      Pre Treatment weight: 87.5kg  Post Treatment weight: 84kg  Estimated Dry Weight: 83.9kg    Access used:     Central Venous Catheter: na     Internal Access: left upper fistula       Access Flow: good, 450     Sign and symptoms of infection: no       If YES: na    Medications or blood products given: Benedryl IV given by primary RN early in treatment.    Regular outpatient schedule: [facility]    Summary of response to treatment: Patient tolerated treatment well, very anxious towards end of treatment, wanting anxiety medications, Primary RN aware.    Explain if orders NOT met, was physician notified:cuco      ACES flowsheet faxed to patient unit/ placed in patient chart: yes    Post assessment completed: yes    Report given to: Savita Fuentes      * Intra-treatment documented Safety Checks include the followin) Access and face visible at all times.     2) All connections and blood lines are secure with no kinks.     3) NVL alarm engaged.     4) Hemosafe device applied (if applicable).     5) No collapse of Arterial or Venous blood chambers.     6) All blood lines / pump segments in the air detectors.

## 2024-10-07 NOTE — PROGRESS NOTES
Secure message sent to Dr. Webster regarding pt requesting medication. pt was seen by psych NP today for the consult for anxiety. pt's anxiety has increased throughout the day . stating \"xanax works good\". Xanax is not on pt's home med list.     Zoloft ordered

## 2024-10-07 NOTE — ED NOTES
Medicated with inst for anxiety. Pt states onset anxiety \"issues\" 3 weeks ago  \"before then I never had anxiety ,\" states went to ER a couple times and was given a couple prescriptions for \" a few\" xanax or ativan but as soon as they run out pt c/o severe anxiety again.  Pt also c/o left abd pain states known hernia there .  Support given to pt  updated dr mahoney of pt c/o abd pain. Pt denies any cp or sob at present  just c/o \"I just feel so anxious\"  pt is fidgeting in bed

## 2024-10-07 NOTE — CONSULTS
Department of Psychiatry  Consult Service   Psychiatric Assessment        REASON FOR CONSULT: Debilitating anxiety and panic attacks.     CONSULTING PHYSICIAN: Grace ROMERO CNP    History obtained from: patient/EMR    HISTORY OF PRESENT ILLNESS:      The patient is a 59 y.o. male with no significant psychiatric history who is admitted medically for treatment of colitis. Pt endorses LLQ abdominal pain since 2018. He reports having an abdominal hernia for which the mesh was removed in 2018. Pt reports recently getting an abdominal binder which has been alleviating some of the abdominal pain. Abdominal pain was at 8/10 yesterday when in the ER and was requesting pain medication. Abdominal CT w/o contrast was obtained in ER which found inflammatory/infectious colitis for which the patient was admitted and is being treated with Zosyn. Patient denies nausea, vomiting, fevers, and chills. Pt initially presented to ER due to increasing anxiety and had developed chest tightness with shortness of breath. Pt is agreeable to assessment at bedside today, door is closed for privacy during interview. Pt endorses anxiety over the last 3 weeks. Pt endorses panic attacks which consists of difficulty breathing, sensation of throat closing, chest tightness, and sensation of \"feeling warm.\" Pt endorses panic attacks have been \"off and on\" over last 3 weeks. When asked if having them daily, he states \"not when I am on the Xanax.\" Pt endorses being prescribed Xanax in the ER at previous visits in the last 2 weeks. He endorses taking the Xanax twice a day. Pt reports that his Xanax prescription was a 10 day supply with 1 refill. Reports attempting to refill but the pharmacy had cancelled his order. Pt endorses poor sleep. Reports he sleeps about 30 minutes to 1 hour per night. Unable to stay asleep and fall back to sleep due to worry and panic. When asked about stressors, patient explains that he has been struggling with ESRD for about  [place].   EDUCATION: Bachelor in Mechanical Engineering.    Past Medical History:        Diagnosis Date    Chronic kidney disease     left renal infarct-on dialysis Tues., Thurs, Sat.    Chronic pancreatitis (HCC)     Diabetes mellitus (HCC)     Diverticulosis     Hyperlipidemia     Hypertension     Mild malnutrition (HCC)     MRSA (methicillin resistant staph aureus) culture positive 02/26/2019    nares    Pancreatitis     Personal history of colonic polyps 7/14/2020       Past Surgical History:        Procedure Laterality Date    AV FISTULA CREATION Left     CATHETER REMOVAL N/A 6/7/2017    CATHETER REMOVAL TUNNEL H-D  performed by Benito Velarde MD at [facility] OR    CHOLECYSTECTOMY      with bile duct repair    COLONOSCOPY N/A 7/13/2020    COLONOSCOPY POLYPECTOMY COLD BIOPSY, COLD SNARE performed by Braydon Yee MD at [facility] ENDO    HERNIA REPAIR      OTHER SURGICAL HISTORY      bile duct surgery    OTHER SURGICAL HISTORY  06/07/2017    removal tunneled dialysis catheter    OTHER SURGICAL HISTORY      hernia mesh removed    SEPTOPLASTY      TONSILLECTOMY      UPPER GASTROINTESTINAL ENDOSCOPY N/A 10/23/2020    EGD BIOPSY AND DILATION performed by Braydon Yee MD at [facility] ENDO       Family Medical and Psychiatric History:     Patient denies knowledge of family history mental illness. Reports his mother used Ativan but was unaware of a psychiatric diagnosis.         Problem Relation Age of Onset    No Known Problems Mother     No Known Problems Father        Medications Prior to Admission:   Medications Prior to Admission: NIFEdipine (ADALAT CC) 90 MG extended release tablet, TAKE 1 TABLET BY MOUTH AT NIGHT  hydrALAZINE (APRESOLINE) 100 MG tablet, TAKE 1 TABLET BY MOUTH 3 TIMES  DAILY (Patient taking differently: Take 1 tablet by mouth 2 times daily)  omeprazole (PRILOSEC) 20 MG delayed release capsule, TAKE 1 CAPSULE BY MOUTH TWICE  DAILY  CREON 6000-51496 units delayed release capsule,

## 2024-10-07 NOTE — ED PROVIDER NOTES
EMERGENCY DEPARTMENT ENCOUNTER    Pt Name: Isai Gonzalez  MRN: [id number]  Birthdate 1965  Date of evaluation: 10/7/24  CHIEF COMPLAINT       Chief Complaint   Patient presents with    Shortness of Breath    Anxiety     HISTORY OF PRESENT ILLNESS   This is a 59-year-old male he is got a history of hypertension, hyperlipidemia, diabetes, ESRD on Monday Wednesday Friday hemodialysis presenting with concern about anxiety    Patient states he has been feeling very anxious over the last few weeks.  He developed some chest tightness shortness of breath    No current chest pain.  Still feels a bit short of breath    States he was given Xanax at an outside emergency department which seemed to help    No leg swelling recent travel or surgery no history of blood clots no pleuritic pain no hemoptysis no fevers cough              REVIEW OF SYSTEMS     Review of Systems   Constitutional:  Negative for chills and fever.   HENT:  Negative for rhinorrhea and sore throat.    Eyes:  Negative for discharge and redness.   Respiratory:  Positive for chest tightness and shortness of breath.    Cardiovascular:  Negative for chest pain.   Gastrointestinal:  Negative for abdominal pain, diarrhea, nausea and vomiting.   Genitourinary:  Negative for dysuria and frequency.   Musculoskeletal:  Negative for arthralgias and myalgias.   Skin:  Negative for rash.   Neurological:  Negative for weakness and numbness.   Psychiatric/Behavioral:  Negative for suicidal ideas.    All other systems reviewed and are negative.    PASTMEDICAL HISTORY     Past Medical History:   Diagnosis Date    Chronic kidney disease     left renal infarct-on dialysis Tues., Thurs, Sat.    Chronic pancreatitis (HCC)     Diabetes mellitus (HCC)     Diverticulosis     Hyperlipidemia     Hypertension     Mild malnutrition (HCC)     MRSA (methicillin resistant staph aureus) culture positive 02/26/2019    nares    Pancreatitis     Personal history of colonic polyps 7/14/2020  as above.   6. Cardiomegaly with mild basilar edema.   7. Chronic pneumobilia again noted.   8. Nonobstructive nephrolithiasis.   9. Other nonacute incidental findings as above.         XR CHEST PORTABLE   Final Result   Cardiomegaly with vascular congestion and mildly prominent interstitial   opacities which may represent interstitial pulmonary edema.             LABS: Lab orders shown below, the results are reviewed by myself, and all abnormals are listed below.  Labs Reviewed   CBC WITH AUTO DIFFERENTIAL - Abnormal; Notable for the following components:       Result Value    RBC 3.51 (*)     Hemoglobin 11.2 (*)     Hematocrit 32.9 (*)     RDW 15.8 (*)     Platelets 91 (*)     Neutrophils % 83 (*)     Lymphocytes % 5 (*)     Monocytes % 10 (*)     Lymphocytes Absolute 0.22 (*)     All other components within normal limits   COMPREHENSIVE METABOLIC PANEL - Abnormal; Notable for the following components:    Potassium 5.6 (*)     Chloride 92 (*)     Anion Gap 23 (*)     Glucose 180 (*)     BUN 77 (*)     Creatinine 12.7 (*)     Est, Glom Filt Rate 4 (*)     All other components within normal limits   PROTIME-INR - Abnormal; Notable for the following components:    Protime 16.2 (*)     All other components within normal limits   TROPONIN - Abnormal; Notable for the following components:    Troponin, High Sensitivity 216 (*)     All other components within normal limits   TROPONIN - Abnormal; Notable for the following components:    Troponin, High Sensitivity 189 (*)     All other components within normal limits   BRAIN NATRIURETIC PEPTIDE - Abnormal; Notable for the following components:    NT Pro-BNP 87,440 (*)     All other components within normal limits   BLOOD GAS, VENOUS - Abnormal; Notable for the following components:    pCO2, Quentin 36.4 (*)     PO2, Quentin 66.2 (*)     HCO3, Venous 22.1 (*)     Negative Base Excess, Quentin 2.9 (*)     O2 Sat, Quentin 89.4 (*)     All other components within normal limits   COVID-19 &

## 2024-10-07 NOTE — PROGRESS NOTES
HEMODIALYSIS PRE-TREATMENT NOTE    Patient Identifiers prior to treatment: name  mrn    Isolation Required: contact                      Isolation Type: MRSA       (please document if patient is being managed as a PUI/COVID-19 patient)        Hepatitis status:                           Date Drawn                             Result  Hepatitis B Surface Antigen 24     neg                     Hepatitis B Surface Antibody 24 neg        Hepatitis B Core Antibody            How was Hepatitis Status verified: Stuita     Was a copy of the labs you documented provided to facility for the patient's chart:  records    Hemodialysis orders verified: yes    Access Within normal limits ( I.e. s/s of infection,...): yes     Pre-Assessment completed: yes    Pre-dialysis report received from: Savita Fuentes                      Time: 1030

## 2024-10-07 NOTE — ED NOTES
Pt checked  c/o left abd pain 8/10  requesting pain med, dr mahoney updated  updated pt await final read on ct abd , states understanding

## 2024-10-07 NOTE — PROGRESS NOTES
Pt has been medicatd with Atarax x2, MS x2 and IV Benadryl x2 this shift. Pt's anxiety is still \"overwhleming, and I need to go home.\" NP, Grace Panda called and informed of the above. Grace visited pt and order for Ativan received.

## 2024-10-07 NOTE — CONSULTS
[facility]    GASTROENTEROLOGY CONSULT    Patient:   Isai Gonzalez   :    1965   Facility:   [facility]  Date:    10/7/2024  Admission Dx:  Colitis [K52.9]  Chest pain, unspecified type [R07.9]  Requesting physician: Yesi Barriga MD  Reason for consult:  colitis      SUBJECTIVE:  History of Present Illness:  This is a 59 y.o.   male who was admitted 10/7/2024 with Colitis [K52.9]  Chest pain, unspecified type [R07.9]. We have been asked to see the patient in consultation by Yesi Barriga MD for  colitis. This is a 59 y.o. male with past medical history of type 2 diabetes, essential hypertension, CHF with preserved EF diastolic dysfunction cad esrd on hd dm chronic pancreatitis secondary to previous etoh abuse, hx bile duct repair and necrotizing pancreatitis s/p debridement years ago, hld who presented to the ED for shortness of breath with anxiety. He is being treated for colitis. He reports intermittent LUQ pain for the last 8 years. States it initially started when he had hernia surgery with defective mesh. He has not had previous episodes of diverticulitis.  He denies fevers chills and diarrhea. Imaging shows chronic pneumobilia and prominent gastric  fold and wall thickening, portions of the colon have prominent wall thickening with adjacent stranding  and diverticulosis. Labs show creat 12.7 k 5.6 lft normal no leucocytosis hgb 11.2 plt 91 no recent anemia profile . He denies weight loss dysphagia change in the bowel habits melena hematemesis and hematochezia.     Family hx no liver pancreatic stomach or colon cancer no uc/crdohns  Endoscopy hx states he had colonoscopy several years ago that removed benign polyps no record in epic, states he had normal egd no report in epic    OBJECTIVE:    PAST MEDICAL/SURGICAL HISTORY  Past Medical History:   Diagnosis Date    Chronic kidney disease     left renal infarct-on dialysis Tues., Thurs, Sat.    Chronic  edema.  7. Chronic pneumobilia again noted.  8. Nonobstructive nephrolithiasis.  9. Other nonacute incidental findings as above.       IMPRESSION/plan  1. Abdominal pain imaging showing prominent gastric fold and wall thickening and wall thickeness and adjacent stranding in some portions of the colon suggesting colitis  -continue abx   Clear diet  -pain mgt per primary service    2.hx chronic pancreatitis secondary to previous etoh abuse, hx bile duct surgery and pancreatic debridement secondary to necrosis.    3.anxiety   Psych consulted    4.anemia  Has not had overt gi bleeding  Trend hh  Ppi   Anemia profile      This plan was formulated in collaboration with Dr. mann .  Thank you for allowing us to participate in the care of your patient.    Electronically signed by: HEATHER Holguin CNP on 10/7/2024 at 11:42 AM     Attending Attestation:    I have discussed the care of Isai Gonzalez and I have examined the patient myselft and taken ros and hpi , including pertinent history and exam findings,  with the author of this note . I have reviewed the key elements of all parts of the encounter with the nurse practitioner/resident.  I agree with the assessment, plan and orders as documented by the above health care provider with the following addendum    Abnl GI imaging  Outpt scopes     More than 50% of the time was spent taking care of this patient in addition to the nurse practitioner time.  That also included history taking follow-up physical examination and review of system.    Electronically signed by Dada Mann MD

## 2024-10-07 NOTE — PROGRESS NOTES
PHARMACY NOTE:    The electrolyte replacement protocol for potassium/magnesium has been discontinued per P&T guidelines because the patient has reduced renal function (CrCl < 30 mL/min).      The patient's most recent potassium & magnesium levels are:  Recent Labs     10/07/24  0235   K 5.6*   MG 2.1     CrCl cannot be calculated (Unknown ideal weight.).    For patients with decreased renal function (below 30ml/min) needing potassium/magnesium supplementation, please order individual bolus doses with appropriate monitoring.      Please contact the inpatient pharmacy with any concerns.  Thank you.  Francesco Cowan [facility] BCPS  10/7/2024   6:05 AM

## 2024-10-07 NOTE — CONSULTS
NEPHROLOGY CONSULT     Patient :  Isai Gonzalez; 59 y.o. MRN# [id number]  Location:  2087/2087-01  Attending:  Yesi Barriga MD  Admit Date:  10/7/2024   Hospital Day: 0      Reason for Consult: ESRD/hemodialysis      Chief Complaint: Anxiety shortness of breath  History Obtained From:  patient    History of Present Illness:    This is a 59 y.o. male with past medical history of type 2 diabetes, essential hypertension, CHF with preserved EF diastolic dysfunction normal coronary artery disease cardiac catheterization June 2023, history of end-stage renal disease secondary to diabetic glomera sclerosis on routine hemodialysis at [facility] with on Monday Tuesday Wednesday Friday schedule using a left arm forearm AV graft in the care of Dr. Kaiser.  Patient presented to the hospital with complaints of shortness of breath and anxiety and panic attacks that has been going on for weeks whenever he uses Xanax during dialysis he feels better.  Patient also felt short of breath.  Patient has been getting short his dialysis at outpatient dialysis unit because of anxiety and panic.  Patient complains of left sided abdominal pain which is not new and is chronic no nausea vomiting diarrhea no fever chills.  CT abdomen shows findings of gastritis and colitis  Labs reviewed.    Past Medical History:        Diagnosis Date    Chronic kidney disease     left renal infarct-on dialysis Tues., Thurs, Sat.    Chronic pancreatitis (HCC)     Diabetes mellitus (HCC)     Diverticulosis     Hyperlipidemia     Hypertension     Mild malnutrition (HCC)     MRSA (methicillin resistant staph aureus) culture positive 02/26/2019    nares    Pancreatitis     Personal history of colonic polyps 7/14/2020       Past Surgical History:        Procedure Laterality Date    AV FISTULA CREATION Left     CATHETER REMOVAL N/A 6/7/2017    CATHETER REMOVAL TUNNEL H-D  performed by Benito Velarde MD at [facility] OR    CHOLECYSTECTOMY      with bile duct repair

## 2024-10-07 NOTE — H&P
[facility]   IN-PATIENT SERVICE   [facility]    HISTORY AND PHYSICAL EXAMINATION            Date:   10/7/2024  Patient name:  Isai Gonzalez  Date of admission:  10/7/2024  1:44 AM  MRN:   [id number]  Account:  [id number]  YOB: 1965  PCP:    Jose David Grover MD  Room:   [id number]  Code Status:    Full Code    Chief Complaint:     Chief Complaint   Patient presents with    Shortness of Breath    Anxiety       History Obtained From:     patient    History of Present Illness:     The patient is a 59 y.o.  Non- / non  male who presents with Shortness of Breath and Anxiety   and he is admitted to the hospital for the management of        Isai Gonzalez is a 59 y.o. Non- / non  male who presents with Shortness of Breath and Anxiety   and is admitted to the hospital for the management of Colitis.     Patient's medical history significant for anxiety, cardiomegaly, chronic diastolic CHF, ESRD on hemodialysis, HTN, mixed hyperlipidemia and DM type 2.     According to patient, he has been struggling with anxiety and severe panic attacks.  Today is his fourth ED visit in less than 2 weeks time.  States that the ED provider sometimes gives him a couple pills to get him through; however, once those pills are gone he again feels overwhelmed with anxiety.  Additionally, patient reports having left lower quadrant abdominal pain that has been ongoing for nearly 3 years, since he had to have his mesh removed.  Patient is noted to have a large left-sided abdominal hernia.     CT abdomen pelvis obtained in ED shows:  1. Prominent gastric fold thickening and wall thickening, without discrete   focal mass or ulceration.  This could be seen with gastritis.   2. Portions of the colon demonstrate prominent wall thickness with some   adjacent stranding which can be seen with inflammatory or infectious colitis.   3. Colonic diverticulosis.   4. Bladder wall thickening,

## 2024-10-07 NOTE — ED NOTES
Pt medicated for c/o itching , states anxiety is \"easing up a little\" c/o left abd pain \"hernia pain\" updated dr maru dr to room, ct ordered.  Pt states he has dialysis appt at [facility] in [place] at 0530 updated .  Pt states he has a  new pt psych appt at [facility] 10/8  and pcp appt 10/26 for recent c/o anxiety

## 2024-10-07 NOTE — ED NOTES
Pt updated on admit, bed, poc, states understanding remains alert ox3,  to floor with Bisi , nursing assistant,  pt with no distress.

## 2024-10-07 NOTE — ED NOTES
Pt medicated for c/o left mid abd pain \"I didn't wear my binder today and that makes my hernia worse\" pt fidgeting in bed, c/o anxiety continues, states \" I was doing good with that anxiety when I had the xanax and I took it in the morning and at 6pm but as soon as I was out of that the anxiety comes back worse\"  support given  dr garcia

## 2024-10-07 NOTE — PROGRESS NOTES
Piperacillin-Tazobactam Extended Interval Interchange    The following ordered dose of Piperacillin-Tazobactam has been changed to optimize its pharmacodynamic profile as approved by the Patient Care Review Committee.    Ordered Dose    __ 3.375 gm IV q8hrs (240 minute infusion)      New Dose    CrCl < 20ml/min     __ 3.375gm IV q12hrs  (4-hour infusion)      Pharmacists should be contacted for issues concerning drug compatibility with multiple IV medications.  All doses will be prepared using 50ml D5W to be infused over 4-hours at a rate of 12.5ml/hr.    Thank You,  Francesco Cowan, [id number]:34 AM

## 2024-10-07 NOTE — CONSULTS
Department of Psychiatry  Consult Service   Psychiatric Assessment        REASON FOR CONSULT: Debilitating anxiety and panic attacks.    CONSULTING PHYSICIAN: ***    History obtained from: History and EMR.    HISTORY OF PRESENT ILLNESS:          The patient is a 59 y.o. male with significant psychiatric history of anxiety and panic attacks who is admitted medically for treatment of colitis. Pt endorses LLQ abdominal pain since 2018. He reports having an abdominal hernia for which the mesh was removed then in 2018. Pt reports recently getting an abdominal binder which has been alleviating some of the abdominal pain. Abdominal pain was at 8/10 yesterday when in the ER and was requesting pain medication. Abdominal CT w/o contrast was obtained in ER which found inflammatory/infectious colitis for which the patient was admitted and is being treated with Zosyn. Patient denies nausea, vomiting, fevers, and chills. Pt initially presented to ER due to increasing anxiety and had developed chest tightness with shortness of breath. Pt is agreeable to assessment at bedside today, door is closed for privacy during interview. Pt endorses anxiety over the last 3 weeks. Pt endorses panic attacks which consists of difficulty breathing, sensation of throat closing, chest tightness, and sensation of \"feeling warm.\" Pt endorses panic attacks have been \"off and on\" over last 3 weeks. When asked if the is having them daily, he states \"not when I am on the Xanax.\" Pt endorses being prescribed Xanax in the ER at previous visits in the last 2 weeks. He endorses taking the Xanax twice a day. Pt reports that his Xanax prescription was a 10 day supply with 1 refill. Reports attempting to refill but the pharmacy had cancelled his order. Pt endorses poor sleep. Reports he sleeps about 30 minutes to 1 hour per night. Unable to stay asleep and fall back to sleep due to worry and panic. When asked about stressors, patient explains that he has been  (LYRICA) 75 MG capsule TAKE 1 CAPSULE BY MOUTH EVERY EVENING 5/6/23 6/5/23  Johny Ford MD   Misc. Devices (STEP N REST II WALKER) MISC Use daily for ADL'S 2/10/22   Johny Ford MD   doxercalciferol (HECTOROL) 4 MCG/2ML injection Infuse 1.25 mLs intravenously Once in dialysis for 1 dose Given at the end of dialysis [facility], Thurs, Sat 3/19/19 3/1/22  Ne Becker MD   glucose monitoring kit (FREESTYLE) monitoring kit 1 kit by Does not apply route daily 8/7/17   Mirian Hendrickson APRN - CNP   Misc. Devices MISC Check blood sugar one time a day 8/7/17   Mirian Hendrickson APRN - CNP   Insulin Pen Needle 32G X 6 MM MISC 1 Package by Does not apply route daily  Patient not taking: No sig reported 6/23/17   Mirian Hendrickson APRN - CNP        Medications:    Current Facility-Administered Medications: sodium chloride flush 0.9 % injection 5-40 mL, 5-40 mL, IntraVENous, 2 times per day  sodium chloride flush 0.9 % injection 10 mL, 10 mL, IntraVENous, PRN  0.9 % sodium chloride infusion, , IntraVENous, PRN  ondansetron (ZOFRAN-ODT) disintegrating tablet 4 mg, 4 mg, Oral, Q8H PRN **OR** ondansetron (ZOFRAN) injection 4 mg, 4 mg, IntraVENous, Q6H PRN  polyethylene glycol (GLYCOLAX) packet 17 g, 17 g, Oral, Daily PRN  bisacodyl (DULCOLAX) suppository 10 mg, 10 mg, Rectal, Daily PRN  acetaminophen (TYLENOL) tablet 650 mg, 650 mg, Oral, Q6H PRN **OR** acetaminophen (TYLENOL) suppository 650 mg, 650 mg, Rectal, Q6H PRN  piperacillin-tazobactam (ZOSYN) 3,375 mg in sodium chloride 0.9 % 50 mL IVPB (mini-bag), 3,375 mg, IntraVENous, Q12H  lipase-protease-amylase (ZENPEP) delayed release capsule 10,000 Units, 10,000 Units, Oral, TID WC  hydrALAZINE (APRESOLINE) tablet 100 mg, 100 mg, Oral, BID  NIFEdipine (ADALAT CC) extended release tablet 90 mg, 90 mg, Oral, Nightly  pantoprazole (PROTONIX) tablet 40 mg, 40 mg, Oral, QAM AC  diphenhydrAMINE (BENADRYL) injection 25 mg, 25 mg, IntraVENous, Q6H PRN  hydrALAZINE (APRESOLINE)

## 2024-10-08 VITALS
BODY MASS INDEX: 25.79 KG/M2 | TEMPERATURE: 97.8 F | RESPIRATION RATE: 16 BRPM | HEART RATE: 70 BPM | HEIGHT: 70 IN | DIASTOLIC BLOOD PRESSURE: 77 MMHG | SYSTOLIC BLOOD PRESSURE: 152 MMHG | OXYGEN SATURATION: 95 % | WEIGHT: 180.12 LBS

## 2024-10-08 LAB
ANION GAP SERPL CALCULATED.3IONS-SCNC: 15 MMOL/L (ref 9–17)
BASOPHILS # BLD: 0 K/UL (ref 0–0.2)
BASOPHILS NFR BLD: 0 % (ref 0–2)
BUN SERPL-MCNC: 31 MG/DL (ref 6–20)
CALCIUM SERPL-MCNC: 9 MG/DL (ref 8.6–10.4)
CHLORIDE SERPL-SCNC: 90 MMOL/L (ref 98–107)
CO2 SERPL-SCNC: 27 MMOL/L (ref 20–31)
CREAT SERPL-MCNC: 7.7 MG/DL (ref 0.7–1.2)
EKG ATRIAL RATE: 86 BPM
EKG P AXIS: 63 DEGREES
EKG P-R INTERVAL: 232 MS
EKG Q-T INTERVAL: 410 MS
EKG QRS DURATION: 112 MS
EKG QTC CALCULATION (BAZETT): 490 MS
EKG R AXIS: -72 DEGREES
EKG T AXIS: 78 DEGREES
EKG VENTRICULAR RATE: 86 BPM
EOSINOPHIL # BLD: 0.03 K/UL (ref 0–0.4)
EOSINOPHILS RELATIVE PERCENT: 1 % (ref 0–4)
ERYTHROCYTE [DISTWIDTH] IN BLOOD BY AUTOMATED COUNT: 15.6 % (ref 11.5–14.9)
FERRITIN SERPL-MCNC: 813 NG/ML (ref 30–400)
GFR, ESTIMATED: 8 ML/MIN/1.73M2
GLUCOSE SERPL-MCNC: 129 MG/DL (ref 70–99)
HCT VFR BLD AUTO: 31.5 % (ref 41–53)
HGB BLD-MCNC: 10.8 G/DL (ref 13.5–17.5)
IRON SATN MFR SERPL: 36 % (ref 20–55)
IRON SERPL-MCNC: 87 UG/DL (ref 61–157)
LYMPHOCYTES NFR BLD: 0.24 K/UL (ref 1–4.8)
LYMPHOCYTES RELATIVE PERCENT: 7 % (ref 24–44)
MCH RBC QN AUTO: 32.4 PG (ref 26–34)
MCHC RBC AUTO-ENTMCNC: 34.3 G/DL (ref 31–37)
MCV RBC AUTO: 94.4 FL (ref 80–100)
MONOCYTES NFR BLD: 0.27 K/UL (ref 0.1–1.3)
MONOCYTES NFR BLD: 8 % (ref 1–7)
MORPHOLOGY: ABNORMAL
MORPHOLOGY: ABNORMAL
NEUTROPHILS NFR BLD: 84 % (ref 36–66)
NEUTS SEG NFR BLD: 2.86 K/UL (ref 1.3–9.1)
PHOSPHATE SERPL-MCNC: 5.9 MG/DL (ref 2.5–4.5)
PLATELET # BLD AUTO: 85 K/UL (ref 150–450)
PMV BLD AUTO: 7.2 FL (ref 6–12)
POTASSIUM SERPL-SCNC: 5.2 MMOL/L (ref 3.7–5.3)
RBC # BLD AUTO: 3.33 M/UL (ref 4.5–5.9)
SODIUM SERPL-SCNC: 132 MMOL/L (ref 135–144)
TIBC SERPL-MCNC: 239 UG/DL (ref 250–450)
UNSATURATED IRON BINDING CAPACITY: 152 UG/DL (ref 112–347)
WBC OTHER # BLD: 3.4 K/UL (ref 3.5–11)

## 2024-10-08 PROCEDURE — 6360000002 HC RX W HCPCS: Performed by: INTERNAL MEDICINE

## 2024-10-08 PROCEDURE — 99239 HOSP IP/OBS DSCHRG MGMT >30: CPT | Performed by: INTERNAL MEDICINE

## 2024-10-08 PROCEDURE — 96366 THER/PROPH/DIAG IV INF ADDON: CPT

## 2024-10-08 PROCEDURE — 36415 COLL VENOUS BLD VENIPUNCTURE: CPT

## 2024-10-08 PROCEDURE — 93010 ELECTROCARDIOGRAM REPORT: CPT | Performed by: INTERNAL MEDICINE

## 2024-10-08 PROCEDURE — 6360000002 HC RX W HCPCS: Performed by: NURSE PRACTITIONER

## 2024-10-08 PROCEDURE — 6370000000 HC RX 637 (ALT 250 FOR IP): Performed by: PSYCHIATRY & NEUROLOGY

## 2024-10-08 PROCEDURE — 6370000000 HC RX 637 (ALT 250 FOR IP): Performed by: INTERNAL MEDICINE

## 2024-10-08 PROCEDURE — 83550 IRON BINDING TEST: CPT

## 2024-10-08 PROCEDURE — APPSS30 APP SPLIT SHARED TIME 16-30 MINUTES: Performed by: NURSE PRACTITIONER

## 2024-10-08 PROCEDURE — 80048 BASIC METABOLIC PNL TOTAL CA: CPT

## 2024-10-08 PROCEDURE — 6370000000 HC RX 637 (ALT 250 FOR IP): Performed by: NURSE PRACTITIONER

## 2024-10-08 PROCEDURE — 84100 ASSAY OF PHOSPHORUS: CPT

## 2024-10-08 PROCEDURE — 83540 ASSAY OF IRON: CPT

## 2024-10-08 PROCEDURE — 99232 SBSQ HOSP IP/OBS MODERATE 35: CPT | Performed by: PSYCHIATRY & NEUROLOGY

## 2024-10-08 PROCEDURE — 82728 ASSAY OF FERRITIN: CPT

## 2024-10-08 PROCEDURE — 96376 TX/PRO/DX INJ SAME DRUG ADON: CPT

## 2024-10-08 PROCEDURE — G0378 HOSPITAL OBSERVATION PER HR: HCPCS

## 2024-10-08 PROCEDURE — 2580000003 HC RX 258: Performed by: NURSE PRACTITIONER

## 2024-10-08 PROCEDURE — 85025 COMPLETE CBC W/AUTO DIFF WBC: CPT

## 2024-10-08 RX ORDER — LEVOFLOXACIN 750 MG/1
750 TABLET, FILM COATED ORAL ONCE
Status: COMPLETED | OUTPATIENT
Start: 2024-10-08 | End: 2024-10-08

## 2024-10-08 RX ORDER — METRONIDAZOLE 500 MG/1
500 TABLET ORAL 3 TIMES DAILY
Qty: 24 TABLET | Refills: 0 | Status: SHIPPED | OUTPATIENT
Start: 2024-10-08 | End: 2024-10-16

## 2024-10-08 RX ORDER — SERTRALINE HYDROCHLORIDE 25 MG/1
25 TABLET, FILM COATED ORAL DAILY
Qty: 30 TABLET | Refills: 3 | Status: SHIPPED | OUTPATIENT
Start: 2024-10-09

## 2024-10-08 RX ORDER — HYDRALAZINE HYDROCHLORIDE 50 MG/1
100 TABLET, FILM COATED ORAL EVERY 8 HOURS SCHEDULED
Status: DISCONTINUED | OUTPATIENT
Start: 2024-10-08 | End: 2024-10-08 | Stop reason: HOSPADM

## 2024-10-08 RX ORDER — HYDROXYZINE HYDROCHLORIDE 10 MG/1
10 TABLET, FILM COATED ORAL EVERY 8 HOURS PRN
Qty: 30 TABLET | Refills: 0 | Status: SHIPPED | OUTPATIENT
Start: 2024-10-08 | End: 2024-10-18

## 2024-10-08 RX ORDER — LEVOFLOXACIN 250 MG/1
250 TABLET, FILM COATED ORAL DAILY
Qty: 8 TABLET | Refills: 0 | Status: SHIPPED | OUTPATIENT
Start: 2024-10-08 | End: 2024-10-16

## 2024-10-08 RX ADMIN — LORAZEPAM 0.5 MG: 0.5 TABLET ORAL at 01:26

## 2024-10-08 RX ADMIN — PIPERACILLIN AND TAZOBACTAM 3375 MG: 3; .375 INJECTION, POWDER, LYOPHILIZED, FOR SOLUTION INTRAVENOUS at 05:59

## 2024-10-08 RX ADMIN — DIPHENHYDRAMINE HYDROCHLORIDE 25 MG: 50 INJECTION INTRAMUSCULAR; INTRAVENOUS at 06:34

## 2024-10-08 RX ADMIN — PANTOPRAZOLE SODIUM 40 MG: 40 TABLET, DELAYED RELEASE ORAL at 06:00

## 2024-10-08 RX ADMIN — MORPHINE SULFATE 1 MG: 2 INJECTION, SOLUTION INTRAMUSCULAR; INTRAVENOUS at 07:54

## 2024-10-08 RX ADMIN — MORPHINE SULFATE 1 MG: 2 INJECTION, SOLUTION INTRAMUSCULAR; INTRAVENOUS at 03:29

## 2024-10-08 RX ADMIN — LEVOFLOXACIN 750 MG: 750 TABLET, FILM COATED ORAL at 14:58

## 2024-10-08 RX ADMIN — MORPHINE SULFATE 1 MG: 2 INJECTION, SOLUTION INTRAMUSCULAR; INTRAVENOUS at 13:02

## 2024-10-08 RX ADMIN — SODIUM CHLORIDE, PRESERVATIVE FREE 10 ML: 5 INJECTION INTRAVENOUS at 07:54

## 2024-10-08 RX ADMIN — PANCRELIPASE LIPASE, PANCRELIPASE PROTEASE, PANCRELIPASE AMYLASE 10000 UNITS: 5000; 17000; 24000 CAPSULE, DELAYED RELEASE ORAL at 11:36

## 2024-10-08 RX ADMIN — HYDRALAZINE HYDROCHLORIDE 10 MG: 20 INJECTION INTRAMUSCULAR; INTRAVENOUS at 03:26

## 2024-10-08 RX ADMIN — DIPHENHYDRAMINE HYDROCHLORIDE 25 MG: 50 INJECTION INTRAMUSCULAR; INTRAVENOUS at 12:05

## 2024-10-08 RX ADMIN — LORAZEPAM 0.5 MG: 0.5 TABLET ORAL at 11:35

## 2024-10-08 RX ADMIN — SERTRALINE 25 MG: 50 TABLET, FILM COATED ORAL at 07:54

## 2024-10-08 RX ADMIN — PANCRELIPASE LIPASE, PANCRELIPASE PROTEASE, PANCRELIPASE AMYLASE 10000 UNITS: 5000; 17000; 24000 CAPSULE, DELAYED RELEASE ORAL at 07:53

## 2024-10-08 RX ADMIN — HYDRALAZINE HYDROCHLORIDE 100 MG: 50 TABLET ORAL at 07:54

## 2024-10-08 ASSESSMENT — PAIN DESCRIPTION - DESCRIPTORS
DESCRIPTORS: STABBING

## 2024-10-08 ASSESSMENT — PAIN DESCRIPTION - LOCATION
LOCATION: ABDOMEN

## 2024-10-08 ASSESSMENT — PAIN - FUNCTIONAL ASSESSMENT: PAIN_FUNCTIONAL_ASSESSMENT: PREVENTS OR INTERFERES SOME ACTIVE ACTIVITIES AND ADLS

## 2024-10-08 ASSESSMENT — PAIN SCALES - GENERAL
PAINLEVEL_OUTOF10: 5
PAINLEVEL_OUTOF10: 8
PAINLEVEL_OUTOF10: 7
PAINLEVEL_OUTOF10: 7
PAINLEVEL_OUTOF10: 4

## 2024-10-08 ASSESSMENT — PAIN DESCRIPTION - ORIENTATION
ORIENTATION: LEFT

## 2024-10-08 ASSESSMENT — PAIN SCALES - WONG BAKER: WONGBAKER_NUMERICALRESPONSE: NO HURT

## 2024-10-08 NOTE — PROGRESS NOTES
NEPHROLOGY progress note    Patient :  Isai Gonzalez; 59 y.o. MRN# [id number]  Location:  2087/2087-01  Attending:  Yesi Barriga MD  Admit Date:  10/7/2024   Hospital Day: 1      Reason for Consult: ESRD/hemodialysis      Chief Complaint: Anxiety shortness of breath    Subjective/interval history.  Patient seen and examined denies any new complaints today abdominal pain has improved patient had dialysis yesterday tolerated well    History of Present Illness:    This is a 59 y.o. male with past medical history of type 2 diabetes, essential hypertension, CHF with preserved EF diastolic dysfunction normal coronary artery disease cardiac catheterization June 2023, history of end-stage renal disease secondary to diabetic glomera sclerosis on routine hemodialysis at [facility] with on Monday Tuesday Wednesday Friday schedule using a left arm forearm AV graft in the care of Dr. Kaiser.  Patient presented to the hospital with complaints of shortness of breath and anxiety and panic attacks that has been going on for weeks whenever he uses Xanax during dialysis he feels better.  Patient also felt short of breath.  Patient has been getting short his dialysis at outpatient dialysis unit because of anxiety and panic.  Patient complains of left sided abdominal pain which is not new and is chronic no nausea vomiting diarrhea no fever chills.  CT abdomen shows findings of gastritis and colitis  Labs reviewed.    Past Medical History:        Diagnosis Date    Chronic kidney disease     left renal infarct-on dialysis Tues., Thurs, Sat.    Chronic pancreatitis (HCC)     Diabetes mellitus (HCC)     Diverticulosis     Hyperlipidemia     Hypertension     Mild malnutrition (HCC)     MRSA (methicillin resistant staph aureus) culture positive 02/26/2019    nares    Pancreatitis     Personal history of colonic polyps 7/14/2020       Past Surgical History:        Procedure Laterality Date    AV FISTULA CREATION Left     CATHETER REMOVAL  N/A 6/7/2017    CATHETER REMOVAL TUNNEL H-D  performed by Benito Velarde MD at [facility] OR    CHOLECYSTECTOMY      with bile duct repair    COLONOSCOPY N/A 7/13/2020    COLONOSCOPY POLYPECTOMY COLD BIOPSY, COLD SNARE performed by Braydon Yee MD at [facility] ENDO    HERNIA REPAIR      OTHER SURGICAL HISTORY      bile duct surgery    OTHER SURGICAL HISTORY  06/07/2017    removal tunneled dialysis catheter    OTHER SURGICAL HISTORY      hernia mesh removed    SEPTOPLASTY      TONSILLECTOMY      UPPER GASTROINTESTINAL ENDOSCOPY N/A 10/23/2020    EGD BIOPSY AND DILATION performed by Braydon Yee MD at [facility] ENDO       Current Medications:    levoFLOXacin (LEVAQUIN) tablet 750 mg, Once  hydrALAZINE (APRESOLINE) tablet 100 mg, 3 times per day  sodium chloride flush 0.9 % injection 5-40 mL, 2 times per day  sodium chloride flush 0.9 % injection 10 mL, PRN  0.9 % sodium chloride infusion, PRN  ondansetron (ZOFRAN-ODT) disintegrating tablet 4 mg, Q8H PRN   Or  ondansetron (ZOFRAN) injection 4 mg, Q6H PRN  polyethylene glycol (GLYCOLAX) packet 17 g, Daily PRN  bisacodyl (DULCOLAX) suppository 10 mg, Daily PRN  acetaminophen (TYLENOL) tablet 650 mg, Q6H PRN   Or  acetaminophen (TYLENOL) suppository 650 mg, Q6H PRN  piperacillin-tazobactam (ZOSYN) 3,375 mg in sodium chloride 0.9 % 50 mL IVPB (mini-bag), Q12H  lipase-protease-amylase (ZENPEP) delayed release capsule 10,000 Units, TID WC  NIFEdipine (ADALAT CC) extended release tablet 90 mg, Nightly  pantoprazole (PROTONIX) tablet 40 mg, QAM AC  diphenhydrAMINE (BENADRYL) injection 25 mg, Q6H PRN  hydrALAZINE (APRESOLINE) injection 10 mg, Q6H PRN  morphine injection 1 mg, Q4H PRN  hydrOXYzine HCl (ATARAX) tablet 10 mg, TID PRN  sertraline (ZOLOFT) tablet 25 mg, Daily  LORazepam (ATIVAN) tablet 0.5 mg, Q6H PRN        Allergies:  Clonidine and Heparin    Social History:   Social History     Socioeconomic History    Marital status: Single     Spouse name: Not on file

## 2024-10-08 NOTE — PROGRESS NOTES
Spiritual Health History and Assessment/Progress Note  [facility]    (P) Spiritual/Emotional Needs,  ,  ,      Name: Isai Gonzalez MRN: [id number]    Age: 59 y.o.     Sex: male   Language: English   Anglican: None   Colitis     Date: 10/8/2024                           Spiritual Assessment began in [facility] PROGRESSIVE CARE        Referral/Consult From: (P) Rounding   Encounter Overview/Reason: (P) Spiritual/Emotional Needs  Service Provided For: (P) Patient    PT sitting in a chair. Pt was quiet and appreciated writer's visit greatly.     Ghada, Belief, Meaning:   Patient identifies as spiritual  Family/Friends No family/friends present      Importance and Influence:  Patient unable to assess at this time  Family/Friends No family/friends present    Community:  Patient Other: NA  Family/Friends No family/friends present    Assessment and Plan of Care:     Patient Interventions include: Facilitated expression of thoughts and feelings, Explored spiritual coping/struggle/distress, and Affirmed coping skills/support systems  Family/Friends Interventions include: No family/friends present    Patient Plan of Care: Spiritual Care available upon further referral  Family/Friends Plan of Care: No family/friends present    Electronically signed by Chaplain Ewa on 10/8/2024 at 3:05 PM

## 2024-10-08 NOTE — PROGRESS NOTES
[facility]    Gastroenterology Daily Progress Note      Patient:   Isai Gonzalez   :    1965   Facility:   [facility]  Date:     10/8/2024  Consultant:   HEATHER Holguin - CNP, CNP      SUBJECTIVE  59 y.o. male admitted 10/7/2024 with Colitis [K52.9]  Chest pain, unspecified type [R07.9] and seen for colitis. The pt was seen and examined. No c/o abdominal pain or nausea. No bm overnight . Tolerating food        OBJECTIVE  Scheduled Meds:   sodium chloride flush  5-40 mL IntraVENous 2 times per day    piperacillin-tazobactam  3,375 mg IntraVENous Q12H    lipase-protease-amylase  10,000 Units Oral TID WC    hydrALAZINE  100 mg Oral BID    NIFEdipine  90 mg Oral Nightly    pantoprazole  40 mg Oral QAM AC    sertraline  25 mg Oral Daily       Vital Signs:  BP (!) 172/88   Pulse 79   Temp 98.4 °F (36.9 °C) (Oral)   Resp 18   Ht 1.778 m (5' 10\")   Wt 81.7 kg (180 lb 1.9 oz)   SpO2 93%   BMI 25.84 kg/m²    Review of Systems - History obtained from chart review and the patient  General ROS: negative  Respiratory ROS: no cough, shortness of breath, or wheezing  Cardiovascular ROS: no chest pain or dyspnea on exertion  Gastrointestinal ROS: no abdominal pain, change in bowel habits, or black or bloody stools   Physical Exam:     General Appearance: alert and oriented to person, place and time, well-developed and well-nourished, in no acute distress  Skin: warm and dry, no rash or erythema  Head: normocephalic and atraumatic  Eyes: pupils equal, round, and reactive to light, extraocular eye movements intact, conjunctivae normal  ENT: hearing grossly normal bilaterally  Neck: neck supple and non tender without mass, no thyromegaly or thyroid nodules, no cervical lymphadenopathy   Pulmonary/Chest: clear to auscultation bilaterally- no wheezes, rales or rhonchi, normal air movement, no respiratory distress  Cardiovascular: normal rate, regular rhythm, normal S1 and S2, no murmurs,  distention.  There is a small fat containing inguinal  hernia, with a small amount of fluid or chronic scarring within the proximal  right inguinal canal.     Peritoneum/Retroperitoneum: Aorto iliac and mesenteric artery diffuse  atherosclerotic disease.  An IVC filter is noted.  There is a right  paramidline hernia just above the umbilicus containing loops of small bowel  though without incarceration.  There is a right spigelian hernia without  bowel herniation.  There is a left paramidline abdominal wall hernia  containing portions of colon as well as numerous dilated varices, though  without acute abnormality.     Bones/Soft Tissues: No acute subcutaneous soft tissue abnormality is  identified.  No acute osseous abnormality is identified.  Multilevel  degenerative changes are seen within the spine.  No acute fracture is  identified.     IMPRESSION:  1. Prominent gastric fold thickening and wall thickening, without discrete  focal mass or ulceration.  This could be seen with gastritis.  2. Portions of the colon demonstrate prominent wall thickness with some  adjacent stranding which can be seen with inflammatory or infectious colitis.  3. Colonic diverticulosis.  4. Bladder wall thickening, favored to be secondary to poor distension.  5. Nonacute appearing abdominal wall hernias noted as above.  6. Cardiomegaly with mild basilar edema.  7. Chronic pneumobilia again noted.  8. Nonobstructive nephrolithiasis.  9. Other nonacute incidental findings as above.        ASSESSMENT/plan  Abdominal pain imaging showing prominent gastric fold and wall thickening and wall thickeness and adjacent stranding in some portions of the colon suggesting colitis-improved  -continue abx ; will need 7 days of oral abx at discharge  Advance diet  pain mgt per primary service     2.hx chronic pancreatitis secondary to previous etoh abuse, hx bile duct surgery and pancreatic debridement secondary to necrosis.     3.anxiety   Mgt per

## 2024-10-08 NOTE — PLAN OF CARE
Problem: Chronic Conditions and Co-morbidities  Goal: Patient's chronic conditions and co-morbidity symptoms are monitored and maintained or improved  10/8/2024 1503 by Stephanie Marshall RN  Outcome: Completed     Problem: Discharge Planning  Goal: Discharge to home or other facility with appropriate resources  10/8/2024 1503 by Stephanie Marshall RN  Outcome: Completed     Problem: Pain  Goal: Verbalizes/displays adequate comfort level or baseline comfort level  10/8/2024 1503 by Stephanie Marshall RN  Outcome: Completed     Problem: Safety - Adult  Goal: Free from fall injury  10/8/2024 1503 by Stephanie Marshall RN  Outcome: Completed     Problem: Risk for Elopement  Goal: Patient will not exit the unit/facility without proper excort  10/8/2024 1503 by Stephanie Marshall RN  Outcome: Completed     Problem: ABCDS Injury Assessment  Goal: Absence of physical injury  10/8/2024 1503 by Stephanie Marshall RN  Outcome: Completed

## 2024-10-08 NOTE — DISCHARGE SUMMARY
IN-PATIENT SERVICE   [facility] Internal Medicine    Discharge Summary     Patient ID: Isai Gonzalez  :  1965   MRN: [id number]     ACCOUNT:  [id number]   Patient's PCP: Jose David Grover MD  Admit Date: 10/7/2024   Discharge Date: 10/8/2024    Length of Stay: 1  Code Status:  Full Code  Admitting Physician: Yesi Barriga MD  Discharge Physician: Yesi Barriga MD     Active Discharge Diagnoses:     Primary Problem  Colitis      Hospital Problems  Active Hospital Problems    Diagnosis Date Noted    Colitis [K52.9] 10/07/2024    DESIREE (generalized anxiety disorder) [F41.1] 10/07/2024    Chest pain [R07.9] 10/07/2024    Gastrointestinal tract imaging abnormality [R93.3] 10/07/2024    Anemia [D64.9] 2020    ESRD (end stage renal disease) on dialysis (HCC) [N18.6, Z99.2] 2017       Admission Condition:  fair     Discharged Condition: fair    Hospital Stay:     Hospital Course:  Isai Gonzalez is a 59 y.o. male who was admitted for the management of Colitis , presented to ER with Shortness of Breath and Anxiety    Patient is 59-year-old male multiple comorbidities including type 2 diabetes mellitus, history of necrotizing pancreatitis s/p debridement, history of infected ventral hernia mesh eventually got removed at [facility], hypertension incisional disease on dialysis, currently on transplant list presented to the ER with multiple complaints.  Patient complaining of abdominal pain chest pain panic attacks and nausea going on last 3 weeks, as per patient he could not finish previous dialysis session due to severe panic attack last dialysis session was on Friday  Abdominal pain secondary to nfectious/inflammatory colitis and gastritis, please started on Zosyn, GI consulted, will start clear liquid diet advance as tolerated,  Patient was seen by GI, signed off recommended p.o. antibiotics patient tolerated diet well, seen by psychiatry for severe anxiety, started on Atarax as needed  ABDOMEN PELVIS WO CONTRAST    Result Date: 10/2/2024  CT ABDOMEN AND PELVIS WO CONT: 10/2/2024 PROVIDED HISTORY: *  59 years old Male *  LLQ abdominal pain COMPARISON: CT abdomen/pelvis 8/30/2024 TECHNIQUE: 1.  Multiple axial images of the abdomen and pelvis were obtained. No intravenous contrast was administered. 2.  All CT scans at this facility use dose modulation, iterative reconstruction, and/or weight based dosing when appropriate to reduce radiation dose to as low as reasonably achievable. FINDINGS: The lack of intravenous contrast limits evaluation of the solid abdominal organs. LOWER THORAX: There is subsegmental atelectasis in the lung bases. ABDOMEN/PELVIS: LIVER: Within normal limits, with similar small volume pneumobilia from 8/30/2024 GALLBLADDER AND BILE DUCTS: There is mild prominence of the central intrahepatic biliary ducts and CBD not unexpected for postcholecystectomy status.  Gallbladder is not visualized. PANCREAS: Unremarkable. SPLEEN: Unremarkable. ADRENAL GLANDS: Within normal limits. KIDNEYS/URETERS: Atrophic kidneys, similar to prior with scattered benign appearing renal cortical cysts and too small to definitively characterize hypoattenuating lesions. No evidence of hydroureteronephrosis nephrosis. URINARY BLADDER: Distended limits complete evaluation though with questionable perivesicular fat stranding. BOWEL: Mild pericolonic fat infiltration primarily involving the distal descending colon and proximal sigmoid colon region of diverticular disease, without definite evidence of acute inflamed diverticulum. Adjacent mild intra-abdominal free fluid extending along the left paracolic gutter (coronal series 601, images 47-57). Gas-filled appendix is unremarkable.   PERITONEUM: No ascites. No free air. VASCULATURE: Severe atherosclerotic calcifications of the nonaneurysmal abdominal aorta extending to its branch vessels as well as severe vascular calcifications throughout the major

## 2024-10-08 NOTE — PLAN OF CARE
Problem: Chronic Conditions and Co-morbidities  Goal: Patient's chronic conditions and co-morbidity symptoms are monitored and maintained or improved  10/8/2024 0341 by Fadumo Martinez RN  Outcome: Progressing  Flowsheets (Taken 10/8/2024 0340)  Care Plan - Patient's Chronic Conditions and Co-Morbidity Symptoms are Monitored and Maintained or Improved:   Monitor and assess patient's chronic conditions and comorbid symptoms for stability, deterioration, or improvement   Collaborate with multidisciplinary team to address chronic and comorbid conditions and prevent exacerbation or deterioration     Problem: Discharge Planning  Goal: Discharge to home or other facility with appropriate resources  10/8/2024 0341 by Fadumo Martinez, RN  Outcome: Progressing  Flowsheets (Taken 10/8/2024 0340)  Discharge to home or other facility with appropriate resources:   Identify barriers to discharge with patient and caregiver   Arrange for needed discharge resources and transportation as appropriate   Identify discharge learning needs (meds, wound care, etc)   Refer to discharge planning if patient needs post-hospital services based on physician order or complex needs related to functional status, cognitive ability or social support system     Problem: Pain  Goal: Verbalizes/displays adequate comfort level or baseline comfort level  10/8/2024 0341 by Fadumo Martinez, RN  Outcome: Progressing  Flowsheets (Taken 10/8/2024 0340)  Verbalizes/displays adequate comfort level or baseline comfort level:   Encourage patient to monitor pain and request assistance   Assess pain using appropriate pain scale   Administer analgesics based on type and severity of pain and evaluate response   Implement non-pharmacological measures as appropriate and evaluate response   Consider cultural and social influences on pain and pain management  Note: The patient is receiving morphine for pain relief and is satisfied with pain relief.       Problem: Safety - Adult  Goal: Free from fall injury  10/8/2024 0341 by Fadumo Martinez, RN  Outcome: Progressing  Flowsheets (Taken 10/8/2024 0340)  Free From Fall Injury: Instruct family/caregiver on patient safety  Note: Patient is low PA fall risk and calls out appropriately for needs      Problem: Risk for Elopement  Goal: Patient will not exit the unit/facility without proper excort  Outcome: Progressing  Flowsheets (Taken 10/8/2024 0341)  Nursing Interventions for Elopement Risk:   Assist with personal care needs such as toileting, eating, dressing, as needed to reduce the risk of wandering   Reduce environmental triggers  Note: RN notified NP of patient s increasing anxiety and ativan PO Q6 was ordered to help treat.      Problem: ABCDS Injury Assessment  Goal: Absence of physical injury  Outcome: Progressing  Flowsheets (Taken 10/8/2024 0341)  Absence of Physical Injury: Implement safety measures based on patient assessment

## 2024-10-08 NOTE — CARE COORDINATION
Pt. He follows at [facility], for his PCP.     Writer left , for Bisi Barton, to schedule a Hospital F/U apt.     ADD: Pt. Has apt already scheduled w/ [facility], for 10/24/24 at 8:20 AM. This is the soonest they have available & office will call if they can get him in sooner.

## 2024-10-08 NOTE — DISCHARGE INSTR - COC
Continuity of Care Form    Patient Name: Isai Gonzalez   :  1965  MRN:  [id number]    Admit date:  10/7/2024  Discharge date:  ***    Code Status Order: Full Code   Advance Directives:   Advance Care Flowsheet Documentation             Admitting Physician:  Yesi Barriga MD  PCP: Jose David Grover MD    Discharging Nurse: ***  Discharging Hospital Unit/Room#: 2087/2087-01  Discharging Unit Phone Number: ***    Emergency Contact:   Extended Emergency Contact Information  Primary Emergency Contact: Karyna Armstrong   [facility]  Home Phone: [phone]  Mobile Phone: [phone]  Relation: Child  Secondary Emergency Contact: Aimee Wilkerson  Home Phone: [phone]  Work Phone: [phone]  Mobile Phone: [phone]  Relation: Spouse   needed? No    Past Surgical History:  Past Surgical History:   Procedure Laterality Date    AV FISTULA CREATION Left     CATHETER REMOVAL N/A 2017    CATHETER REMOVAL TUNNEL H-D  performed by Benito Velarde MD at [facility] OR    CHOLECYSTECTOMY      with bile duct repair    COLONOSCOPY N/A 2020    COLONOSCOPY POLYPECTOMY COLD BIOPSY, COLD SNARE performed by Braydon Yee MD at [facility] ENDO    HERNIA REPAIR      OTHER SURGICAL HISTORY      bile duct surgery    OTHER SURGICAL HISTORY  2017    removal tunneled dialysis catheter    OTHER SURGICAL HISTORY      hernia mesh removed    SEPTOPLASTY      TONSILLECTOMY      UPPER GASTROINTESTINAL ENDOSCOPY N/A 10/23/2020    EGD BIOPSY AND DILATION performed by Braydon Yee MD at [facility] ENDO       Immunization History:   Immunization History   Administered Date(s) Administered    COVID-19, PFIZER Bivalent, DO NOT Dilute, (age 12y+), IM, 30 mcg/0.3 mL 2022    COVID-19, PFIZER PURPLE top, DILUTE for use, (age 12 y+), 30mcg/0.3mL 2021, 2022    Hep B, ENGERIX-B, (age 20y+), IM, 1mL 2019, 2021, 2021, 2021, 2021, 2022    Influenza Vaccine, unspecified

## 2024-10-08 NOTE — PROGRESS NOTES
IV and telemetry removed. Discharge instructions reviewed with pt including follow up appt this month, new medications, and education. All questions answered. Pt wheeled outside to his ride.

## 2024-10-08 NOTE — CARE COORDINATION
ONGOING DISCHARGE PLAN:    Patient is alert and oriented x4.    Spoke with patient regarding discharge plan and patient confirms that plan is still to return to home With no needs.     Denies VNS.    Remains on IV Zosyn.    K+ today 5.2, CR 7.7, .     Dialysis Pt. Nephro on board.     GI continues to follow.     Meds adjusted by Psych.     Will continue to follow for additional discharge needs.    If patient is discharged prior to next notation, then this note serves as note for discharge by case management.    Electronically signed by Lizzette Ching RN on 10/8/2024 at 11:53 AM

## 2024-10-09 ENCOUNTER — CARE COORDINATION (OUTPATIENT)
Dept: CARE COORDINATION | Age: 59
End: 2024-10-09

## 2024-10-09 NOTE — PROGRESS NOTES
Department of Psychiatry  Consult Service   Psychiatric Assessment        REASON FOR CONSULT: Debilitating anxiety and panic attacks.     CONSULTING PHYSICIAN: Grace ROMERO CNP    History obtained from: patient/EMR  Interim history    The patient was seen face-to-face.  He continues to be quite anxious.  He is somewhat medication seeking and wants to continue lorazepam.  I discussed that I would not be prescribing him lorazepam when he leaves the hospital.  He should continue Zoloft and follow-up with his outpatient psychiatry or primary care physician to have his medications adjusted.  The patient denies any suicidal ideation.  He has no auditory visual hallucinations or psychotic phenomena.    HISTORY OF PRESENT ILLNESS:      The patient is a 59 y.o. male with no significant psychiatric history who is admitted medically for treatment of colitis. Pt endorses LLQ abdominal pain since 2018. He reports having an abdominal hernia for which the mesh was removed in 2018. Pt reports recently getting an abdominal binder which has been alleviating some of the abdominal pain. Abdominal pain was at 8/10 yesterday when in the ER and was requesting pain medication. Abdominal CT w/o contrast was obtained in ER which found inflammatory/infectious colitis for which the patient was admitted and is being treated with Zosyn. Patient denies nausea, vomiting, fevers, and chills. Pt initially presented to ER due to increasing anxiety and had developed chest tightness with shortness of breath. Pt is agreeable to assessment at bedside today, door is closed for privacy during interview. Pt endorses anxiety over the last 3 weeks. Pt endorses panic attacks which consists of difficulty breathing, sensation of throat closing, chest tightness, and sensation of \"feeling warm.\" Pt endorses panic attacks have been \"off and on\" over last 3 weeks. When asked if having them daily, he states \"not when I am on the Xanax.\" Pt endorses being  evaluated the patient.  I reviewed the  documentation above.  Any additional comments or changes to the    documentation are stated below otherwise agree with assessment.      PDMP reviewed Last prescription of Xanax was 3 tablet sfor 3 days.   10/03/2024 10/02/2024 3 Alprazolam 0.5 Mg Tablet 3.00 3 To Hos [id number] [place] (1722) 0 1.00 [place]ME [place]   09/23/2024 09/23/2024 1 Alprazolam 0.5 Mg Tablet 10.00 4 Ma Mat [id number] [place] (1722) 0 2.50 [place]   06/08/2023 06/06/2023 2 Oxycodone-Acetaminophen 5-325 9.00 3 Ch Gar [id number] [facility] (7847) 0 22.50 [place]   05/10/2023 05/06/2023 2 Pregabalin 75 Mg Capsule 30.00 30 Ra Ahm [id number] [facility] (7847) 0 0.50 [place]   03/03/2023 03/03/2023 1 Diazepam 5 Mg Tablet 3.00 1 Mi Gra [id number] [place] (2390) 0 1.50 [place]     The patient was seen face-to-face while having dialysis.  The patient is pleasant on approach.  He reports intermittent panic attacks and generalized anxiety.  In the past he has benefited from Xanax.  He denies depressive symptoms or suicidal ideation.  He denies any auditory visual hallucination or psychotic phenomena.  We discussed that Xanax is unlikely to be helpful in the long-term because he would develop tolerance to it and probably get rebound anxiety.  The patient is open to the idea of a long-term medication like Zoloft.  The indications and risk-benefit analysis was discussed with the patient and the medication has been ordered for him.    PLAN  Zoloft 25mg daily added. Can be increased to 50mg daily once tolerability is is established.   Medications as noted above  Attempt to develop insight  Psycho-education conducted.  Supportive Therapy conducted.  Follow-up daily while on inpatient unit    Electronically signed by PÉREZ CHAVES MD on 10/7/24 at 1:15 PM EDT

## 2024-10-09 NOTE — CARE COORDINATION
Care Transitions Note    Initial Call - Call within 2 business days of discharge: Yes    Patient Current Location:  Home: [address] [place] [address]    Care Transition Nurse contacted the patient by telephone to perform post hospital discharge assessment, verified name and  as identifiers. Provided introduction to self, and explanation of the Care Transition Nurse role.     Patient: Isai Gonzalez    Patient : 1965   MRN: [id number]    Reason for Admission: Colitis  Discharge Date: 10/8/24  RURS: Readmission Risk Score: 14.2      Last Discharge Facility       Date Complaint Diagnosis Description Type Department Provider    10/7/24 Shortness of Breath; Anxiety Chest pain, unspecified type ... ED to Hosp-Admission (Discharged) (ADMITTED) Yesi Acosta MD; Anirudh Vail...            Was this an external facility discharge? No    Additional needs identified to be addressed with provider   No needs identified             Method of communication with provider: none.    Patients top risk factors for readmission: medical condition-ESRD, anxiety    Interventions to address risk factors:   Take medications as ordered, follow up with PCP, follow up with [facility] for anxiety issues    Care Summary Note: Spoke with patient today for initial 24 hour follow up call. Patient had come to the ED with c/o chest pain, abdominal pain and anxiety.  Patient has been to the ED several times recently for issues with anxiety.  He was seen by psychiatry and GI while inpatient, was treated for his anxiety and started on antibiotics for his colitis.  Today, he said he feels really good. He has already had an appointment this morning out at [facility] and has upcoming appt with PCP in the next couple of weeks, follows with non [facility]CP at [facility]. He is a dialysis patient and goes to [facility] in [place].  States pretty recently in the last couple of months has been having issues with anxiety, has been given

## 2024-10-14 ENCOUNTER — HOSPITAL ENCOUNTER (EMERGENCY)
Age: 59
Discharge: HOME | End: 2024-10-14
Attending: EMERGENCY MEDICINE
Payer: MEDICARE

## 2024-10-14 ENCOUNTER — APPOINTMENT (OUTPATIENT)
Dept: CT IMAGING | Age: 59
End: 2024-10-14
Payer: MEDICARE

## 2024-10-14 VITALS
RESPIRATION RATE: 16 BRPM | HEART RATE: 80 BPM | BODY MASS INDEX: 27.2 KG/M2 | HEIGHT: 70 IN | WEIGHT: 190 LBS | DIASTOLIC BLOOD PRESSURE: 92 MMHG | SYSTOLIC BLOOD PRESSURE: 170 MMHG | TEMPERATURE: 98.1 F | OXYGEN SATURATION: 98 %

## 2024-10-14 DIAGNOSIS — R10.9 ABDOMINAL PAIN, UNSPECIFIED ABDOMINAL LOCATION: ICD-10-CM

## 2024-10-14 DIAGNOSIS — N30.00 ACUTE CYSTITIS WITHOUT HEMATURIA: Primary | ICD-10-CM

## 2024-10-14 DIAGNOSIS — K29.90 GASTRITIS AND DUODENITIS: ICD-10-CM

## 2024-10-14 LAB
ALBUMIN SERPL-MCNC: 4.4 G/DL (ref 3.5–5.2)
ALP SERPL-CCNC: 73 U/L (ref 40–129)
ALT SERPL-CCNC: 7 U/L (ref 10–50)
ANION GAP SERPL CALCULATED.3IONS-SCNC: 20 MMOL/L (ref 9–16)
AST SERPL-CCNC: 24 U/L (ref 10–50)
BASOPHILS # BLD: 0 K/UL (ref 0–0.2)
BASOPHILS NFR BLD: 1 % (ref 0–2)
BILIRUB DIRECT SERPL-MCNC: 0.2 MG/DL (ref 0–0.3)
BILIRUB INDIRECT SERPL-MCNC: 0.4 MG/DL (ref 0–1)
BILIRUB SERPL-MCNC: 0.6 MG/DL (ref 0–1.2)
BUN SERPL-MCNC: 46 MG/DL (ref 6–20)
CALCIUM SERPL-MCNC: 9.6 MG/DL (ref 8.6–10.4)
CHLORIDE SERPL-SCNC: 98 MMOL/L (ref 98–107)
CO2 SERPL-SCNC: 22 MMOL/L (ref 20–31)
CREAT SERPL-MCNC: 12.1 MG/DL (ref 0.7–1.2)
EOSINOPHIL # BLD: 0.1 K/UL (ref 0–0.4)
EOSINOPHILS RELATIVE PERCENT: 2 % (ref 0–4)
ERYTHROCYTE [DISTWIDTH] IN BLOOD BY AUTOMATED COUNT: 15.3 % (ref 11.5–14.9)
GFR, ESTIMATED: 4 ML/MIN/1.73M2
GLUCOSE SERPL-MCNC: 143 MG/DL (ref 74–99)
HCT VFR BLD AUTO: 31.7 % (ref 41–53)
HGB BLD-MCNC: 10.8 G/DL (ref 13.5–17.5)
LIPASE SERPL-CCNC: 41 U/L (ref 13–60)
LYMPHOCYTES NFR BLD: 0.4 K/UL (ref 1–4.8)
LYMPHOCYTES RELATIVE PERCENT: 7 % (ref 24–44)
MCH RBC QN AUTO: 31.8 PG (ref 26–34)
MCHC RBC AUTO-ENTMCNC: 33.9 G/DL (ref 31–37)
MCV RBC AUTO: 93.7 FL (ref 80–100)
MONOCYTES NFR BLD: 0.4 K/UL (ref 0.1–1.3)
MONOCYTES NFR BLD: 8 % (ref 1–7)
NEUTROPHILS NFR BLD: 82 % (ref 36–66)
NEUTS SEG NFR BLD: 4.5 K/UL (ref 1.3–9.1)
PLATELET # BLD AUTO: 64 K/UL (ref 150–450)
PMV BLD AUTO: 7.7 FL (ref 6–12)
POTASSIUM SERPL-SCNC: 5 MMOL/L (ref 3.7–5.3)
PROT SERPL-MCNC: 7.1 G/DL (ref 6.6–8.7)
RBC # BLD AUTO: 3.38 M/UL (ref 4.5–5.9)
SODIUM SERPL-SCNC: 140 MMOL/L (ref 136–145)
WBC OTHER # BLD: 5.4 K/UL (ref 3.5–11)

## 2024-10-14 PROCEDURE — 96374 THER/PROPH/DIAG INJ IV PUSH: CPT

## 2024-10-14 PROCEDURE — 80048 BASIC METABOLIC PNL TOTAL CA: CPT

## 2024-10-14 PROCEDURE — 36415 COLL VENOUS BLD VENIPUNCTURE: CPT

## 2024-10-14 PROCEDURE — 6370000000 HC RX 637 (ALT 250 FOR IP): Performed by: EMERGENCY MEDICINE

## 2024-10-14 PROCEDURE — 2580000003 HC RX 258: Performed by: EMERGENCY MEDICINE

## 2024-10-14 PROCEDURE — 96376 TX/PRO/DX INJ SAME DRUG ADON: CPT

## 2024-10-14 PROCEDURE — 96375 TX/PRO/DX INJ NEW DRUG ADDON: CPT

## 2024-10-14 PROCEDURE — 99285 EMERGENCY DEPT VISIT HI MDM: CPT

## 2024-10-14 PROCEDURE — 83690 ASSAY OF LIPASE: CPT

## 2024-10-14 PROCEDURE — 6360000004 HC RX CONTRAST MEDICATION: Performed by: EMERGENCY MEDICINE

## 2024-10-14 PROCEDURE — 80076 HEPATIC FUNCTION PANEL: CPT

## 2024-10-14 PROCEDURE — 85025 COMPLETE CBC W/AUTO DIFF WBC: CPT

## 2024-10-14 PROCEDURE — 6360000002 HC RX W HCPCS: Performed by: EMERGENCY MEDICINE

## 2024-10-14 PROCEDURE — 74177 CT ABD & PELVIS W/CONTRAST: CPT

## 2024-10-14 RX ORDER — SODIUM CHLORIDE 0.9 % (FLUSH) 0.9 %
10 SYRINGE (ML) INJECTION PRN
Status: DISCONTINUED | OUTPATIENT
Start: 2024-10-14 | End: 2024-10-14 | Stop reason: HOSPADM

## 2024-10-14 RX ORDER — CEPHALEXIN 500 MG/1
500 CAPSULE ORAL 3 TIMES DAILY
Qty: 8 CAPSULE | Refills: 0 | Status: SHIPPED | OUTPATIENT
Start: 2024-10-14 | End: 2024-10-17

## 2024-10-14 RX ORDER — 0.9 % SODIUM CHLORIDE 0.9 %
100 INTRAVENOUS SOLUTION INTRAVENOUS ONCE
Status: COMPLETED | OUTPATIENT
Start: 2024-10-14 | End: 2024-10-14

## 2024-10-14 RX ORDER — MORPHINE SULFATE 2 MG/ML
2 INJECTION, SOLUTION INTRAMUSCULAR; INTRAVENOUS ONCE
Status: COMPLETED | OUTPATIENT
Start: 2024-10-14 | End: 2024-10-14

## 2024-10-14 RX ORDER — CEPHALEXIN 500 MG/1
500 CAPSULE ORAL 2 TIMES DAILY
Qty: 5 CAPSULE | Refills: 0 | Status: SHIPPED | OUTPATIENT
Start: 2024-10-14 | End: 2024-10-14

## 2024-10-14 RX ORDER — MORPHINE SULFATE 2 MG/ML
2 INJECTION, SOLUTION INTRAMUSCULAR; INTRAVENOUS ONCE
Status: DISCONTINUED | OUTPATIENT
Start: 2024-10-14 | End: 2024-10-14

## 2024-10-14 RX ORDER — IOPAMIDOL 755 MG/ML
75 INJECTION, SOLUTION INTRAVASCULAR
Status: COMPLETED | OUTPATIENT
Start: 2024-10-14 | End: 2024-10-14

## 2024-10-14 RX ORDER — ONDANSETRON 2 MG/ML
4 INJECTION INTRAMUSCULAR; INTRAVENOUS ONCE
Status: COMPLETED | OUTPATIENT
Start: 2024-10-14 | End: 2024-10-14

## 2024-10-14 RX ORDER — ACETAMINOPHEN 500 MG
500 TABLET ORAL 4 TIMES DAILY PRN
Qty: 30 TABLET | Refills: 0 | Status: SHIPPED | OUTPATIENT
Start: 2024-10-14

## 2024-10-14 RX ORDER — DIPHENHYDRAMINE HYDROCHLORIDE 50 MG/ML
25 INJECTION INTRAMUSCULAR; INTRAVENOUS ONCE
Status: COMPLETED | OUTPATIENT
Start: 2024-10-14 | End: 2024-10-14

## 2024-10-14 RX ORDER — SUCRALFATE 1 G/1
1 TABLET ORAL 3 TIMES DAILY
Qty: 30 TABLET | Refills: 0 | Status: SHIPPED | OUTPATIENT
Start: 2024-10-14 | End: 2024-10-24

## 2024-10-14 RX ADMIN — DIPHENHYDRAMINE HYDROCHLORIDE 25 MG: 50 INJECTION INTRAMUSCULAR; INTRAVENOUS at 03:00

## 2024-10-14 RX ADMIN — CEPHALEXIN 500 MG: 250 CAPSULE ORAL at 04:34

## 2024-10-14 RX ADMIN — IOPAMIDOL 75 ML: 755 INJECTION, SOLUTION INTRAVENOUS at 03:11

## 2024-10-14 RX ADMIN — MORPHINE SULFATE 2 MG: 2 INJECTION, SOLUTION INTRAMUSCULAR; INTRAVENOUS at 04:28

## 2024-10-14 RX ADMIN — SODIUM CHLORIDE 100 ML: 9 INJECTION, SOLUTION INTRAVENOUS at 03:11

## 2024-10-14 RX ADMIN — ONDANSETRON 4 MG: 2 INJECTION, SOLUTION INTRAMUSCULAR; INTRAVENOUS at 02:53

## 2024-10-14 RX ADMIN — SODIUM CHLORIDE, PRESERVATIVE FREE 10 ML: 5 INJECTION INTRAVENOUS at 03:10

## 2024-10-14 RX ADMIN — MORPHINE SULFATE 2 MG: 2 INJECTION, SOLUTION INTRAMUSCULAR; INTRAVENOUS at 02:54

## 2024-10-14 ASSESSMENT — ENCOUNTER SYMPTOMS
ABDOMINAL PAIN: 1
NAUSEA: 1
SHORTNESS OF BREATH: 0
PHOTOPHOBIA: 0
DIARRHEA: 0
COLOR CHANGE: 0
VOMITING: 0
COUGH: 0
TROUBLE SWALLOWING: 0

## 2024-10-14 ASSESSMENT — PAIN - FUNCTIONAL ASSESSMENT: PAIN_FUNCTIONAL_ASSESSMENT: 0-10

## 2024-10-14 ASSESSMENT — LIFESTYLE VARIABLES
HOW MANY STANDARD DRINKS CONTAINING ALCOHOL DO YOU HAVE ON A TYPICAL DAY: PATIENT DOES NOT DRINK
HOW OFTEN DO YOU HAVE A DRINK CONTAINING ALCOHOL: NEVER

## 2024-10-14 ASSESSMENT — PAIN DESCRIPTION - LOCATION
LOCATION: ABDOMEN
LOCATION: ABDOMEN

## 2024-10-14 ASSESSMENT — PAIN SCALES - GENERAL
PAINLEVEL_OUTOF10: 8

## 2024-10-14 ASSESSMENT — PAIN DESCRIPTION - DESCRIPTORS
DESCRIPTORS: ACHING
DESCRIPTORS: ACHING

## 2024-10-14 ASSESSMENT — PAIN DESCRIPTION - ORIENTATION
ORIENTATION: LEFT;LOWER
ORIENTATION: LEFT;LOWER

## 2024-10-14 NOTE — ED NOTES
Mode of arrival (squad #, walk in, police, etc) : Walk-in        Chief complaint(s): Abd pain @ hernia         Arrival Note (brief scenario, treatment PTA, etc).: Pt arrived to the ED with c/o abd at the hernia. Pt states that the pain started yesterday and he tried to take tylenol but it did not help. Pt denies any BM changes, n/v/d or urinary changes. Pt states that he is in the process of trying to get the hernias repaired but since he is a dialysis pt there has been delayed.     Pt is a mon, wed, fri dialysis. Last dialysis was Wednesday.     C= \"Have you ever felt that you should Cut down on your drinking?\"  No  A= \"Have people Annoyed you by criticizing your drinking?\"  No  G= \"Have you ever felt bad or Guilty about your drinking?\"  No  E= \"Have you ever had a drink as an Eye-opener first thing in the morning to steady your nerves or to help a hangover?\"  No      Deferred []      Reason for deferring: N/A    *If yes to two or more: probable alcohol abuse.*

## 2024-10-14 NOTE — ED NOTES
Unable to get iv due to patient with lack of iv access and Lisy Boyer informed. Lavender, green and grey tubes sent to lab.

## 2024-10-14 NOTE — ED PROVIDER NOTES
EMERGENCY DEPARTMENT ENCOUNTER    Pt Name: Isai Gonzalez  MRN: [id number]  Birthdate 1965  Date of evaluation: 10/14/24  CHIEF COMPLAINT       Chief Complaint   Patient presents with    Abdominal Pain    Hernia     HISTORY OF PRESENT ILLNESS   59-year-old male presenting to the ER complaining of left lower quadrant abdominal pain.  Patient states it started on Saturday.  Patient does admit to multiple abdominal surgeries including cholecystectomy as well as appendectomy and hernia repairs.    The history is provided by the patient.   Abdominal Pain  Pain location:  LLQ  Pain quality: aching    Associated symptoms: nausea    Associated symptoms: no chest pain, no cough, no diarrhea, no dysuria, no fatigue, no fever, no shortness of breath and no vomiting            REVIEW OF SYSTEMS     Review of Systems   Constitutional:  Negative for activity change, fatigue and fever.   HENT:  Negative for congestion, ear pain and trouble swallowing.    Eyes:  Negative for photophobia and visual disturbance.   Respiratory:  Negative for cough and shortness of breath.    Cardiovascular:  Negative for chest pain and palpitations.   Gastrointestinal:  Positive for abdominal pain and nausea. Negative for diarrhea and vomiting.   Genitourinary:  Negative for dysuria, flank pain and urgency.   Musculoskeletal:  Negative for arthralgias and myalgias.   Skin:  Negative for color change and rash.   Neurological:  Negative for dizziness and facial asymmetry.   Psychiatric/Behavioral:  Negative for agitation and behavioral problems.      PASTMEDICAL HISTORY     Past Medical History:   Diagnosis Date    Chronic kidney disease     left renal infarct-on dialysis Tues., Thurs, Sat.    Chronic pancreatitis (HCC)     Diabetes mellitus (HCC)     Diverticulosis     Hyperlipidemia     Hypertension     Mild malnutrition (HCC)     MRSA (methicillin resistant staph aureus) culture positive 02/26/2019    nares    Pancreatitis     Personal history of

## 2024-10-15 ENCOUNTER — CARE COORDINATION (OUTPATIENT)
Dept: CARE COORDINATION | Age: 59
End: 2024-10-15

## 2024-10-15 NOTE — CARE COORDINATION
Care Transitions Note    Follow Up Call     Attempted to reach patient for transitions of care follow up.  Unable to reach patient.      Outreach Attempts:   Multiple attempts to contact patient at phone numbers on file.     Care Summary Note: Patient being followed for anxiety and chest pain, was in the ED yesterday for abdominal pain, was discharged with diagnosis of acute cystitis without hematuria, new meds for ATB and Carafate ordered.  Patient did not answer today, vm left. Will attempt to reach again tomorrow.     Follow Up Appointment:       Plan for follow-up on next business day.  based on severity of symptoms and risk factors. Plan for next call:  ED follow up for cystitis    Gris Manning RN

## 2024-10-16 ENCOUNTER — CARE COORDINATION (OUTPATIENT)
Dept: CARE COORDINATION | Age: 59
End: 2024-10-16

## 2024-10-16 NOTE — CARE COORDINATION
Care Transitions Note    Follow Up Call     Patient Current Location:  Home: [address] [place] [address]    Care Transition Nurse contacted the patient by telephone. Verified name and  as identifiers.    Additional needs identified to be addressed with provider   No needs identified                 Method of communication with provider: none.    Care Summary Note: Spoke with patient today for ED follow up call. Patient was in the emergency room for c/o abdominal pain and nausea.  Patient was found to have UTI and discharged home on oral ATB and Carafate.  Patient was just in hospital last week and being followed for chest pain and colitis, was having a lot of issues with his anxiety at that time, was started on Zoloft and given Atarax as needed.  This week, he said his anxiety is much much better.  He goes to dialysis on TTS schedule in am. Today is not having abdominal pain or any issues with his bowels but said he is nauseated frequently and currently complaining of nausea today.  He did  new scripts and is currently taking.  Patient denies any new needs or concerns at this time. He has his follow up with his PCP next week on 10/24.  He did say he was told he had a hole in his abdomen lining, patient does have hernia and was told to follow up with his PCP.  Expressed if PCP sends him to surgeon, we can help arrange appointment.  Expressed if any new needs or issues to feel free to reach out.     Plan of care updates since last contact:  None       Advance Care Planning:   Does patient have an Advance Directive: reviewed during previous call, see note. .    Medication Review:  Medications changed since last call, reviewed today.     Remote Patient Monitoring:  Offered patient enrollment in the Remote Patient Monitoring (RPM) program for in-home monitoring: Patient is not eligible for RPM program because: patient does not have qualifying diagnosis.    Assessments:  Care Transitions ED Follow Up

## 2024-10-20 ENCOUNTER — HOSPITAL ENCOUNTER (INPATIENT)
Age: 59
LOS: 3 days | Discharge: HOME | DRG: 640 | End: 2024-10-24
Attending: STUDENT IN AN ORGANIZED HEALTH CARE EDUCATION/TRAINING PROGRAM | Admitting: INTERNAL MEDICINE
Payer: MEDICARE

## 2024-10-20 DIAGNOSIS — E87.5 HYPERKALEMIA: Primary | ICD-10-CM

## 2024-10-20 DIAGNOSIS — R10.32 LEFT LOWER QUADRANT ABDOMINAL PAIN: ICD-10-CM

## 2024-10-20 DIAGNOSIS — K21.9 GASTROESOPHAGEAL REFLUX DISEASE WITHOUT ESOPHAGITIS: ICD-10-CM

## 2024-10-20 PROCEDURE — 96367 TX/PROPH/DG ADDL SEQ IV INF: CPT

## 2024-10-20 PROCEDURE — 96368 THER/DIAG CONCURRENT INF: CPT

## 2024-10-20 PROCEDURE — 99285 EMERGENCY DEPT VISIT HI MDM: CPT

## 2024-10-20 ASSESSMENT — PAIN DESCRIPTION - LOCATION: LOCATION: ABDOMEN

## 2024-10-20 ASSESSMENT — PAIN SCALES - GENERAL: PAINLEVEL_OUTOF10: 9

## 2024-10-20 ASSESSMENT — LIFESTYLE VARIABLES
HOW OFTEN DO YOU HAVE A DRINK CONTAINING ALCOHOL: NEVER
HOW MANY STANDARD DRINKS CONTAINING ALCOHOL DO YOU HAVE ON A TYPICAL DAY: PATIENT DOES NOT DRINK

## 2024-10-20 ASSESSMENT — PAIN - FUNCTIONAL ASSESSMENT: PAIN_FUNCTIONAL_ASSESSMENT: 0-10

## 2024-10-21 ENCOUNTER — APPOINTMENT (OUTPATIENT)
Dept: CT IMAGING | Age: 59
DRG: 640 | End: 2024-10-21
Payer: MEDICARE

## 2024-10-21 PROBLEM — R10.32 LEFT LOWER QUADRANT ABDOMINAL PAIN: Status: ACTIVE | Noted: 2024-10-21

## 2024-10-21 LAB
ALBUMIN SERPL-MCNC: 3.9 G/DL (ref 3.5–5.2)
ALP SERPL-CCNC: 88 U/L (ref 40–129)
ALT SERPL-CCNC: 7 U/L (ref 10–50)
ANION GAP SERPL CALCULATED.3IONS-SCNC: 14 MMOL/L (ref 9–16)
ANION GAP SERPL CALCULATED.3IONS-SCNC: 16 MMOL/L (ref 9–16)
AST SERPL-CCNC: 20 U/L (ref 10–50)
BASOPHILS # BLD: 0.04 K/UL (ref 0–0.2)
BASOPHILS # BLD: 0.1 K/UL (ref 0–0.2)
BASOPHILS NFR BLD: 1 % (ref 0–2)
BASOPHILS NFR BLD: 1 % (ref 0–2)
BILIRUB SERPL-MCNC: 0.4 MG/DL (ref 0–1.2)
BUN SERPL-MCNC: 53 MG/DL (ref 6–20)
BUN SERPL-MCNC: 55 MG/DL (ref 6–20)
CALCIUM SERPL-MCNC: 9.2 MG/DL (ref 8.6–10.4)
CALCIUM SERPL-MCNC: 9.2 MG/DL (ref 8.6–10.4)
CHLORIDE SERPL-SCNC: 97 MMOL/L (ref 98–107)
CHLORIDE SERPL-SCNC: 98 MMOL/L (ref 98–107)
CO2 SERPL-SCNC: 20 MMOL/L (ref 20–31)
CO2 SERPL-SCNC: 21 MMOL/L (ref 20–31)
CREAT SERPL-MCNC: 11 MG/DL (ref 0.7–1.2)
CREAT SERPL-MCNC: 11.3 MG/DL (ref 0.7–1.2)
EKG ATRIAL RATE: 81 BPM
EKG P AXIS: 47 DEGREES
EKG P-R INTERVAL: 276 MS
EKG Q-T INTERVAL: 412 MS
EKG QRS DURATION: 114 MS
EKG QTC CALCULATION (BAZETT): 478 MS
EKG R AXIS: -94 DEGREES
EKG T AXIS: 56 DEGREES
EKG VENTRICULAR RATE: 81 BPM
EOSINOPHIL # BLD: 0.1 K/UL (ref 0–0.4)
EOSINOPHIL # BLD: 0.14 K/UL (ref 0–0.4)
EOSINOPHILS RELATIVE PERCENT: 2 % (ref 0–4)
EOSINOPHILS RELATIVE PERCENT: 4 % (ref 0–4)
ERYTHROCYTE [DISTWIDTH] IN BLOOD BY AUTOMATED COUNT: 15.3 % (ref 11.5–14.9)
ERYTHROCYTE [DISTWIDTH] IN BLOOD BY AUTOMATED COUNT: 15.6 % (ref 11.5–14.9)
GFR, ESTIMATED: 5 ML/MIN/1.73M2
GFR, ESTIMATED: 5 ML/MIN/1.73M2
GLUCOSE BLD-MCNC: 115 MG/DL (ref 75–110)
GLUCOSE BLD-MCNC: 129 MG/DL (ref 75–110)
GLUCOSE BLD-MCNC: 154 MG/DL (ref 75–110)
GLUCOSE BLD-MCNC: 162 MG/DL (ref 75–110)
GLUCOSE BLD-MCNC: 166 MG/DL (ref 75–110)
GLUCOSE BLD-MCNC: 188 MG/DL (ref 75–110)
GLUCOSE BLD-MCNC: 92 MG/DL (ref 75–110)
GLUCOSE SERPL-MCNC: 112 MG/DL (ref 74–99)
GLUCOSE SERPL-MCNC: 151 MG/DL (ref 74–99)
HCT VFR BLD AUTO: 32.2 % (ref 41–53)
HCT VFR BLD AUTO: 33 % (ref 41–53)
HGB BLD-MCNC: 10.7 G/DL (ref 13.5–17.5)
HGB BLD-MCNC: 10.8 G/DL (ref 13.5–17.5)
LACTATE BLDV-SCNC: 0.7 MMOL/L (ref 0.5–2.2)
LYMPHOCYTES NFR BLD: 0.36 K/UL (ref 1–4.8)
LYMPHOCYTES NFR BLD: 0.4 K/UL (ref 1–4.8)
LYMPHOCYTES RELATIVE PERCENT: 10 % (ref 24–44)
LYMPHOCYTES RELATIVE PERCENT: 7 % (ref 24–44)
MCH RBC QN AUTO: 31.2 PG (ref 26–34)
MCH RBC QN AUTO: 31.9 PG (ref 26–34)
MCHC RBC AUTO-ENTMCNC: 32.9 G/DL (ref 31–37)
MCHC RBC AUTO-ENTMCNC: 33.3 G/DL (ref 31–37)
MCV RBC AUTO: 94.9 FL (ref 80–100)
MCV RBC AUTO: 95.8 FL (ref 80–100)
MONOCYTES NFR BLD: 0.47 K/UL (ref 0.1–1.3)
MONOCYTES NFR BLD: 0.5 K/UL (ref 0.1–1.3)
MONOCYTES NFR BLD: 10 % (ref 1–7)
MONOCYTES NFR BLD: 13 % (ref 1–7)
MORPHOLOGY: ABNORMAL
NEUTROPHILS NFR BLD: 72 % (ref 36–66)
NEUTROPHILS NFR BLD: 80 % (ref 36–66)
NEUTS SEG NFR BLD: 2.59 K/UL (ref 1.3–9.1)
NEUTS SEG NFR BLD: 4.3 K/UL (ref 1.3–9.1)
PLATELET # BLD AUTO: 80 K/UL (ref 150–450)
PLATELET # BLD AUTO: 91 K/UL (ref 150–450)
PMV BLD AUTO: 7.4 FL (ref 6–12)
PMV BLD AUTO: 7.5 FL (ref 6–12)
POTASSIUM SERPL-SCNC: 5.7 MMOL/L (ref 3.7–5.3)
POTASSIUM SERPL-SCNC: 6.3 MMOL/L (ref 3.7–5.3)
PROT SERPL-MCNC: 6.6 G/DL (ref 6.6–8.7)
RBC # BLD AUTO: 3.36 M/UL (ref 4.5–5.9)
RBC # BLD AUTO: 3.47 M/UL (ref 4.5–5.9)
SODIUM SERPL-SCNC: 133 MMOL/L (ref 136–145)
SODIUM SERPL-SCNC: 133 MMOL/L (ref 136–145)
WBC OTHER # BLD: 3.6 K/UL (ref 3.5–11)
WBC OTHER # BLD: 5.4 K/UL (ref 3.5–11)

## 2024-10-21 PROCEDURE — 2580000003 HC RX 258

## 2024-10-21 PROCEDURE — 80048 BASIC METABOLIC PNL TOTAL CA: CPT

## 2024-10-21 PROCEDURE — 85025 COMPLETE CBC W/AUTO DIFF WBC: CPT

## 2024-10-21 PROCEDURE — 6360000002 HC RX W HCPCS: Performed by: INTERNAL MEDICINE

## 2024-10-21 PROCEDURE — 6360000002 HC RX W HCPCS

## 2024-10-21 PROCEDURE — 2580000003 HC RX 258: Performed by: STUDENT IN AN ORGANIZED HEALTH CARE EDUCATION/TRAINING PROGRAM

## 2024-10-21 PROCEDURE — 5A1D70Z PERFORMANCE OF URINARY FILTRATION, INTERMITTENT, LESS THAN 6 HOURS PER DAY: ICD-10-PCS | Performed by: INTERNAL MEDICINE

## 2024-10-21 PROCEDURE — 6360000002 HC RX W HCPCS: Performed by: STUDENT IN AN ORGANIZED HEALTH CARE EDUCATION/TRAINING PROGRAM

## 2024-10-21 PROCEDURE — 99222 1ST HOSP IP/OBS MODERATE 55: CPT | Performed by: INTERNAL MEDICINE

## 2024-10-21 PROCEDURE — 96368 THER/DIAG CONCURRENT INF: CPT

## 2024-10-21 PROCEDURE — 6370000000 HC RX 637 (ALT 250 FOR IP)

## 2024-10-21 PROCEDURE — 90935 HEMODIALYSIS ONE EVALUATION: CPT

## 2024-10-21 PROCEDURE — 99223 1ST HOSP IP/OBS HIGH 75: CPT

## 2024-10-21 PROCEDURE — 93010 ELECTROCARDIOGRAM REPORT: CPT | Performed by: INTERNAL MEDICINE

## 2024-10-21 PROCEDURE — 2060000000 HC ICU INTERMEDIATE R&B

## 2024-10-21 PROCEDURE — 96375 TX/PRO/DX INJ NEW DRUG ADDON: CPT

## 2024-10-21 PROCEDURE — 96365 THER/PROPH/DIAG IV INF INIT: CPT

## 2024-10-21 PROCEDURE — 93005 ELECTROCARDIOGRAM TRACING: CPT | Performed by: STUDENT IN AN ORGANIZED HEALTH CARE EDUCATION/TRAINING PROGRAM

## 2024-10-21 PROCEDURE — 6370000000 HC RX 637 (ALT 250 FOR IP): Performed by: NURSE PRACTITIONER

## 2024-10-21 PROCEDURE — 74176 CT ABD & PELVIS W/O CONTRAST: CPT

## 2024-10-21 PROCEDURE — 36415 COLL VENOUS BLD VENIPUNCTURE: CPT

## 2024-10-21 PROCEDURE — 82947 ASSAY GLUCOSE BLOOD QUANT: CPT

## 2024-10-21 PROCEDURE — 96367 TX/PROPH/DG ADDL SEQ IV INF: CPT

## 2024-10-21 PROCEDURE — 83605 ASSAY OF LACTIC ACID: CPT

## 2024-10-21 PROCEDURE — 6370000000 HC RX 637 (ALT 250 FOR IP): Performed by: STUDENT IN AN ORGANIZED HEALTH CARE EDUCATION/TRAINING PROGRAM

## 2024-10-21 PROCEDURE — 80053 COMPREHEN METABOLIC PANEL: CPT

## 2024-10-21 RX ORDER — HYDROXYZINE HYDROCHLORIDE 25 MG/1
25 TABLET, FILM COATED ORAL ONCE
Status: COMPLETED | OUTPATIENT
Start: 2024-10-21 | End: 2024-10-21

## 2024-10-21 RX ORDER — LANOLIN ALCOHOL/MO/W.PET/CERES
3 CREAM (GRAM) TOPICAL NIGHTLY PRN
Status: DISCONTINUED | OUTPATIENT
Start: 2024-10-21 | End: 2024-10-24 | Stop reason: HOSPADM

## 2024-10-21 RX ORDER — POLYETHYLENE GLYCOL 3350 17 G/17G
180 POWDER, FOR SOLUTION ORAL ONCE
Status: COMPLETED | OUTPATIENT
Start: 2024-10-21 | End: 2024-10-21

## 2024-10-21 RX ORDER — DEXTROSE MONOHYDRATE 100 MG/ML
INJECTION, SOLUTION INTRAVENOUS CONTINUOUS PRN
Status: DISCONTINUED | OUTPATIENT
Start: 2024-10-21 | End: 2024-10-21 | Stop reason: SDUPTHER

## 2024-10-21 RX ORDER — METRONIDAZOLE 500 MG/100ML
500 INJECTION, SOLUTION INTRAVENOUS ONCE
Status: COMPLETED | OUTPATIENT
Start: 2024-10-21 | End: 2024-10-21

## 2024-10-21 RX ORDER — HYDRALAZINE HYDROCHLORIDE 50 MG/1
100 TABLET, FILM COATED ORAL 3 TIMES DAILY
Status: DISCONTINUED | OUTPATIENT
Start: 2024-10-21 | End: 2024-10-24 | Stop reason: HOSPADM

## 2024-10-21 RX ORDER — HYDRALAZINE HYDROCHLORIDE 20 MG/ML
10 INJECTION INTRAMUSCULAR; INTRAVENOUS ONCE
Status: COMPLETED | OUTPATIENT
Start: 2024-10-21 | End: 2024-10-21

## 2024-10-21 RX ORDER — INSULIN LISPRO 100 [IU]/ML
0-4 INJECTION, SOLUTION INTRAVENOUS; SUBCUTANEOUS
Status: DISCONTINUED | OUTPATIENT
Start: 2024-10-21 | End: 2024-10-24 | Stop reason: HOSPADM

## 2024-10-21 RX ORDER — HYDRALAZINE HYDROCHLORIDE 20 MG/ML
10 INJECTION INTRAMUSCULAR; INTRAVENOUS EVERY 6 HOURS PRN
Status: DISCONTINUED | OUTPATIENT
Start: 2024-10-21 | End: 2024-10-24 | Stop reason: HOSPADM

## 2024-10-21 RX ORDER — SODIUM CHLORIDE 0.9 % (FLUSH) 0.9 %
10 SYRINGE (ML) INJECTION PRN
Status: DISCONTINUED | OUTPATIENT
Start: 2024-10-21 | End: 2024-10-24 | Stop reason: HOSPADM

## 2024-10-21 RX ORDER — SUCRALFATE 1 G/1
1 TABLET ORAL 3 TIMES DAILY
Status: DISCONTINUED | OUTPATIENT
Start: 2024-10-21 | End: 2024-10-24 | Stop reason: HOSPADM

## 2024-10-21 RX ORDER — ONDANSETRON 2 MG/ML
4 INJECTION INTRAMUSCULAR; INTRAVENOUS ONCE
Status: COMPLETED | OUTPATIENT
Start: 2024-10-21 | End: 2024-10-21

## 2024-10-21 RX ORDER — NIFEDIPINE 90 MG/1
90 TABLET, FILM COATED, EXTENDED RELEASE ORAL NIGHTLY
Status: DISCONTINUED | OUTPATIENT
Start: 2024-10-21 | End: 2024-10-21

## 2024-10-21 RX ORDER — LORAZEPAM 2 MG/ML
1 INJECTION INTRAMUSCULAR EVERY 4 HOURS PRN
Status: DISCONTINUED | OUTPATIENT
Start: 2024-10-21 | End: 2024-10-22

## 2024-10-21 RX ORDER — HYDRALAZINE HYDROCHLORIDE 20 MG/ML
5 INJECTION INTRAMUSCULAR; INTRAVENOUS EVERY 6 HOURS PRN
Status: DISCONTINUED | OUTPATIENT
Start: 2024-10-21 | End: 2024-10-21

## 2024-10-21 RX ORDER — ACETAMINOPHEN 325 MG/1
650 TABLET ORAL EVERY 6 HOURS PRN
Status: DISCONTINUED | OUTPATIENT
Start: 2024-10-21 | End: 2024-10-24 | Stop reason: HOSPADM

## 2024-10-21 RX ORDER — ONDANSETRON 2 MG/ML
4 INJECTION INTRAMUSCULAR; INTRAVENOUS EVERY 6 HOURS PRN
Status: DISCONTINUED | OUTPATIENT
Start: 2024-10-21 | End: 2024-10-24 | Stop reason: HOSPADM

## 2024-10-21 RX ORDER — ACETAMINOPHEN 650 MG/1
650 SUPPOSITORY RECTAL EVERY 6 HOURS PRN
Status: DISCONTINUED | OUTPATIENT
Start: 2024-10-21 | End: 2024-10-24 | Stop reason: HOSPADM

## 2024-10-21 RX ORDER — ONDANSETRON 4 MG/1
4 TABLET, ORALLY DISINTEGRATING ORAL EVERY 8 HOURS PRN
Status: DISCONTINUED | OUTPATIENT
Start: 2024-10-21 | End: 2024-10-24 | Stop reason: HOSPADM

## 2024-10-21 RX ORDER — POLYETHYLENE GLYCOL 3350 17 G/17G
17 POWDER, FOR SOLUTION ORAL DAILY PRN
Status: DISCONTINUED | OUTPATIENT
Start: 2024-10-21 | End: 2024-10-24 | Stop reason: HOSPADM

## 2024-10-21 RX ORDER — DIPHENHYDRAMINE HYDROCHLORIDE 50 MG/ML
25 INJECTION INTRAMUSCULAR; INTRAVENOUS EVERY 6 HOURS PRN
Status: DISCONTINUED | OUTPATIENT
Start: 2024-10-21 | End: 2024-10-23

## 2024-10-21 RX ORDER — MORPHINE SULFATE 4 MG/ML
4 INJECTION, SOLUTION INTRAMUSCULAR; INTRAVENOUS ONCE
Status: COMPLETED | OUTPATIENT
Start: 2024-10-21 | End: 2024-10-21

## 2024-10-21 RX ORDER — PANTOPRAZOLE SODIUM 40 MG/1
40 TABLET, DELAYED RELEASE ORAL
Status: DISCONTINUED | OUTPATIENT
Start: 2024-10-21 | End: 2024-10-24 | Stop reason: HOSPADM

## 2024-10-21 RX ORDER — DEXTROSE MONOHYDRATE 100 MG/ML
INJECTION, SOLUTION INTRAVENOUS CONTINUOUS PRN
Status: DISCONTINUED | OUTPATIENT
Start: 2024-10-21 | End: 2024-10-24 | Stop reason: HOSPADM

## 2024-10-21 RX ORDER — BISACODYL 10 MG
10 SUPPOSITORY, RECTAL RECTAL DAILY PRN
Status: DISCONTINUED | OUTPATIENT
Start: 2024-10-21 | End: 2024-10-24 | Stop reason: HOSPADM

## 2024-10-21 RX ORDER — SODIUM CHLORIDE 0.9 % (FLUSH) 0.9 %
5-40 SYRINGE (ML) INJECTION EVERY 12 HOURS SCHEDULED
Status: DISCONTINUED | OUTPATIENT
Start: 2024-10-21 | End: 2024-10-24 | Stop reason: HOSPADM

## 2024-10-21 RX ORDER — DIPHENHYDRAMINE HYDROCHLORIDE 50 MG/ML
25 INJECTION INTRAMUSCULAR; INTRAVENOUS ONCE
Status: COMPLETED | OUTPATIENT
Start: 2024-10-21 | End: 2024-10-21

## 2024-10-21 RX ORDER — DIPHENHYDRAMINE HCL 25 MG
25 TABLET ORAL
Status: DISCONTINUED | OUTPATIENT
Start: 2024-10-21 | End: 2024-10-21

## 2024-10-21 RX ORDER — CALCIUM GLUCONATE 20 MG/ML
2000 INJECTION, SOLUTION INTRAVENOUS ONCE
Status: COMPLETED | OUTPATIENT
Start: 2024-10-21 | End: 2024-10-21

## 2024-10-21 RX ORDER — SODIUM CHLORIDE 9 MG/ML
INJECTION, SOLUTION INTRAVENOUS PRN
Status: DISCONTINUED | OUTPATIENT
Start: 2024-10-21 | End: 2024-10-24 | Stop reason: HOSPADM

## 2024-10-21 RX ORDER — DIPHENHYDRAMINE HYDROCHLORIDE 50 MG/ML
12.5 INJECTION INTRAMUSCULAR; INTRAVENOUS ONCE
Status: COMPLETED | OUTPATIENT
Start: 2024-10-21 | End: 2024-10-21

## 2024-10-21 RX ADMIN — PIPERACILLIN AND TAZOBACTAM 3375 MG: 3; .375 INJECTION, POWDER, LYOPHILIZED, FOR SOLUTION INTRAVENOUS at 17:08

## 2024-10-21 RX ADMIN — SUCRALFATE 1 G: 1 TABLET ORAL at 16:37

## 2024-10-21 RX ADMIN — HYDRALAZINE HYDROCHLORIDE 5 MG: 20 INJECTION, SOLUTION INTRAMUSCULAR; INTRAVENOUS at 09:50

## 2024-10-21 RX ADMIN — HYDROMORPHONE HYDROCHLORIDE 0.5 MG: 1 INJECTION, SOLUTION INTRAMUSCULAR; INTRAVENOUS; SUBCUTANEOUS at 08:26

## 2024-10-21 RX ADMIN — HYDROMORPHONE HYDROCHLORIDE 0.5 MG: 1 INJECTION, SOLUTION INTRAMUSCULAR; INTRAVENOUS; SUBCUTANEOUS at 18:31

## 2024-10-21 RX ADMIN — HYDRALAZINE HYDROCHLORIDE 100 MG: 50 TABLET ORAL at 08:26

## 2024-10-21 RX ADMIN — POLYETHYLENE GLYCOL 3350 180 G: 17 POWDER, FOR SOLUTION ORAL at 17:41

## 2024-10-21 RX ADMIN — HYDROMORPHONE HYDROCHLORIDE 0.5 MG: 1 INJECTION, SOLUTION INTRAMUSCULAR; INTRAVENOUS; SUBCUTANEOUS at 21:42

## 2024-10-21 RX ADMIN — PANCRELIPASE LIPASE, PANCRELIPASE PROTEASE, PANCRELIPASE AMYLASE 5000 UNITS: 5000; 17000; 24000 CAPSULE, DELAYED RELEASE ORAL at 08:01

## 2024-10-21 RX ADMIN — DEXTROSE MONOHYDRATE 250 ML: 100 INJECTION, SOLUTION INTRAVENOUS at 01:24

## 2024-10-21 RX ADMIN — SODIUM CHLORIDE, PRESERVATIVE FREE 10 ML: 5 INJECTION INTRAVENOUS at 05:14

## 2024-10-21 RX ADMIN — LORAZEPAM 1 MG: 2 INJECTION INTRAMUSCULAR; INTRAVENOUS at 12:54

## 2024-10-21 RX ADMIN — SODIUM ZIRCONIUM CYCLOSILICATE 10 G: 5 POWDER, FOR SUSPENSION ORAL at 01:33

## 2024-10-21 RX ADMIN — PANTOPRAZOLE SODIUM 40 MG: 40 TABLET, DELAYED RELEASE ORAL at 06:44

## 2024-10-21 RX ADMIN — ONDANSETRON 4 MG: 2 INJECTION, SOLUTION INTRAMUSCULAR; INTRAVENOUS at 00:37

## 2024-10-21 RX ADMIN — DIPHENHYDRAMINE HYDROCHLORIDE 25 MG: 50 INJECTION, SOLUTION INTRAMUSCULAR; INTRAVENOUS at 17:36

## 2024-10-21 RX ADMIN — INSULIN HUMAN 5 UNITS: 100 INJECTION, SOLUTION PARENTERAL at 01:40

## 2024-10-21 RX ADMIN — HYDRALAZINE HYDROCHLORIDE 100 MG: 50 TABLET ORAL at 16:37

## 2024-10-21 RX ADMIN — NICARDIPINE HYDROCHLORIDE 15 MG/HR: 25 INJECTION INTRAVENOUS at 23:18

## 2024-10-21 RX ADMIN — DIPHENHYDRAMINE HYDROCHLORIDE 25 MG: 50 INJECTION INTRAMUSCULAR; INTRAVENOUS at 02:27

## 2024-10-21 RX ADMIN — PIPERACILLIN AND TAZOBACTAM 4500 MG: 4; .5 INJECTION, POWDER, LYOPHILIZED, FOR SOLUTION INTRAVENOUS; PARENTERAL at 09:11

## 2024-10-21 RX ADMIN — SUCRALFATE 1 G: 1 TABLET ORAL at 20:54

## 2024-10-21 RX ADMIN — LORAZEPAM 1 MG: 2 INJECTION INTRAMUSCULAR; INTRAVENOUS at 19:33

## 2024-10-21 RX ADMIN — HYDRALAZINE HYDROCHLORIDE 100 MG: 50 TABLET ORAL at 21:40

## 2024-10-21 RX ADMIN — HYDROMORPHONE HYDROCHLORIDE 0.5 MG: 1 INJECTION, SOLUTION INTRAMUSCULAR; INTRAVENOUS; SUBCUTANEOUS at 01:55

## 2024-10-21 RX ADMIN — HYDROXYZINE HYDROCHLORIDE 25 MG: 25 TABLET, FILM COATED ORAL at 00:37

## 2024-10-21 RX ADMIN — HYDRALAZINE HYDROCHLORIDE 10 MG: 20 INJECTION INTRAMUSCULAR; INTRAVENOUS at 23:30

## 2024-10-21 RX ADMIN — HYDROMORPHONE HYDROCHLORIDE 0.5 MG: 1 INJECTION, SOLUTION INTRAMUSCULAR; INTRAVENOUS; SUBCUTANEOUS at 15:20

## 2024-10-21 RX ADMIN — NICARDIPINE HYDROCHLORIDE 5 MG/HR: 25 INJECTION INTRAVENOUS at 19:33

## 2024-10-21 RX ADMIN — SODIUM CHLORIDE, PRESERVATIVE FREE 10 ML: 5 INJECTION INTRAVENOUS at 08:01

## 2024-10-21 RX ADMIN — PANCRELIPASE LIPASE, PANCRELIPASE PROTEASE, PANCRELIPASE AMYLASE 5000 UNITS: 5000; 17000; 24000 CAPSULE, DELAYED RELEASE ORAL at 16:37

## 2024-10-21 RX ADMIN — HYDRALAZINE HYDROCHLORIDE 10 MG: 20 INJECTION, SOLUTION INTRAMUSCULAR; INTRAVENOUS at 15:15

## 2024-10-21 RX ADMIN — METRONIDAZOLE 500 MG: 500 INJECTION, SOLUTION INTRAVENOUS at 03:37

## 2024-10-21 RX ADMIN — WATER 1000 MG: 1 INJECTION INTRAMUSCULAR; INTRAVENOUS; SUBCUTANEOUS at 03:30

## 2024-10-21 RX ADMIN — SUCRALFATE 1 G: 1 TABLET ORAL at 08:01

## 2024-10-21 RX ADMIN — HYDROMORPHONE HYDROCHLORIDE 0.5 MG: 1 INJECTION, SOLUTION INTRAMUSCULAR; INTRAVENOUS; SUBCUTANEOUS at 11:54

## 2024-10-21 RX ADMIN — DIPHENHYDRAMINE HYDROCHLORIDE 25 MG: 50 INJECTION, SOLUTION INTRAMUSCULAR; INTRAVENOUS at 08:01

## 2024-10-21 RX ADMIN — DIPHENHYDRAMINE HYDROCHLORIDE 12.5 MG: 50 INJECTION, SOLUTION INTRAMUSCULAR; INTRAVENOUS at 11:23

## 2024-10-21 RX ADMIN — MORPHINE SULFATE 4 MG: 4 INJECTION, SOLUTION INTRAMUSCULAR; INTRAVENOUS at 00:38

## 2024-10-21 RX ADMIN — CALCIUM GLUCONATE 2000 MG: 20 INJECTION, SOLUTION INTRAVENOUS at 01:13

## 2024-10-21 RX ADMIN — DIPHENHYDRAMINE HYDROCHLORIDE 25 MG: 50 INJECTION, SOLUTION INTRAMUSCULAR; INTRAVENOUS at 23:48

## 2024-10-21 RX ADMIN — HYDROMORPHONE HYDROCHLORIDE 0.5 MG: 1 INJECTION, SOLUTION INTRAMUSCULAR; INTRAVENOUS; SUBCUTANEOUS at 05:14

## 2024-10-21 ASSESSMENT — PAIN DESCRIPTION - LOCATION
LOCATION: ABDOMEN

## 2024-10-21 ASSESSMENT — PAIN DESCRIPTION - ORIENTATION
ORIENTATION: MID;LEFT
ORIENTATION: LEFT
ORIENTATION: LEFT;LOWER

## 2024-10-21 ASSESSMENT — PAIN DESCRIPTION - FREQUENCY
FREQUENCY: CONTINUOUS

## 2024-10-21 ASSESSMENT — PAIN SCALES - GENERAL
PAINLEVEL_OUTOF10: 9
PAINLEVEL_OUTOF10: 4
PAINLEVEL_OUTOF10: 8
PAINLEVEL_OUTOF10: 4
PAINLEVEL_OUTOF10: 4
PAINLEVEL_OUTOF10: 8
PAINLEVEL_OUTOF10: 4
PAINLEVEL_OUTOF10: 8
PAINLEVEL_OUTOF10: 8
PAINLEVEL_OUTOF10: 5
PAINLEVEL_OUTOF10: 7
PAINLEVEL_OUTOF10: 4
PAINLEVEL_OUTOF10: 9
PAINLEVEL_OUTOF10: 9

## 2024-10-21 ASSESSMENT — PAIN DESCRIPTION - ONSET
ONSET: ON-GOING

## 2024-10-21 ASSESSMENT — PAIN DESCRIPTION - DESCRIPTORS
DESCRIPTORS: STABBING
DESCRIPTORS: SHARP;STABBING
DESCRIPTORS: STABBING

## 2024-10-21 ASSESSMENT — PAIN - FUNCTIONAL ASSESSMENT
PAIN_FUNCTIONAL_ASSESSMENT: PREVENTS OR INTERFERES SOME ACTIVE ACTIVITIES AND ADLS
PAIN_FUNCTIONAL_ASSESSMENT: ACTIVITIES ARE NOT PREVENTED

## 2024-10-21 ASSESSMENT — PAIN DESCRIPTION - PAIN TYPE
TYPE: CHRONIC PAIN

## 2024-10-21 NOTE — DIALYSIS
HEMODIALYSIS POST TREATMENT NOTE    Treatment time ordered: 4 hours    Actual treatment time: 4 hours    UltraFiltration Goal: 3 to 4 L  UltraFiltration Removed: 3 L      Pre Treatment weight: 81.5 kg  Post Treatment weight: 78.5 kg  Estimated Dry Weight: 83.9 kg    Access used:     Central Venous Catheter:          Tunneled or Non-tunneled:            Site:           Access Flow:       Internal Access:       AV Fistula or AV Graft: fistula         Site: left upper arm       Access Flow: good       Sign and symptoms of infection: wnl       If YES:     Medications or blood products given: 12.5 diphenhydramine pre treatment provided by Shirlene RAWLS per order    Chronic outpatient schedule:     Chronic outpatient unit: [facility]    Summary of response to treatment: Treatment tolerated well. Patient did have bm x3 while in hd unit. BP remained elevated through duration of treatment. Net fluid removal 3L over 4 hours.     Explain if orders NOT met, was physician notified:n/a      ACES flowsheet faxed to patient unit/ placed in patient chart: in process    Post assessment completed: yes    Report given to: Shirlene RAWLS      * Intra-treatment documented Safety Checks include the followin) Access and face visible at all times.     2) All connections and blood lines are secure with no kinks.     3) NVL alarm engaged.     4) Hemosafe device applied (if applicable).     5) No collapse of Arterial or Venous blood chambers.     6) All blood lines / pump segments in the air detectors.

## 2024-10-21 NOTE — H&P
Poncho Wasserman DO   NIFEdipine (ADALAT CC) 90 MG extended release tablet TAKE 1 TABLET BY MOUTH AT NIGHT 3/8/24  Yes Johny Ford MD   hydrALAZINE (APRESOLINE) 100 MG tablet TAKE 1 TABLET BY MOUTH 3 TIMES  DAILY 2/12/24  Yes Johny Ford MD   omeprazole (PRILOSEC) 20 MG delayed release capsule TAKE 1 CAPSULE BY MOUTH TWICE  DAILY 12/28/23  Yes Johny Ford MD   CREON [id number] units delayed release capsule TAKE ONE CAPSULE BY MOUTH 3 TIMES A DAY WITH MEALS 2/15/22  Yes Johny Ford MD   sucralfate (CARAFATE) 1 GM tablet Take 1 tablet by mouth in the morning, at noon, and at bedtime for 10 days  Patient not taking: Reported on 10/21/2024 10/14/24 10/24/24  Poncho Wasserman DO   sertraline (ZOLOFT) 25 MG tablet Take 1 tablet by mouth daily  Patient not taking: Reported on 10/21/2024 10/9/24   Yesi Barriga MD   Misc. Devices (STEP N REST II WALKER) MISC Use daily for ADL'S 2/10/22   Johny Ford MD   doxercalciferol (HECTOROL) 4 MCG/2ML injection Infuse 1.25 mLs intravenously Once in dialysis for 1 dose Given at the end of dialysis [place], Thurs, Sat 3/19/19 3/1/22  Ne Becker MD   glucose monitoring kit (FREESTYLE) monitoring kit 1 kit by Does not apply route daily 8/7/17   Mirian Hendrickson APRN - CNP   Misc. Devices MISC Check blood sugar one time a day 8/7/17   Mirian Hendrickson APRN - CNP   Insulin Pen Needle 32G X 6 MM MISC 1 Package by Does not apply route daily 6/23/17   Mirian Hendrickson APRN - CNP        Allergies:     Clonidine and Heparin    Social History:     Tobacco:    reports that he has never smoked. He has never used smokeless tobacco.  Alcohol:      reports that he does not currently use alcohol.  Drug Use:  reports no history of drug use.    Family History:     Family History   Problem Relation Age of Onset    No Known Problems Mother     No Known Problems Father        Review of Systems:     Positive and Negative as described in HPI.    CONSTITUTIONAL:  negative for fevers, chills,

## 2024-10-21 NOTE — ED PROVIDER NOTES
[facility]  Emergency Department Encounter  Emergency Medicine Physician     Pt Name: Isai Gonzalez  MRN: [id number]  Birthdate 1965  Date of evaluation: 10/21/24  PCP:  Jose David Grover MD    CHIEF COMPLAINT       Chief Complaint   Patient presents with    Hernia    Abdominal Pain       HISTORY OF PRESENT ILLNESS  (Location/Symptom, Timing/Onset, Context/Setting, Quality, Duration, Modifying Factors, Severity.)    Isai Gonzalez is a 59 y.o. male who presents with abdominal pain.  Been bothering him for the past month but has significantly worsened within the past day.  States the pain is within the left lower abdomen.  Does have a history of multiple hernias status post repair with mesh, status post mesh failure.  He states that all of his abdominal surgeries have been done at the [facility].  Does have a history of end-stage renal disease, gets dialysis 4 days a week.  He states that he is only able to tolerate 1 to 2 hours at a time secondary to anxiety.  He denies any trauma.  Denies any chest pain.  States occasionally feels short of breath.  Denies any muscle aches.  Denies any fevers or chills        PAST MEDICAL / SURGICAL / SOCIAL / FAMILY HISTORY    has a past medical history of Chronic kidney disease, Chronic pancreatitis (HCC), Diabetes mellitus (HCC), Diverticulosis, Hyperlipidemia, Hypertension, Mild malnutrition (HCC), MRSA (methicillin resistant staph aureus) culture positive, Pancreatitis, and Personal history of colonic polyps.     has a past surgical history that includes hernia repair; Cholecystectomy; Tonsillectomy; Septoplasty; other surgical history; other surgical history (06/07/2017); Catheter Removal (N/A, 6/7/2017); AV fistula creation (Left); Colonoscopy (N/A, 7/13/2020); other surgical history; and Upper gastrointestinal endoscopy (N/A, 10/23/2020).    Social History     Socioeconomic History    Marital status: Single     Spouse name: Not on file    Number

## 2024-10-21 NOTE — ED NOTES
Report given to CURLY Funk from ER.   Report method in person   The following was reviewed with receiving RN:   Current vital signs:  BP (!) 183/90   Pulse 88   Temp 98 °F (36.7 °C)   Resp 22   Ht 1.778 m (5' 10\")   Wt 86.2 kg (190 lb)   SpO2 96%   BMI 27.26 kg/m²                MEWS Score: 1     Any medication or safety alerts were reviewed. Any pending diagnostics and notifications were also reviewed, as well as any safety concerns or issues, abnormal labs, abnormal imaging, and abnormal assessment findings. Questions were answered.

## 2024-10-21 NOTE — CARE COORDINATION
Case Management Assessment  Initial Evaluation    Date/Time of Evaluation: 10/21/2024 09:30AM  Assessment Completed by: Cielo Aguayo RN    If patient is discharged prior to next notation, then this note serves as note for discharge by case management.    Patient Name: Isai Gonzalez                   YOB: 1965  Diagnosis: Hyperkalemia [E87.5]  Left lower quadrant abdominal pain [R10.32]                   Date / Time: 10/20/2024 11:27 PM    Patient Admission Status: Inpatient   Readmission Risk (Low < 19, Mod (19-27), High > 27): Readmission Risk Score: 20.1    Current PCP: Jose David Grover MD  PCP verified by CM? Yes    Chart Reviewed: Yes      History Provided by: Patient  Patient Orientation: Alert and Oriented    Patient Cognition: Alert    Hospitalization in the last 30 days (Readmission):  Yes    If yes, Readmission Assessment in  Navigator will be completed.    Advance Directives:      Code Status: Full Code   Patient's Primary Decision Maker is:        Discharge Planning:    Patient lives with: Spouse/Significant Other Type of Home: House  Primary Care Giver: Self  Patient Support Systems include: None   Current Financial resources: Medicare  Current community resources: Other (Comment) ([facility])  Current services prior to admission: Other (Comment) (dialysis)            Current DME:              Type of Home Care services:  None    ADLS  Prior functional level: Independent in ADLs/IADLs  Current functional level: Independent in ADLs/IADLs    PT AM-PAC:   /24  OT AM-PAC:   /24    Family can provide assistance at DC: Yes  Would you like Case Management to discuss the discharge plan with any other family members/significant others, and if so, who? No  Plans to Return to Present Housing: Yes  Other Identified Issues/Barriers to RETURNING to current housing: none  Potential Assistance needed at discharge: N/A            Potential DME:    Patient expects to discharge to: House  Plan

## 2024-10-22 ENCOUNTER — APPOINTMENT (OUTPATIENT)
Dept: GENERAL RADIOLOGY | Age: 59
DRG: 640 | End: 2024-10-22
Payer: MEDICARE

## 2024-10-22 PROBLEM — F43.0 ACUTE STRESS REACTION: Status: ACTIVE | Noted: 2024-10-22

## 2024-10-22 LAB
ANION GAP SERPL CALCULATED.3IONS-SCNC: 22 MMOL/L (ref 9–16)
B-OH-BUTYR SERPL-MCNC: 0.05 MMOL/L (ref 0.02–0.27)
BASOPHILS # BLD: 0 K/UL (ref 0–0.2)
BASOPHILS NFR BLD: 0 % (ref 0–2)
BUN SERPL-MCNC: 17 MG/DL (ref 6–20)
CALCIUM SERPL-MCNC: 7.9 MG/DL (ref 8.6–10.4)
CHLORIDE SERPL-SCNC: 97 MMOL/L (ref 98–107)
CO2 SERPL-SCNC: 16 MMOL/L (ref 20–31)
CREAT SERPL-MCNC: 6.2 MG/DL (ref 0.7–1.2)
EOSINOPHIL # BLD: 0.14 K/UL (ref 0–0.4)
EOSINOPHILS RELATIVE PERCENT: 3 % (ref 0–4)
ERYTHROCYTE [DISTWIDTH] IN BLOOD BY AUTOMATED COUNT: 15.9 % (ref 11.5–14.9)
GFR, ESTIMATED: 10 ML/MIN/1.73M2
GLUCOSE BLD-MCNC: 116 MG/DL (ref 75–110)
GLUCOSE SERPL-MCNC: 71 MG/DL (ref 74–99)
HBV CORE IGM SERPL QL IA: NONREACTIVE
HBV SURFACE AB SERPL IA-ACNC: <3.5 MIU/ML
HBV SURFACE AG SERPL QL IA: NONREACTIVE
HCT VFR BLD AUTO: 36 % (ref 41–53)
HGB BLD-MCNC: 11.9 G/DL (ref 13.5–17.5)
LACTATE BLDV-SCNC: 2 MMOL/L (ref 0.5–2.2)
LYMPHOCYTES NFR BLD: 0.32 K/UL (ref 1–4.8)
LYMPHOCYTES RELATIVE PERCENT: 7 % (ref 24–44)
MCH RBC QN AUTO: 31.5 PG (ref 26–34)
MCHC RBC AUTO-ENTMCNC: 33.1 G/DL (ref 31–37)
MCV RBC AUTO: 95 FL (ref 80–100)
MONOCYTES NFR BLD: 0.5 K/UL (ref 0.1–1.3)
MONOCYTES NFR BLD: 11 % (ref 1–7)
MORPHOLOGY: ABNORMAL
NEUTROPHILS NFR BLD: 79 % (ref 36–66)
NEUTS SEG NFR BLD: 3.54 K/UL (ref 1.3–9.1)
PLATELET # BLD AUTO: 91 K/UL (ref 150–450)
PMV BLD AUTO: 7.2 FL (ref 6–12)
POTASSIUM SERPL-SCNC: 4.5 MMOL/L (ref 3.7–5.3)
RBC # BLD AUTO: 3.79 M/UL (ref 4.5–5.9)
SODIUM SERPL-SCNC: 135 MMOL/L (ref 136–145)
WBC OTHER # BLD: 4.5 K/UL (ref 3.5–11)

## 2024-10-22 PROCEDURE — APPSS30 APP SPLIT SHARED TIME 16-30 MINUTES: Performed by: NURSE PRACTITIONER

## 2024-10-22 PROCEDURE — APPSS60 APP SPLIT SHARED TIME 46-60 MINUTES

## 2024-10-22 PROCEDURE — 99231 SBSQ HOSP IP/OBS SF/LOW 25: CPT | Performed by: INTERNAL MEDICINE

## 2024-10-22 PROCEDURE — 86317 IMMUNOASSAY INFECTIOUS AGENT: CPT

## 2024-10-22 PROCEDURE — 99223 1ST HOSP IP/OBS HIGH 75: CPT | Performed by: PSYCHIATRY & NEUROLOGY

## 2024-10-22 PROCEDURE — 2580000003 HC RX 258

## 2024-10-22 PROCEDURE — 99233 SBSQ HOSP IP/OBS HIGH 50: CPT | Performed by: INTERNAL MEDICINE

## 2024-10-22 PROCEDURE — 6360000002 HC RX W HCPCS

## 2024-10-22 PROCEDURE — 6370000000 HC RX 637 (ALT 250 FOR IP): Performed by: PSYCHIATRY & NEUROLOGY

## 2024-10-22 PROCEDURE — 86705 HEP B CORE ANTIBODY IGM: CPT

## 2024-10-22 PROCEDURE — 82010 KETONE BODYS QUAN: CPT

## 2024-10-22 PROCEDURE — 36415 COLL VENOUS BLD VENIPUNCTURE: CPT

## 2024-10-22 PROCEDURE — 6370000000 HC RX 637 (ALT 250 FOR IP)

## 2024-10-22 PROCEDURE — 6370000000 HC RX 637 (ALT 250 FOR IP): Performed by: INTERNAL MEDICINE

## 2024-10-22 PROCEDURE — 83605 ASSAY OF LACTIC ACID: CPT

## 2024-10-22 PROCEDURE — 2060000000 HC ICU INTERMEDIATE R&B

## 2024-10-22 PROCEDURE — 87340 HEPATITIS B SURFACE AG IA: CPT

## 2024-10-22 PROCEDURE — 82947 ASSAY GLUCOSE BLOOD QUANT: CPT

## 2024-10-22 PROCEDURE — 80048 BASIC METABOLIC PNL TOTAL CA: CPT

## 2024-10-22 PROCEDURE — 85025 COMPLETE CBC W/AUTO DIFF WBC: CPT

## 2024-10-22 PROCEDURE — 71045 X-RAY EXAM CHEST 1 VIEW: CPT

## 2024-10-22 RX ORDER — DIAZEPAM 5 MG/1
5 TABLET ORAL ONCE
Status: COMPLETED | OUTPATIENT
Start: 2024-10-22 | End: 2024-10-22

## 2024-10-22 RX ORDER — TRAZODONE HYDROCHLORIDE 50 MG/1
50 TABLET, FILM COATED ORAL NIGHTLY
Status: DISCONTINUED | OUTPATIENT
Start: 2024-10-22 | End: 2024-10-24 | Stop reason: HOSPADM

## 2024-10-22 RX ORDER — OXYCODONE HYDROCHLORIDE 5 MG/1
5 TABLET ORAL EVERY 4 HOURS PRN
Status: DISCONTINUED | OUTPATIENT
Start: 2024-10-22 | End: 2024-10-24 | Stop reason: HOSPADM

## 2024-10-22 RX ORDER — VALSARTAN 80 MG/1
80 TABLET ORAL NIGHTLY
Status: DISCONTINUED | OUTPATIENT
Start: 2024-10-22 | End: 2024-10-24 | Stop reason: HOSPADM

## 2024-10-22 RX ORDER — CARVEDILOL 6.25 MG/1
6.25 TABLET ORAL 2 TIMES DAILY WITH MEALS
Status: DISCONTINUED | OUTPATIENT
Start: 2024-10-22 | End: 2024-10-22

## 2024-10-22 RX ORDER — CARVEDILOL 12.5 MG/1
12.5 TABLET ORAL 2 TIMES DAILY WITH MEALS
Status: DISCONTINUED | OUTPATIENT
Start: 2024-10-22 | End: 2024-10-24 | Stop reason: HOSPADM

## 2024-10-22 RX ADMIN — DIPHENHYDRAMINE HYDROCHLORIDE 25 MG: 50 INJECTION, SOLUTION INTRAMUSCULAR; INTRAVENOUS at 14:06

## 2024-10-22 RX ADMIN — SUCRALFATE 1 G: 1 TABLET ORAL at 21:09

## 2024-10-22 RX ADMIN — CARVEDILOL 6.25 MG: 6.25 TABLET, FILM COATED ORAL at 09:50

## 2024-10-22 RX ADMIN — SERTRALINE 50 MG: 50 TABLET, FILM COATED ORAL at 07:49

## 2024-10-22 RX ADMIN — NICARDIPINE HYDROCHLORIDE 12.5 MG/HR: 25 INJECTION INTRAVENOUS at 11:39

## 2024-10-22 RX ADMIN — DIPHENHYDRAMINE HYDROCHLORIDE 25 MG: 50 INJECTION, SOLUTION INTRAMUSCULAR; INTRAVENOUS at 07:48

## 2024-10-22 RX ADMIN — HYDRALAZINE HYDROCHLORIDE 100 MG: 50 TABLET ORAL at 14:02

## 2024-10-22 RX ADMIN — PANCRELIPASE LIPASE, PANCRELIPASE PROTEASE, PANCRELIPASE AMYLASE 5000 UNITS: 5000; 17000; 24000 CAPSULE, DELAYED RELEASE ORAL at 12:03

## 2024-10-22 RX ADMIN — HYDRALAZINE HYDROCHLORIDE 100 MG: 50 TABLET ORAL at 07:48

## 2024-10-22 RX ADMIN — PIPERACILLIN AND TAZOBACTAM 3375 MG: 3; .375 INJECTION, POWDER, LYOPHILIZED, FOR SOLUTION INTRAVENOUS at 16:00

## 2024-10-22 RX ADMIN — LORAZEPAM 1 MG: 2 INJECTION INTRAMUSCULAR; INTRAVENOUS at 08:17

## 2024-10-22 RX ADMIN — NICARDIPINE HYDROCHLORIDE 10 MG/HR: 25 INJECTION INTRAVENOUS at 15:04

## 2024-10-22 RX ADMIN — VALSARTAN 80 MG: 80 TABLET ORAL at 21:10

## 2024-10-22 RX ADMIN — DIPHENHYDRAMINE HYDROCHLORIDE 25 MG: 50 INJECTION, SOLUTION INTRAMUSCULAR; INTRAVENOUS at 23:21

## 2024-10-22 RX ADMIN — PANTOPRAZOLE SODIUM 40 MG: 40 TABLET, DELAYED RELEASE ORAL at 05:52

## 2024-10-22 RX ADMIN — NICARDIPINE HYDROCHLORIDE 15 MG/HR: 25 INJECTION INTRAVENOUS at 09:51

## 2024-10-22 RX ADMIN — HYDROMORPHONE HYDROCHLORIDE 0.5 MG: 1 INJECTION, SOLUTION INTRAMUSCULAR; INTRAVENOUS; SUBCUTANEOUS at 05:52

## 2024-10-22 RX ADMIN — LORAZEPAM 1 MG: 2 INJECTION INTRAMUSCULAR; INTRAVENOUS at 02:42

## 2024-10-22 RX ADMIN — PIPERACILLIN AND TAZOBACTAM 3375 MG: 3; .375 INJECTION, POWDER, LYOPHILIZED, FOR SOLUTION INTRAVENOUS at 04:17

## 2024-10-22 RX ADMIN — SUCRALFATE 1 G: 1 TABLET ORAL at 07:49

## 2024-10-22 RX ADMIN — TRAZODONE HYDROCHLORIDE 50 MG: 50 TABLET ORAL at 21:09

## 2024-10-22 RX ADMIN — NICARDIPINE HYDROCHLORIDE 15 MG/HR: 25 INJECTION INTRAVENOUS at 17:12

## 2024-10-22 RX ADMIN — DIAZEPAM 5 MG: 5 TABLET ORAL at 19:35

## 2024-10-22 RX ADMIN — HYDRALAZINE HYDROCHLORIDE 10 MG: 20 INJECTION INTRAMUSCULAR; INTRAVENOUS at 05:52

## 2024-10-22 RX ADMIN — SODIUM CHLORIDE, PRESERVATIVE FREE 10 ML: 5 INJECTION INTRAVENOUS at 07:49

## 2024-10-22 RX ADMIN — HYDROMORPHONE HYDROCHLORIDE 0.5 MG: 1 INJECTION, SOLUTION INTRAMUSCULAR; INTRAVENOUS; SUBCUTANEOUS at 09:55

## 2024-10-22 RX ADMIN — HYDROMORPHONE HYDROCHLORIDE 0.5 MG: 1 INJECTION, SOLUTION INTRAMUSCULAR; INTRAVENOUS; SUBCUTANEOUS at 01:18

## 2024-10-22 RX ADMIN — LORAZEPAM 1 MG: 2 INJECTION INTRAMUSCULAR; INTRAVENOUS at 15:55

## 2024-10-22 RX ADMIN — CARVEDILOL 12.5 MG: 12.5 TABLET, FILM COATED ORAL at 15:55

## 2024-10-22 RX ADMIN — HYDRALAZINE HYDROCHLORIDE 100 MG: 50 TABLET ORAL at 21:09

## 2024-10-22 RX ADMIN — NICARDIPINE HYDROCHLORIDE 15 MG/HR: 25 INJECTION INTRAVENOUS at 07:54

## 2024-10-22 RX ADMIN — PANCRELIPASE LIPASE, PANCRELIPASE PROTEASE, PANCRELIPASE AMYLASE 5000 UNITS: 5000; 17000; 24000 CAPSULE, DELAYED RELEASE ORAL at 15:55

## 2024-10-22 RX ADMIN — NICARDIPINE HYDROCHLORIDE 15 MG/HR: 25 INJECTION INTRAVENOUS at 00:59

## 2024-10-22 RX ADMIN — OXYCODONE HYDROCHLORIDE 5 MG: 5 TABLET ORAL at 15:07

## 2024-10-22 RX ADMIN — NICARDIPINE HYDROCHLORIDE 3 MG/HR: 25 INJECTION INTRAVENOUS at 22:29

## 2024-10-22 RX ADMIN — SUCRALFATE 1 G: 1 TABLET ORAL at 14:02

## 2024-10-22 RX ADMIN — NICARDIPINE HYDROCHLORIDE 15 MG/HR: 25 INJECTION INTRAVENOUS at 04:18

## 2024-10-22 RX ADMIN — NICARDIPINE HYDROCHLORIDE 15 MG/HR: 25 INJECTION INTRAVENOUS at 02:42

## 2024-10-22 ASSESSMENT — PAIN SCALES - GENERAL
PAINLEVEL_OUTOF10: 7
PAINLEVEL_OUTOF10: 7
PAINLEVEL_OUTOF10: 9
PAINLEVEL_OUTOF10: 7
PAINLEVEL_OUTOF10: 5
PAINLEVEL_OUTOF10: 9
PAINLEVEL_OUTOF10: 7
PAINLEVEL_OUTOF10: 7

## 2024-10-22 ASSESSMENT — PAIN DESCRIPTION - ORIENTATION
ORIENTATION: MID
ORIENTATION: LEFT
ORIENTATION: LEFT;LOWER
ORIENTATION: LOWER
ORIENTATION: MID;LEFT

## 2024-10-22 ASSESSMENT — PAIN DESCRIPTION - LOCATION
LOCATION: ABDOMEN

## 2024-10-22 ASSESSMENT — PAIN DESCRIPTION - DESCRIPTORS
DESCRIPTORS: ACHING;DISCOMFORT
DESCRIPTORS: ACHING;DISCOMFORT

## 2024-10-22 NOTE — CONSULTS
[facility]    GASTROENTEROLOGY CONSULT    Patient:   Isai Gonzalez   :    1965   Facility:   [facility]  Date:    10/21/2024  Admission Dx:  Hyperkalemia [E87.5]  Left lower quadrant abdominal pain [R10.32]  Requesting physician: Maninder Ramirez MD  Reason for consult:  colitis      SUBJECTIVE:  History of Present Illness:  This is a 59 y.o.   male who was admitted 10/20/2024 with Hyperkalemia [E87.5]  Left lower quadrant abdominal pain [R10.32]. We have been asked to see the patient in consultation by Maninder Ramirez MD for  colitis This is a 59 y.o. male with past medical history of type 2 diabetes, essential hypertension,  anxiety CHF with preserved EF diastolic dysfunction cad esrd on hd dm chronic pancreatitis secondary to previous etoh abuse, hx bile duct repair and necrotizing pancreatitis s/p debridement years ago, hld who presented to the ED for abdominal pain. Reports abdominal pain for the last month in the LLQ that has been increasing. He was seen by our service for the same complaint earlier this month and was treated with antibiotics for colitis, he was discharged on antibiotics and states he has two pills left to take. Has been taking up to 10 tylenol per day. He states that the pain is constant and non radiating, it is not changed with eating.  No fevers or chills. Has not had diarrhea, nausea or emesis. Labs show normal lft no leucocytosis hgb 10.7 plt 80 lipase was not checked. Creat 11.3 k 5.7  Ct abd shows small bowel with ventral hernias mesenteric edema and diffuse wall thickening of the stomach and colon -colitis is not excluded. He denies weight loss dysphagia change in the bowel habits melena hematochezia.      Family hx no liver pancreatic stomach or colon cancer no uc/crdohns  Endoscopy hx states he had colonoscopy several years ago that removed benign polyps no record in epic, states he had normal egd no report in 
Department of Psychiatry  Consult Service   Psychiatric Assessment        REASON FOR CONSULT: Severe anxiety    CONSULTING PHYSICIAN: Yesi Barirga    History obtained from: Patient and EMR    HISTORY OF PRESENT ILLNESS:          The patient is a 59 y.o. male with significant psychiatric history of anxiety who presents to the ER with complaints of hernia and abdominal pain.  Upon further evaluation it was found that patient's potassium was high.  He was admitted to medical for management of hyperkalemia.  Isai is in ESRD and gets Monday, Wednesday, Friday dialysis.   He reported that getting dialysis was becoming increasingly anxiety provoking for him.  Isai was seen about 2 weeks ago while inpatient by psychiatry services under similar circumstances.  He was started on Zoloft and Atarax as needed.  He was discharged on these medications.  Isai is seen at bedside today.   When asked about events leading up to hospitalization he states, \"I have a hernia and I was having some pain with it.\"  He reports that he has struggled with ESRD for the past 8 years.  He states over the last month he starts to have \"panic\" while at dialysis treatments and he is unsure why.  He states, \"I get shaky, I see starrs, and my breathing gets weird.\"  He states as soon as he leaves dialysis things start to feel normal again.  He also reports his anxiety gets bad at night interfering with his sleep.  He states he wakes frequently throughout the night and finds it hard to fall back asleep.  Isai denies issues with depression or anhedonia.  He does report poor energy, concentration and appetite.  He denies suicidal ideation, intent or plan.  He denies homicidal ideation. He denies history of del. He denies history of psychosis.  Isai does deal with significant anxiety feeling restless and on edge often.  Asked patient if he was taking his Zoloft as prescribed and he stated, \"no I only took it once.\"    Educated patient on the 
or sore throat.  Cardiac:  No chest pain, dyspnea, orthopnea or PND, palpitations  Chest:  No cough, hemoptysis, pleuritic chest pain, wheezing,SOB  Abdomen:  + abdominal pain, nausea, vomiting, no diarrhea, melena, dysphagia hematemesis,constipation, abdominal bloating, flank pain  Neuro:  No CVA, TIA or seizure like activity.  Skin:   No rashes, no itching.  :   No hematuria, no pyuria, no dysuria, no flank pain.  Extremities:  No swelling or joint pains.      Objective:  CURRENT TEMPERATURE:  Temp: 97.6 °F (36.4 °C)  MAXIMUM TEMPERATURE OVER 24HRS:  Temp (24hrs), Av.8 °F (36.6 °C), Min:97.6 °F (36.4 °C), Max:98 °F (36.7 °C)    CURRENT RESPIRATORY RATE:  Respirations: 17  CURRENT PULSE:  Pulse: 70  CURRENT BLOOD PRESSURE:  BP: (!) 152/83  24HR BLOOD PRESSURE RANGE:  Systolic (24hrs), Av , Min:152 , Max:193   ; Diastolic (24hrs), Av, Min:71, Max:98    24HR INTAKE/OUTPUT:    Intake/Output Summary (Last 24 hours) at 10/21/2024 1345  Last data filed at 10/21/2024 0121  Gross per 24 hour   Intake 5.94 ml   Output --   Net 5.94 ml     Patient Vitals for the past 96 hrs (Last 3 readings):   Weight   10/21/24 0458 90.4 kg (199 lb 4.7 oz)   10/20/24 2327 86.2 kg (190 lb)       Physical Exam:  GENERAL APPEARANCE: Alert and cooperative, and appears to be in no acute distress.  HEAD: normocephalic  EYES:  EOMI. Not pale, anicteric   NOSE:  No nasal discharge.    THROAT:  Oral cavity and pharynx normal. Moist  CARDIAC: Normal S1 and S2. No S3, S4 or murmurs. Rhythm is regular.  LUNGS:  diminished breath sounds.  No wheezing no accessory muscle use  NECK: Neck supple, non-tender, trachea midline  GI-soft mild tenderness generalized  MUSKULOSKELETAL: Adequately aligned spine. No joint erythema or tenderness.   EXTREMITIES:_+ edema. Peripheral pulses intact.   NEURO: Nonfocal      Labs:   CBC:  Recent Labs     10/21/24  0022 10/21/24  0639   WBC 5.4 3.6   RBC 3.47* 3.36*   HGB 10.8* 10.7*   HCT 33.0* 32.2*   MCV

## 2024-10-22 NOTE — CARE COORDINATION
ONGOING DISCHARGE PLAN:    Patient is alert and oriented x4.    Spoke with patient regarding discharge plan and patient confirms that plan is still home. Denies needs for VNS.    GI consult  Plan for colonoscopy on Wed or Thurs    IV zosyn    Nephro consult-cardene drip  ESRD    Psych consult-severe anxiety  Continue zoloft daily    Follows with [facility] psychiatry treatments    Will continue to follow for additional discharge needs.    If patient is discharged prior to next notation, then this note serves as note for discharge by case management.    Electronically signed by Lizet Macdonald RN on 10/22/2024 at 10:49 AM

## 2024-10-22 NOTE — PLAN OF CARE
Problem: Chronic Conditions and Co-morbidities  Goal: Patient's chronic conditions and co-morbidity symptoms are monitored and maintained or improved  10/22/2024 0356 by Lu Horne RN  Outcome: Progressing  10/21/2024 1500 by Mylene Humphrey RN  Outcome: Progressing     Problem: Discharge Planning  Goal: Discharge to home or other facility with appropriate resources  10/22/2024 0356 by Lu Horne RN  Outcome: Progressing  10/21/2024 1500 by Mylene Humphrey RN  Outcome: Progressing     Problem: Pain  Goal: Verbalizes/displays adequate comfort level or baseline comfort level  10/22/2024 0356 by Lu Horne RN  Outcome: Progressing  10/21/2024 1500 by Mylene Humphrey RN  Outcome: Progressing     Problem: Safety - Adult  Goal: Free from fall injury  10/22/2024 0356 by Lu Horne RN  Outcome: Progressing  10/21/2024 1500 by Mylene Humphrey RN  Outcome: Progressing     Problem: ABCDS Injury Assessment  Goal: Absence of physical injury  10/22/2024 0356 by Lu Horne RN  Outcome: Progressing  10/21/2024 1500 by Mylene Humphrey RN  Outcome: Progressing

## 2024-10-22 NOTE — PLAN OF CARE
Problem: Discharge Planning  Goal: Discharge to home or other facility with appropriate resources  0/22/2024 1301 by Javy Evans RN  Outcome: Progressing     Problem: Chronic Conditions and Co-morbidities  Goal: Patient's chronic conditions and co-morbidity symptoms are monitored and maintained or improved  10/22/2024 1301 by Javy Evans RN  Outcome: Progressing     Problem: Safety - Adult  Goal: Free from fall injury  10/22/2024 1301 by Javy Evans RN  Outcome: Progressing     Problem: ABCDS Injury Assessment  Goal: Absence of physical injury  10/22/2024 1301 by Javy Evans RN  Outcome: Progressing  Flowsheets (Taken 10/22/2024 1301)  Absence of Physical Injury: Implement safety measures based on patient assessment     Problem: Respiratory - Adult  Goal: Achieves optimal ventilation and oxygenation  Outcome: Progressing  Flowsheets (Taken 10/22/2024 1301)  Achieves optimal ventilation and oxygenation:   Assess for changes in respiratory status   Assess for changes in mentation and behavior   Oxygen supplementation based on oxygen saturation or arterial blood gases     Problem: Cardiovascular - Adult  Goal: Maintains optimal cardiac output and hemodynamic stability  Outcome: Progressing  Flowsheets (Taken 10/22/2024 1301)  Maintains optimal cardiac output and hemodynamic stability:   Monitor blood pressure and heart rate   Monitor urine output and notify Licensed Independent Practitioner for values outside of normal range     Problem: Gastrointestinal - Adult  Goal: Maintains or returns to baseline bowel function  Outcome: Progressing  Flowsheets (Taken 10/22/2024 1301)  Maintains or returns to baseline bowel function:   Assess bowel function   Encourage oral fluids to ensure adequate hydration   Administer ordered medications as needed   Encourage mobilization and activity     Problem: Gastrointestinal - Adult  Goal: Maintains adequate nutritional intake  Outcome: Progressing  Flowsheets (Taken

## 2024-10-23 LAB
ANION GAP SERPL CALCULATED.3IONS-SCNC: 12 MMOL/L (ref 9–16)
BASOPHILS # BLD: 0 K/UL (ref 0–0.2)
BASOPHILS NFR BLD: 1 % (ref 0–2)
BUN SERPL-MCNC: 25 MG/DL (ref 6–20)
CALCIUM SERPL-MCNC: 7.8 MG/DL (ref 8.6–10.4)
CHLORIDE SERPL-SCNC: 100 MMOL/L (ref 98–107)
CO2 SERPL-SCNC: 21 MMOL/L (ref 20–31)
CREAT SERPL-MCNC: 7.6 MG/DL (ref 0.7–1.2)
EOSINOPHIL # BLD: 0.3 K/UL (ref 0–0.4)
EOSINOPHILS RELATIVE PERCENT: 8 % (ref 0–4)
ERYTHROCYTE [DISTWIDTH] IN BLOOD BY AUTOMATED COUNT: 15.2 % (ref 11.5–14.9)
GFR, ESTIMATED: 8 ML/MIN/1.73M2
GLUCOSE BLD-MCNC: 138 MG/DL (ref 75–110)
GLUCOSE BLD-MCNC: 85 MG/DL (ref 75–110)
GLUCOSE BLD-MCNC: 87 MG/DL (ref 75–110)
GLUCOSE BLD-MCNC: 90 MG/DL (ref 75–110)
GLUCOSE SERPL-MCNC: 97 MG/DL (ref 74–99)
HCT VFR BLD AUTO: 31.4 % (ref 41–53)
HGB BLD-MCNC: 10.5 G/DL (ref 13.5–17.5)
LYMPHOCYTES NFR BLD: 0.4 K/UL (ref 1–4.8)
LYMPHOCYTES RELATIVE PERCENT: 11 % (ref 24–44)
MCH RBC QN AUTO: 31.8 PG (ref 26–34)
MCHC RBC AUTO-ENTMCNC: 33.4 G/DL (ref 31–37)
MCV RBC AUTO: 95.2 FL (ref 80–100)
MONOCYTES NFR BLD: 0.4 K/UL (ref 0.1–1.3)
MONOCYTES NFR BLD: 13 % (ref 1–7)
NEUTROPHILS NFR BLD: 67 % (ref 36–66)
NEUTS SEG NFR BLD: 2.2 K/UL (ref 1.3–9.1)
PLATELET # BLD AUTO: 74 K/UL (ref 150–450)
PMV BLD AUTO: 6.8 FL (ref 6–12)
POTASSIUM SERPL-SCNC: 4.7 MMOL/L (ref 3.7–5.3)
RBC # BLD AUTO: 3.3 M/UL (ref 4.5–5.9)
SODIUM SERPL-SCNC: 133 MMOL/L (ref 136–145)
WBC OTHER # BLD: 3.2 K/UL (ref 3.5–11)

## 2024-10-23 PROCEDURE — 90935 HEMODIALYSIS ONE EVALUATION: CPT

## 2024-10-23 PROCEDURE — 99231 SBSQ HOSP IP/OBS SF/LOW 25: CPT | Performed by: INTERNAL MEDICINE

## 2024-10-23 PROCEDURE — APPSS30 APP SPLIT SHARED TIME 16-30 MINUTES: Performed by: NURSE PRACTITIONER

## 2024-10-23 PROCEDURE — 6370000000 HC RX 637 (ALT 250 FOR IP): Performed by: INTERNAL MEDICINE

## 2024-10-23 PROCEDURE — 6360000002 HC RX W HCPCS: Performed by: INTERNAL MEDICINE

## 2024-10-23 PROCEDURE — 2060000000 HC ICU INTERMEDIATE R&B

## 2024-10-23 PROCEDURE — 82947 ASSAY GLUCOSE BLOOD QUANT: CPT

## 2024-10-23 PROCEDURE — 2580000003 HC RX 258

## 2024-10-23 PROCEDURE — 80048 BASIC METABOLIC PNL TOTAL CA: CPT

## 2024-10-23 PROCEDURE — 36415 COLL VENOUS BLD VENIPUNCTURE: CPT

## 2024-10-23 PROCEDURE — 6370000000 HC RX 637 (ALT 250 FOR IP)

## 2024-10-23 PROCEDURE — 6370000000 HC RX 637 (ALT 250 FOR IP): Performed by: PSYCHIATRY & NEUROLOGY

## 2024-10-23 PROCEDURE — 6360000002 HC RX W HCPCS

## 2024-10-23 PROCEDURE — 99233 SBSQ HOSP IP/OBS HIGH 50: CPT | Performed by: INTERNAL MEDICINE

## 2024-10-23 PROCEDURE — 85025 COMPLETE CBC W/AUTO DIFF WBC: CPT

## 2024-10-23 PROCEDURE — 99232 SBSQ HOSP IP/OBS MODERATE 35: CPT | Performed by: PSYCHIATRY & NEUROLOGY

## 2024-10-23 RX ORDER — NIFEDIPINE 90 MG/1
90 TABLET, FILM COATED, EXTENDED RELEASE ORAL NIGHTLY
Status: DISCONTINUED | OUTPATIENT
Start: 2024-10-23 | End: 2024-10-24 | Stop reason: HOSPADM

## 2024-10-23 RX ORDER — DIPHENHYDRAMINE HYDROCHLORIDE 50 MG/ML
12.5 INJECTION INTRAMUSCULAR; INTRAVENOUS EVERY 6 HOURS PRN
Status: DISCONTINUED | OUTPATIENT
Start: 2024-10-23 | End: 2024-10-24 | Stop reason: HOSPADM

## 2024-10-23 RX ORDER — ISOSORBIDE MONONITRATE 30 MG/1
30 TABLET, EXTENDED RELEASE ORAL DAILY
Status: DISCONTINUED | OUTPATIENT
Start: 2024-10-23 | End: 2024-10-24 | Stop reason: HOSPADM

## 2024-10-23 RX ADMIN — SUCRALFATE 1 G: 1 TABLET ORAL at 08:48

## 2024-10-23 RX ADMIN — PANTOPRAZOLE SODIUM 40 MG: 40 TABLET, DELAYED RELEASE ORAL at 05:49

## 2024-10-23 RX ADMIN — PANCRELIPASE LIPASE, PANCRELIPASE PROTEASE, PANCRELIPASE AMYLASE 5000 UNITS: 5000; 17000; 24000 CAPSULE, DELAYED RELEASE ORAL at 08:48

## 2024-10-23 RX ADMIN — PIPERACILLIN AND TAZOBACTAM 3375 MG: 3; .375 INJECTION, POWDER, LYOPHILIZED, FOR SOLUTION INTRAVENOUS at 15:37

## 2024-10-23 RX ADMIN — HYDRALAZINE HYDROCHLORIDE 10 MG: 20 INJECTION INTRAMUSCULAR; INTRAVENOUS at 09:37

## 2024-10-23 RX ADMIN — PANCRELIPASE LIPASE, PANCRELIPASE PROTEASE, PANCRELIPASE AMYLASE 5000 UNITS: 5000; 17000; 24000 CAPSULE, DELAYED RELEASE ORAL at 21:03

## 2024-10-23 RX ADMIN — HYDRALAZINE HYDROCHLORIDE 10 MG: 20 INJECTION INTRAMUSCULAR; INTRAVENOUS at 02:55

## 2024-10-23 RX ADMIN — NIFEDIPINE 90 MG: 90 TABLET, EXTENDED RELEASE ORAL at 15:34

## 2024-10-23 RX ADMIN — PIPERACILLIN AND TAZOBACTAM 3375 MG: 3; .375 INJECTION, POWDER, LYOPHILIZED, FOR SOLUTION INTRAVENOUS at 04:11

## 2024-10-23 RX ADMIN — TRAZODONE HYDROCHLORIDE 50 MG: 50 TABLET ORAL at 21:06

## 2024-10-23 RX ADMIN — NICARDIPINE HYDROCHLORIDE 8 MG/HR: 25 INJECTION INTRAVENOUS at 08:20

## 2024-10-23 RX ADMIN — SUCRALFATE 1 G: 1 TABLET ORAL at 13:49

## 2024-10-23 RX ADMIN — HYDRALAZINE HYDROCHLORIDE 100 MG: 50 TABLET ORAL at 08:48

## 2024-10-23 RX ADMIN — OXYCODONE HYDROCHLORIDE 5 MG: 5 TABLET ORAL at 13:48

## 2024-10-23 RX ADMIN — NICARDIPINE HYDROCHLORIDE 10.5 MG/HR: 25 INJECTION INTRAVENOUS at 10:45

## 2024-10-23 RX ADMIN — DIPHENHYDRAMINE HYDROCHLORIDE 25 MG: 50 INJECTION, SOLUTION INTRAMUSCULAR; INTRAVENOUS at 07:07

## 2024-10-23 RX ADMIN — OXYCODONE HYDROCHLORIDE 5 MG: 5 TABLET ORAL at 05:53

## 2024-10-23 RX ADMIN — DIPHENHYDRAMINE HYDROCHLORIDE 12.5 MG: 50 INJECTION INTRAMUSCULAR; INTRAVENOUS at 13:00

## 2024-10-23 RX ADMIN — OXYCODONE HYDROCHLORIDE 5 MG: 5 TABLET ORAL at 00:11

## 2024-10-23 RX ADMIN — NICARDIPINE HYDROCHLORIDE 5.5 MG/HR: 25 INJECTION INTRAVENOUS at 13:45

## 2024-10-23 RX ADMIN — OXYCODONE HYDROCHLORIDE 5 MG: 5 TABLET ORAL at 21:05

## 2024-10-23 RX ADMIN — CARVEDILOL 12.5 MG: 12.5 TABLET, FILM COATED ORAL at 08:48

## 2024-10-23 RX ADMIN — SODIUM CHLORIDE, PRESERVATIVE FREE 10 ML: 5 INJECTION INTRAVENOUS at 08:49

## 2024-10-23 RX ADMIN — DIPHENHYDRAMINE HYDROCHLORIDE 12.5 MG: 50 INJECTION INTRAMUSCULAR; INTRAVENOUS at 21:13

## 2024-10-23 RX ADMIN — NICARDIPINE HYDROCHLORIDE 5.5 MG/HR: 25 INJECTION INTRAVENOUS at 04:12

## 2024-10-23 RX ADMIN — SERTRALINE 50 MG: 50 TABLET, FILM COATED ORAL at 08:49

## 2024-10-23 RX ADMIN — SODIUM CHLORIDE, PRESERVATIVE FREE 10 ML: 5 INJECTION INTRAVENOUS at 21:19

## 2024-10-23 RX ADMIN — PANCRELIPASE LIPASE, PANCRELIPASE PROTEASE, PANCRELIPASE AMYLASE 5000 UNITS: 5000; 17000; 24000 CAPSULE, DELAYED RELEASE ORAL at 11:21

## 2024-10-23 RX ADMIN — ISOSORBIDE MONONITRATE 30 MG: 30 TABLET, EXTENDED RELEASE ORAL at 09:17

## 2024-10-23 RX ADMIN — VALSARTAN 80 MG: 80 TABLET ORAL at 21:07

## 2024-10-23 RX ADMIN — SUCRALFATE 1 G: 1 TABLET ORAL at 21:07

## 2024-10-23 RX ADMIN — HYDRALAZINE HYDROCHLORIDE 100 MG: 50 TABLET ORAL at 13:00

## 2024-10-23 ASSESSMENT — PAIN DESCRIPTION - ORIENTATION
ORIENTATION: LOWER;LEFT
ORIENTATION: LEFT;LOWER
ORIENTATION: MID
ORIENTATION: MID
ORIENTATION: LEFT;LOWER
ORIENTATION: LEFT;LOWER

## 2024-10-23 ASSESSMENT — PAIN DESCRIPTION - PAIN TYPE
TYPE: ACUTE PAIN
TYPE: CHRONIC PAIN

## 2024-10-23 ASSESSMENT — PAIN - FUNCTIONAL ASSESSMENT
PAIN_FUNCTIONAL_ASSESSMENT: ACTIVITIES ARE NOT PREVENTED

## 2024-10-23 ASSESSMENT — PAIN DESCRIPTION - LOCATION
LOCATION: ABDOMEN

## 2024-10-23 ASSESSMENT — PAIN SCALES - WONG BAKER: WONGBAKER_NUMERICALRESPONSE: HURTS A LITTLE BIT

## 2024-10-23 ASSESSMENT — PAIN DESCRIPTION - DESCRIPTORS
DESCRIPTORS: SHARP;STABBING
DESCRIPTORS: STABBING
DESCRIPTORS: ACHING;CRAMPING
DESCRIPTORS: ACHING
DESCRIPTORS: SHARP;STABBING

## 2024-10-23 ASSESSMENT — PAIN SCALES - GENERAL
PAINLEVEL_OUTOF10: 7
PAINLEVEL_OUTOF10: 6
PAINLEVEL_OUTOF10: 7
PAINLEVEL_OUTOF10: 8
PAINLEVEL_OUTOF10: 4
PAINLEVEL_OUTOF10: 3

## 2024-10-23 ASSESSMENT — PAIN DESCRIPTION - FREQUENCY
FREQUENCY: INTERMITTENT
FREQUENCY: CONTINUOUS

## 2024-10-23 ASSESSMENT — PAIN DESCRIPTION - ONSET: ONSET: GRADUAL

## 2024-10-23 ASSESSMENT — ENCOUNTER SYMPTOMS
VOMITING: 0
ABDOMINAL PAIN: 1
COUGH: 0
DIARRHEA: 0
SHORTNESS OF BREATH: 0
NAUSEA: 0

## 2024-10-23 NOTE — CARE COORDINATION
ONGOING DISCHARGE PLAN:    Patient is alert and oriented x4.    Spoke with patient regarding discharge plan and patient confirms that plan is still home. Denies needs for VNS.    GI consult  Plan for colonoscopy once off cardene drip  Full liquid diet    Hgb 10.5    IV zosyn    Nephro consult-cardene drip, med changes   ESRD    Psych consult-severe anxiety  Continue zoloft daily    Follows with [facility] psychiatry treatments    Will continue to follow for additional discharge needs.    If patient is discharged prior to next notation, then this note serves as note for discharge by case management.    Electronically signed by Lizet Macdonald RN on 10/23/2024 at 10:58 AM

## 2024-10-23 NOTE — DIALYSIS
HEMODIALYSIS PRE-TREATMENT NOTE    Patient Identifiers prior to treatment: Name, , MRN    Isolation Required: MRSA                      Isolation Type: contact       (please document if patient is being managed as a PUI/COVID-19 patient)        Hepatitis status:                           Date Drawn                             Result  Hepatitis B Surface Antigen 10/22/24     Neg                     Hepatitis B Surface Antibody 10/22/24 <3.5        Hepatitis B Core Antibody 10/22/24 Neg          How was Hepatitis Status verified: yes     Was a copy of the labs you documented provided to facility for the patient's chart: yes    Hemodialysis orders verified: yes per Dr Lopez at 0935    Access Within normal limits ( I.e. s/s of infection,...): No redness, edema or drainage.     Pre-Assessment completed: yes    Pre-dialysis report received from: Vesna RAWLS                      Time: 7495

## 2024-10-23 NOTE — PLAN OF CARE
Problem: Chronic Conditions and Co-morbidities  Goal: Patient's chronic conditions and co-morbidity symptoms are monitored and maintained or improved  Outcome: Progressing     Problem: Discharge Planning  Goal: Discharge to home or other facility with appropriate resources  Outcome: Progressing     Problem: Pain  Goal: Verbalizes/displays adequate comfort level or baseline comfort level  Outcome: Progressing  Flowsheets (Taken 10/23/2024 0358)  Verbalizes/displays adequate comfort level or baseline comfort level:   Implement non-pharmacological measures as appropriate and evaluate response   Assess pain using appropriate pain scale     Problem: Safety - Adult  Goal: Free from fall injury  Outcome: Progressing  Flowsheets (Taken 10/23/2024 0358)  Free From Fall Injury: Based on caregiver fall risk screen, instruct family/caregiver to ask for assistance with transferring infant if caregiver noted to have fall risk factors  Note: Call light within reach, safety socks on     Problem: ABCDS Injury Assessment  Goal: Absence of physical injury  Outcome: Progressing     Problem: Respiratory - Adult  Goal: Achieves optimal ventilation and oxygenation  Outcome: Progressing     Problem: Cardiovascular - Adult  Goal: Maintains optimal cardiac output and hemodynamic stability  Outcome: Progressing  Flowsheets (Taken 10/23/2024 0358)  Maintains optimal cardiac output and hemodynamic stability:   Monitor blood pressure and heart rate   Assess for signs of decreased cardiac output     Problem: Skin/Tissue Integrity - Adult  Goal: Skin integrity remains intact  Outcome: Progressing     Problem: Gastrointestinal - Adult  Goal: Maintains or returns to baseline bowel function  Outcome: Progressing  Goal: Maintains adequate nutritional intake  Outcome: Progressing     Problem: Metabolic/Fluid and Electrolytes - Adult  Goal: Hemodynamic stability and optimal renal function maintained  Outcome: Progressing

## 2024-10-23 NOTE — ADT AUTH CERT
not showing definite colitis   Initial plans for colonoscopy which if now postponed due to patient being on cardene drip, will plan once off the drip     anemia  with thrombocytopenia iron stores are adequate  Has not had overt gi bleeding  Trend hh  Ppi      IM:     Patient seen and examined, continues to be on Cardene drip.  Add Coreg and slowly wean off Cardene  Denies any chest pain, continues to have abdominal pain, seen by GI colonoscopy likely tomorrow  Patient continues to complain of uncontrolled anxiety, asking Atarax and Ativan, consult psychiatry,  High anion gap metabolic acidosis, will check lactic acid beta-hydroxybutyrate  Chest x-ray reviewed continues to show mild pulmonary vascular congestion, nephrology on board, will need dialysis  On Zosyn  DVT prophylaxis SCDs patient allergic to heparin     NEPHRO:     continue hemodialysis Monday Wednesday Friday.  HD tomorrow.  Wean cardene ggt as tolerated. Resume Nifedipine PO when stopping cardene ggt  Increase coreg 12.5 mg bid, hold for HR less than 55 /min.  Can also add ACE I or ARBs  Continue Hydralazine  Low potassium diet     MEDICATIONS:     diazePAM 5 mg PO x1     diphenhydrAMINE  Dose: 25 mg  Freq: EVERY 6 HOURS PRN Route: IV x3  niCARdipine 25 mg  Rate:  mL/hr Dose: 2.5-15 mg/hr  Freq: CONTINUOUS Route: IV  sucralfate  Dose: 1 g  Freq: 3 times daily Route: PO  piperacillin-tazobactam  Dose: 3,375 mg  Freq: EVERY 12 HOURS Route: IV  pantoprazole  Dose: 40 mg  Freq: DAILY BEFORE BREAKFAST Route: PO  carvedilol  Dose: 12.5 mg  Freq: 2 TIMES DAILY WITH MEALS Route: PO  hydrALAZINE  Dose: 10 mg  Freq: EVERY 6 HOURS PRN Route: IV x1  hydrALAZINE  Dose: 100 mg  Freq: 3 TIMES DAILY Route: PO  HYDROmorphone  Dose: 0.5 mg  Freq: EVERY 3 HOURS PRN Route: IV x3  LORazepam  Dose: 1 mg  Freq: EVERY 4 HOURS PRN Route: IV x3  oxyCODONE  Dose: 5 mg  Freq: EVERY 4 HOURS PRN Route: PO x1     ORDERS:  IV abx, low potassium diet

## 2024-10-23 NOTE — PLAN OF CARE
Problem: Discharge Planning  Goal: Discharge to home or other facility with appropriate resources  10/23/2024 0952 by Vesna Scanlon RN  Outcome: Progressing  Flowsheets (Taken 10/23/2024 0815)  Discharge to home or other facility with appropriate resources: Identify barriers to discharge with patient and caregiver  10/23/2024 0358 by Lu Horne RN  Outcome: Progressing     Problem: Pain  Goal: Verbalizes/displays adequate comfort level or baseline comfort level  10/23/2024 0952 by Vesna Scanlon RN  Outcome: Progressing  Flowsheets (Taken 10/23/2024 0952)  Verbalizes/displays adequate comfort level or baseline comfort level:   Encourage patient to monitor pain and request assistance   Assess pain using appropriate pain scale   Administer analgesics based on type and severity of pain and evaluate response  10/23/2024 0358 by Lu Horne RN  Outcome: Progressing  Flowsheets (Taken 10/23/2024 0358)  Verbalizes/displays adequate comfort level or baseline comfort level:   Implement non-pharmacological measures as appropriate and evaluate response   Assess pain using appropriate pain scale     Problem: Safety - Adult  Goal: Free from fall injury  10/23/2024 0952 by Vesna Scanlon RN  Outcome: Progressing  Flowsheets (Taken 10/23/2024 0915)  Free From Fall Injury: Instruct family/caregiver on patient safety  10/23/2024 0358 by Lu Horne RN  Outcome: Progressing  Flowsheets (Taken 10/23/2024 0358)  Free From Fall Injury: Based on caregiver fall risk screen, instruct family/caregiver to ask for assistance with transferring infant if caregiver noted to have fall risk factors  Note: Call light within reach, safety socks on     Problem: Respiratory - Adult  Goal: Achieves optimal ventilation and oxygenation  10/23/2024 0952 by Vesna Scanlon RN  Outcome: Progressing  Flowsheets (Taken 10/22/2024 1301 by Javy Evans RN)  Achieves optimal ventilation and oxygenation:   Assess

## 2024-10-24 VITALS
BODY MASS INDEX: 24.49 KG/M2 | OXYGEN SATURATION: 88 % | HEART RATE: 64 BPM | SYSTOLIC BLOOD PRESSURE: 140 MMHG | DIASTOLIC BLOOD PRESSURE: 68 MMHG | RESPIRATION RATE: 12 BRPM | HEIGHT: 70 IN | TEMPERATURE: 98.3 F | WEIGHT: 171.08 LBS

## 2024-10-24 LAB
ANION GAP SERPL CALCULATED.3IONS-SCNC: 9 MMOL/L (ref 9–16)
BASOPHILS # BLD: 0 K/UL (ref 0–0.2)
BASOPHILS NFR BLD: 1 % (ref 0–2)
BUN SERPL-MCNC: 10 MG/DL (ref 6–20)
CALCIUM SERPL-MCNC: 8.1 MG/DL (ref 8.6–10.4)
CHLORIDE SERPL-SCNC: 102 MMOL/L (ref 98–107)
CO2 SERPL-SCNC: 26 MMOL/L (ref 20–31)
CREAT SERPL-MCNC: 4.7 MG/DL (ref 0.7–1.2)
EOSINOPHIL # BLD: 0.2 K/UL (ref 0–0.4)
EOSINOPHILS RELATIVE PERCENT: 5 % (ref 0–4)
ERYTHROCYTE [DISTWIDTH] IN BLOOD BY AUTOMATED COUNT: 15.5 % (ref 11.5–14.9)
GFR, ESTIMATED: 14 ML/MIN/1.73M2
GLUCOSE BLD-MCNC: 131 MG/DL (ref 75–110)
GLUCOSE BLD-MCNC: 246 MG/DL (ref 75–110)
GLUCOSE BLD-MCNC: 93 MG/DL (ref 75–110)
GLUCOSE SERPL-MCNC: 80 MG/DL (ref 74–99)
HCT VFR BLD AUTO: 29.9 % (ref 41–53)
HGB BLD-MCNC: 10 G/DL (ref 13.5–17.5)
LYMPHOCYTES NFR BLD: 0.4 K/UL (ref 1–4.8)
LYMPHOCYTES RELATIVE PERCENT: 13 % (ref 24–44)
MCH RBC QN AUTO: 31.9 PG (ref 26–34)
MCHC RBC AUTO-ENTMCNC: 33.3 G/DL (ref 31–37)
MCV RBC AUTO: 95.9 FL (ref 80–100)
MONOCYTES NFR BLD: 0.4 K/UL (ref 0.1–1.3)
MONOCYTES NFR BLD: 13 % (ref 1–7)
NEUTROPHILS NFR BLD: 68 % (ref 36–66)
NEUTS SEG NFR BLD: 2.1 K/UL (ref 1.3–9.1)
PLATELET # BLD AUTO: 82 K/UL (ref 150–450)
PMV BLD AUTO: 7.2 FL (ref 6–12)
POTASSIUM SERPL-SCNC: 3.8 MMOL/L (ref 3.7–5.3)
RBC # BLD AUTO: 3.12 M/UL (ref 4.5–5.9)
SODIUM SERPL-SCNC: 137 MMOL/L (ref 136–145)
WBC OTHER # BLD: 3.1 K/UL (ref 3.5–11)

## 2024-10-24 PROCEDURE — 36415 COLL VENOUS BLD VENIPUNCTURE: CPT

## 2024-10-24 PROCEDURE — 82947 ASSAY GLUCOSE BLOOD QUANT: CPT

## 2024-10-24 PROCEDURE — 99232 SBSQ HOSP IP/OBS MODERATE 35: CPT | Performed by: PSYCHIATRY & NEUROLOGY

## 2024-10-24 PROCEDURE — 80048 BASIC METABOLIC PNL TOTAL CA: CPT

## 2024-10-24 PROCEDURE — 6370000000 HC RX 637 (ALT 250 FOR IP): Performed by: NURSE PRACTITIONER

## 2024-10-24 PROCEDURE — 6360000002 HC RX W HCPCS

## 2024-10-24 PROCEDURE — 6360000002 HC RX W HCPCS: Performed by: INTERNAL MEDICINE

## 2024-10-24 PROCEDURE — 6370000000 HC RX 637 (ALT 250 FOR IP)

## 2024-10-24 PROCEDURE — 99231 SBSQ HOSP IP/OBS SF/LOW 25: CPT | Performed by: INTERNAL MEDICINE

## 2024-10-24 PROCEDURE — 6370000000 HC RX 637 (ALT 250 FOR IP): Performed by: INTERNAL MEDICINE

## 2024-10-24 PROCEDURE — 99233 SBSQ HOSP IP/OBS HIGH 50: CPT | Performed by: INTERNAL MEDICINE

## 2024-10-24 PROCEDURE — 85025 COMPLETE CBC W/AUTO DIFF WBC: CPT

## 2024-10-24 PROCEDURE — 2580000003 HC RX 258

## 2024-10-24 PROCEDURE — APPSS30 APP SPLIT SHARED TIME 16-30 MINUTES: Performed by: NURSE PRACTITIONER

## 2024-10-24 RX ORDER — CIPROFLOXACIN 500 MG/1
500 TABLET, FILM COATED ORAL 2 TIMES DAILY
Qty: 8 TABLET | Refills: 0 | Status: SHIPPED | OUTPATIENT
Start: 2024-10-24 | End: 2024-10-28

## 2024-10-24 RX ORDER — BUSPIRONE HYDROCHLORIDE 10 MG/1
10 TABLET ORAL 3 TIMES DAILY
Status: DISCONTINUED | OUTPATIENT
Start: 2024-10-24 | End: 2024-10-24 | Stop reason: HOSPADM

## 2024-10-24 RX ORDER — DOPAMINE HYDROCHLORIDE 160 MG/100ML
INJECTION, SOLUTION INTRAVENOUS
Status: DISCONTINUED
Start: 2024-10-24 | End: 2024-10-24 | Stop reason: HOSPADM

## 2024-10-24 RX ORDER — METRONIDAZOLE 500 MG/1
500 TABLET ORAL 3 TIMES DAILY
Qty: 12 TABLET | Refills: 0 | Status: SHIPPED | OUTPATIENT
Start: 2024-10-24 | End: 2024-10-28

## 2024-10-24 RX ORDER — VALSARTAN 80 MG/1
80 TABLET ORAL NIGHTLY
Qty: 30 TABLET | Refills: 1 | Status: SHIPPED | OUTPATIENT
Start: 2024-10-24

## 2024-10-24 RX ORDER — CARVEDILOL 12.5 MG/1
12.5 TABLET ORAL 2 TIMES DAILY WITH MEALS
Qty: 60 TABLET | Refills: 1 | Status: SHIPPED | OUTPATIENT
Start: 2024-10-24

## 2024-10-24 RX ORDER — BUSPIRONE HYDROCHLORIDE 10 MG/1
10 TABLET ORAL 3 TIMES DAILY
Qty: 90 TABLET | Refills: 1 | Status: SHIPPED | OUTPATIENT
Start: 2024-10-24 | End: 2024-12-23

## 2024-10-24 RX ORDER — ISOSORBIDE MONONITRATE 30 MG/1
30 TABLET, EXTENDED RELEASE ORAL DAILY
Qty: 30 TABLET | Refills: 1 | Status: SHIPPED | OUTPATIENT
Start: 2024-10-25

## 2024-10-24 RX ADMIN — OXYCODONE HYDROCHLORIDE 5 MG: 5 TABLET ORAL at 05:51

## 2024-10-24 RX ADMIN — INSULIN LISPRO 1 UNITS: 100 INJECTION, SOLUTION INTRAVENOUS; SUBCUTANEOUS at 12:17

## 2024-10-24 RX ADMIN — BUSPIRONE HYDROCHLORIDE 10 MG: 10 TABLET ORAL at 02:17

## 2024-10-24 RX ADMIN — SUCRALFATE 1 G: 1 TABLET ORAL at 08:13

## 2024-10-24 RX ADMIN — HYDRALAZINE HYDROCHLORIDE 100 MG: 50 TABLET ORAL at 08:13

## 2024-10-24 RX ADMIN — BUSPIRONE HYDROCHLORIDE 10 MG: 10 TABLET ORAL at 08:13

## 2024-10-24 RX ADMIN — SUCRALFATE 1 G: 1 TABLET ORAL at 15:26

## 2024-10-24 RX ADMIN — BUSPIRONE HYDROCHLORIDE 10 MG: 10 TABLET ORAL at 15:26

## 2024-10-24 RX ADMIN — OXYCODONE HYDROCHLORIDE 5 MG: 5 TABLET ORAL at 01:00

## 2024-10-24 RX ADMIN — PANTOPRAZOLE SODIUM 40 MG: 40 TABLET, DELAYED RELEASE ORAL at 05:35

## 2024-10-24 RX ADMIN — DIPHENHYDRAMINE HYDROCHLORIDE 12.5 MG: 50 INJECTION INTRAMUSCULAR; INTRAVENOUS at 05:35

## 2024-10-24 RX ADMIN — SERTRALINE 50 MG: 50 TABLET, FILM COATED ORAL at 08:13

## 2024-10-24 RX ADMIN — PANCRELIPASE LIPASE, PANCRELIPASE PROTEASE, PANCRELIPASE AMYLASE 5000 UNITS: 5000; 17000; 24000 CAPSULE, DELAYED RELEASE ORAL at 08:13

## 2024-10-24 RX ADMIN — OXYCODONE HYDROCHLORIDE 5 MG: 5 TABLET ORAL at 09:27

## 2024-10-24 RX ADMIN — CARVEDILOL 12.5 MG: 12.5 TABLET, FILM COATED ORAL at 08:13

## 2024-10-24 RX ADMIN — Medication 3 MG: at 00:32

## 2024-10-24 RX ADMIN — DIPHENHYDRAMINE HYDROCHLORIDE 12.5 MG: 50 INJECTION INTRAMUSCULAR; INTRAVENOUS at 12:17

## 2024-10-24 RX ADMIN — HYDRALAZINE HYDROCHLORIDE 100 MG: 50 TABLET ORAL at 15:26

## 2024-10-24 RX ADMIN — PIPERACILLIN AND TAZOBACTAM 3375 MG: 3; .375 INJECTION, POWDER, LYOPHILIZED, FOR SOLUTION INTRAVENOUS at 04:27

## 2024-10-24 RX ADMIN — PANCRELIPASE LIPASE, PANCRELIPASE PROTEASE, PANCRELIPASE AMYLASE 5000 UNITS: 5000; 17000; 24000 CAPSULE, DELAYED RELEASE ORAL at 12:18

## 2024-10-24 RX ADMIN — ISOSORBIDE MONONITRATE 30 MG: 30 TABLET, EXTENDED RELEASE ORAL at 08:13

## 2024-10-24 ASSESSMENT — ENCOUNTER SYMPTOMS
VOMITING: 0
NAUSEA: 0
ABDOMINAL PAIN: 1
DIARRHEA: 0
SHORTNESS OF BREATH: 0
COUGH: 0

## 2024-10-24 ASSESSMENT — PAIN DESCRIPTION - ORIENTATION
ORIENTATION: LOWER;LEFT
ORIENTATION: RIGHT;LEFT;MID
ORIENTATION: LEFT;LOWER

## 2024-10-24 ASSESSMENT — PAIN DESCRIPTION - LOCATION
LOCATION: ABDOMEN
LOCATION: GENERALIZED
LOCATION: ABDOMEN

## 2024-10-24 ASSESSMENT — PAIN - FUNCTIONAL ASSESSMENT
PAIN_FUNCTIONAL_ASSESSMENT: ACTIVITIES ARE NOT PREVENTED
PAIN_FUNCTIONAL_ASSESSMENT: ACTIVITIES ARE NOT PREVENTED
PAIN_FUNCTIONAL_ASSESSMENT: PREVENTS OR INTERFERES SOME ACTIVE ACTIVITIES AND ADLS

## 2024-10-24 ASSESSMENT — PAIN DESCRIPTION - DESCRIPTORS
DESCRIPTORS: ACHING;SHARP;STABBING
DESCRIPTORS: ACHING;SHARP;STABBING
DESCRIPTORS: ACHING

## 2024-10-24 ASSESSMENT — PAIN SCALES - GENERAL
PAINLEVEL_OUTOF10: 0
PAINLEVEL_OUTOF10: 8
PAINLEVEL_OUTOF10: 8
PAINLEVEL_OUTOF10: 4
PAINLEVEL_OUTOF10: 8

## 2024-10-24 NOTE — PLAN OF CARE
Problem: Chronic Conditions and Co-morbidities  Goal: Patient's chronic conditions and co-morbidity symptoms are monitored and maintained or improved  10/24/2024 1516 by Yaritza Vines RN  Flowsheets (Taken 10/24/2024 1516)  Care Plan - Patient's Chronic Conditions and Co-Morbidity Symptoms are Monitored and Maintained or Improved:   Monitor and assess patient's chronic conditions and comorbid symptoms for stability, deterioration, or improvement   Collaborate with multidisciplinary team to address chronic and comorbid conditions and prevent exacerbation or deterioration   Update acute care plan with appropriate goals if chronic or comorbid symptoms are exacerbated and prevent overall improvement and discharge  Note: Patient will receive HD tmr,Cr improved today towards baseline.  BP remains less than px requirements to maintain off cardene drip.  Up to chair today, and walking in room.  Anxiety appears to be under control. Benedryl prn given for itching , nestor given for pain.  Awaiting GI input for plans for colonscopy.   10/24/2024 0221 by Milagros Kwan RN  Outcome: Progressing  Flowsheets (Taken 10/23/2024 2100)  Care Plan - Patient's Chronic Conditions and Co-Morbidity Symptoms are Monitored and Maintained or Improved:   Monitor and assess patient's chronic conditions and comorbid symptoms for stability, deterioration, or improvement   Collaborate with multidisciplinary team to address chronic and comorbid conditions and prevent exacerbation or deterioration   Update acute care plan with appropriate goals if chronic or comorbid symptoms are exacerbated and prevent overall improvement and discharge

## 2024-10-24 NOTE — PLAN OF CARE
Problem: Chronic Conditions and Co-morbidities  Goal: Patient's chronic conditions and co-morbidity symptoms are monitored and maintained or improved  Outcome: Progressing  Flowsheets (Taken 10/23/2024 2100)  Care Plan - Patient's Chronic Conditions and Co-Morbidity Symptoms are Monitored and Maintained or Improved:   Monitor and assess patient's chronic conditions and comorbid symptoms for stability, deterioration, or improvement   Collaborate with multidisciplinary team to address chronic and comorbid conditions and prevent exacerbation or deterioration   Update acute care plan with appropriate goals if chronic or comorbid symptoms are exacerbated and prevent overall improvement and discharge     Problem: Discharge Planning  Goal: Discharge to home or other facility with appropriate resources  Outcome: Progressing  Flowsheets (Taken 10/23/2024 2100)  Discharge to home or other facility with appropriate resources: Identify barriers to discharge with patient and caregiver     Problem: Pain  Goal: Verbalizes/displays adequate comfort level or baseline comfort level  Outcome: Progressing  Flowsheets (Taken 10/23/2024 2100)  Verbalizes/displays adequate comfort level or baseline comfort level:   Encourage patient to monitor pain and request assistance   Assess pain using appropriate pain scale   Administer analgesics based on type and severity of pain and evaluate response   Implement non-pharmacological measures as appropriate and evaluate response      Problem: ABCDS Injury Assessment  Goal: Absence of physical injury  Outcome: Progressing  Flowsheets (Taken 10/23/2024 2100)  Absence of Physical Injury: Implement safety measures based on patient assessment     Problem: Respiratory - Adult  Goal: Achieves optimal ventilation and oxygenation  Outcome: Progressing  Flowsheets (Taken 10/23/2024 2100)  Achieves optimal ventilation and oxygenation:   Assess for changes in respiratory status   Assess for changes in

## 2024-10-24 NOTE — CARE COORDINATION
ONGOING DISCHARGE PLAN:    Patient is alert and oriented x4.    Spoke with patient regarding discharge plan and patient confirms that plan is still home. Denies needs for VNS.    GI consult-possible colonoscopy  Full liquid diet    Hgb 10.0    IV zosyn    Nephro consult-cardene drip off  med changes   ESRD-MWF    Psych consult-severe anxiety  Continue zoloft daily  Buspar added    Follows with [facility] psychiatry treatments    Will continue to follow for additional discharge needs.    If patient is discharged prior to next notation, then this note serves as note for discharge by case management.    Electronically signed by Lizet Macdonald RN on 10/24/2024 at 1:48 PM

## 2024-10-24 NOTE — DISCHARGE INSTRUCTIONS
Please monitor blood pressure closely at home.  Take your medications as prescribed.  If it is persistently above 140/100, reach out to your PCP.  You need to follow-up with your family doctor.  Please follow-up with the stomach doctor, referral included, you need an outpatient colonoscopy.  If your symptoms recur please go to the nearest emergency department.  
none

## 2024-10-24 NOTE — DIALYSIS
HEMODIALYSIS POST TREATMENT NOTE    Treatment time ordered: 4 hours    Actual treatment time: 4 hours    UltraFiltration Goal: 2 to 3 Kg  UltraFiltration Removed: 3 Kg      Pre Treatment weight: 80.6 Kg  Post Treatment weight: 77.6 Kg  Estimated Dry Weight: 83.9 Kg    Access used:     Central Venous Catheter:          Tunneled or Non-tunneled: na           Site: na          Access Flow: na      Internal Access:       AV Fistula or AV Graft: AVF         Site: left upper arm       Access Flow: 400       Sign and symptoms of infection: none       If YES: na    Medications or blood products given: none    Chronic outpatient schedule: M/W/F    Chronic outpatient unit: [facility]    Summary of response to treatment: Pt tolerated treatment well. Removed 3 kg easily without any issues. Patient did not require any medication for BP support. No complaints voiced at time of discharge.    Explain if orders NOT met, was physician notified:Orders met      ACES flowsheet faxed to patient unit/ placed in patient chart: yes    Post assessment completed: yes    Report given to: Milagros RAWLS      * Intra-treatment documented Safety Checks include the followin) Access and face visible at all times.     2) All connections and blood lines are secure with no kinks.     3) NVL alarm engaged.     4) Hemosafe device applied (if applicable).     5) No collapse of Arterial or Venous blood chambers.     6) All blood lines / pump segments in the air detectors.

## 2024-10-25 ENCOUNTER — CARE COORDINATION (OUTPATIENT)
Dept: CARE COORDINATION | Age: 59
End: 2024-10-25

## 2024-10-25 NOTE — PROGRESS NOTES
[facility] Internal Medicine  Lenard Jonas MD; Steffen Herman MD; Maninder Ramirez MD; MD Karolina Naik MD; Karley Cartwright MD; Yesi Barriga MD; Svetlana Rao MD    [facility] Internal Medicine   IN-PATIENT SERVICE   [facility]    DAILY PROGRESS NOTE            Date:   10/23/2024  Patient name:  Isai Gonzalez  Date of admission:  10/20/2024 11:27 PM  MRN:   [id number]  Account:  [id number]  YOB: 1965  PCP:    Jose David Grover MD  Room:   2014/2014-01  Code Status:    Full Code    Chief Complaint:     Chief Complaint   Patient presents with    Hernia    Abdominal Pain      Subjective:     Anxious overnight, received Valium x 1, trazodone, Benadryl  Dilaudid and Ativan discontinued per psychiatry due to concern for developing opioid dependence    Patient seen and examined at bedside this morning.  Continues to report anxiety  He is lethargic on exam this morning but answering questions appropriately.  States that he was unable to sleep last night due to the anxiety  Continues to be hypertensive this morning, /69 on Cardene drip.  Received 2 doses of prn hydralazine / 24hrs. On p.o. Coreg, valsartan, hydralazine TID  On 2 L nasal cannula saturating appropriately  Left-sided abdominal pain and tenderness on exam.  No guarding or rigidity present.  Bowel sounds present.  Passing flatus and having bowel movements.  Acidosis resolved this morning, bicarb 21 > 16  Stable thrombocytopenia, PC 74  BM x 2  1+ pitting edema of bilateral lower extremity    History of Present Illness:     Isai Gonzalez is a 59 y.o. Non- / non  male who presents with Hernia and Abdominal Pain   and is admitted to the hospital for the management of Hyperkalemia.    According to patient, he has a history of an abdominal wall hernia, however he has experienced an increase of pain abdominal pain and shortness of breath.  He follows with [facility] 
    [facility] Internal Medicine  Lenard Jonas MD; Steffen Herman MD; Maninder Ramirez MD; MD Karolina Naik MD; Karley Cartwright MD  [facility] Internal Medicine   IN-PATIENT SERVICE  [facility]                 Date:   10/21/2024  Patientname:  Isai Gonzalez  Date of admission:  10/20/2024 11:27 PM  MRN:   [id number]  Account:  [id number]  YOB: 1965  PCP:    Jose David Grover MD  Room:   04/04  Code Status:    Full      Chief Complaint:     Chief Complaint   Patient presents with    Hernia    Abdominal Pain       History of Present Illness:     Isai Gonzalez is a 59 y.o. Non- / non  male who presents with Hernia and Abdominal Pain   and is admitted to the hospital for the management of Hyperkalemia.    According to patient, he has a history of an abdominal wall hernia, however he has experienced an increase of pain abdominal pain and shortness of breath.  He follows with [facility] for multiple abdominal surgeries.  He has end stage renal disease on hemodialysis, however he has difficulty completing his treatments due to severe anxiety.  The patient is very frustrated by this and is hoping to try a different plan of action.      Upon presentation to the ER the patient was hypertensive with a blood pressure of 160/81.  His potassium was 6.3.  Will,, calcium gluconate, insulin dextrose protocol was administered.  BUN was 53 and creatinine was 11.  T wave changes noted on EKG.  Nephrology consulted who recommended admission for dialysis.  No leukocytosis was noted.  However CT abdomen was concerning for Cardiomegaly with pulmonary edema, anasarca, and diffuse.  There was concern for colitis.  At the time of assessment the patient reports effective pain control.  Known heart murmur noted.    Chart review reveals that the patient was recently discharged from this facility earlier this month with chest pain, ESRD, colitis, and anemia.  He has a 
    [facility]    Gastroenterology Daily Progress Note      Patient:   Isai Gonzalez   :    1965   [facility]  Date:     10/22/2024  Consultant:   HEATHER Holguin - CNP, CNP      SUBJECTIVE  59 y.o. male admitted 10/20/2024 with Hyperkalemia [E87.5]  Left lower quadrant abdominal pain [R10.32] and seen for  abdominal pain. The pt was seen and examined. C/o LLQ pain, no nausea, had several non bloody stools overnight. Now on cardene drip.        OBJECTIVE  Scheduled Meds:   lipase-protease-amylase  5,000 Units Oral TID WC    hydrALAZINE  100 mg Oral TID    pantoprazole  40 mg Oral QAM AC    sucralfate  1 g Oral TID    sodium chloride flush  5-40 mL IntraVENous 2 times per day    piperacillin-tazobactam  3,375 mg IntraVENous Q12H    insulin lispro  0-4 Units SubCUTAneous 4x Daily AC & HS    sertraline  50 mg Oral Daily       Vital Signs:  BP (!) 181/65   Pulse 75   Temp 98.3 °F (36.8 °C) (Oral)   Resp 15   Ht 1.778 m (5' 10\")   Wt 78.3 kg (172 lb 9.9 oz)   SpO2 95%   BMI 24.77 kg/m²    Review of Systems - History obtained from chart review and the patient  General ROS: negative  Respiratory ROS: no cough, shortness of breath, or wheezing  Cardiovascular ROS: no chest pain or dyspnea on exertion  Gastrointestinal ROS: positive for - abdominal pain   Physical Exam:     General Appearance: alert and oriented to person, place and time, well-developed and well-nourished, in no acute distress  Skin: warm and dry, no rash or erythema  Head: normocephalic and atraumatic  Eyes: pupils equal, round, and reactive to light, extraocular eye movements intact, conjunctivae normal  ENT: hearing grossly normal bilaterally  Neck: neck supple and non tender without mass, no thyromegaly or thyroid nodules, no cervical lymphadenopathy   Pulmonary/Chest: clear to auscultation bilaterally- no wheezes, rales or rhonchi, normal air movement, no respiratory distress  Cardiovascular: 
    [facility]    Gastroenterology Daily Progress Note      Patient:   Isai Gonzalez   :    1965   [facility]  Date:     10/23/2024  Consultant:   HEATHER Holguin - CNP, CNP      SUBJECTIVE  59 y.o. male admitted 10/20/2024 with Hyperkalemia [E87.5]  Left lower quadrant abdominal pain [R10.32] and seen for colitis the pt was seen and examined. .reports decreased LLQ pain no emesis did have several non bloody formed stools and then had watery stool x1.  Hgb 10.5 remains on cardene drip        OBJECTIVE  Scheduled Meds:   carvedilol  12.5 mg Oral BID WC    valsartan  80 mg Oral Nightly    traZODone  50 mg Oral Nightly    lipase-protease-amylase  5,000 Units Oral TID WC    hydrALAZINE  100 mg Oral TID    pantoprazole  40 mg Oral QAM AC    sucralfate  1 g Oral TID    sodium chloride flush  5-40 mL IntraVENous 2 times per day    piperacillin-tazobactam  3,375 mg IntraVENous Q12H    insulin lispro  0-4 Units SubCUTAneous 4x Daily AC & HS    sertraline  50 mg Oral Daily       Vital Signs:  BP (!) 164/65   Pulse 77   Temp 97.3 °F (36.3 °C) (Oral)   Resp 16   Ht 1.778 m (5' 10\")   Wt 78.3 kg (172 lb 9.9 oz)   SpO2 95%   BMI 24.77 kg/m²    Review of Systems - History obtained from chart review and the patient  General ROS: negative  Respiratory ROS: no cough, shortness of breath, or wheezing  Cardiovascular ROS: no chest pain or dyspnea on exertion  Gastrointestinal ROS: positive for - abdominal pain   Physical Exam:     General Appearance: alert and oriented to person, place and time, well-developed and well-nourished, in no acute distress  Skin: warm and dry, no rash or erythema  Head: normocephalic and atraumatic  Eyes: pupils equal, round, and reactive to light, extraocular eye movements intact, conjunctivae normal  ENT: hearing grossly normal bilaterally  Neck: neck supple and non tender without mass, no thyromegaly or thyroid nodules, no cervical lymphadenopathy 
   10/23/24 1159   Encounter Summary   Encounter Overview/Reason Attempted Encounter   Service Provided For Patient not available  (with staff)       
  NEPHROLOGY PROGRESS NOTE    Patient :  Isai Gonzalez; 59 y.o. MRN# [id number]  Location:  2014/2014-01  Attending:  Maninder Ramirez MD  Admit Date:  10/20/2024   Hospital Day: 1      Reason for Consult: ESRD/hemodialysis      Chief Complaint: Abdominal pain    Patient seen and examined, Abdominal pain is better after dilaudid.  BP suboptimally controlled, requiring Cardene ggt since yesterday.  Patient had Dialysis yesterday  GI following , considering colonoscopy.      History of Present Illness:    This is a 59 y.o. male with significant past medical history of Chronic pancreatitis [s/p bile duct repair], Type 2 diabetes mellitus, ventral hernia complicating surgical incision, systemic hypertension and end-stage renal disease secondary to diabetic glomerulosclerosis [on routine hemodialysis on a Monday/Wednesday/Friday schedule at [facility] dialysis unit [place] using left arm AV fistula under the care of Dr. Gallagher of renal services of [place]], who presented to the emergency department last night for evaluation of abdominal pain.  Patient recently discharged from [facility] on 10/17/2024 patient was admitted for similar problem and was worked up for abdominal pain CT abdomen pelvis showed stable bilateral bowel containing ventral hernias no evidence of bowel incarceration is seen, small atrophic kidneys with nonobstructing renal calculi bilaterally.  3.2 cm right inguinal mass unchanged from previous examination.  Patient was discharged home and was advised to follow-up with Dr. Higginbotham for possible abdominal wall reconstruction.  Patient continued to have abdominal pain patient did not go to dialysis on Friday and has been cutting short his dialysis.  Labs showed potassium of 6.3 on admission serum sodium of 133 creatinine of 11.0, BUN 53 mg/dL  Patient again had CT abdomen pelvis which is unchanged from before showing bilateral small bowel containing ventral abdominal wall hernias no evidence of bowel 
  NEPHROLOGY PROGRESS NOTE    Patient :  Isai Gonzalez; 59 y.o. MRN# [id number]  Location:  2014/2014-01  Attending:  Maninder Ramirez MD  Admit Date:  10/20/2024   Hospital Day: 2      Reason for Consult: ESRD/hemodialysis      Chief Complaint: Abdominal pain    Patient seen and examined, Abdominal pain is better with pain meds.  No N/V  BP improved with cardene ggt  Patient scheduled for dialysis today  GI following , considering colonoscopy.      History of Present Illness:    This is a 59 y.o. male with significant past medical history of Chronic pancreatitis [s/p bile duct repair], Type 2 diabetes mellitus, ventral hernia complicating surgical incision, systemic hypertension and end-stage renal disease secondary to diabetic glomerulosclerosis [on routine hemodialysis on a Monday/Wednesday/Friday schedule at [facility] dialysis unit [place] using left arm AV fistula under the care of Dr. Gallagher of renal services of [place]], who presented to the emergency department last night for evaluation of abdominal pain.  Patient recently discharged from [facility] on 10/17/2024 patient was admitted for similar problem and was worked up for abdominal pain CT abdomen pelvis showed stable bilateral bowel containing ventral hernias no evidence of bowel incarceration is seen, small atrophic kidneys with nonobstructing renal calculi bilaterally.  3.2 cm right inguinal mass unchanged from previous examination.  Patient was discharged home and was advised to follow-up with Dr. Higginbotham for possible abdominal wall reconstruction.  Patient continued to have abdominal pain patient did not go to dialysis on Friday and has been cutting short his dialysis.  Labs showed potassium of 6.3 on admission serum sodium of 133 creatinine of 11.0, BUN 53 mg/dL  Patient again had CT abdomen pelvis which is unchanged from before showing bilateral small bowel containing ventral abdominal wall hernias no evidence of bowel obstruction, shows 
ADMIT TO PCU      Admit from: [facility]    Residence prior to admit: home with spouse    Details that led to admit: Patient states that he has had abdominal pain for the last few weeks.  Patient has an extensive abdominal surgery history and the issue has not subsided since last surgery.  Patient does have hernia in LLQ abdomen.  Patient is a dialysis patient, M-W-F and an occassional fourth day.  Patient states that for the last month, he has been unable to sit during a full treatment and only able to sit for about 1-1.5hr d/t anxiety.  Patient ambulates per self.  Patient is on room air when awake and 2L O2 when sleeping.      Services prior to admit: none    Patient is in bed.  Patient is A&Ox4 and in no distress at this time.  Bed in lowest and locked position.    Call light within patient's reach.    Telemetry applied.  See posted strip in the chart.  Vitals obtained at time of admit to PCU 2084.  Vitals:    10/21/24 0415 10/21/24 0450 10/21/24 0458 10/21/24 0514   BP: (!) 164/71 (!) 180/81     Pulse: 81 77     Resp: 14 18  16   Temp: 97.8 °F (36.6 °C) 98 °F (36.7 °C)     TempSrc: Oral Oral     SpO2: 95% 95%     Weight:   90.4 kg (199 lb 4.7 oz)    Height:   1.778 m (5' 10\")          
Colonoscopy prep to be stopped per GI NP. No plans for colonoscopy while pt is on Cardene gtt per MAURICE Cgale NP  
Dr Imam galindo at bedside. Updates given on Cardene gtt, vitals, and oral medications. MD to increase BP medications. Awaiting orders.   
Dr Lopez aware of creatinine and potassium levels.  
Notified Dr Rao about blood pressures being 220/108. Pt denies any symptoms.   
Patient blood glucose 125    
Patient inquiring about advancing diet, MAURICE Cagle NP notified. Ok to advance to full liquid diet.   
Patient is complaining of anxiety and stated that he is having a \"panic attack\". Patient agreed to take Melatonin for now but is still concern of getting medication for his anxiety. Writer reached out to FATMATA Panda thru [employer]. No new medications were ordered for now but patient can be given Benadryl when it's due.   
Patient placed on nasal cannula 1L/min due to desat to 85% while relaxing in bed.  
Patient transferred to ICU 2014, vitals and assessment obtained. Patient oriented to room and surroundings, call light in reach.   
Patient was brought back to room 2014, status post hemodialysis. Attached to cardiac monitor, NIBP and pulse oximeter. Assessment by writer to follow.  
Patient woke up demanding ativan, after receiving percocet at 0015, trazodone and valium at 2100 for nightly medication regimen. Writer educated patient that ativan is discontinued per psych MD recommendations. Patient also receiving benadryl every 6 hours for itching, but writer has not observed patient itching at all.     Patient began stating he is anxious still, and writer educated him on non pharmacological ways to manage pain and anxiety. Writer told the patient that if he continues to receive high/frequent  amounts of ativan, benadryl, dilaudid, percocet, and trazodone it can knock his respiratory effort out and runs the risk of needing intubated.     Patient stating he would like to leave AMA. Writer educated patient on the risks of him leaving the hospital. NP notified.     Writer left the room, and reentered the room with NP. Patient observed resting quietly in bed.     
Patients blood glucose 131    
Per Nephmadi Lopez wean Cardene gtt okay with systolic 160/170. Start home dose of PO nifedipine when gtt off.     1510- Gtt off reorder home mediation.   
[facility] [employer] sent to Dr Rao regarding BP of 230/101 in dialysis with an hour left. Hydralazine PRN order is not due til 3:50. Asking for something else.   
Perfect serve sent to dr gaytan for a psych consult.  
Pt frequently complaining of pain and anxiety before PRN meds can be given again. PRNs currently receiving are IV Dilaudid 0.5mg q3 and IV Ativan 1mg q4. Dr. Barriga notified via secure message. Dr. Barriga to place orders.  
Pt taken to ICU on cardiac monitor in the bed. His belongings were taken with him. Pt was hooked up to ICU monitor before PCU staff left the room and ICU RN was at the bedside   
Pt to HD. PO BP meds administered prior. IV atb started all questions answered.   
Spiritual Health History and Assessment/Progress Note  [facility]    (P) Spiritual/Emotional Needs,  ,  ,      Name: Isai Gonzalez MRN: [id number]    Age: 59 y.o.     Sex: male   Language: English   Roman Catholic: None   Hyperkalemia     Date: 10/24/2024            Total Time Calculated: (P) 5 min              Spiritual Assessment began in [facility] ICU        Referral/Consult From: (P) Rounding   Encounter Overview/Reason: (P) Spiritual/Emotional Needs  Service Provided For: (P) Patient    PT expressed his mason with his son which makes him feel better and heal faster. Welcomed prayer.     Ghada, Belief, Meaning:   Patient identifies as spiritual  Family/Friends No family/friends present      Importance and Influence:  Patient has spiritual/personal beliefs that influence decisions regarding their health  Family/Friends No family/friends present    Community:  Patient feels well-supported. Support system includes: Spouse/Partner, Children, and Extended family  Family/Friends No family/friends present    Assessment and Plan of Care:     Patient Interventions include: Facilitated expression of thoughts and feelings, Explored spiritual coping/struggle/distress, and Affirmed coping skills/support systems  Family/Friends Interventions include: No family/friends present    Patient Plan of Care: Spiritual Care available upon further referral  Family/Friends Plan of Care: No family/friends present    Electronically signed by Chaplain Ewa on 10/24/2024 at 4:07 PM    
Writer called pharmacy staff to inform that Zenpep was not given earlier since the patient was in dialysis. According to pharmacist Medardo, medication can still be given at 9PM.   
Writer called pt's wife to update her of him being transferred to ICU for a gtt to help his BP. His wife stated ok and no further questions were asked. Writer gave her the nurse's station phone number to ICU.   
Writer spoke to the patient and he verbalized that she is still having anxiety and panic attack. Writer stayed in the patient's room, tried to exchange conversations to for diversion and educated the patient to take deep breathes. Writer referred the patient's complain to FATMATA Panda and order for Kwasi was noted.   
concentration and appetite.  He denies suicidal ideation, intent or plan.  He denies homicidal ideation. He denies history of del. He denies history of psychosis.  Isai does deal with significant anxiety feeling restless and on edge often.  Asked patient if he was taking his Zoloft as prescribed and he stated, \"no I only took it once.\"    Educated patient on the importance of taking Zoloft everyday or else it won't work properly.  Instructed him to use Atarax as needed throughout the day.  He does acknowledge his understanding.  He states he did not realize he was supposed to take the Zoloft daily.  Also encouraged patient to follow up with [facility], whom he states he follows up with on an outpatient basis.    At this time, patient does not require BHI.  Will continue Zoloft 50 mg daily.     The patient is currently receiving care for the above psychiatric illness.    Past Psychiatric History:  Prior Diagnosis: Generalized Anxiety Disorder  Outpatient psychiatric provider: [facility]  Psychiatric Hospitalization: no  Hx of Suicidal Attempts: no  Hx of violence:  no    Past psychiatric medications includes:   Zoloft, Atarax, Xanax    Adverse reactions from psychotropic medications:  Denies    Substance Abuse History:  ETOH: Denies  Marijuana: Denies  Opiates: Denies  Other Drugs: Denies    Social History:     RESIDENCE: [place], [place]  :     CHILDREN: 6 children  OCCUPATION: Retired .  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    Chronic pancreatitis (HCC)     Diabetes mellitus (HCC)     Diverticulosis     Hyperlipidemia     Hypertension     Mild malnutrition (HCC)     MRSA (methicillin resistant staph aureus) culture positive 02/26/2019    nares    Pancreatitis     Personal history of colonic polyps 7/14/2020       Past Surgical History:  
Poncho Wasserman DO   NIFEdipine (ADALAT CC) 90 MG extended release tablet TAKE 1 TABLET BY MOUTH AT NIGHT 3/8/24  Yes Johny Ford MD   hydrALAZINE (APRESOLINE) 100 MG tablet TAKE 1 TABLET BY MOUTH 3 TIMES  DAILY 2/12/24  Yes Johny Ford MD   omeprazole (PRILOSEC) 20 MG delayed release capsule TAKE 1 CAPSULE BY MOUTH TWICE  DAILY 12/28/23  Yes Johny Ford MD   CREON 6000-43002 units delayed release capsule TAKE ONE CAPSULE BY MOUTH 3 TIMES A DAY WITH MEALS 2/15/22  Yes Johny Ford MD   sucralfate (CARAFATE) 1 GM tablet Take 1 tablet by mouth in the morning, at noon, and at bedtime for 10 days  Patient not taking: Reported on 10/21/2024 10/14/24 10/24/24  Poncho Wasserman DO   sertraline (ZOLOFT) 25 MG tablet Take 1 tablet by mouth daily  Patient not taking: Reported on 10/21/2024 10/9/24   Yesi Barriga MD   Misc. Devices (STEP N REST II WALKER) MISC Use daily for ADL'S 2/10/22   Johny Ford MD   doxercalciferol (HECTOROL) 4 MCG/2ML injection Infuse 1.25 mLs intravenously Once in dialysis for 1 dose Given at the end of dialysis [place], Thurs, Sat 3/19/19 3/1/22  Ne Becker MD   glucose monitoring kit (FREESTYLE) monitoring kit 1 kit by Does not apply route daily 8/7/17   Mirian Hendrickson APRN - CNP   Misc. Devices MISC Check blood sugar one time a day 8/7/17   Mirian Hendrickson APRN - CNP   Insulin Pen Needle 32G X 6 MM MISC 1 Package by Does not apply route daily 6/23/17   Mirian Hendrickson APRN - CNP        Allergies:     Clonidine and Heparin    Social History:     Tobacco:    reports that he has never smoked. He has never used smokeless tobacco.  Alcohol:      reports that he does not currently use alcohol.  Drug Use:  reports no history of drug use.    Family History:     Family History   Problem Relation Age of Onset    No Known Problems Mother     No Known Problems Father        Review of Systems:     Positive and Negative as described in HPI.    CONSTITUTIONAL:  negative for fevers, chills, 
(FREESTYLE) monitoring kit 1 kit by Does not apply route daily 17   Mirian HendricksonHEATHER CNP   Misc. Devices MISC Check blood sugar one time a day 17   Mirian Hendrickson, APRN - CNP   Insulin Pen Needle 32G X 6 MM MISC 1 Package by Does not apply route daily 17   Mirian Hendrickson, APRN - CNP        Allergies:     Clonidine and Heparin    Social History:     Tobacco:    reports that he has never smoked. He has never used smokeless tobacco.  Alcohol:      reports that he does not currently use alcohol.  Drug Use:  reports no history of drug use.    Family History:     Family History   Problem Relation Age of Onset    No Known Problems Mother     No Known Problems Father        Review of Systems:     Review of Systems   Constitutional:  Negative for fever.   Respiratory:  Negative for cough and shortness of breath.    Cardiovascular:  Negative for chest pain and palpitations.   Gastrointestinal:  Positive for abdominal pain. Negative for diarrhea, nausea and vomiting.   Neurological:  Negative for dizziness, light-headedness and headaches.   Psychiatric/Behavioral:  The patient is nervous/anxious.      Physical Exam:   /64   Pulse 73   Temp 98.5 °F (36.9 °C) (Oral)   Resp 12   Ht 1.778 m (5' 10\")   Wt 77.6 kg (171 lb 1.2 oz)   SpO2 (!) 88%   BMI 24.55 kg/m²   Temp (24hrs), Av.3 °F (36.8 °C), Min:97.7 °F (36.5 °C), Max:98.6 °F (37 °C)    Recent Labs     10/23/24  1611 10/23/24  2030 10/24/24  0056 10/24/24  1102   POCGLU 138* 90 93 246*       Intake/Output Summary (Last 24 hours) at 10/24/2024 1126  Last data filed at 10/24/2024 0554  Gross per 24 hour   Intake 990.62 ml   Output 3500 ml   Net -2509.38 ml     Physical Exam  Constitutional:       Appearance: He is obese. He is not ill-appearing.   HENT:      Nose: No rhinorrhea.   Cardiovascular:      Rate and Rhythm: Normal rate and regular rhythm.   Pulmonary:      Breath sounds: No wheezing, rhonchi or rales.   Abdominal:      Tenderness: 
time.  That also included history taking follow-up physical examination and review of system.    Electronically signed by Kamlesh Silva MD       
  Muscle Strength & Tone: full ROM    Involuntary Movements: No    Mental Status Examination:  Level of consciousness:  Awake and alert  Appearance: hospital attire, lying in bed, fair grooming  Behavior/Motor:  calm  Attitude toward examiner:  cooperative, attentive and good eye contact  Speech:  Spontaneous, normal rate and volume  Mood:  euthymic  Affect: mood congruent  Thought processes:  Linear and coherent  Thought content: Denies suicidal ideations   Denies homicidal ideations    Denies hallucinations   Denies delusions  Cognition:  Oriented to self, situation, location, date  Concentration clinically adequate  Memory age appropriate  Insight & Judgment:  fair    DSM-5 DIAGNOSIS:      DESIREE      Stressors     Severity of stressors is mild  Source of stressors include:  Other psychosocial and environmental stressors    Plan:    No medication changes today.   Avoid benzodiazepines  Patient does not require admission to [facility] at this time  Would continue Zoloft 50 mg daily and instruct patient to take this medication daily.  Please have patient follow up with outpatient provider on discharge  Additional recommendations will follow the clinical course.       Thank you very much for allowing us to participate in the care of this patient.      Electronically signed by PÉREZ CHAVES MD on 10/24/2024 at 9:44 PM      Please note that this chart was generated using voice recognition Dragon dictation software.  Although every effort was made to ensure the accuracy of this automated transcription, some errors in transcription may have occurred.  I independently saw and evaluated the patient.  I reviewed the  documentation above                
10/23/24  0444 10/24/24  0447   * 133* 137   K 4.5 4.7 3.8   CL 97* 100 102   CO2 16* 21 26   BUN 17 25* 10   CREATININE 6.2* 7.6* 4.7*   GLUCOSE 71* 97 80   CALCIUM 7.9* 7.8* 8.1*         Radiology:  Reviewed as available.    Assessment:  ESRD on hemodialysis via left arm AV fistula Monday Wednesday Friday, hemodialysis today as per orders  Hyperkalemia-due to missing dialysis, patient missed dialysis on Friday  Abdominal pain,bilateral bowel containing ventral hernias, without obstruction  Anemia of chronic kidney disease  Essential hypertension-- better controlled , off of cardene ggt on oral BP meds.  Mineral bone disorder       Plan:  continue hemodialysis Monday Wednesday Friday.  HD tomorrow    continue coreg 12.5 mg bid, Hydralazine,Nifedipine 90 mg daily,Valsartan 80 mg daily   Low potassium diet  Continue current treatment    Thank you for the consultation.      Electronically signed by Elio Lopez MD on 10/24/2024 at 11:46 AM

## 2024-10-25 NOTE — CARE COORDINATION
Care Transitions Note    Initial Call - Call within 2 business days of discharge: Yes    Attempted to reach patient for transitions of care follow up. Unable to reach patient.    Outreach Attempts:   HIPAA compliant voicemail left for patient.     Patient: Isai Gonzalez    Patient : 1965   MRN: [id number]    Reason for Admission: hyperkalemia  Discharge Date: 10/24/24  RURS: Readmission Risk Score: 19.2    Last Discharge Facility       Date Complaint Diagnosis Description Type Department Provider    10/20/24 Hernia; Abdominal Pain Hyperkalemia ... ED to Hosp-Admission (Discharged) (ADMITTED) [facility] ICU Maninder Ramirez MD; Faisal, ...            Was this an external facility discharge? No    Follow Up Appointment:   Patient does not have a follow up appointment scheduled at time of call.       Plan for follow-up on next business day.      Pebbles Black RN

## 2024-10-28 ENCOUNTER — CARE COORDINATION (OUTPATIENT)
Dept: CARE COORDINATION | Age: 59
End: 2024-10-28

## 2024-10-28 NOTE — CARE COORDINATION
Care Transitions Note    Initial Call - Call within 2 business days of discharge: Yes    Attempted to reach patient for transitions of care follow up. Unable to reach patient. Second attempt, left VM, closing for BK    Outreach Attempts:   Multiple attempts to contact patient at phone numbers on file.   HIPAA compliant voicemail left for patient.     Patient: Isai Gonzalez    Patient : 1965   MRN: [id number]    Reason for Admission: hyperkalemia  Discharge Date: 10/24/24  RURS: Readmission Risk Score: 19.2    Last Discharge Facility       Date Complaint Diagnosis Description Type Department Provider    10/20/24 Hernia; Abdominal Pain Hyperkalemia ... ED to Hosp-Admission (Discharged) (ADMITTED) [facility] ICU Maninder Ramirez MD; Faisal, ...            Was this an external facility discharge? No    Follow Up Appointment:   Patient does not have a follow up appointment scheduled at time of call.       No further follow-up call indicated     Pebbles Black RN

## 2024-11-01 RX ORDER — HYDRALAZINE HYDROCHLORIDE 100 MG/1
100 TABLET, FILM COATED ORAL 3 TIMES DAILY
Qty: 300 TABLET | Refills: 2 | OUTPATIENT
Start: 2024-11-01

## 2024-11-08 RX ORDER — NIFEDIPINE 90 MG/1
90 TABLET, FILM COATED, EXTENDED RELEASE ORAL NIGHTLY
Qty: 100 TABLET | Refills: 2 | OUTPATIENT
Start: 2024-11-08

## 2024-11-18 ENCOUNTER — HOSPITAL ENCOUNTER (OUTPATIENT)
Age: 59
Setting detail: SPECIMEN
Discharge: HOME | End: 2024-11-18

## 2024-11-18 LAB
25(OH)D3 SERPL-MCNC: 22.2 NG/ML (ref 30–100)
ALBUMIN SERPL-MCNC: 4.5 G/DL (ref 3.5–5.2)
ALBUMIN/GLOB SERPL: 2.1 {RATIO} (ref 1–2.5)
ALP SERPL-CCNC: 92 U/L (ref 40–129)
ALT SERPL-CCNC: 11 U/L (ref 10–50)
ANION GAP SERPL CALCULATED.3IONS-SCNC: 21 MMOL/L (ref 9–16)
AST SERPL-CCNC: 21 U/L (ref 10–50)
BASOPHILS # BLD: 0 K/UL (ref 0–0.2)
BASOPHILS NFR BLD: 0 % (ref 0–2)
BILIRUB SERPL-MCNC: 0.4 MG/DL (ref 0–1.2)
BNP SERPL-MCNC: ABNORMAL PG/ML (ref 0–125)
BUN SERPL-MCNC: 82 MG/DL (ref 6–20)
CALCIUM SERPL-MCNC: 8.8 MG/DL (ref 8.6–10.4)
CHLORIDE SERPL-SCNC: 94 MMOL/L (ref 98–107)
CHOLEST SERPL-MCNC: 129 MG/DL (ref 0–199)
CHOLESTEROL/HDL RATIO: 2.9
CO2 SERPL-SCNC: 18 MMOL/L (ref 20–31)
CREAT SERPL-MCNC: 12.2 MG/DL (ref 0.7–1.2)
EOSINOPHIL # BLD: 0.1 K/UL (ref 0–0.4)
EOSINOPHILS RELATIVE PERCENT: 2 % (ref 1–4)
ERYTHROCYTE [DISTWIDTH] IN BLOOD BY AUTOMATED COUNT: 13.9 % (ref 11.8–14.4)
FOLATE SERPL-MCNC: 6.8 NG/ML (ref 4.8–24.2)
GFR, ESTIMATED: 4 ML/MIN/1.73M2
GLUCOSE SERPL-MCNC: 155 MG/DL (ref 74–99)
HBV CORE AB SER QL: NONREACTIVE
HBV SURFACE AB SERPL IA-ACNC: <3.5 MIU/ML
HBV SURFACE AG SERPL QL IA: NONREACTIVE
HCT VFR BLD AUTO: 33.7 % (ref 40.7–50.3)
HDLC SERPL-MCNC: 45 MG/DL
HGB BLD-MCNC: 10.9 G/DL (ref 13–17)
IMM GRANULOCYTES # BLD AUTO: 0 K/UL (ref 0–0.3)
IMM GRANULOCYTES NFR BLD: 0 %
LDLC SERPL CALC-MCNC: 69 MG/DL (ref 0–100)
LYMPHOCYTES NFR BLD: 0.99 K/UL (ref 1–4.8)
LYMPHOCYTES RELATIVE PERCENT: 19 % (ref 24–44)
MAGNESIUM SERPL-MCNC: 2.1 MG/DL (ref 1.6–2.6)
MCH RBC QN AUTO: 29.5 PG (ref 25.2–33.5)
MCHC RBC AUTO-ENTMCNC: 32.3 G/DL (ref 28.4–34.8)
MCV RBC AUTO: 91.1 FL (ref 82.6–102.9)
MONOCYTES NFR BLD: 0.31 K/UL (ref 0.1–0.8)
MONOCYTES NFR BLD: 6 % (ref 1–7)
MORPHOLOGY: NORMAL
NEUTROPHILS NFR BLD: 73 % (ref 36–66)
NEUTS SEG NFR BLD: 3.8 K/UL (ref 1.8–7.7)
NRBC BLD-RTO: 0 PER 100 WBC
PLATELET # BLD AUTO: 97 K/UL (ref 138–453)
PMV BLD AUTO: 9.7 FL (ref 8.1–13.5)
POTASSIUM SERPL-SCNC: 6.1 MMOL/L (ref 3.7–5.3)
PROT SERPL-MCNC: 6.6 G/DL (ref 6.6–8.7)
PSA SERPL-MCNC: 0.3 NG/ML (ref 0–4)
PTH-INTACT SERPL-MCNC: 197 PG/ML (ref 15–65)
RBC # BLD AUTO: 3.7 M/UL (ref 4.21–5.77)
SODIUM SERPL-SCNC: 133 MMOL/L (ref 136–145)
T PALLIDUM AB SER QL IA: NONREACTIVE
TRIGL SERPL-MCNC: 74 MG/DL
TSH SERPL DL<=0.05 MIU/L-ACNC: 3.16 UIU/ML (ref 0.27–4.2)
URATE SERPL-MCNC: 7.4 MG/DL (ref 3.4–7)
VIT B12 SERPL-MCNC: 424 PG/ML (ref 232–1245)
VLDLC SERPL CALC-MCNC: 15 MG/DL (ref 1–30)
WBC OTHER # BLD: 5.2 K/UL (ref 3.5–11.3)

## 2024-11-19 LAB — HCV AB SERPL QL IA: NONREACTIVE

## 2024-11-20 LAB
ALDOST SERPL-MCNC: 9.6 NG/DL
HCV RNA # SERPL NAA+PROBE: NOT DETECTED {COPIES}/ML
SPECIMEN SOURCE: NORMAL

## 2025-01-02 RX ORDER — BUSPIRONE HYDROCHLORIDE 10 MG/1
10 TABLET ORAL 3 TIMES DAILY
Qty: 90 TABLET | Refills: 1 | OUTPATIENT
Start: 2025-01-02

## 2025-01-06 RX ORDER — NIFEDIPINE 90 MG/1
90 TABLET, FILM COATED, EXTENDED RELEASE ORAL NIGHTLY
Qty: 60 TABLET | Refills: 5 | OUTPATIENT
Start: 2025-01-06

## 2025-01-06 RX ORDER — HYDRALAZINE HYDROCHLORIDE 100 MG/1
100 TABLET, FILM COATED ORAL 3 TIMES DAILY
Qty: 180 TABLET | Refills: 5 | OUTPATIENT
Start: 2025-01-06

## 2025-01-20 RX ORDER — BUSPIRONE HYDROCHLORIDE 10 MG/1
10 TABLET ORAL 3 TIMES DAILY
Qty: 90 TABLET | Refills: 1 | OUTPATIENT
Start: 2025-01-20

## 2025-01-22 RX ORDER — BUSPIRONE HYDROCHLORIDE 10 MG/1
10 TABLET ORAL 3 TIMES DAILY
Qty: 90 TABLET | Refills: 1 | OUTPATIENT
Start: 2025-01-22

## (undated) DEVICE — 3M™ TEGADERM™ TRANSPARENT FILM DRESSING FRAME STYLE, 1626W, 4 IN X 4-3/4 IN (10 CM X 12 CM), 50/CT 4CT/CASE: Brand: 3M™ TEGADERM™

## (undated) DEVICE — DISCONTINUED USE 394504 SYRINGE LUER LOCK TIP 12 ML

## (undated) DEVICE — ENDO KIT W/SYRINGE: Brand: MEDLINE INDUSTRIES, INC.

## (undated) DEVICE — Z DISCONTINUED GLOVE SURG SZ 7.5 L12IN FNGR THK13MIL WHT ISOLEX

## (undated) DEVICE — SUTURE VCRL + SZ 3-0 L27IN ABSRB UD L26MM SH 1/2 CIR VCP416H

## (undated) DEVICE — POLYP TRAP: Brand: TRAPEASE®

## (undated) DEVICE — PAD,NON-ADHERENT,3X8,STERILE,LF,1/PK: Brand: MEDLINE

## (undated) DEVICE — MEDI-VAC YANKAUER SUCTION HANDLE W/BULBOUS TIP: Brand: CARDINAL HEALTH

## (undated) DEVICE — SUTURE MCRYL + SZ 4-0 L27IN ABSRB UD L19MM PS-2 3/8 CIR MCP426H

## (undated) DEVICE — 1200CC GUARDIAN II: Brand: GUARDIAN

## (undated) DEVICE — GLOVE ORANGE PI 7   MSG9070

## (undated) DEVICE — 3M™ STERI-STRIP™ COMPOUND BENZOIN TINCTURE 40 BAGS/CARTON 4 CARTONS/CASE C1544: Brand: 3M™ STERI-STRIP™

## (undated) DEVICE — PENCIL ES L3M BTTN SWCH HOLSTER W/ BLDE ELECTRD EDGE

## (undated) DEVICE — STANDARD HYPODERMIC NEEDLE,POLYPROPYLENE HUB: Brand: MONOJECT

## (undated) DEVICE — FORCEPS BX L240CM WRK CHN 2.8MM STD CAP W/ NDL MIC MESH

## (undated) DEVICE — DEFENDO AIR WATER SUCTION AND BIOPSY VALVE KIT FOR  OLYMPUS: Brand: DEFENDO AIR/WATER/SUCTION AND BIOPSY VALVE

## (undated) DEVICE — INTENDED FOR TISSUE SEPARATION, AND OTHER PROCEDURES THAT REQUIRE A SHARP SURGICAL BLADE TO PUNCTURE OR CUT.: Brand: BARD-PARKER ® CARBON RIB-BACK BLADES

## (undated) DEVICE — MEDI-VAC NON-CONDUCTIVE SUCTION TUBING: Brand: CARDINAL HEALTH

## (undated) DEVICE — Device: Brand: LEVEL 1

## (undated) DEVICE — COVER LT HNDL BLU PLAS

## (undated) DEVICE — 3M™ STERI-STRIP™ REINFORCED ADHESIVE SKIN CLOSURES, R1547, 1/2 IN X 4 IN (12 MM X 100 MM), 6 STRIPS/ENVELOPE: Brand: 3M™ STERI-STRIP™

## (undated) DEVICE — BITEBLOCK 54FR W/ DENT RIM BLOX

## (undated) DEVICE — NO USE 18 MONTHS GOWN STD LG  1153D

## (undated) DEVICE — SNARE ENDOSCP L240CM LOOP W13MM DIA2.4MM SHT THROW SM OVL

## (undated) DEVICE — SOLUTION IV IRRIG POUR BRL 0.9% SODIUM CHL 2F7124

## (undated) DEVICE — SURGICAL PROCEDURE PACK PLAS C